# Patient Record
Sex: FEMALE | Race: WHITE | NOT HISPANIC OR LATINO | Employment: FULL TIME | URBAN - METROPOLITAN AREA
[De-identification: names, ages, dates, MRNs, and addresses within clinical notes are randomized per-mention and may not be internally consistent; named-entity substitution may affect disease eponyms.]

---

## 2017-01-30 ENCOUNTER — TELEPHONE (OUTPATIENT)
Dept: INTERNAL MEDICINE | Facility: CLINIC | Age: 62
End: 2017-01-30

## 2017-02-08 ENCOUNTER — TELEPHONE (OUTPATIENT)
Dept: INTERNAL MEDICINE | Facility: CLINIC | Age: 62
End: 2017-02-08

## 2017-02-08 NOTE — TELEPHONE ENCOUNTER
Please ask her to keep a log of her blood sugars for us to review at her next appointment.  For now, I recommend she continue the same dose of metformin.

## 2017-02-08 NOTE — TELEPHONE ENCOUNTER
Bydureon pen injector sent to [facility] mail order service due to medication sent to wrong pharmacy. Notified patient

## 2017-02-08 NOTE — TELEPHONE ENCOUNTER
----- Message from Marita Rivera sent at 2/8/2017 10:01 AM EST -----  Contact: pt  Pt calling prescription for Exenatide ER (BYDUREON) 2 MG pen-injector was sent to the wrong pharmacy. Pt is out , she says that if it can get sent in today [facility] will overnight the prescription for her. Could you resend?    Exenatide ER (BYDUREON) 2 MG pen-injector    To [facility]

## 2017-02-08 NOTE — TELEPHONE ENCOUNTER
----- Message from Chiquita Elliott sent at 2/8/2017  3:36 PM EST -----  Contact: pt - Dr Langston' pt - RE: Rx  Spoke w/ pt, pt informs will not get start on any insulin Rx. Pt informs that since discussed at last appt to just try and see and insurance will not cover Trulicity, pt informs will not try. Pt informs will see what numbers are between now and next appt. Pt informs if Metformin needs to be increased, could you please call in new Rx? Please advise. Thanks      Pt # [phone]

## 2017-03-15 ENCOUNTER — OFFICE VISIT (OUTPATIENT)
Dept: INTERNAL MEDICINE | Facility: CLINIC | Age: 62
End: 2017-03-15

## 2017-03-15 VITALS
BODY MASS INDEX: 33.66 KG/M2 | DIASTOLIC BLOOD PRESSURE: 74 MMHG | HEIGHT: 65 IN | WEIGHT: 202 LBS | RESPIRATION RATE: 14 BRPM | SYSTOLIC BLOOD PRESSURE: 122 MMHG

## 2017-03-15 DIAGNOSIS — I10 ESSENTIAL HYPERTENSION: ICD-10-CM

## 2017-03-15 DIAGNOSIS — E11.9 CONTROLLED TYPE 2 DIABETES MELLITUS WITHOUT COMPLICATION, WITHOUT LONG-TERM CURRENT USE OF INSULIN (HCC): Primary | ICD-10-CM

## 2017-03-15 DIAGNOSIS — E78.00 HYPERCHOLESTEROLEMIA: ICD-10-CM

## 2017-03-15 LAB
ALBUMIN SERPL-MCNC: 4.29 G/DL (ref 3.4–4.6)
ALBUMIN UR-MCNC: 23 MG/L (ref 1.3–20)
ALBUMIN/GLOB SERPL: 1.3 G/DL
ALP SERPL-CCNC: 88 U/L (ref 46–116)
ALT SERPL W P-5'-P-CCNC: 37 U/L (ref 14–59)
ANION GAP SERPL CALCULATED.3IONS-SCNC: 12 MMOL/L
AST SERPL-CCNC: 24 U/L (ref 7–37)
BACTERIA UR QL AUTO: ABNORMAL /HPF
BILIRUB SERPL-MCNC: 0.3 MG/DL (ref 0.2–1)
BILIRUB UR QL STRIP: NEGATIVE
BUN BLD-MCNC: 14 MG/DL (ref 6–22)
BUN/CREAT SERPL: 16.3 (ref 7–25)
CALCIUM SPEC-SCNC: 9.7 MG/DL (ref 8.6–10.5)
CHLORIDE SERPL-SCNC: 103 MMOL/L (ref 95–107)
CHOLEST SERPL-MCNC: 140 MG/DL (ref 0–200)
CLARITY UR: CLEAR
CO2 SERPL-SCNC: 26 MMOL/L (ref 23–32)
COD CRY URNS QL: ABNORMAL /HPF
COLOR UR: YELLOW
CREAT BLD-MCNC: 0.86 MG/DL (ref 0.55–1.02)
CREAT UR-MCNC: 190.4 MG/DL (ref 20–320)
GFR SERPL CREATININE-BSD FRML MDRD: 67 ML/MIN/1.73
GLOBULIN UR ELPH-MCNC: 3.3 GM/DL
GLUCOSE BLD-MCNC: 149 MG/DL (ref 70–100)
GLUCOSE UR STRIP-MCNC: NEGATIVE MG/DL
HBA1C MFR BLD: 6.9 % (ref 4–6)
HDLC SERPL-MCNC: 56 MG/DL (ref 40–81)
HGB UR QL STRIP.AUTO: ABNORMAL
HYALINE CASTS UR QL AUTO: ABNORMAL /LPF
KETONES UR QL STRIP: ABNORMAL
LDLC SERPL CALC-MCNC: 60 MG/DL (ref 0–100)
LDLC/HDLC SERPL: 1.07 {RATIO}
LEUKOCYTE ESTERASE UR QL STRIP.AUTO: ABNORMAL
MICROALBUMIN/CREAT UR: 12.1 MG/L (ref 1.3–30)
MUCOUS THREADS URNS QL MICRO: ABNORMAL /HPF
NITRITE UR QL STRIP: NEGATIVE
PH UR STRIP.AUTO: <=5 [PH] (ref 5–8)
POTASSIUM BLD-SCNC: 5.1 MMOL/L (ref 3.3–5.3)
PROT SERPL-MCNC: 7.6 G/DL (ref 6.3–8.4)
PROT UR QL STRIP: NEGATIVE
RBC # UR: ABNORMAL /HPF
REF LAB TEST METHOD: ABNORMAL
SODIUM BLD-SCNC: 141 MMOL/L (ref 136–145)
SP GR UR STRIP: >=1.03 (ref 1–1.03)
SQUAMOUS #/AREA URNS HPF: ABNORMAL /HPF
TRIGL SERPL-MCNC: 121 MG/DL (ref 0–150)
UROBILINOGEN UR QL STRIP: ABNORMAL
VLDLC SERPL-MCNC: 24.2 MG/DL
WBC UR QL AUTO: ABNORMAL /HPF

## 2017-03-15 PROCEDURE — 83036 HEMOGLOBIN GLYCOSYLATED A1C: CPT | Performed by: INTERNAL MEDICINE

## 2017-03-15 PROCEDURE — 36415 COLL VENOUS BLD VENIPUNCTURE: CPT | Performed by: INTERNAL MEDICINE

## 2017-03-15 PROCEDURE — 80053 COMPREHEN METABOLIC PANEL: CPT | Performed by: INTERNAL MEDICINE

## 2017-03-15 PROCEDURE — 99214 OFFICE O/P EST MOD 30 MIN: CPT | Performed by: INTERNAL MEDICINE

## 2017-03-15 PROCEDURE — 82570 ASSAY OF URINE CREATININE: CPT | Performed by: INTERNAL MEDICINE

## 2017-03-15 PROCEDURE — 80061 LIPID PANEL: CPT | Performed by: INTERNAL MEDICINE

## 2017-03-15 PROCEDURE — 81001 URINALYSIS AUTO W/SCOPE: CPT | Performed by: INTERNAL MEDICINE

## 2017-03-15 PROCEDURE — 82043 UR ALBUMIN QUANTITATIVE: CPT | Performed by: INTERNAL MEDICINE

## 2017-03-15 RX ORDER — FLUOXETINE HYDROCHLORIDE 40 MG/1
40 CAPSULE ORAL DAILY
Qty: 90 CAPSULE | Refills: 1 | Status: SHIPPED | OUTPATIENT
Start: 2017-03-15 | End: 2017-10-07 | Stop reason: SDUPTHER

## 2017-03-15 NOTE — PROGRESS NOTES
Chief Complaint   Patient presents with   • Hypertension     4 month check up    • Hyperlipidemia   • Diabetes        Subjective   Summer Jefferson is a 61 y.o. female     HPI: She comes in for follow-up of hypertension, hyperlipidemia and diabetes.  These are chronic conditions.    Diabetes:  She has diabetes type 2.  She does not have symptoms of polyuria, polydipsia or vision changes.  She does not have associated symptoms of extremity pain, numbness, tingling.  No unexplained weight loss.  She denies symptoms suggestive of hypoglycemia.  Her current treatment includes metformin,Bydureon, dietary modification and exercise. Bydureon was started soon after her last appointment when her hemoglobin A1c returned at 8.4%, higher than previously.  She is tolerating the medications well.  She is trying to follow a prudent diet and exercise regularly.  She recently had her lap band tightened.  So she thinks she is doing better with her diet.    Hyperlipidemia:  no management changes were made at her last appointment.  Her recent lipid panel done 11/14/2016 showed total cholesterol 154, triglycerides 170, HDL 49 and LDL 71.  She is currently taking a statin medication for this.  Exacerbating factors include use of thiazide diuretic and history of hypothyroidism.  Treatment complications negative for myalgias and elevated liver transaminases    Hypertension: Current treatment includes an ACE inhibitor and calcium channel blocker.  By report, there is good compliance with treatment, good tolerance of treatment.  She is also following a prudent diet and trying to exercise regularly.  She denies lightheadedness, dizziness.  No headaches, PND, dyspnea on exertion, orthopnea.    The following portions of the patient's history were reviewed and updated as appropriate: allergies, current medications, past social history, problem list, past surgical history    Review of Systems   Constitutional: Negative for activity change and  "appetite change.   HENT: Negative for nosebleeds.    Eyes: Negative for visual disturbance.   Respiratory: Negative for cough and shortness of breath.    Cardiovascular: Negative for chest pain, palpitations and leg swelling.   Neurological: Negative for headaches.   Psychiatric/Behavioral: Negative for sleep disturbance.       Visit Vitals   • /74 (BP Location: Left arm, Patient Position: Sitting, Cuff Size: Adult)   • Resp 14   • Ht 64.5\" (163.8 cm)   • Wt 202 lb (91.6 kg)   • BMI 34.14 kg/m2        Objective   Physical Exam   Constitutional: She is oriented to person, place, and time. She appears well-developed and well-nourished. No distress.   Neck: Normal carotid pulses present. Carotid bruit is not present.   Cardiovascular: Normal rate, regular rhythm, S1 normal, S2 normal and intact distal pulses.  Exam reveals no gallop and no friction rub.    No murmur heard.  Pulses:       Carotid pulses are 2+ on the right side, and 2+ on the left side.  Pulmonary/Chest: Effort normal and breath sounds normal. No respiratory distress. She has no wheezes. She has no rhonchi. She has no rales. Chest wall is not dull to percussion.   Musculoskeletal: She exhibits no edema.   Neurological: She is alert and oriented to person, place, and time.   Skin: Skin is warm and dry.   Nursing note and vitals reviewed.      Assessment/Plan   Problem List Items Addressed This Visit        Cardiovascular and Mediastinum    Hypertension    Hypercholesterolemia       Endocrine    Diabetes mellitus type 2, controlled - Primary    Relevant Orders    Comprehensive Metabolic Panel (Completed)    Lipid Panel (Completed)    Urinalysis With / Microscopic If Indicated (Completed)    Hemoglobin A1c (Completed)    Microalbumin / Creatinine Urine Ratio (Completed)    Urinalysis, Microscopic Only (Completed)            "

## 2017-04-06 ENCOUNTER — TELEPHONE (OUTPATIENT)
Dept: INTERNAL MEDICINE | Facility: CLINIC | Age: 62
End: 2017-04-06

## 2017-04-06 NOTE — TELEPHONE ENCOUNTER
----- Message from Renetta Trotter sent at 4/6/2017 11:42 AM EDT -----  Contact: Patient  Patient called regarding medication Exenatide ER (BYDUREON) 2 MG pen-injector.  Patient states every time she gives herself an injection she develops a lima bean-sized knot in her leg that can be seen with naked eye.  Only painful when first giving injection.  No discoloration.  She may take another injection on Monday so she will have insulin in her until new med is delivered, if Dr. Langston wants to prescribe new med.      Patient made aware Dr. Langston out this afternoon; states okay to make her aware tomorrow.  Please advise.     Patient:  [phone]    Pharmacy:  [facility] - [address] [phone] [facility] [phone]

## 2017-04-13 NOTE — TELEPHONE ENCOUNTER
Patient states she has multiple lumps on her stomach about the size of a lima bean, patient states she has had lumps that have lasted more than 2 months. She would like to know how long lumps will last before healing and some of the lumps are itchy. Patient would like to know if there is a better drug then Bydureon. Please advise

## 2017-04-13 NOTE — TELEPHONE ENCOUNTER
Please let her know that a small bump at the injection site is a fairly common side effect.  I think it is okay for her to continue using this medication if that is all she is noticed.

## 2017-04-14 NOTE — TELEPHONE ENCOUNTER
Please let her know that Tanzeum and Trulicity may not have that same reaction.  She may check with her pharmacist and also find out if either one is on her formulary

## 2017-06-13 RX ORDER — HYDROCHLOROTHIAZIDE 25 MG/1
TABLET ORAL
Qty: 90 TABLET | Refills: 1 | Status: SHIPPED | OUTPATIENT
Start: 2017-06-13 | End: 2017-11-29 | Stop reason: SDUPTHER

## 2017-09-08 ENCOUNTER — OFFICE VISIT (OUTPATIENT)
Dept: INTERNAL MEDICINE | Facility: CLINIC | Age: 62
End: 2017-09-08

## 2017-09-08 VITALS
BODY MASS INDEX: 32.99 KG/M2 | DIASTOLIC BLOOD PRESSURE: 66 MMHG | SYSTOLIC BLOOD PRESSURE: 112 MMHG | HEIGHT: 65 IN | WEIGHT: 198 LBS

## 2017-09-08 DIAGNOSIS — I10 ESSENTIAL HYPERTENSION: ICD-10-CM

## 2017-09-08 DIAGNOSIS — E03.9 HYPOTHYROIDISM, ADULT: ICD-10-CM

## 2017-09-08 DIAGNOSIS — E78.00 HYPERCHOLESTEROLEMIA: ICD-10-CM

## 2017-09-08 DIAGNOSIS — E11.9 CONTROLLED TYPE 2 DIABETES MELLITUS WITHOUT COMPLICATION, WITHOUT LONG-TERM CURRENT USE OF INSULIN (HCC): Primary | ICD-10-CM

## 2017-09-08 LAB
ALBUMIN SERPL-MCNC: 4.8 G/DL (ref 3.5–5.2)
ALBUMIN/GLOB SERPL: 1.8 G/DL
ALP SERPL-CCNC: 71 U/L (ref 39–117)
ALT SERPL W P-5'-P-CCNC: 28 U/L (ref 1–33)
ANION GAP SERPL CALCULATED.3IONS-SCNC: 14.5 MMOL/L
AST SERPL-CCNC: 29 U/L (ref 1–32)
BILIRUB SERPL-MCNC: 0.5 MG/DL (ref 0.1–1.2)
BUN BLD-MCNC: 15 MG/DL (ref 8–23)
BUN/CREAT SERPL: 16.9 (ref 7–25)
CALCIUM SPEC-SCNC: 10.2 MG/DL (ref 8.6–10.5)
CHLORIDE SERPL-SCNC: 99 MMOL/L (ref 98–107)
CHOLEST SERPL-MCNC: 132 MG/DL (ref 0–200)
CO2 SERPL-SCNC: 27.5 MMOL/L (ref 22–29)
CREAT BLD-MCNC: 0.89 MG/DL (ref 0.57–1)
GFR SERPL CREATININE-BSD FRML MDRD: 64 ML/MIN/1.73
GLOBULIN UR ELPH-MCNC: 2.7 GM/DL
GLUCOSE BLD-MCNC: 141 MG/DL (ref 65–99)
HBA1C MFR BLD: 6.23 % (ref 4.8–5.6)
HDLC SERPL-MCNC: 53 MG/DL (ref 40–60)
LDLC SERPL CALC-MCNC: 52 MG/DL (ref 0–100)
LDLC/HDLC SERPL: 0.98 {RATIO}
POTASSIUM BLD-SCNC: 4 MMOL/L (ref 3.5–5.2)
PROT SERPL-MCNC: 7.5 G/DL (ref 6–8.5)
SODIUM BLD-SCNC: 141 MMOL/L (ref 136–145)
TRIGL SERPL-MCNC: 136 MG/DL (ref 0–150)
TSH SERPL DL<=0.05 MIU/L-ACNC: 1.39 MIU/ML (ref 0.27–4.2)
VLDLC SERPL-MCNC: 27.2 MG/DL (ref 5–40)

## 2017-09-08 PROCEDURE — 36415 COLL VENOUS BLD VENIPUNCTURE: CPT | Performed by: INTERNAL MEDICINE

## 2017-09-08 PROCEDURE — 83036 HEMOGLOBIN GLYCOSYLATED A1C: CPT | Performed by: INTERNAL MEDICINE

## 2017-09-08 PROCEDURE — 80061 LIPID PANEL: CPT | Performed by: INTERNAL MEDICINE

## 2017-09-08 PROCEDURE — 80053 COMPREHEN METABOLIC PANEL: CPT | Performed by: INTERNAL MEDICINE

## 2017-09-08 PROCEDURE — 99214 OFFICE O/P EST MOD 30 MIN: CPT | Performed by: INTERNAL MEDICINE

## 2017-09-08 PROCEDURE — 84443 ASSAY THYROID STIM HORMONE: CPT | Performed by: INTERNAL MEDICINE

## 2017-09-08 NOTE — PROGRESS NOTES
Chief Complaint   Patient presents with   • Hyperlipidemia     4 month follow up   • Hypertension   • Diabetes        Subjective   Summer Jefferson is a 62 y.o. female     HPI:   Diabetes Type 2: This is a chronic problem.   She  has not had symptoms of polyuria, polydipsia or vision changes. Associated symptoms do not include extremity pain, numbness, paresthesias or unexplained weight loss.   She does not report symptoms suggestive of hypoglycemia.   Current treatment includes: bydureon, metformin. dietary modification, exercise program and diabetic foot care. By report there is good compliance with treatment and good tolerance of treatment.    Disease complications do not include peripheral neuropathy, diabetic retinopathy or diabetic nephropathy.    She does not check her blood sugars.   Diabetes self-management includes exercise and diet modification. Lifestyle recommendations include: diabetic diet, regular exercise, close monitoring of feet.  Medical history is significant for HTN, hyperlipidemia. She does not have history of CAD or CVA.  Lab Results   Component Value Date    HGBA1C 6.9 (H) 03/15/2017   She is up to date on eye exams.  Since starting bydureon, she has noticed urgent bowel movements after eating.     Hypertension: This is a chronic problem.   No management changes were made at her last appointment.       She has not had problems with headaches, visual disturbance, dizziness.  Recent blood pressures have been controlled.    She has not had associated symptoms of chest pain, syncope, edema, orthopnea, MARSHALL, PND.     Current treatment: diuretic, ACE-I, BB Prudent diet and regular exercise have also been recommended. By report, there is good compliance and good tolerance of medication. She takes celebrex which could potentially aggravate blood pressure.     Hyperlipidemia:  This is a chronic problem.   No management changes were made at her last appointment.   Her most recent lipid panel was done   "3/2017.  Total cholesterol was 140, Triglycerides 121, HDL 56, LDL 60.   Current treatment: statin  Prudent diet and regular exercise have also been recommended. By report, there is good compliance with treatment and good tolerance.    She has not experienced statin associated myalgias, dyspepsia.  She has not had liver enzyme elevation.        Hypothyroidism: Current treatment levothyroxine. She is compliant with the medication, tolerates it well and reports good control of symptoms of hypothyroidism.  She has not noted any change in tolerance of heat or cold.  No change in hair or skin.  No constipation.  No symptoms of hyperthyroidism.                The following portions of the patient's history were reviewed and updated as appropriate: allergies, current medications, past social history, problem list, past surgical history    Review of Systems   Constitutional: Negative for activity change and appetite change.   HENT: Negative for nosebleeds.    Eyes: Negative for visual disturbance.   Respiratory: Negative for cough and shortness of breath.    Cardiovascular: Negative for chest pain, palpitations and leg swelling.   Neurological: Negative for headaches.   Psychiatric/Behavioral: Negative for sleep disturbance.           Objective     /66  Ht 64.5\" (163.8 cm)  Wt 198 lb (89.8 kg)  BMI 33.46 kg/m2     Physical Exam   Constitutional: She is oriented to person, place, and time. She appears well-developed and well-nourished. No distress.   Neck: Normal carotid pulses present. Carotid bruit is not present.   Cardiovascular: Normal rate, regular rhythm, S1 normal, S2 normal and intact distal pulses.  Exam reveals no gallop and no friction rub.    No murmur heard.  Pulses:       Carotid pulses are 2+ on the right side, and 2+ on the left side.  Pulmonary/Chest: Effort normal and breath sounds normal. No respiratory distress. She has no wheezes. She has no rhonchi. She has no rales. Chest wall is not dull to " percussion.   Musculoskeletal: She exhibits no edema.   Neurological: She is alert and oriented to person, place, and time.   Skin: Skin is warm and dry.   Nursing note and vitals reviewed.      Assessment/Plan   Problem List Items Addressed This Visit        Cardiovascular and Mediastinum    Hypertension - Primary    Hypercholesterolemia       Endocrine    Diabetes mellitus type 2, controlled    Relevant Orders    Comprehensive Metabolic Panel    Lipid Panel    Hemoglobin A1c    Hypothyroidism, adult    Relevant Orders    TSH

## 2017-10-09 RX ORDER — FLUOXETINE HYDROCHLORIDE 40 MG/1
CAPSULE ORAL
Qty: 90 CAPSULE | Refills: 3 | Status: SHIPPED | OUTPATIENT
Start: 2017-10-09 | End: 2017-11-29 | Stop reason: SDUPTHER

## 2017-11-29 ENCOUNTER — TELEPHONE (OUTPATIENT)
Dept: INTERNAL MEDICINE | Facility: CLINIC | Age: 62
End: 2017-11-29

## 2017-11-29 DIAGNOSIS — E11.9 TYPE 2 DIABETES MELLITUS WITHOUT COMPLICATION, WITH LONG-TERM CURRENT USE OF INSULIN (HCC): ICD-10-CM

## 2017-11-29 DIAGNOSIS — Z79.4 TYPE 2 DIABETES MELLITUS WITHOUT COMPLICATION, WITH LONG-TERM CURRENT USE OF INSULIN (HCC): ICD-10-CM

## 2017-11-29 DIAGNOSIS — E78.5 HYPERLIPIDEMIA, UNSPECIFIED HYPERLIPIDEMIA TYPE: ICD-10-CM

## 2017-11-29 DIAGNOSIS — E03.9 HYPOTHYROIDISM, UNSPECIFIED TYPE: Primary | ICD-10-CM

## 2017-11-29 RX ORDER — VERAPAMIL HYDROCHLORIDE 240 MG/1
240 CAPSULE, EXTENDED RELEASE ORAL NIGHTLY
Qty: 90 CAPSULE | Refills: 3 | Status: SHIPPED | OUTPATIENT
Start: 2017-11-29 | End: 2018-01-04 | Stop reason: SDUPTHER

## 2017-11-29 RX ORDER — ROSUVASTATIN CALCIUM 10 MG/1
10 TABLET, COATED ORAL DAILY
Qty: 90 TABLET | Refills: 3 | Status: SHIPPED | OUTPATIENT
Start: 2017-11-29 | End: 2018-11-06 | Stop reason: SDUPTHER

## 2017-11-29 RX ORDER — METFORMIN HYDROCHLORIDE 500 MG/1
TABLET, EXTENDED RELEASE ORAL
Qty: 270 TABLET | Refills: 3 | Status: SHIPPED | OUTPATIENT
Start: 2017-11-29 | End: 2018-11-06 | Stop reason: SDUPTHER

## 2017-11-29 RX ORDER — CELECOXIB 200 MG/1
200 CAPSULE ORAL 2 TIMES DAILY
Qty: 180 CAPSULE | Refills: 3 | Status: SHIPPED | OUTPATIENT
Start: 2017-11-29 | End: 2018-11-06 | Stop reason: SDUPTHER

## 2017-11-29 RX ORDER — LEVOTHYROXINE SODIUM 0.05 MG/1
50 TABLET ORAL DAILY
Qty: 90 TABLET | Refills: 3 | Status: SHIPPED | OUTPATIENT
Start: 2017-11-29 | End: 2018-11-06 | Stop reason: SDUPTHER

## 2017-11-29 RX ORDER — FLUOXETINE HYDROCHLORIDE 40 MG/1
40 CAPSULE ORAL DAILY
Qty: 90 CAPSULE | Refills: 3 | Status: SHIPPED | OUTPATIENT
Start: 2017-11-29 | End: 2018-05-16

## 2017-11-29 RX ORDER — FAMCICLOVIR 500 MG/1
500 TABLET ORAL 2 TIMES DAILY
Qty: 180 TABLET | Refills: 3 | Status: SHIPPED | OUTPATIENT
Start: 2017-11-29 | End: 2018-08-15

## 2017-11-29 RX ORDER — LISINOPRIL 10 MG/1
10 TABLET ORAL DAILY
Qty: 90 TABLET | Refills: 3 | Status: SHIPPED | OUTPATIENT
Start: 2017-11-29 | End: 2018-11-06 | Stop reason: SDUPTHER

## 2017-11-29 RX ORDER — HYDROCHLOROTHIAZIDE 25 MG/1
25 TABLET ORAL DAILY
Qty: 90 TABLET | Refills: 3 | Status: SHIPPED | OUTPATIENT
Start: 2017-11-29 | End: 2018-11-06 | Stop reason: SDUPTHER

## 2017-11-29 NOTE — TELEPHONE ENCOUNTER
----- Message from Geri Hudson MA sent at 11/29/2017 10:20 AM EST -----  Pt calling for new scripts w/refills to be sent to mail order:    [id number]  Crestor 10mg  Metformin XR 500mg  Levothyroxine 50mcg  Lisinopril 10mg  Fluoxetine 40mg  HCTZ 25mg  Bydureon Pen 2mg  Celebrex 200mg  Famvir 500mg    [facility]

## 2017-12-04 PROBLEM — B00.9: Status: ACTIVE | Noted: 2017-12-04

## 2017-12-20 RX ORDER — LIDOCAINE 50 MG/G
PATCH TOPICAL
Qty: 90 PATCH | Refills: 1 | Status: SHIPPED | OUTPATIENT
Start: 2017-12-20 | End: 2019-07-29

## 2017-12-21 ENCOUNTER — TELEPHONE (OUTPATIENT)
Dept: INTERNAL MEDICINE | Facility: CLINIC | Age: 62
End: 2017-12-21

## 2017-12-21 RX ORDER — AZITHROMYCIN 250 MG/1
TABLET, FILM COATED ORAL
Qty: 6 TABLET | Refills: 0 | Status: SHIPPED | OUTPATIENT
Start: 2017-12-21 | End: 2018-05-16

## 2017-12-21 NOTE — TELEPHONE ENCOUNTER
----- Message from Serena Khan sent at 12/21/2017 11:03 AM EST -----  Contact: Pt  Pt is having coughing and tried to come into the acute clinic today and is closed.  She went to urgent care and they tried to charge $100 for a copay.  She wanted to see if anything could be done or called in. Please advise.  She states her  was just in yesterday with the same thing, got a z pac    Coughing  Head cold  Sinuses    Pt# [phone]

## 2017-12-26 ENCOUNTER — APPOINTMENT (OUTPATIENT)
Dept: GENERAL RADIOLOGY | Facility: HOSPITAL | Age: 62
End: 2017-12-26

## 2017-12-26 PROCEDURE — 71020 HC CHEST PA AND LATERAL: CPT | Performed by: EMERGENCY MEDICINE

## 2017-12-26 PROCEDURE — 71020 XR CHEST 2 VW: CPT | Performed by: EMERGENCY MEDICINE

## 2018-01-04 RX ORDER — VERAPAMIL HYDROCHLORIDE 240 MG/1
240 CAPSULE, EXTENDED RELEASE ORAL NIGHTLY
Qty: 90 CAPSULE | Refills: 3 | Status: SHIPPED | OUTPATIENT
Start: 2018-01-04 | End: 2018-11-06 | Stop reason: SDUPTHER

## 2018-04-23 ENCOUNTER — TELEPHONE (OUTPATIENT)
Dept: INTERNAL MEDICINE | Facility: CLINIC | Age: 63
End: 2018-04-23

## 2018-04-23 NOTE — TELEPHONE ENCOUNTER
----- Message from Serena Khan sent at 4/23/2018 11:00 AM EDT -----  Contact: pt  Pt is calling for a refill     FOR  glucose blood (ACCU-CHEK COMPACT TEST DRUM) test strip      Patient requests RX SENT TO   [facility] - [address] [phone] [facility] [phone] [facility]      Caller# Phone:  W:[phone]; H:[phone]    Please and thank you.

## 2018-05-02 ENCOUNTER — TELEPHONE (OUTPATIENT)
Dept: INTERNAL MEDICINE | Facility: CLINIC | Age: 63
End: 2018-05-02

## 2018-05-02 NOTE — TELEPHONE ENCOUNTER
----- Message from Naomie Alarcon sent at 4/30/2018 11:59 AM EDT -----  Contact: Pharmacy  Pharmacy Calling to clarify prescription    glucose blood (ACCU-CHEK COMPACT TEST DRUM) test strip    [facility]:[phone]  REF:[id number]

## 2018-05-02 NOTE — TELEPHONE ENCOUNTER
----- Message from aNomie Alarcon sent at 5/2/2018 11:44 AM EDT -----  Contact: Patient  Patient calling has an appointment on 5/11 at 1 can we write orders and she come in before and have blood drawn.Please advise    Patient:[phone]

## 2018-05-02 NOTE — TELEPHONE ENCOUNTER
Patient's pharmacy stated insurance wasn't covering previous strips ordered, so a new meter strips and lancets have been called in and I have left message to inform patient

## 2018-05-05 DIAGNOSIS — Z79.4 TYPE 2 DIABETES MELLITUS WITHOUT COMPLICATION, WITH LONG-TERM CURRENT USE OF INSULIN (HCC): Primary | ICD-10-CM

## 2018-05-05 DIAGNOSIS — E11.9 TYPE 2 DIABETES MELLITUS WITHOUT COMPLICATION, WITH LONG-TERM CURRENT USE OF INSULIN (HCC): Primary | ICD-10-CM

## 2018-05-05 DIAGNOSIS — E03.9 HYPOTHYROIDISM, UNSPECIFIED TYPE: ICD-10-CM

## 2018-05-08 ENCOUNTER — TELEPHONE (OUTPATIENT)
Dept: INTERNAL MEDICINE | Facility: CLINIC | Age: 63
End: 2018-05-08

## 2018-05-08 NOTE — TELEPHONE ENCOUNTER
----- Message from Naomie Alarcon sent at 5/3/2018 11:03 AM EDT -----  Contact: Patient  Patient calling, Pharmacy said Pharmacy is waiting on additional information for lidocaine (LIDODERM) 5 %. Please advise    Patient:[phone]    Pharmacy:[facility] - [address] [phone] [facility] [phone]

## 2018-05-16 ENCOUNTER — OFFICE VISIT (OUTPATIENT)
Dept: INTERNAL MEDICINE | Facility: CLINIC | Age: 63
End: 2018-05-16

## 2018-05-16 VITALS
SYSTOLIC BLOOD PRESSURE: 108 MMHG | BODY MASS INDEX: 33.46 KG/M2 | HEIGHT: 64 IN | WEIGHT: 196 LBS | DIASTOLIC BLOOD PRESSURE: 64 MMHG

## 2018-05-16 DIAGNOSIS — I10 ESSENTIAL HYPERTENSION: ICD-10-CM

## 2018-05-16 DIAGNOSIS — E11.9 CONTROLLED TYPE 2 DIABETES MELLITUS WITHOUT COMPLICATION, WITHOUT LONG-TERM CURRENT USE OF INSULIN (HCC): Primary | ICD-10-CM

## 2018-05-16 DIAGNOSIS — E03.9 HYPOTHYROIDISM, UNSPECIFIED TYPE: ICD-10-CM

## 2018-05-16 DIAGNOSIS — E11.9 TYPE 2 DIABETES MELLITUS WITHOUT COMPLICATION, WITH LONG-TERM CURRENT USE OF INSULIN (HCC): ICD-10-CM

## 2018-05-16 DIAGNOSIS — Z79.4 TYPE 2 DIABETES MELLITUS WITHOUT COMPLICATION, WITH LONG-TERM CURRENT USE OF INSULIN (HCC): ICD-10-CM

## 2018-05-16 DIAGNOSIS — E78.00 HYPERCHOLESTEROLEMIA: ICD-10-CM

## 2018-05-16 LAB
ALBUMIN SERPL-MCNC: 4.9 G/DL (ref 3.5–5.2)
ALBUMIN/GLOB SERPL: 2 G/DL
ALP SERPL-CCNC: 71 U/L (ref 39–117)
ALT SERPL-CCNC: 25 U/L (ref 1–33)
AST SERPL-CCNC: 28 U/L (ref 1–32)
BILIRUB SERPL-MCNC: 0.4 MG/DL (ref 0.1–1.2)
BUN SERPL-MCNC: 15 MG/DL (ref 8–23)
BUN/CREAT SERPL: 15.6 (ref 7–25)
CALCIUM SERPL-MCNC: 10.1 MG/DL (ref 8.6–10.5)
CHLORIDE SERPL-SCNC: 98 MMOL/L (ref 98–107)
CHOLEST SERPL-MCNC: 126 MG/DL (ref 0–200)
CO2 SERPL-SCNC: 26.2 MMOL/L (ref 22–29)
CREAT SERPL-MCNC: 0.96 MG/DL (ref 0.57–1)
ERYTHROCYTE [DISTWIDTH] IN BLOOD BY AUTOMATED COUNT: 14.3 % (ref 11.7–13)
GLOBULIN SER CALC-MCNC: 2.5 GM/DL
GLUCOSE SERPL-MCNC: 109 MG/DL (ref 65–99)
HBA1C MFR BLD: 6.2 % (ref 4.8–5.6)
HCT VFR BLD AUTO: 47.4 % (ref 35.6–45.5)
HDLC SERPL-MCNC: 50 MG/DL (ref 40–60)
HGB BLD-MCNC: 14.7 G/DL (ref 11.9–15.5)
LDLC SERPL CALC-MCNC: 52 MG/DL (ref 0–100)
MCH RBC QN AUTO: 28.3 PG (ref 26.9–32)
MCHC RBC AUTO-ENTMCNC: 31 G/DL (ref 32.4–36.3)
MCV RBC AUTO: 91.3 FL (ref 80.5–98.2)
PLATELET # BLD AUTO: 287 10*3/MM3 (ref 140–500)
POTASSIUM SERPL-SCNC: 4.5 MMOL/L (ref 3.5–5.2)
PROT SERPL-MCNC: 7.4 G/DL (ref 6–8.5)
RBC # BLD AUTO: 5.19 10*6/MM3 (ref 3.9–5.2)
SODIUM SERPL-SCNC: 142 MMOL/L (ref 136–145)
TRIGL SERPL-MCNC: 118 MG/DL (ref 0–150)
TSH SERPL-ACNC: 1.71 MIU/ML (ref 0.27–4.2)
VLDLC SERPL-MCNC: 23.6 MG/DL (ref 5–40)
WBC # BLD AUTO: 8.64 10*3/MM3 (ref 4.5–10.7)

## 2018-05-16 PROCEDURE — 99214 OFFICE O/P EST MOD 30 MIN: CPT | Performed by: INTERNAL MEDICINE

## 2018-05-16 RX ORDER — FLUOXETINE HYDROCHLORIDE 20 MG/1
CAPSULE ORAL
Qty: 30 CAPSULE | Refills: 0 | Status: SHIPPED | OUTPATIENT
Start: 2018-05-16 | End: 2018-05-16 | Stop reason: SDUPTHER

## 2018-05-16 RX ORDER — FLUOXETINE HYDROCHLORIDE 20 MG/1
CAPSULE ORAL
Qty: 57 CAPSULE | Refills: 0 | Status: SHIPPED | OUTPATIENT
Start: 2018-05-16 | End: 2018-09-04 | Stop reason: SDUPTHER

## 2018-05-16 NOTE — PROGRESS NOTES
Chief Complaint   Patient presents with   • Hyperlipidemia     follow up   • Hypertension   • Diabetes       Subjective   Summer Jefferson is a 62 y.o. female.     Hyperlipidemia   This is a chronic problem. Recent lipid tests were reviewed and are normal. Exacerbating diseases include diabetes, hypothyroidism and obesity. Factors aggravating her hyperlipidemia include thiazides. Pertinent negatives include no chest pain or shortness of breath. Current antihyperlipidemic treatment includes statins, exercise and diet change. The current treatment provides significant improvement of lipids. There are no compliance problems.  Risk factors for coronary artery disease include diabetes mellitus, dyslipidemia and post-menopausal.   Hypertension   This is a chronic problem. The problem is controlled. Pertinent negatives include no anxiety, blurred vision, chest pain, headaches, orthopnea, palpitations, peripheral edema, PND or shortness of breath. Agents associated with hypertension include NSAIDs. Risk factors for coronary artery disease include diabetes mellitus, dyslipidemia, obesity, sedentary lifestyle and post-menopausal state. Current antihypertension treatment includes ACE inhibitors, diuretics, calcium channel blockers and lifestyle changes. The current treatment provides significant improvement. There are no compliance problems.  There is no history of CAD/MI, CVA or left ventricular hypertrophy.   Diabetes   She presents for her follow-up diabetic visit. She has type 2 diabetes mellitus. Pertinent negatives for hypoglycemia include no headaches. (No episodes of hypoglycemia) Pertinent negatives for diabetes include no blurred vision, no chest pain, no foot paresthesias, no polydipsia, no polyuria and no visual change. Pertinent negatives for diabetic complications include no CVA. Risk factors for coronary artery disease include dyslipidemia, diabetes mellitus, obesity and post-menopausal. Current diabetic treatment  "includes oral agent (monotherapy) and diet (and bydureon). She is compliant with treatment most of the time. Her weight is stable. She is following a generally healthy diet. Meal planning includes avoidance of concentrated sweets. She participates in exercise intermittently. There is no change in her home blood glucose trend. An ACE inhibitor/angiotensin II receptor blocker is being taken. Eye exam is current.   Depression: has taken antidepressant for years.  Would like to try and stop. Makes her feel \"numb\".  She has reduced fluoxetine to every other day.      Hypothyroidism: Current treatment levothyroxine. She is compliant with the medication, tolerates it well and reports good control of symptoms of hypothyroidism.  She has not noted any change in tolerance of heat or cold.  No change in hair or skin.  No constipation.  No symptoms of hyperthyroidism.       The following portions of the patient's history were reviewed and updated as appropriate: allergies, current medications, past family history, past medical history, past social history, past surgical history and problem list.    Review of Systems   Constitutional: Negative for appetite change.   HENT: Negative for nosebleeds.    Eyes: Negative for blurred vision and double vision.   Respiratory: Negative for cough and shortness of breath.    Cardiovascular: Negative for chest pain, palpitations, orthopnea, leg swelling and PND.   Endocrine: Negative for polydipsia and polyuria.   Neurological: Negative for headache.         Current Outpatient Prescriptions:   •  albuterol (PROVENTIL HFA;VENTOLIN HFA) 108 (90 Base) MCG/ACT inhaler, Inhale 2 puffs Every 4 (Four) Hours As Needed for Wheezing., Disp: 6.7 g, Rfl: 0  •  celecoxib (CeleBREX) 200 MG capsule, Take 1 capsule by mouth 2 (Two) Times a Day., Disp: 180 capsule, Rfl: 3  •  Exenatide ER (BYDUREON) 2 MG pen-injector, Inject 2 mg under the skin Every 7 (Seven) Days., Disp: 12 each, Rfl: 3  •  famciclovir " "(FAMVIR) 500 MG tablet, Take 1 tablet by mouth 2 (Two) Times a Day., Disp: 180 tablet, Rfl: 3  •  FLUoxetine (PROZAC) 20 MG capsule, One daily for two weeks, then one every other day until finished, Disp: 30 capsule, Rfl: 0  •  glucose blood (ACCU-CHEK COMPACT TEST DRUM) test strip, 1 each by Other route Daily., Disp: 90 each, Rfl: 3  •  hydrochlorothiazide (HYDRODIURIL) 25 MG tablet, Take 1 tablet by mouth Daily., Disp: 90 tablet, Rfl: 3  •  Lancets Misc. (ACCU-CHEK FASTCLIX LANCET) kit, 1 each Daily. Checks blood sugar 3 times a day, Disp: 1 kit, Rfl: 3  •  levothyroxine (SYNTHROID, LEVOTHROID) 50 MCG tablet, Take 1 tablet by mouth Daily., Disp: 90 tablet, Rfl: 3  •  lidocaine (LIDODERM) 5 %, APPLY 1 PATCH DAILY FOR 12  HOURS. REMOVE AFTER 12  HOURS., Disp: 90 patch, Rfl: 1  •  lisinopril (PRINIVIL,ZESTRIL) 10 MG tablet, Take 1 tablet by mouth Daily., Disp: 90 tablet, Rfl: 3  •  metFORMIN ER (GLUCOPHAGE-XR) 500 MG 24 hr tablet, Take one with breakfast, two with dinner, Disp: 270 tablet, Rfl: 3  •  rosuvastatin (CRESTOR) 10 MG tablet, Take 1 tablet by mouth Daily., Disp: 90 tablet, Rfl: 3  •  verapamil (VERELAN) 240 MG 24 hr capsule, Take 1 capsule by mouth Every Night., Disp: 90 capsule, Rfl: 3        Objective     /64   Ht 162.6 cm (64\")   Wt 88.9 kg (196 lb)   BMI 33.64 kg/m²     Physical Exam   Constitutional: She is oriented to person, place, and time. She appears well-developed and well-nourished. No distress.   Neck: Normal carotid pulses present. Carotid bruit is not present.   Cardiovascular: Regular rhythm, S1 normal and S2 normal.  Exam reveals no gallop and no friction rub.    No murmur heard.  Pulses:       Carotid pulses are 2+ on the right side, and 2+ on the left side.  Pulmonary/Chest: Effort normal and breath sounds normal. She has no wheezes. She has no rhonchi. She has no rales. Chest wall is not dull to percussion.   Musculoskeletal: She exhibits no edema.   Neurological: She is alert " "and oriented to person, place, and time.   Skin: Skin is warm and dry.   Nursing note and vitals reviewed.        Assessment/Plan   Summer was seen today for hyperlipidemia, hypertension and diabetes.    Diagnoses and all orders for this visit:    Controlled type 2 diabetes mellitus without complication, without long-term current use of insulin    Essential hypertension    Hypercholesterolemia    Type 2 diabetes mellitus without complication, with long-term current use of insulin  -     Comprehensive Metabolic Panel  -     Lipid Panel  -     Hemoglobin A1c  -     Microalbumin / Creatinine Urine Ratio - Urine, Clean Catch  -     Urinalysis With / Microscopic If Indicated - Urine, Clean Catch  -     CBC (No Diff)    Hypothyroidism, unspecified type  -     TSH Rfx On Abnormal To Free T4    Other orders  -     FLUoxetine (PROZAC) 20 MG capsule; One daily for two weeks, then one every other day until finished      Will try tapering and discontinuing fluoxetine to see if this makes her feel less \"numb\".  She currently takes 40 mg daily.  She has been taking this every other day.  Prescription given for fluoxetine 20 mg, one daily for 2 weeks and then 1 every other day until finished.  If she notices symptoms of worsening depression, she will resume fluoxetine 40 mg..         "

## 2018-05-17 LAB
CREAT 24H UR-MCNC: 180 MG/DL
MICROALBUMIN UR-MCNC: 19.2 UG/ML
MICROALBUMIN/CREAT UR: 10.7 MG/G CREAT (ref 0–30)

## 2018-05-18 DIAGNOSIS — N39.0 URINARY TRACT INFECTION WITHOUT HEMATURIA, SITE UNSPECIFIED: Primary | ICD-10-CM

## 2018-05-18 LAB
APPEARANCE UR: CLEAR
BACTERIA UR CULT: ABNORMAL
BILIRUB UR QL STRIP: NEGATIVE
CASTS URNS MICRO: ABNORMAL
CASTS URNS QL MICRO: PRESENT /LPF
COLOR UR: YELLOW
CONV BACTERIA IN URINE MICRO: ABNORMAL /HPF
CRYSTALS URNS MICRO: ABNORMAL
EPI CELLS #/AREA URNS HPF: >10 /HPF
GLUCOSE UR QL: NEGATIVE
HGB UR QL STRIP: NEGATIVE
KETONES UR QL STRIP: NEGATIVE
LEUKOCYTE ESTERASE UR QL STRIP: ABNORMAL
Lab: ABNORMAL
MICRO URNS: ABNORMAL
MUCOUS THREADS URNS QL MICRO: PRESENT /HPF
NITRITE UR QL STRIP: NEGATIVE
PH UR STRIP.AUTO: 5.5 [PH] (ref 5–7.5)
PROTEIN: NEGATIVE
RBC #/AREA URNS HPF: ABNORMAL /HPF
SP GR UR: 1.02 (ref 1–1.03)
UNIDENT CRYS URNS QL MICRO: PRESENT /LPF
URINALYSIS REFLEX: ABNORMAL
UROBILINOGEN UR STRIP-MCNC: 1 MG/DL (ref 0.2–1)
WBC #/AREA URNS HPF: ABNORMAL /HPF

## 2018-05-18 RX ORDER — AMOXICILLIN 875 MG/1
875 TABLET, COATED ORAL 2 TIMES DAILY
Qty: 14 TABLET | Refills: 0 | Status: SHIPPED | OUTPATIENT
Start: 2018-05-18 | End: 2018-05-25

## 2018-08-08 ENCOUNTER — TELEPHONE (OUTPATIENT)
Dept: ORTHOPEDIC SURGERY | Facility: CLINIC | Age: 63
End: 2018-08-08

## 2018-08-15 ENCOUNTER — OFFICE VISIT (OUTPATIENT)
Dept: ORTHOPEDIC SURGERY | Facility: CLINIC | Age: 63
End: 2018-08-15

## 2018-08-15 VITALS — BODY MASS INDEX: 34.15 KG/M2 | WEIGHT: 200 LBS | HEIGHT: 64 IN

## 2018-08-15 DIAGNOSIS — M54.16 LUMBAR RADICULOPATHY: Primary | ICD-10-CM

## 2018-08-15 PROCEDURE — 72100 X-RAY EXAM L-S SPINE 2/3 VWS: CPT | Performed by: ORTHOPAEDIC SURGERY

## 2018-08-15 PROCEDURE — 73562 X-RAY EXAM OF KNEE 3: CPT | Performed by: ORTHOPAEDIC SURGERY

## 2018-08-15 PROCEDURE — 72170 X-RAY EXAM OF PELVIS: CPT | Performed by: ORTHOPAEDIC SURGERY

## 2018-08-15 PROCEDURE — 99213 OFFICE O/P EST LOW 20 MIN: CPT | Performed by: ORTHOPAEDIC SURGERY

## 2018-08-15 RX ORDER — LIDOCAINE 50 MG/G
1 PATCH TOPICAL DAILY
Qty: 30 PATCH | Refills: 2 | Status: SHIPPED | OUTPATIENT
Start: 2018-08-15 | End: 2019-12-11

## 2018-08-19 NOTE — PROGRESS NOTES
"Patient:Summer Jefferson    YOB: 1955    Medical Record Number:[id number]    Chief Complaints:  Right hip and leg pain    History of Present Illness:     63 y.o. female patient who presents for evaluation of her right hip and leg.  She reports a 6 month history of pain in the back, buttock and down the back of her right leg without precipitating event or factor.  She says that she feels like her gait is \"off\".  She feels like her leg wants to \"rotate out\".  She describes her pain as mild, constant and aching.  The symptoms are worse with standing, sitting, driving and walking.  Ice and anti-inflammatories help.    Allergies:  Allergies   Allergen Reactions   • Oxycodone-Acetaminophen      Pruritis       Home Medications:    Current Outpatient Prescriptions:   •  celecoxib (CeleBREX) 200 MG capsule, Take 1 capsule by mouth 2 (Two) Times a Day., Disp: 180 capsule, Rfl: 3  •  Exenatide ER (BYDUREON) 2 MG pen-injector, Inject 2 mg under the skin Every 7 (Seven) Days., Disp: 12 each, Rfl: 3  •  FLUoxetine (PROzac) 20 MG capsule, TAKE ONE CAPSULE BY MOUTH DAILY FOR 2 WEEKS THEN 1 EVERY OTHER DAY UNTIL FINISHED, Disp: 57 capsule, Rfl: 0  •  glucose blood (ACCU-CHEK COMPACT TEST DRUM) test strip, 1 each by Other route Daily., Disp: 90 each, Rfl: 3  •  hydrochlorothiazide (HYDRODIURIL) 25 MG tablet, Take 1 tablet by mouth Daily., Disp: 90 tablet, Rfl: 3  •  Lancets Misc. (ACCU-CHEK FASTCLIX LANCET) kit, 1 each Daily. Checks blood sugar 3 times a day, Disp: 1 kit, Rfl: 3  •  levothyroxine (SYNTHROID, LEVOTHROID) 50 MCG tablet, Take 1 tablet by mouth Daily., Disp: 90 tablet, Rfl: 3  •  lisinopril (PRINIVIL,ZESTRIL) 10 MG tablet, Take 1 tablet by mouth Daily., Disp: 90 tablet, Rfl: 3  •  metFORMIN ER (GLUCOPHAGE-XR) 500 MG 24 hr tablet, Take one with breakfast, two with dinner, Disp: 270 tablet, Rfl: 3  •  rosuvastatin (CRESTOR) 10 MG tablet, Take 1 tablet by mouth Daily., Disp: 90 tablet, Rfl: 3  •  verapamil " (VERELAN) 240 MG 24 hr capsule, Take 1 capsule by mouth Every Night., Disp: 90 capsule, Rfl: 3  •  albuterol (PROVENTIL HFA;VENTOLIN HFA) 108 (90 Base) MCG/ACT inhaler, Inhale 2 puffs Every 4 (Four) Hours As Needed for Wheezing., Disp: 6.7 g, Rfl: 0  •  lidocaine (LIDODERM) 5 %, APPLY 1 PATCH DAILY FOR 12  HOURS. REMOVE AFTER 12  HOURS., Disp: 90 patch, Rfl: 1  •  lidocaine (LIDODERM) 5 %, Place 1 patch on the skin as directed by provider Daily. Remove & Discard patch within 12 hours or as directed by MD, Disp: 30 patch, Rfl: 2    Past Medical History:   Diagnosis Date   • Backache    • Diabetes mellitus type 2, controlled (CMS/[facility])    • H/O bone density study     8/2014   • H/O mammogram     8/2014   • Hypercholesterolemia    • Hypertension    • Hypothyroidism    • Osteoarthritis of multiple joints        Past Surgical History:   Procedure Laterality Date   • ANKLE ARTHROSCOPY Right     1/2013 & 10/2013   • COLONOSCOPY      7/27/2007   • LAPAROSCOPIC GASTRIC BANDING  08/01/2007   • PAP SMEAR      12/2014   • ROTATOR CUFF REPAIR Right     5/2014   • TOTAL KNEE ARTHROPLASTY Right     12/2014       Social History     Occupational History   • Not on file.     Social History Main Topics   • Smoking status: Former Smoker   • Smokeless tobacco: Not on file   • Alcohol use Yes   • Drug use: Unknown   • Sexual activity: Not on file      Social History     Social History Narrative   • No narrative on file       History reviewed. No pertinent family history.    Review of Systems:      Constitutional: Denies fever, shaking or chills   Eyes: Denies change in visual acuity   HEENT: Denies nasal congestion or sore throat   Respiratory: Denies cough or shortness of breath   Cardiovascular: Denies chest pain or edema  Endocrine: Denies tremors, palpitations, intolerance of heat or cold, polyuria, polydipsia.  GI: Denies abdominal pain, nausea, vomiting, bloody stools or diarrhea  : Denies frequency, urgency, incontinence,  "retention, or nocturia.  Musculoskeletal: Denies numbness, tingling or loss of motor function except as above  Integument: Denies rash, lesion or ulceration   Neurologic: Denies headache or focal weakness, deficits  Heme: Denies spontaneous or excessive bleeding, epistaxis, hematuria, melena, fatigue, enlarged or tender lymph nodes.      All other pertinent positives and negatives as noted above in HPI.    Physical Exam:63 y.o. female  Vitals:    08/15/18 1437   Weight: 90.7 kg (200 lb)   Height: 162.6 cm (64\")   RR:  12    General:  Patient is awake and alert.  Appears in no acute distress or discomfort.    Psych:  Affect and demeanor are appropriate.    Back: No tenderness or step-offs.  Skin is benign.  A straight leg raise maneuver on the right does reproduce back and buttock pain    Extremities:  The right knee is examined.  Incision is healed.  No tenderness or effusion.  No adenopathy.  Good motion.  No instability.  Good strength with hip flexion and knee extension.  Good strength with plantar flexion and dorsal flexion of the ankle and toes.  Sensation is intact.  Brisk capillary refill.    Imaging:  AP, merchants and lateral views the right knee are ordered, reviewed, and compared to previous x-rays.  The implants appear well fixed and well positioned.  No complicating process noted.  AP and lateral views of the lumbar spine ordered and reviewed.  She has grade 1 spondylolisthesis at L4/5 and mild diffuse degenerative changes.  An AP pelvis is ordered and reviewed.  No concerning findings noted.    Assessment/Plan:  Lumbar radiculopathy    I think her symptoms are coming from the back.  I suggested physical therapy.  She was given a referral for this.  She tells me that she has responded well to Lidoderm patches in the past.  I have agreed to give her a limited prescription for those.  Risks were discussed.  If her symptoms persist, I will be happy to see her back.  Otherwise, she can follow-up as " needed.    Madi Blanton MD    08/15/2018

## 2018-08-21 ENCOUNTER — TREATMENT (OUTPATIENT)
Dept: PHYSICAL THERAPY | Facility: CLINIC | Age: 63
End: 2018-08-21

## 2018-08-21 DIAGNOSIS — M54.16 LUMBAR RADICULOPATHY: ICD-10-CM

## 2018-08-21 DIAGNOSIS — G89.29 CHRONIC RIGHT-SIDED LOW BACK PAIN WITH RIGHT-SIDED SCIATICA: Primary | ICD-10-CM

## 2018-08-21 DIAGNOSIS — M54.41 CHRONIC RIGHT-SIDED LOW BACK PAIN WITH RIGHT-SIDED SCIATICA: Primary | ICD-10-CM

## 2018-08-21 PROCEDURE — 97530 THERAPEUTIC ACTIVITIES: CPT | Performed by: PHYSICAL THERAPIST

## 2018-08-21 PROCEDURE — G0283 ELEC STIM OTHER THAN WOUND: HCPCS | Performed by: PHYSICAL THERAPIST

## 2018-08-21 PROCEDURE — 97112 NEUROMUSCULAR REEDUCATION: CPT | Performed by: PHYSICAL THERAPIST

## 2018-08-21 PROCEDURE — 97161 PT EVAL LOW COMPLEX 20 MIN: CPT | Performed by: PHYSICAL THERAPIST

## 2018-08-21 NOTE — PROGRESS NOTES
Physical Therapy Initial Evaluation and Plan of Care    TIME  TIME OUT 1003  Patient: Summer Jefferson   : 1955  Diagnosis/ICD-10 Code:  Chronic right-sided low back pain with right-sided sciatica [M54.41, G89.29]  Referring practitioner: Madi Blanton MD     OSWESTRY- BACK    Please answer every section and may in each section only the ONE answer which applies to you or most closely describes your problem.  Thank you.    Section 1- Pain Intensity  []  The pain comes and goes and is very mild.  []  The pain is mild and does not vary much.  [x]  The pain comes and goes and is moderate.  []  The pain is moderate and does not vary much.  []  The pain comes and goes and is severe.  []  The pain is severe and does not vary much.    Section 2- Personal Care  [x]  I would not have to change my way of washing or dressing in order to avoid pain.  []  I do not normally change my way of washing or dressing even though it causes      some pain.  []  Washing and dressing increases the pain but I manage not to change my way of doing it.  []  Washing and dressing increase the pain and I find it necessary to change my way of doing it.  []  Because of the pain I am unable to do some washing and dressing without help.  []  Because of the pain I am unable to do any washing or dressing without help  .  Section 3- Lifting  []  I can lift heavy weight without extra pain.  []  I can lift heavy weight but it causes extra pain.  []  Pain prevents me lifting heavy weight off the floor.  []  Pain prevents me lifting heavy weight off the floor but I can manage if they are conveniently positioned, e.g., on a table.  []  Pain prevents me from lifting heavy weight but I can manage light to medium weight if it is conveniently positioned.  [x]  I can only lift very light weight at the most.    Section 4- Walking  [] I have no pain on walking.  [] I have some pain on walking but it does not increase with distance.  [] I cannot walk  more than One Mile without increasing pain.  [x] I cannot walk more than 1/2 Mile without increasing pain.  [] I cannot walk more than 1/4 Mile without increasing pain.  [] I cannot walk at all without increasing pain.    Section 5- Sitting  [x] I can sit in any chair as long as I like.  [] I can sit only in my favorite chair as long as I like.  [] Pain prevents me from sitting more than one hour.  [] Pain prevents me from sitting more than 1/2 hour.  [] Pain prevents me from sitting more than 10 minutes.  [] I avoid sitting because it increases pain straight away.      Section 6- Standing  [] I can stand as long as I want without pain.  [] I have some pain on standing but it does not increase with time.  [x] I cannot stand for longer than one hour without increasing pain.  [] I cannot stand for longer than 1/2 hour without increasing pain.  [] I cannot stand for longer than 10 minutes without increasing pain.  [] I avoid standing because it increases the pain straight away.    Section 7- Sleeping  [] I get no pain in bed.  [] I get pain in bed but it does not prevent me from sleeping well.  [x] Because of pain my normal nights sleep is reduced by less than 1/4.  [] Because of pain my normal nights sleep is reduced by less than 1/2.  [] Because of pain my normal nights sleep is reduced by less than 3/4.  [] Pain prevents me from sleeping at all.    Section 8-Social Life  [] My social life is normal and gives me no pain.  [] My social life is normal but increases the degree of my pain.  [x] Pain has no significant effect on my social life apart from limiting my more energetic interests e.g., dancing etc.  [] Pain has restricted my social life and I do not go out very often.  [] Pain has restricted my social life to my home.  [] I have hardly any social life because of pain.    Section 9- Traveling  [] I get no pain while traveling.  [x] I get some pain while traveling but none of my usual forms of travel make it any  "worse.  [] I get extra pain while traveling but it does not compel me to seek alternative forms of travel.  [] I get extra pain while traveling which compels me to seek alternative forms of travel.  [] Pain restricts all forms of travel.  [] Pain prevents all forms of travel except that done lying down.    Section 10 - Changing Degree of Pain  [] My pain is rapidly getting better.  [] My pain fluctuates but overall is definitely getting better.  [] My pain seems to be getting better but improvement is slow at present.  [x] My pain is neither getting better or worse.  [] My pain is gradually worsening.  [] My pain is rapidly worsening.                                                           Initial 6 Visits D/C Change   % Score 40%      VAS #                       Subjective Evaluation    History of Present Illness  Onset date: 3-4 months.  Mechanism of injury: Have been walking with a limp/hitch for 3-4 months because of (R) foot turning out. States family notices this as well.  Had (R) knee checked out due to previous (R) TKA (approx 2 yrs ago).  Reports weakness (R) LE with ascending steps (nonreciprocally).      Xray of (R) knee shows normal prosthetic alignment.  Xray lumbar spine shows L4-5 grade 1 spondylolisthesis and mild diffuse degenerative changes. Negative Hip xrays.    Cannot stand for more than 1 hour without pain in \"small of low back.\"  Had used Lidoderm patches in past.  Pain wraps around (R) hip to anterior thigh and anteromedial lower leg. Denies N/T (R) LE.    Joined [employer] to ride bike and build overall strength.        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 Blanton is trying to get Lidoderm patches.     Patient Goals  Patient goals for therapy: decreased pain and increased strength  Patient goal: decrease pain, strengthening overall, be able to walk up steps normally           Objective       Tenderness     Additional Tenderness Details  Neg TTP lumbar spine    Neurological Testing     Sensation     Lumbar   Left   Intact: light touch    Right   Intact: light touch    Additional Neurological Details  Intact and equal (B) LE dermatomes except some numbness from previous (R) TKA    Active Range of Motion     Lumbar   Flexion: 26 degrees with pain  Extension: 21 degrees with pain  Left lateral flexion: 24 degrees with pain  Right lateral flexion: 21 degrees with pain  Left rotation: Active left lumbar rotation: 75%, discomfort.   Right rotation: Active right lumbar rotation: 75%     Strength/Myotome Testing     Left Hip   Planes of Motion   Flexion: 4+    Right Hip   Planes of Motion   Flexion: 4-    Left Knee   Flexion: 4+  Extension: 4+    Right Knee   Flexion: 3  Extension: 4- (with pain in lumbar spine)    Left Ankle/Foot   Dorsiflexion: 4+    Right Ankle/Foot   Dorsiflexion: 3+    Tests     Additional Tests Details  Neg SLR (B) but reports increased strain/effort required (R) LE actively versus (L)  Limited ONUR (R) versus (L)       Ambulation     Observational Gait   Gait: antalgic and asymmetric   Decreased walking speed and right stance time.     Additional Observational Gait Details  (R) LE significantly externally rotated during ambulation         Assessment & Plan     Assessment  Impairments: abnormal coordination, abnormal gait, abnormal muscle firing, abnormal or restricted ROM, activity intolerance, impaired balance, impaired physical strength, lacks appropriate home exercise program and pain with function  Assessment  details: Patient is a 63 y.o female who reports 3-4+ month history of LBP, (R) LE pain, and gait abnormalities.  She is concerned about the weakness in her (R) LE and how much her (R) foot turns outward when she walks.  Xray revealed grade 1 spondylolisthesis at L4-5.  She exhibits limited and painful lumbar AROM, decreased (R) LE strength, positional intolerance (standing), limited functional mobility (walking), and significant gait abnormalities (R) LE.  She scores 40% on Oswestry this date.  She will benefit from skilled PT services to improve pain, restore normal ROM and functional strength, and assist patient in returning to optimal physical functional level.    Prognosis: good  Functional Limitations: carrying objects, lifting, sleeping, walking, uncomfortable because of pain and standing  Goals  Plan Goals: STGs: to be met in 4 weeks  1. Patient will be independent with initial HEP  2. Patient will report improved lumbar/(R) LE pain 0-4/10 for improved comfort with ADLs  3. Patient will demonstrate good stability of lumbar spine (minimal to no movement) during TrA retraining activities in supported position for improved lumbar stability during ADLs  4. Patient will demonstrate functional, painfree lumbar AROM all planes  5. Patient will report ability to stand and/or walk up to 1.5 hours for improved activity tolerance    LTGs: to be met in 8 weeks  1. Patient will be independent with progressed strength and stabilization HEP  2. Patient will report improved LBP 0-2/10 and no incidence of (R) LE pain beyond gluteal region x 1 week  3. Patient will demonstrate improved (R) hip flexion, knee flexion, and ankle dorsiflexion strength >/= 4+/5 for improved function and stability   4. Patient will consistently negotiate stairs reciprocally  5. Patient will have improved ELIECER </= 20% for subjective evidence of functional improvement    Plan  Therapy options: will be seen for skilled physical therapy services  Planned  modality interventions: cryotherapy, electrical stimulation/Russian stimulation and thermotherapy (hydrocollator packs)  Planned therapy interventions: abdominal trunk stabilization, body mechanics training, fine motor coordination training, flexibility, functional ROM exercises, gait training, home exercise program, joint mobilization, manual therapy, motor coordination training, neuromuscular re-education, postural training, soft tissue mobilization, spinal/joint mobilization, strengthening, stretching and therapeutic activities  Frequency: 2x week  Duration in weeks: 8  Treatment plan discussed with: patient        Manual Therapy:         mins  28472;  Therapeutic Exercise:         mins  03631;     Neuromuscular Bernabe:    17    mins  82338;    Therapeutic Activity:     12     mins  86122;     Gait Training:           mins  31559;     Ultrasound:          mins  78635;    Electrical Stimulation:    15     mins  05471 ( );  Dry Needling          mins self-pay    Timed Treatment:   29   mins   Total Treatment:     61   mins    PT SIGNATURE: Breana Mederos PT, DPT   DATE TREATMENT INITIATED: 8/21/2018    Initial Certification  Certification Period: 11/19/2018  I certify that the therapy services are furnished while this patient is under my care.  The services outlined above are required by this patient, and will be reviewed every 90 days.     PHYSICIAN: Madi Blanton MD      DATE:     Please sign and return via fax to [phone].. Thank you, [facility].

## 2018-08-30 ENCOUNTER — TREATMENT (OUTPATIENT)
Dept: PHYSICAL THERAPY | Facility: CLINIC | Age: 63
End: 2018-08-30

## 2018-08-30 DIAGNOSIS — G89.29 CHRONIC RIGHT-SIDED LOW BACK PAIN WITH RIGHT-SIDED SCIATICA: Primary | ICD-10-CM

## 2018-08-30 DIAGNOSIS — M54.16 LUMBAR RADICULOPATHY: ICD-10-CM

## 2018-08-30 DIAGNOSIS — M54.41 CHRONIC RIGHT-SIDED LOW BACK PAIN WITH RIGHT-SIDED SCIATICA: Primary | ICD-10-CM

## 2018-08-30 PROCEDURE — 97112 NEUROMUSCULAR REEDUCATION: CPT | Performed by: PHYSICAL THERAPIST

## 2018-08-30 PROCEDURE — G0283 ELEC STIM OTHER THAN WOUND: HCPCS | Performed by: PHYSICAL THERAPIST

## 2018-08-30 PROCEDURE — 97110 THERAPEUTIC EXERCISES: CPT | Performed by: PHYSICAL THERAPIST

## 2018-08-30 NOTE — PROGRESS NOTES
"Physical Therapy Daily Progress Note    Summer Jefferson reports: \"I just got back from [place] on Tuesday.  On the way home we did the drive in one day so my back is pretty sore from that.  I didn't do the exercises while I was gone, I'll admit.  I did get my Lidoderm patches approved so I should be getting those today.  I use them sparingly but they help a lot.\"    Subjective     Objective   See Exercise, Manual, and Modality Logs for complete treatment.   *Initiated flexion-based lumbar stretching    Assessment & Plan     Assessment  Assessment details: Patient requires cueing for therapeutic exercise recall this date, admitting she did not perform HEP while gone on vacation.  She verbalizes mild s/s relief with PPT position.  Instructed patient to utilize this maneuver as pain-relieving activity once she reaches the point of lumbar muscular fatigue during standing and walking activities.  Transversus abdominis engagement is fair but quick to fatigue, exhibiting poor endurance of deep abdominal stabilizers.        Progress strengthening /stabilization /functional activity. Initiate bridging with PPT and sidelying clamshells.         Manual Therapy:         mins  53752;  Therapeutic Exercise:    14     mins  00609;     Neuromuscular Bernabe:    17    mins  22691;    Therapeutic Activity:          mins  74392;     Gait Training:           mins  71342;     Ultrasound:          mins  75777;    Electrical Stimulation:    15     mins  84877 ( );  Dry Needling          mins self-pay    Timed Treatment:   31   mins   Total Treatment:     49   mins    Breana Mederos PT, DPT  Physical Therapist  [place] License # [id number]    "

## 2018-09-04 ENCOUNTER — TREATMENT (OUTPATIENT)
Dept: PHYSICAL THERAPY | Facility: CLINIC | Age: 63
End: 2018-09-04

## 2018-09-04 ENCOUNTER — TELEPHONE (OUTPATIENT)
Dept: INTERNAL MEDICINE | Facility: CLINIC | Age: 63
End: 2018-09-04

## 2018-09-04 DIAGNOSIS — G89.29 CHRONIC RIGHT-SIDED LOW BACK PAIN WITH RIGHT-SIDED SCIATICA: Primary | ICD-10-CM

## 2018-09-04 DIAGNOSIS — M54.41 CHRONIC RIGHT-SIDED LOW BACK PAIN WITH RIGHT-SIDED SCIATICA: Primary | ICD-10-CM

## 2018-09-04 DIAGNOSIS — M54.16 LUMBAR RADICULOPATHY: ICD-10-CM

## 2018-09-04 PROCEDURE — 97112 NEUROMUSCULAR REEDUCATION: CPT | Performed by: PHYSICAL THERAPIST

## 2018-09-04 PROCEDURE — 97110 THERAPEUTIC EXERCISES: CPT | Performed by: PHYSICAL THERAPIST

## 2018-09-04 PROCEDURE — G0283 ELEC STIM OTHER THAN WOUND: HCPCS | Performed by: PHYSICAL THERAPIST

## 2018-09-04 RX ORDER — FLUOXETINE HYDROCHLORIDE 20 MG/1
20 CAPSULE ORAL DAILY
Qty: 90 CAPSULE | Refills: 3 | Status: SHIPPED | OUTPATIENT
Start: 2018-09-04 | End: 2018-11-06 | Stop reason: SDUPTHER

## 2018-09-04 NOTE — PROGRESS NOTES
"Physical Therapy Daily Progress Note    Summer Jefferson reports: \"I am sore today from having a Labor Day party yesterday, so it was a lot of standing and cooking.  I have had a busy past week with traveling and this party so I may be partly sore from that but I am not feeling much relief yet. I had to stand for about 4 hours on Sunday working at the [employer] and I was having a lot of pain down my right leg.  I have been using heat for the last 3 nights. I still can't go up steps alternating feet yet.   I am not feeling the strength yet I would like to have.\"    Subjective Evaluation    Pain  Pain scale: 5-6/10.  Location: Lumbar           Objective   See Exercise, Manual, and Modality Logs for complete treatment.   *Initiated sciatic nerve glides  *Progressed PPT to include bridge  *Initiated sidelying TrA with clamshell  *Initiated TrA activ with alt LE heel slides     Assessment & Plan     Assessment  Assessment details: Patient verbalizes no significant lasting pain relief yet with PT interventions.  She experiences temporary s/s alleviation with modalities as well as PPT activities including bridging.  Discussed the gradual, progressive nature of strength building and reiterated the necessity of improving dynamic spinal stability via surrounding musculature to prevent progression of s/s.  She responds positively to introduction of bridging with PPT as well as sciatic nerve glide this date. HEP is updated in [facility].        Progress per Plan of Care - reassess lumbar AROM.  Initiate seated flexion stretching with Swiss ball on low table.         Manual Therapy:         mins  07439;  Therapeutic Exercise:    27     mins  59144;     Neuromuscular Bernabe:    16    mins  23745;    Therapeutic Activity:          mins  14384;     Gait Training:           mins  59940;     Ultrasound:          mins  71290;    Electrical Stimulation:    15     mins  14446 ( );  Dry Needling          mins self-pay    Timed " Treatment:   43   mins   Total Treatment:     64   mins    Breana Mederos PT, DPT  Physical Therapist  [place] License # [id number]

## 2018-09-04 NOTE — TELEPHONE ENCOUNTER
If she is off Fluoxetine completely, lets try 20 mg once a day for 4-5 weeks, if not better- then will change to 40.

## 2018-09-04 NOTE — PATIENT INSTRUCTIONS
Importance of building dynamic spinal stability through surrounding musculature  Pain-relief through PPT activities to reverse significant lordotic curve

## 2018-09-04 NOTE — TELEPHONE ENCOUNTER
----- Message from Chiquita Elliott sent at 9/4/2018 10:17 AM EDT -----  Contact: pt - Dr Langston' pt - RE: new Rx refill  Pt calling and would like a return call regarding her re-starting anti-depressant medication. She would like to know if ok to start taking 40 mg Prozac. Could you please call pt to discuss Rx, dosage if ok to take, etc? Please advise. Thanks    FLUoxetine (PROzac) 20 MG capsule 57 capsule    Sig: TAKE ONE CAPSULE BY MOUTH DAILY FOR 2 WEEKS THEN 1 EVERY OTHER DAY UNTIL FINISHED     Pt # [phone]

## 2018-09-06 ENCOUNTER — TREATMENT (OUTPATIENT)
Dept: PHYSICAL THERAPY | Facility: CLINIC | Age: 63
End: 2018-09-06

## 2018-09-06 DIAGNOSIS — M54.41 CHRONIC RIGHT-SIDED LOW BACK PAIN WITH RIGHT-SIDED SCIATICA: Primary | ICD-10-CM

## 2018-09-06 DIAGNOSIS — M54.16 LUMBAR RADICULOPATHY: ICD-10-CM

## 2018-09-06 DIAGNOSIS — G89.29 CHRONIC RIGHT-SIDED LOW BACK PAIN WITH RIGHT-SIDED SCIATICA: Primary | ICD-10-CM

## 2018-09-06 PROCEDURE — G0283 ELEC STIM OTHER THAN WOUND: HCPCS | Performed by: PHYSICAL THERAPIST

## 2018-09-06 PROCEDURE — 97112 NEUROMUSCULAR REEDUCATION: CPT | Performed by: PHYSICAL THERAPIST

## 2018-09-06 PROCEDURE — 97110 THERAPEUTIC EXERCISES: CPT | Performed by: PHYSICAL THERAPIST

## 2018-09-06 NOTE — PROGRESS NOTES
"Physical Therapy Daily Progress Note    Summer Jefferson reports: \"My back is starting to feel better.  It seems to finally be starting to release some tension.  I think all of the traveling kept it irritated. I haven't had any pain down my leg for a couple of days. I am not really noticing my foot turning outward as much so I don't think it's doing it as much.  That was the thing that actually brought me in. I went to work out at the gym yesterday and did my normal 10 machine circuit.  I felt weak from not being there but it didn't bother my back.  I always finish with the water massage.\"    Subjective     Objective       Active Range of Motion     Lumbar   Flexion: 38 degrees   Extension: 24 degrees   Left lateral flexion: 31 degrees   Right lateral flexion: 26 degrees   Left rotation: Active left lumbar rotation: 80%   Right rotation: Active right lumbar rotation: 75%      See Exercise, Manual, and Modality Logs for complete treatment.   *Initiated seated lumbar flexion stretching with Swiss ball     Assessment & Plan     Assessment  Assessment details: Patient verbalizes improving lumbar and (R) LE s/s this date.  She exhibits improving lumbar AROM which is painfree all planes and very mildly limited.  She is able to perform all activities painfree this date, including introduction of seated lumbar flexion stretching with Swiss ball as patient is unable to kneel.  She experiences further s/s relief with modality application which appears to be lasting longer after each session.        Progress strengthening /stabilization /functional activity         Manual Therapy:         mins  58172;  Therapeutic Exercise:    19     mins  20428;     Neuromuscular Bernabe:    12    mins  98824;    Therapeutic Activity:          mins  07080;     Gait Training:           mins  66957;     Ultrasound:          mins  37064;    Electrical Stimulation:    15     mins  11642 ( );  Dry Needling          mins self-pay    Timed " Treatment:   31   mins   Total Treatment:     49   mins    Breana Mederos PT, DPT  Physical Therapist  [place] License # [id number]

## 2018-09-18 ENCOUNTER — TREATMENT (OUTPATIENT)
Dept: PHYSICAL THERAPY | Facility: CLINIC | Age: 63
End: 2018-09-18

## 2018-09-18 DIAGNOSIS — M54.41 CHRONIC RIGHT-SIDED LOW BACK PAIN WITH RIGHT-SIDED SCIATICA: Primary | ICD-10-CM

## 2018-09-18 DIAGNOSIS — G89.29 CHRONIC RIGHT-SIDED LOW BACK PAIN WITH RIGHT-SIDED SCIATICA: Primary | ICD-10-CM

## 2018-09-18 DIAGNOSIS — M54.16 LUMBAR RADICULOPATHY: ICD-10-CM

## 2018-09-18 PROCEDURE — 97110 THERAPEUTIC EXERCISES: CPT | Performed by: PHYSICAL THERAPIST

## 2018-09-18 PROCEDURE — 97530 THERAPEUTIC ACTIVITIES: CPT | Performed by: PHYSICAL THERAPIST

## 2018-09-18 NOTE — PROGRESS NOTES
Physical Therapy Daily Progress Note    OSWESTRY- BACK    Please answer every section and may in each section only the ONE answer which applies to you or most closely describes your problem.  Thank you.    Section 1- Pain Intensity  [x]  The pain comes and goes and is very mild.  []  The pain is mild and does not vary much.  []  The pain comes and goes and is moderate.  []  The pain is moderate and does not vary much.  []  The pain comes and goes and is severe.  []  The pain is severe and does not vary much.    Section 2- Personal Care  [x]  I would not have to change my way of washing or dressing in order to avoid pain.  []  I do not normally change my way of washing or dressing even though it causes      some pain.  []  Washing and dressing increases the pain but I manage not to change my way of doing it.  []  Washing and dressing increase the pain and I find it necessary to change my way of doing it.  []  Because of the pain I am unable to do some washing and dressing without help.  []  Because of the pain I am unable to do any washing or dressing without help  .  Section 3- Lifting  [x]  I can lift heavy weight without extra pain.  []  I can lift heavy weight but it causes extra pain.  []  Pain prevents me lifting heavy weight off the floor.  []  Pain prevents me lifting heavy weight off the floor but I can manage if they are conveniently positioned, e.g., on a table.  []  Pain prevents me from lifting heavy weight but I can manage light to medium weight if it is conveniently positioned.  []  I can only lift very light weight at the most.    Section 4- Walking  [] I have no pain on walking.  [x] I have some pain on walking but it does not increase with distance.  [] I cannot walk more than One Mile without increasing pain.  [] I cannot walk more than 1/2 Mile without increasing pain.  [] I cannot walk more than 1/4 Mile without increasing pain.  [] I cannot walk at all without increasing pain.    Section 5-  Sitting  [x] I can sit in any chair as long as I like.  [] I can sit only in my favorite chair as long as I like.  [] Pain prevents me from sitting more than one hour.  [] Pain prevents me from sitting more than 1/2 hour.  [] Pain prevents me from sitting more than 10 minutes.  [] I avoid sitting because it increases pain straight away.      Section 6- Standing  [] I can stand as long as I want without pain.  [] I have some pain on standing but it does not increase with time.  [x] I cannot stand for longer than one hour without increasing pain.  [] I cannot stand for longer than 1/2 hour without increasing pain.  [] I cannot stand for longer than 10 minutes without increasing pain.  [] I avoid standing because it increases the pain straight away.    Section 7- Sleeping  [x] I get no pain in bed.  [] I get pain in bed but it does not prevent me from sleeping well.  [] Because of pain my normal nights sleep is reduced by less than 1/4.  [] Because of pain my normal nights sleep is reduced by less than 1/2.  [] Because of pain my normal nights sleep is reduced by less than 3/4.  [] Pain prevents me from sleeping at all.    Section 8-Social Life  [] My social life is normal and gives me no pain.  [x] My social life is normal but increases the degree of my pain.  [] Pain has no significant effect on my social life apart from limiting my more energetic interests e.g., dancing etc.  [] Pain has restricted my social life and I do not go out very often.  [] Pain has restricted my social life to my home.  [] I have hardly any social life because of pain.    Section 9- Traveling  [] I get no pain while traveling.  [] I get some pain while traveling but none of my usual forms of travel make it any worse.  [x] I get extra pain while traveling but it does not compel me to seek alternative forms of travel.  [] I get extra pain while traveling which compels me to seek alternative forms of travel.  [] Pain restricts all forms of  "travel.  [] Pain prevents all forms of travel except that done lying down.    Section 10 - Changing Degree of Pain  [] My pain is rapidly getting better.  [] My pain fluctuates but overall is definitely getting better.  x My pain seems to be getting better but improvement is slow at present.  [] My pain is neither getting better or worse.  [] My pain is gradually worsening.  [] My pain is rapidly worsening.                                                           Initial 6 Visits D/C Change   % Score 16%      VAS # 0/10                        Summer Jefferson reports: \"I am going to have to make today my last day.  With so much going on at work and my daughter in law got a new job and I have to help with the [employer] afterschool every day now for 3 weeks until she gets things settled. The exercises definitely help a lot but I haven't been as good about doing them this week. I haven't had any pain going down my leg and my foot turning out I think has been corrected now too.  I would like to get a printed copy of them.\"    Subjective Evaluation    Pain  Pain scale: \"No real pain, just a little tender\"           Objective   See Exercise, Manual, and Modality Logs for complete treatment.   *progressed supine marching to dead bugs    Assessment & Plan     Assessment  Assessment details: Patient has met all PT goals except STG #5 and LTG #3.  She is progressing toward these steadily.  Her Oswestry score has improved from 40% to 16%.  She is unfortunately unable to attend further PT at this time due to needing to assist with her grandchildren during her daughter in law's job transition, but she has been issued an updated strength and lumbar stabilization program for [facility] for which she is able to perform independently.  Patient questions how to attempt sleeping on her back; discussed with patient the necessity to reverse excessive lumbar lordosis due to presence of spondylolisthesis by keeping knees bent or LEs elevated.  " Discussed potential pillow or wedge use for this purpose.  All questions are answered to patient's satisfaction and she is encouraged to contact PT for further exercise progression or during period of exacerbation if needed.  Patient is agreeable to this.        Other - D/C skilled PT services to independence with self management at this time.         Manual Therapy:         mins  26589;  Therapeutic Exercise:    19     mins  42599;     Neuromuscular Bernabe:        mins  27390;    Therapeutic Activity:     12     mins  85937;     Gait Training:           mins  91040;     Ultrasound:          mins  46770;    Electrical Stimulation:         mins  61916 ( );  Dry Needling          mins self-pay    Timed Treatment:   31   mins   Total Treatment:     33   mins    Breana Mederos, PT, DPT  Physical Therapist  [place] License # [id number]

## 2018-10-04 ENCOUNTER — OFFICE VISIT (OUTPATIENT)
Dept: INTERNAL MEDICINE | Facility: CLINIC | Age: 63
End: 2018-10-04

## 2018-10-04 VITALS
DIASTOLIC BLOOD PRESSURE: 80 MMHG | BODY MASS INDEX: 34.66 KG/M2 | SYSTOLIC BLOOD PRESSURE: 130 MMHG | WEIGHT: 203 LBS | HEIGHT: 64 IN

## 2018-10-04 DIAGNOSIS — E78.00 HYPERCHOLESTEROLEMIA: ICD-10-CM

## 2018-10-04 DIAGNOSIS — I10 ESSENTIAL HYPERTENSION: ICD-10-CM

## 2018-10-04 DIAGNOSIS — E11.9 CONTROLLED TYPE 2 DIABETES MELLITUS WITHOUT COMPLICATION, WITHOUT LONG-TERM CURRENT USE OF INSULIN (HCC): Primary | ICD-10-CM

## 2018-10-04 LAB
ALBUMIN SERPL-MCNC: 4.6 G/DL (ref 3.5–5.2)
ALBUMIN/GLOB SERPL: 1.6 G/DL
ALP SERPL-CCNC: 74 U/L (ref 39–117)
ALT SERPL W P-5'-P-CCNC: 34 U/L (ref 1–33)
ANION GAP SERPL CALCULATED.3IONS-SCNC: 14.5 MMOL/L
AST SERPL-CCNC: 28 U/L (ref 1–32)
BACTERIA UR QL AUTO: ABNORMAL /HPF
BILIRUB SERPL-MCNC: 0.4 MG/DL (ref 0.1–1.2)
BILIRUB UR QL STRIP: NEGATIVE
BUN BLD-MCNC: 14 MG/DL (ref 8–23)
BUN/CREAT SERPL: 16.7 (ref 7–25)
CALCIUM SPEC-SCNC: 10.2 MG/DL (ref 8.6–10.5)
CHLORIDE SERPL-SCNC: 100 MMOL/L (ref 98–107)
CHOLEST SERPL-MCNC: 132 MG/DL (ref 0–200)
CLARITY UR: CLEAR
CO2 SERPL-SCNC: 26.5 MMOL/L (ref 22–29)
COLOR UR: YELLOW
CREAT BLD-MCNC: 0.84 MG/DL (ref 0.57–1)
GFR SERPL CREATININE-BSD FRML MDRD: 68 ML/MIN/1.73
GLOBULIN UR ELPH-MCNC: 2.8 GM/DL
GLUCOSE BLD-MCNC: 117 MG/DL (ref 65–99)
GLUCOSE UR STRIP-MCNC: NEGATIVE MG/DL
HBA1C MFR BLD: 6.6 % (ref 4.8–5.6)
HDLC SERPL-MCNC: 48 MG/DL (ref 40–60)
HGB UR QL STRIP.AUTO: ABNORMAL
HYALINE CASTS UR QL AUTO: ABNORMAL /LPF
KETONES UR QL STRIP: NEGATIVE
LDLC SERPL CALC-MCNC: 51 MG/DL (ref 0–100)
LDLC/HDLC SERPL: 1.07 {RATIO}
LEUKOCYTE ESTERASE UR QL STRIP.AUTO: ABNORMAL
MUCOUS THREADS URNS QL MICRO: ABNORMAL /HPF
NITRITE UR QL STRIP: NEGATIVE
PH UR STRIP.AUTO: 6 [PH] (ref 5–8)
POTASSIUM BLD-SCNC: 4.5 MMOL/L (ref 3.5–5.2)
PROT SERPL-MCNC: 7.4 G/DL (ref 6–8.5)
PROT UR QL STRIP: NEGATIVE
RBC # UR: ABNORMAL /HPF
REF LAB TEST METHOD: ABNORMAL
SODIUM BLD-SCNC: 141 MMOL/L (ref 136–145)
SP GR UR STRIP: 1.02 (ref 1–1.03)
SQUAMOUS #/AREA URNS HPF: ABNORMAL /HPF
TRIGL SERPL-MCNC: 163 MG/DL (ref 0–150)
UROBILINOGEN UR QL STRIP: ABNORMAL
VLDLC SERPL-MCNC: 32.6 MG/DL (ref 5–40)
WBC UR QL AUTO: ABNORMAL /HPF

## 2018-10-04 PROCEDURE — 90471 IMMUNIZATION ADMIN: CPT | Performed by: INTERNAL MEDICINE

## 2018-10-04 PROCEDURE — 36415 COLL VENOUS BLD VENIPUNCTURE: CPT | Performed by: INTERNAL MEDICINE

## 2018-10-04 PROCEDURE — 80061 LIPID PANEL: CPT | Performed by: INTERNAL MEDICINE

## 2018-10-04 PROCEDURE — 99214 OFFICE O/P EST MOD 30 MIN: CPT | Performed by: INTERNAL MEDICINE

## 2018-10-04 PROCEDURE — 80053 COMPREHEN METABOLIC PANEL: CPT | Performed by: INTERNAL MEDICINE

## 2018-10-04 PROCEDURE — 81001 URINALYSIS AUTO W/SCOPE: CPT | Performed by: INTERNAL MEDICINE

## 2018-10-04 PROCEDURE — 90674 CCIIV4 VAC NO PRSV 0.5 ML IM: CPT | Performed by: INTERNAL MEDICINE

## 2018-10-04 NOTE — PROGRESS NOTES
Chief Complaint   Patient presents with   • Hyperlipidemia     4 month follow up   • Hypertension   • Diabetes       Subjective   Summer Jefferson is a 63 y.o. female.     Hyperlipidemia   This is a chronic problem. The problem is controlled. Recent lipid tests were reviewed and are normal. Exacerbating diseases include diabetes and obesity. Factors aggravating her hyperlipidemia include thiazides. Pertinent negatives include no chest pain, focal sensory loss, focal weakness, leg pain, myalgias or shortness of breath. Current antihyperlipidemic treatment includes statins, exercise and diet change. The current treatment provides significant improvement of lipids. There are no compliance problems (She could do better with diet and exercise.).  Risk factors for coronary artery disease include diabetes mellitus, dyslipidemia, hypertension, post-menopausal, a sedentary lifestyle and obesity.   Hypertension   This is a chronic problem. The problem is unchanged. The problem is controlled. Pertinent negatives include no blurred vision, chest pain, headaches, orthopnea, palpitations, peripheral edema, PND or shortness of breath. Agents associated with hypertension include thyroid hormones. Risk factors for coronary artery disease include diabetes mellitus, dyslipidemia, obesity, post-menopausal state and sedentary lifestyle. Current antihypertension treatment includes calcium channel blockers, ACE inhibitors, diuretics and lifestyle changes. The current treatment provides moderate improvement. Compliance problems include diet and exercise.  There is no history of CAD/MI or CVA.   Diabetes   She presents for her follow-up diabetic visit. She has type 2 diabetes mellitus. Pertinent negatives for hypoglycemia include no headaches. (No episodes of hypoglycemia) Pertinent negatives for diabetes include no blurred vision, no chest pain, no foot paresthesias, no polydipsia, no polyphagia, no polyuria and no visual change. Pertinent  negatives for diabetic complications include no CVA. Risk factors for coronary artery disease include diabetes mellitus, dyslipidemia, hypertension, obesity, post-menopausal and sedentary lifestyle. Current diabetic treatment includes diet (Bydureon, metformin). She is compliant with treatment most of the time. Her weight is stable. She is following a generally healthy diet. Meal planning includes avoidance of concentrated sweets. She has not had a previous visit with a dietitian. She participates in exercise intermittently. There is no change in her home blood glucose trend. (130) An ACE inhibitor/angiotensin II receptor blocker is being taken. She does not see a podiatrist.Eye exam is current.        The following portions of the patient's history were reviewed and updated as appropriate: allergies, current medications, past family history, past medical history, past social history, past surgical history and problem list.    Review of Systems   Constitutional: Negative for appetite change.   HENT: Negative for nosebleeds.    Eyes: Negative for blurred vision and double vision.   Respiratory: Negative for cough and shortness of breath.    Cardiovascular: Negative for chest pain, palpitations, orthopnea, leg swelling and PND.   Endocrine: Negative for polydipsia, polyphagia and polyuria.   Musculoskeletal: Negative for myalgias.   Neurological: Negative for focal weakness.         Current Outpatient Prescriptions:   •  celecoxib (CeleBREX) 200 MG capsule, Take 1 capsule by mouth 2 (Two) Times a Day., Disp: 180 capsule, Rfl: 3  •  Exenatide ER (BYDUREON) 2 MG pen-injector, Inject 2 mg under the skin Every 7 (Seven) Days., Disp: 12 each, Rfl: 3  •  FLUoxetine (PROzac) 20 MG capsule, Take 1 capsule by mouth Daily., Disp: 90 capsule, Rfl: 3  •  glucose blood (ACCU-CHEK COMPACT TEST DRUM) test strip, 1 each by Other route Daily., Disp: 90 each, Rfl: 3  •  hydrochlorothiazide (HYDRODIURIL) 25 MG tablet, Take 1 tablet by  "mouth Daily., Disp: 90 tablet, Rfl: 3  •  Lancets Misc. (ACCU-CHEK FASTCLIX LANCET) kit, 1 each Daily. Checks blood sugar 3 times a day, Disp: 1 kit, Rfl: 3  •  levothyroxine (SYNTHROID, LEVOTHROID) 50 MCG tablet, Take 1 tablet by mouth Daily., Disp: 90 tablet, Rfl: 3  •  lidocaine (LIDODERM) 5 %, APPLY 1 PATCH DAILY FOR 12  HOURS. REMOVE AFTER 12  HOURS., Disp: 90 patch, Rfl: 1  •  lidocaine (LIDODERM) 5 %, Place 1 patch on the skin as directed by provider Daily. Remove & Discard patch within 12 hours or as directed by MD, Disp: 30 patch, Rfl: 2  •  lisinopril (PRINIVIL,ZESTRIL) 10 MG tablet, Take 1 tablet by mouth Daily., Disp: 90 tablet, Rfl: 3  •  metFORMIN ER (GLUCOPHAGE-XR) 500 MG 24 hr tablet, Take one with breakfast, two with dinner, Disp: 270 tablet, Rfl: 3  •  rosuvastatin (CRESTOR) 10 MG tablet, Take 1 tablet by mouth Daily., Disp: 90 tablet, Rfl: 3  •  verapamil (VERELAN) 240 MG 24 hr capsule, Take 1 capsule by mouth Every Night., Disp: 90 capsule, Rfl: 3        Objective     /80   Ht 162.6 cm (64\")   Wt 92.1 kg (203 lb)   BMI 34.84 kg/m²     Physical Exam   Constitutional: She is oriented to person, place, and time. She appears well-developed and well-nourished. No distress.   Neck: Normal carotid pulses present. Carotid bruit is not present.   Cardiovascular: Regular rhythm, S1 normal and S2 normal.  Exam reveals no gallop and no friction rub.    No murmur heard.  Pulses:       Carotid pulses are 2+ on the right side, and 2+ on the left side.  Pulmonary/Chest: Effort normal and breath sounds normal. She has no wheezes. She has no rhonchi. She has no rales. Chest wall is not dull to percussion.   Musculoskeletal: She exhibits no edema.    Summer had a diabetic foot exam performed today.   During the foot exam she had a monofilament test performed.    Neurological Sensory Findings -  No right ankle/foot altered proprioception and no left ankle/foot altered proprioception  Vascular Status -  Her " right foot exhibits normal foot vasculature  and no edema. Her left foot exhibits normal foot vasculature  and no edema.  Skin Integrity  -  Her right foot skin is intact.Her left foot skin is intact..   Foot Structure and Biomechanics -  Her right foot has no hallux valgus present. Her left foot has no hallux valgus.  Neurological: She is alert and oriented to person, place, and time.   Skin: Skin is warm and dry.   Nursing note and vitals reviewed.        Assessment/Plan   Summer was seen today for hyperlipidemia, hypertension and diabetes.    Diagnoses and all orders for this visit:    Controlled type 2 diabetes mellitus without complication, without long-term current use of insulin (CMS/[facility])  -     Comprehensive Metabolic Panel; Future  -     Lipid Panel; Future  -     Hemoglobin A1c; Future  -     Urinalysis With Microscopic If Indicated (No Culture) - Urine, Clean Catch; Future  -     Comprehensive Metabolic Panel  -     Lipid Panel  -     Hemoglobin A1c  -     Urinalysis With Microscopic If Indicated (No Culture) - Urine, Clean Catch  -     Urinalysis, Microscopic Only - Urine, Clean Catch; Future  -     Urinalysis, Microscopic Only - Urine, Clean Catch    Essential hypertension    Hypercholesterolemia    Other orders  -     Flucelvax Quad=>4Years (PFS)

## 2018-11-06 DIAGNOSIS — E78.5 HYPERLIPIDEMIA, UNSPECIFIED HYPERLIPIDEMIA TYPE: ICD-10-CM

## 2018-11-06 DIAGNOSIS — E11.9 TYPE 2 DIABETES MELLITUS WITHOUT COMPLICATION, WITH LONG-TERM CURRENT USE OF INSULIN (HCC): ICD-10-CM

## 2018-11-06 DIAGNOSIS — Z79.4 TYPE 2 DIABETES MELLITUS WITHOUT COMPLICATION, WITH LONG-TERM CURRENT USE OF INSULIN (HCC): ICD-10-CM

## 2018-11-06 DIAGNOSIS — E03.9 HYPOTHYROIDISM, UNSPECIFIED TYPE: ICD-10-CM

## 2018-11-06 RX ORDER — LISINOPRIL 10 MG/1
10 TABLET ORAL DAILY
Qty: 90 TABLET | Refills: 3 | Status: SHIPPED | OUTPATIENT
Start: 2018-11-06 | End: 2019-08-13 | Stop reason: SDUPTHER

## 2018-11-06 RX ORDER — HYDROCHLOROTHIAZIDE 25 MG/1
25 TABLET ORAL DAILY
Qty: 90 TABLET | Refills: 3 | Status: SHIPPED | OUTPATIENT
Start: 2018-11-06 | End: 2019-08-13 | Stop reason: SDUPTHER

## 2018-11-06 RX ORDER — EXENATIDE 2 MG/.65ML
2 INJECTION, SUSPENSION, EXTENDED RELEASE SUBCUTANEOUS
Qty: 12 EACH | Refills: 1 | Status: SHIPPED | OUTPATIENT
Start: 2018-11-06 | End: 2019-07-29

## 2018-11-06 RX ORDER — METFORMIN HYDROCHLORIDE 500 MG/1
TABLET, EXTENDED RELEASE ORAL
Qty: 270 TABLET | Refills: 3 | Status: SHIPPED | OUTPATIENT
Start: 2018-11-06 | End: 2019-07-29 | Stop reason: SDUPTHER

## 2018-11-06 RX ORDER — FLUOXETINE HYDROCHLORIDE 20 MG/1
20 CAPSULE ORAL DAILY
Qty: 90 CAPSULE | Refills: 3 | Status: SHIPPED | OUTPATIENT
Start: 2018-11-06 | End: 2019-08-13 | Stop reason: SDUPTHER

## 2018-11-06 RX ORDER — VERAPAMIL HYDROCHLORIDE 240 MG/1
240 CAPSULE, EXTENDED RELEASE ORAL NIGHTLY
Qty: 90 CAPSULE | Refills: 3 | Status: SHIPPED | OUTPATIENT
Start: 2018-11-06 | End: 2019-08-13 | Stop reason: SDUPTHER

## 2018-11-06 RX ORDER — ROSUVASTATIN CALCIUM 10 MG/1
10 TABLET, COATED ORAL DAILY
Qty: 90 TABLET | Refills: 3 | Status: SHIPPED | OUTPATIENT
Start: 2018-11-06 | End: 2019-08-13 | Stop reason: SDUPTHER

## 2018-11-06 RX ORDER — LEVOTHYROXINE SODIUM 0.05 MG/1
50 TABLET ORAL DAILY
Qty: 90 TABLET | Refills: 3 | Status: SHIPPED | OUTPATIENT
Start: 2018-11-06 | End: 2019-08-13 | Stop reason: SDUPTHER

## 2018-11-06 RX ORDER — CELECOXIB 200 MG/1
200 CAPSULE ORAL 2 TIMES DAILY
Qty: 180 CAPSULE | Refills: 3 | Status: SHIPPED | OUTPATIENT
Start: 2018-11-06 | End: 2019-08-13 | Stop reason: SDUPTHER

## 2018-11-06 NOTE — TELEPHONE ENCOUNTER
----- Message from Chiquita MARILUZ Elliott sent at 11/6/2018  1:39 PM EST -----  Contact: pt - Dr Langston' pt - RE: Rx refills  Pt calling and would like a refill on Rx's      celecoxib (CeleBREX) 200 MG capsule 180 capsule     Sig - Route: Take 1 capsule by mouth 2 (Two) Times a Day. - Oral     FLUoxetine (PROzac) 20 MG capsule 90 capsule     Sig - Route: Take 1 capsule by mouth Daily. - Oral     hydrochlorothiazide (HYDRODIURIL) 25 MG tablet 90 tablet    Sig - Route: Take 1 tablet by mouth Daily. - Oral     levothyroxine (SYNTHROID, LEVOTHROID) 50 MCG tablet 90 tablet   Sig - Route: Take 1 tablet by mouth Daily. - Oral     lisinopril (PRINIVIL,ZESTRIL) 10 MG tablet 90 tablet     Sig - Route: Take 1 tablet by mouth Daily. - Oral     metFORMIN ER (GLUCOPHAGE-XR) 500 MG 24 hr tablet 270 tablet    Sig: Take one with breakfast, two with dinner     rosuvastatin (CRESTOR) 10 MG tablet 90 tablet     Sig - Route: Take 1 tablet by mouth Daily. - Oral     verapamil (VERELAN) 240 MG 24 hr capsule 90 capsule     Sig - Route: Take 1 capsule by mouth Every Night. - Oral     [facility] - [facility] [address] [phone] [facility] [phone] FX    Pt # [phone]

## 2018-11-14 ENCOUNTER — OFFICE VISIT (OUTPATIENT)
Dept: INTERNAL MEDICINE | Facility: CLINIC | Age: 63
End: 2018-11-14

## 2018-11-14 VITALS
HEIGHT: 64 IN | WEIGHT: 205 LBS | HEART RATE: 80 BPM | OXYGEN SATURATION: 97 % | SYSTOLIC BLOOD PRESSURE: 120 MMHG | TEMPERATURE: 98.2 F | DIASTOLIC BLOOD PRESSURE: 78 MMHG | BODY MASS INDEX: 35 KG/M2

## 2018-11-14 DIAGNOSIS — J20.9 ACUTE BRONCHITIS, UNSPECIFIED ORGANISM: Primary | ICD-10-CM

## 2018-11-14 PROBLEM — B00.9: Status: RESOLVED | Noted: 2017-12-04 | Resolved: 2018-11-14

## 2018-11-14 PROCEDURE — 99213 OFFICE O/P EST LOW 20 MIN: CPT | Performed by: NURSE PRACTITIONER

## 2018-11-14 RX ORDER — AZITHROMYCIN 250 MG/1
TABLET, FILM COATED ORAL
Qty: 6 TABLET | Refills: 0 | Status: SHIPPED | OUTPATIENT
Start: 2018-11-14 | End: 2018-11-20

## 2018-11-14 RX ORDER — BENZONATATE 200 MG/1
200 CAPSULE ORAL 3 TIMES DAILY PRN
Qty: 30 CAPSULE | Refills: 0 | Status: SHIPPED | OUTPATIENT
Start: 2018-11-14 | End: 2019-03-15

## 2018-11-14 NOTE — PROGRESS NOTES
Mitzi Jefferson is a 63 y.o. female who presents due to respiratory symptoms.    URI    This is a new problem. The current episode started in the past 7 days. The problem has been gradually worsening. There has been no fever. Associated symptoms include congestion, coughing, ear pain, headaches, rhinorrhea and a sore throat. Pertinent negatives include no abdominal pain, chest pain, diarrhea, nausea, neck pain, sneezing, vomiting or wheezing. She has tried antihistamine for the symptoms.        The following portions of the patient's history were reviewed and updated as appropriate: allergies, current medications, past social history and problem list.    Past Medical History:   Diagnosis Date   • Backache    • Diabetes mellitus type 2, controlled (CMS/[facility])    • H/O bone density study     8/2014   • H/O mammogram     8/2014   • Hypercholesterolemia    • Hypertension    • Hypothyroidism    • Osteoarthritis of multiple joints          Current Outpatient Medications:   •  BYDUREON 2 MG pen-injector injection, INJECT 2 MG UNDER THE SKIN  EVERY 7 (SEVEN) DAYS., Disp: 12 each, Rfl: 1  •  celecoxib (CeleBREX) 200 MG capsule, Take 1 capsule by mouth 2 (Two) Times a Day., Disp: 180 capsule, Rfl: 3  •  FLUoxetine (PROzac) 20 MG capsule, Take 1 capsule by mouth Daily., Disp: 90 capsule, Rfl: 3  •  glucose blood (ACCU-CHEK COMPACT TEST DRUM) test strip, 1 each by Other route Daily., Disp: 90 each, Rfl: 3  •  hydrochlorothiazide (HYDRODIURIL) 25 MG tablet, Take 1 tablet by mouth Daily., Disp: 90 tablet, Rfl: 3  •  Lancets Misc. (ACCU-CHEK FASTCLIX LANCET) kit, 1 each Daily. Checks blood sugar 3 times a day, Disp: 1 kit, Rfl: 3  •  levothyroxine (SYNTHROID, LEVOTHROID) 50 MCG tablet, Take 1 tablet by mouth Daily., Disp: 90 tablet, Rfl: 3  •  lidocaine (LIDODERM) 5 %, APPLY 1 PATCH DAILY FOR 12  HOURS. REMOVE AFTER 12  HOURS., Disp: 90 patch, Rfl: 1  •  lidocaine (LIDODERM) 5 %, Place 1 patch on the skin as directed by  "provider Daily. Remove & Discard patch within 12 hours or as directed by MD, Disp: 30 patch, Rfl: 2  •  lisinopril (PRINIVIL,ZESTRIL) 10 MG tablet, Take 1 tablet by mouth Daily., Disp: 90 tablet, Rfl: 3  •  metFORMIN ER (GLUCOPHAGE-XR) 500 MG 24 hr tablet, Take one with breakfast, two with dinner, Disp: 270 tablet, Rfl: 3  •  rosuvastatin (CRESTOR) 10 MG tablet, Take 1 tablet by mouth Daily., Disp: 90 tablet, Rfl: 3  •  verapamil (VERELAN) 240 MG 24 hr capsule, Take 1 capsule by mouth Every Night., Disp: 90 capsule, Rfl: 3  •  azithromycin (ZITHROMAX Z-MASSIMO) 250 MG tablet, Take 2 tablets the first day, then 1 tablet daily for 4 days., Disp: 6 tablet, Rfl: 0  •  benzonatate (TESSALON) 200 MG capsule, Take 1 capsule by mouth 3 (Three) Times a Day As Needed for Cough., Disp: 30 capsule, Rfl: 0    No Known Allergies    Review of Systems   Constitutional: Negative for appetite change, chills, fatigue and fever.   HENT: Positive for congestion, ear pain, postnasal drip, rhinorrhea, sinus pressure and sore throat. Negative for ear discharge, facial swelling, sneezing and tinnitus.    Respiratory: Positive for cough. Negative for chest tightness, shortness of breath and wheezing.    Cardiovascular: Negative for chest pain, palpitations and leg swelling.   Gastrointestinal: Negative for abdominal pain, diarrhea, nausea and vomiting.   Musculoskeletal: Negative for neck pain and neck stiffness.   Neurological: Positive for headaches.   Hematological: Negative for adenopathy.       Objective   Vitals:    11/14/18 1410   BP: 120/78   BP Location: Left arm   Patient Position: Sitting   Cuff Size: Adult   Pulse: 80   Temp: 98.2 °F (36.8 °C)   TempSrc: Oral   SpO2: 97%   Weight: 93 kg (205 lb)   Height: 162.6 cm (64\")     Physical Exam   Constitutional: She appears well-developed and well-nourished. She is cooperative. She does not have a sickly appearance. She does not appear ill.   HENT:   Head: Normocephalic.   Right Ear: " Hearing and external ear normal. No drainage, swelling or tenderness. Tympanic membrane is bulging. Tympanic membrane is not erythematous. No middle ear effusion. No decreased hearing is noted.   Left Ear: Hearing and external ear normal. No drainage, swelling or tenderness. Tympanic membrane is bulging. Tympanic membrane is not erythematous.  No middle ear effusion. No decreased hearing is noted.   Nose: Nose normal. No mucosal edema, rhinorrhea, sinus tenderness or nasal deformity. Right sinus exhibits no maxillary sinus tenderness and no frontal sinus tenderness. Left sinus exhibits no maxillary sinus tenderness and no frontal sinus tenderness.   Mouth/Throat: Mucous membranes are normal. Posterior oropharyngeal erythema present.   Eyes: Conjunctivae and lids are normal.   Neck: Trachea normal.   Cardiovascular: Regular rhythm, normal heart sounds and normal pulses.   No murmur heard.  Pulmonary/Chest: Breath sounds normal. No respiratory distress. She has no decreased breath sounds. She has no wheezes. She has no rhonchi. She has no rales.   Lymphadenopathy:     She has no cervical adenopathy.   Neurological: She is alert.   Skin: Skin is warm, dry and intact.   Nursing note and vitals reviewed.      Assessment/Plan   Summer was seen today for uri.    Diagnoses and all orders for this visit:    Acute bronchitis, unspecified organism  -     azithromycin (ZITHROMAX Z-MASSIMO) 250 MG tablet; Take 2 tablets the first day, then 1 tablet daily for 4 days.  -     benzonatate (TESSALON) 200 MG capsule; Take 1 capsule by mouth 3 (Three) Times a Day As Needed for Cough.    She will start Tessalon Perles and Zyrtec for increased postnasal drainage and dry cough, only start Zpak if symptoms persist and/or worsen (discussed).

## 2019-03-13 ENCOUNTER — TELEPHONE (OUTPATIENT)
Dept: INTERNAL MEDICINE | Facility: CLINIC | Age: 64
End: 2019-03-13

## 2019-03-13 NOTE — TELEPHONE ENCOUNTER
----- Message from Serena Khan sent at 3/13/2019  8:48 AM EDT -----  Contact: Pt   Pt was scheduled for 30 min, she needs blood work and she wants to see MD.  She has lost a lot of weight and had to adjust her medication herself.  She would like to have blood work done because of the weight.    Pt# [phone]

## 2019-03-15 ENCOUNTER — OFFICE VISIT (OUTPATIENT)
Dept: INTERNAL MEDICINE | Facility: CLINIC | Age: 64
End: 2019-03-15

## 2019-03-15 VITALS
BODY MASS INDEX: 32.27 KG/M2 | WEIGHT: 189 LBS | SYSTOLIC BLOOD PRESSURE: 128 MMHG | HEIGHT: 64 IN | DIASTOLIC BLOOD PRESSURE: 74 MMHG

## 2019-03-15 DIAGNOSIS — E11.9 CONTROLLED TYPE 2 DIABETES MELLITUS WITHOUT COMPLICATION, WITHOUT LONG-TERM CURRENT USE OF INSULIN (HCC): Primary | ICD-10-CM

## 2019-03-15 DIAGNOSIS — E78.00 HYPERCHOLESTEROLEMIA: ICD-10-CM

## 2019-03-15 DIAGNOSIS — I10 ESSENTIAL HYPERTENSION: ICD-10-CM

## 2019-03-15 LAB
ALBUMIN SERPL-MCNC: 4.9 G/DL (ref 3.5–5.2)
ALBUMIN/GLOB SERPL: 2.1 G/DL
ALP SERPL-CCNC: 59 U/L (ref 39–117)
ALT SERPL-CCNC: 24 U/L (ref 1–33)
AST SERPL-CCNC: 23 U/L (ref 1–32)
BILIRUB SERPL-MCNC: 0.3 MG/DL (ref 0.1–1.2)
BUN SERPL-MCNC: 15 MG/DL (ref 8–23)
BUN/CREAT SERPL: 19.2 (ref 7–25)
CALCIUM SERPL-MCNC: 9.7 MG/DL (ref 8.6–10.5)
CHLORIDE SERPL-SCNC: 106 MMOL/L (ref 98–107)
CHOLEST SERPL-MCNC: 138 MG/DL (ref 0–200)
CO2 SERPL-SCNC: 25.7 MMOL/L (ref 22–29)
CREAT SERPL-MCNC: 0.78 MG/DL (ref 0.57–1)
GLOBULIN SER CALC-MCNC: 2.3 GM/DL
GLUCOSE SERPL-MCNC: 110 MG/DL (ref 65–99)
HBA1C MFR BLD: 5.6 % (ref 4.8–5.6)
HDLC SERPL-MCNC: 50 MG/DL (ref 40–60)
LDLC SERPL CALC-MCNC: 74 MG/DL (ref 0–100)
POTASSIUM SERPL-SCNC: 4.6 MMOL/L (ref 3.5–5.2)
PROT SERPL-MCNC: 7.2 G/DL (ref 6–8.5)
SODIUM SERPL-SCNC: 144 MMOL/L (ref 136–145)
TRIGL SERPL-MCNC: 72 MG/DL (ref 0–150)
VLDLC SERPL-MCNC: 14.4 MG/DL (ref 5–40)

## 2019-03-15 PROCEDURE — 99214 OFFICE O/P EST MOD 30 MIN: CPT | Performed by: INTERNAL MEDICINE

## 2019-03-15 NOTE — PROGRESS NOTES
Chief Complaint   Patient presents with   • Diabetes   • Hypertension   • Hyperlipidemia       Subjective   Summer Jefferson is a 63 y.o. female.     She is following a low carb diet. Started January 1.  She is loosing about 2 lbs a week.  She has lost a total of 21 lbs.  AM sugars have been less than 100.  She has reduced metformin to one pill in the am.  She continues to do weekly Bydureon injection.       Diabetes   She presents for her follow-up diabetic visit. She has type 2 diabetes mellitus. Her disease course has been improving. Pertinent negatives for hypoglycemia include no headaches. (No episodes of hypoglycemia) Pertinent negatives for diabetes include no blurred vision, no chest pain, no foot paresthesias, no polydipsia, no polyuria and no visual change. Symptoms are stable. There are no diabetic complications. Risk factors for coronary artery disease include diabetes mellitus, dyslipidemia, hypertension and obesity. Current diabetic treatment includes diet and oral agent (monotherapy) (and bydureon). She is compliant with treatment all of the time. Her weight is decreasing steadily. She is following a generally healthy diet. Meal planning includes avoidance of concentrated sweets and carbohydrate counting. She has not had a previous visit with a dietitian. She participates in exercise three times a week. Her home blood glucose trend is decreasing steadily. An ACE inhibitor/angiotensin II receptor blocker is being taken. Eye exam is current.   Hypertension   This is a chronic problem. The problem is unchanged. The problem is controlled. Pertinent negatives include no blurred vision, chest pain, headaches, orthopnea, palpitations, peripheral edema, PND or shortness of breath. Agents associated with hypertension include NSAIDs. Current antihypertension treatment includes ACE inhibitors, lifestyle changes and calcium channel blockers. The current treatment provides significant improvement. There are no  compliance problems.    Hyperlipidemia   This is a chronic problem. The problem is controlled. Recent lipid tests were reviewed and are normal. Exacerbating diseases include diabetes and obesity. Pertinent negatives include no chest pain, leg pain, myalgias or shortness of breath. Current antihyperlipidemic treatment includes statins. The current treatment provides significant improvement of lipids. There are no compliance problems.         The following portions of the patient's history were reviewed and updated as appropriate: allergies, current medications, past family history, past medical history, past social history, past surgical history and problem list.    Review of Systems   Constitutional: Negative for appetite change.   HENT: Negative for nosebleeds.    Eyes: Negative for blurred vision and double vision.   Respiratory: Negative for cough and shortness of breath.    Cardiovascular: Negative for chest pain, palpitations, orthopnea, leg swelling and PND.   Endocrine: Negative for polydipsia and polyuria.   Musculoskeletal: Negative for myalgias.   Neurological: Negative for headache.         Current Outpatient Medications:   •  BYDUREON 2 MG pen-injector injection, INJECT 2 MG UNDER THE SKIN  EVERY 7 (SEVEN) DAYS., Disp: 12 each, Rfl: 1  •  celecoxib (CeleBREX) 200 MG capsule, Take 1 capsule by mouth 2 (Two) Times a Day., Disp: 180 capsule, Rfl: 3  •  FLUoxetine (PROzac) 20 MG capsule, Take 1 capsule by mouth Daily., Disp: 90 capsule, Rfl: 3  •  glucose blood (ACCU-CHEK COMPACT TEST DRUM) test strip, 1 each by Other route Daily., Disp: 90 each, Rfl: 3  •  hydrochlorothiazide (HYDRODIURIL) 25 MG tablet, Take 1 tablet by mouth Daily., Disp: 90 tablet, Rfl: 3  •  Lancets Misc. (ACCU-CHEK FASTCLIX LANCET) kit, 1 each Daily. Checks blood sugar 3 times a day, Disp: 1 kit, Rfl: 3  •  levothyroxine (SYNTHROID, LEVOTHROID) 50 MCG tablet, Take 1 tablet by mouth Daily., Disp: 90 tablet, Rfl: 3  •  lidocaine  "(LIDODERM) 5 %, APPLY 1 PATCH DAILY FOR 12  HOURS. REMOVE AFTER 12  HOURS., Disp: 90 patch, Rfl: 1  •  lidocaine (LIDODERM) 5 %, Place 1 patch on the skin as directed by provider Daily. Remove & Discard patch within 12 hours or as directed by MD, Disp: 30 patch, Rfl: 2  •  lisinopril (PRINIVIL,ZESTRIL) 10 MG tablet, Take 1 tablet by mouth Daily., Disp: 90 tablet, Rfl: 3  •  metFORMIN ER (GLUCOPHAGE-XR) 500 MG 24 hr tablet, Take one with breakfast, two with dinner, Disp: 270 tablet, Rfl: 3  •  rosuvastatin (CRESTOR) 10 MG tablet, Take 1 tablet by mouth Daily., Disp: 90 tablet, Rfl: 3  •  verapamil (VERELAN) 240 MG 24 hr capsule, Take 1 capsule by mouth Every Night., Disp: 90 capsule, Rfl: 3        Objective     /74   Ht 162.6 cm (64\")   Wt 85.7 kg (189 lb)   BMI 32.44 kg/m²     Physical Exam   Constitutional: She is oriented to person, place, and time. She appears well-developed and well-nourished. No distress.   Neck: Normal carotid pulses present. Carotid bruit is not present.   Cardiovascular: Regular rhythm, S1 normal and S2 normal. Exam reveals no gallop and no friction rub.   No murmur heard.  Pulses:       Carotid pulses are 2+ on the right side, and 2+ on the left side.  Pulmonary/Chest: Effort normal and breath sounds normal. She has no wheezes. She has no rhonchi. She has no rales. Chest wall is not dull to percussion.   Musculoskeletal: She exhibits no edema.   Neurological: She is alert and oriented to person, place, and time.   Skin: Skin is warm and dry.   Nursing note and vitals reviewed.        Assessment/Plan   Summer was seen today for diabetes, hypertension and hyperlipidemia.    Diagnoses and all orders for this visit:    Controlled type 2 diabetes mellitus without complication, without long-term current use of insulin (CMS/[facility])  -     Comprehensive Metabolic Panel; Future  -     Lipid Panel; Future  -     Hemoglobin A1c; Future  -     Comprehensive Metabolic Panel  -     Lipid Panel  -  "    Hemoglobin A1c    Hypercholesterolemia    Essential hypertension

## 2019-06-07 ENCOUNTER — TELEPHONE (OUTPATIENT)
Dept: INTERNAL MEDICINE | Facility: CLINIC | Age: 64
End: 2019-06-07

## 2019-06-07 RX ORDER — FAMCICLOVIR 500 MG/1
500 TABLET ORAL 2 TIMES DAILY
Qty: 180 TABLET | Refills: 3 | Status: SHIPPED | OUTPATIENT
Start: 2019-06-07 | End: 2019-08-13 | Stop reason: SDUPTHER

## 2019-06-07 NOTE — TELEPHONE ENCOUNTER
----- Message from Anat Aguirre sent at 2019  9:42 AM EDT -----  Contact: pt  Patient called in to get a medication that has . She stated that she does not use it very often. It is Samciclovir 500mg.     She would like it sent to [facility] - [place], [place] - [address] [place] [address] - [phone]  - [phone] FX Phone:  [phone] Fax:  [phone]   Address:  [address] [place] [address] [address], [facility] [address]       Please advise    Pt # [phone]

## 2019-06-07 NOTE — TELEPHONE ENCOUNTER
I sent in a prescription for famciclovir 500 mg, 1 twice a day.  That is what we have in her medicine list.

## 2019-07-01 ENCOUNTER — DOCUMENTATION (OUTPATIENT)
Dept: PHYSICAL THERAPY | Facility: CLINIC | Age: 64
End: 2019-07-01

## 2019-07-01 NOTE — PROGRESS NOTES
Patient D/C from skilled PT services. See treatment note dated 09/18/19 for relevant objective findings, assessment, and status of goals.

## 2019-07-17 DIAGNOSIS — E11.9 CONTROLLED TYPE 2 DIABETES MELLITUS WITHOUT COMPLICATION, WITHOUT LONG-TERM CURRENT USE OF INSULIN (HCC): Primary | ICD-10-CM

## 2019-07-22 ENCOUNTER — LAB (OUTPATIENT)
Dept: INTERNAL MEDICINE | Facility: CLINIC | Age: 64
End: 2019-07-22

## 2019-07-22 DIAGNOSIS — E11.9 CONTROLLED TYPE 2 DIABETES MELLITUS WITHOUT COMPLICATION, WITHOUT LONG-TERM CURRENT USE OF INSULIN (HCC): ICD-10-CM

## 2019-07-22 LAB
ALBUMIN SERPL-MCNC: 4.6 G/DL (ref 3.5–5.2)
ALBUMIN/GLOB SERPL: 1.8 G/DL
ALP SERPL-CCNC: 71 U/L (ref 39–117)
ALT SERPL-CCNC: 23 U/L (ref 1–33)
AST SERPL-CCNC: 19 U/L (ref 1–32)
BILIRUB SERPL-MCNC: 0.4 MG/DL (ref 0.2–1.2)
BUN SERPL-MCNC: 19 MG/DL (ref 8–23)
BUN/CREAT SERPL: 22.6 (ref 7–25)
CALCIUM SERPL-MCNC: 9.7 MG/DL (ref 8.6–10.5)
CHLORIDE SERPL-SCNC: 98 MMOL/L (ref 98–107)
CHOLEST SERPL-MCNC: 176 MG/DL (ref 0–200)
CO2 SERPL-SCNC: 28 MMOL/L (ref 22–29)
CREAT SERPL-MCNC: 0.84 MG/DL (ref 0.57–1)
DEPRECATED RDW RBC AUTO: 44.8 FL (ref 37–54)
ERYTHROCYTE [DISTWIDTH] IN BLOOD BY AUTOMATED COUNT: 14.1 % (ref 12.3–15.4)
GLOBULIN SER CALC-MCNC: 2.6 GM/DL
GLUCOSE SERPL-MCNC: 138 MG/DL (ref 65–99)
HBA1C MFR BLD: 5.8 % (ref 4.8–5.6)
HCT VFR BLD AUTO: 45.6 % (ref 34–46.6)
HDLC SERPL-MCNC: 62 MG/DL (ref 40–60)
HGB BLD-MCNC: 15 G/DL (ref 12–15.9)
LDLC SERPL CALC-MCNC: 93 MG/DL (ref 0–100)
MCH RBC QN AUTO: 29 PG (ref 26.6–33)
MCHC RBC AUTO-ENTMCNC: 32.9 G/DL (ref 31.5–35.7)
MCV RBC AUTO: 88.2 FL (ref 79–97)
PLATELET # BLD AUTO: 234 10*3/MM3 (ref 140–450)
PMV BLD AUTO: 10.6 FL (ref 6–12)
POTASSIUM SERPL-SCNC: 4.2 MMOL/L (ref 3.5–5.2)
PROT SERPL-MCNC: 7.2 G/DL (ref 6–8.5)
RBC # BLD AUTO: 5.17 10*6/MM3 (ref 3.77–5.28)
SODIUM SERPL-SCNC: 141 MMOL/L (ref 136–145)
TRIGL SERPL-MCNC: 103 MG/DL (ref 0–150)
TSH SERPL-ACNC: 2.19 MIU/ML (ref 0.27–4.2)
VLDLC SERPL-MCNC: 20.6 MG/DL
WBC NRBC COR # BLD: 4.8 10*3/MM3 (ref 3.4–10.8)

## 2019-07-22 PROCEDURE — 85027 COMPLETE CBC AUTOMATED: CPT | Performed by: INTERNAL MEDICINE

## 2019-07-22 PROCEDURE — 36415 COLL VENOUS BLD VENIPUNCTURE: CPT | Performed by: INTERNAL MEDICINE

## 2019-07-23 LAB
CREAT 24H UR-MCNC: 81.8 MG/DL
MICROALBUMIN UR-MCNC: 7.1 UG/ML
MICROALBUMIN/CREAT UR: 8.7 MG/G CREAT (ref 0–30)

## 2019-07-29 ENCOUNTER — OFFICE VISIT (OUTPATIENT)
Dept: INTERNAL MEDICINE | Facility: CLINIC | Age: 64
End: 2019-07-29

## 2019-07-29 VITALS
BODY MASS INDEX: 30.22 KG/M2 | WEIGHT: 177 LBS | SYSTOLIC BLOOD PRESSURE: 118 MMHG | DIASTOLIC BLOOD PRESSURE: 64 MMHG | HEIGHT: 64 IN

## 2019-07-29 DIAGNOSIS — I10 ESSENTIAL HYPERTENSION: ICD-10-CM

## 2019-07-29 DIAGNOSIS — E11.9 TYPE 2 DIABETES MELLITUS WITHOUT COMPLICATION, WITH LONG-TERM CURRENT USE OF INSULIN (HCC): Primary | ICD-10-CM

## 2019-07-29 DIAGNOSIS — E78.00 HYPERCHOLESTEROLEMIA: ICD-10-CM

## 2019-07-29 DIAGNOSIS — Z79.4 TYPE 2 DIABETES MELLITUS WITHOUT COMPLICATION, WITH LONG-TERM CURRENT USE OF INSULIN (HCC): Primary | ICD-10-CM

## 2019-07-29 PROCEDURE — 99214 OFFICE O/P EST MOD 30 MIN: CPT | Performed by: INTERNAL MEDICINE

## 2019-07-29 RX ORDER — METFORMIN HYDROCHLORIDE 500 MG/1
TABLET, EXTENDED RELEASE ORAL
Qty: 90 TABLET | Refills: 3
Start: 2019-07-29 | End: 2019-12-11 | Stop reason: SDUPTHER

## 2019-07-29 NOTE — PROGRESS NOTES
Chief Complaint   Patient presents with   • Hypertension     4 month follow up   • Hyperlipidemia   • Diabetes   • Hypothyroidism       Subjective   Summer Jefferson is a 63 y.o. female.     Hypertension   This is a chronic problem. The problem is unchanged. The problem is controlled. Pertinent negatives include no blurred vision, chest pain, orthopnea, palpitations, peripheral edema, PND or shortness of breath. Agents associated with hypertension include NSAIDs. Risk factors for coronary artery disease include diabetes mellitus and dyslipidemia. Current antihypertension treatment includes ACE inhibitors and lifestyle changes. The current treatment provides significant improvement. There are no compliance problems.  There is no history of CAD/MI or CVA.   Hyperlipidemia   This is a chronic problem. The problem is controlled. Recent lipid tests were reviewed and are normal. Exacerbating diseases include diabetes, hypothyroidism and obesity. There are no known factors aggravating her hyperlipidemia. Pertinent negatives include no chest pain or shortness of breath. Current antihyperlipidemic treatment includes statins, diet change and exercise. The current treatment provides significant improvement of lipids.   Diabetes   She presents for her follow-up diabetic visit. She has type 2 diabetes mellitus. Her disease course has been improving. There are no hypoglycemic associated symptoms. Pertinent negatives for diabetes include no blurred vision, no chest pain, no foot paresthesias, no polydipsia and no polyuria. There are no hypoglycemic complications. There are no diabetic complications. Pertinent negatives for diabetic complications include no CVA. Risk factors for coronary artery disease include diabetes mellitus, dyslipidemia, hypertension, obesity and post-menopausal. Current diabetic treatment includes oral agent (monotherapy) and diet. She is compliant with treatment all of the time. Her weight is decreasing  steadily. She is following a generally healthy diet. Meal planning includes avoidance of concentrated sweets. She participates in exercise daily. Her home blood glucose trend is decreasing steadily. An ACE inhibitor/angiotensin II receptor blocker is being taken. Eye exam is current.   Hypothyroidism   This is a chronic problem. Progression since onset: Controlled. Pertinent negatives include no chest pain, coughing or numbness. Associated symptoms comments: No changes in heat/cold tolerance, no change hair/skin.  No fatigue.  . Nothing aggravates the symptoms. Treatments tried: Levothyroxine. The treatment provided significant relief.        The following portions of the patient's history were reviewed and updated as appropriate: allergies, current medications, past family history, past medical history, past social history, past surgical history and problem list.    Review of Systems   Constitutional: Negative for appetite change.   HENT: Negative for nosebleeds.    Eyes: Negative for blurred vision and double vision.   Respiratory: Negative for cough and shortness of breath.    Cardiovascular: Negative for chest pain, palpitations, orthopnea, leg swelling and PND.   Endocrine: Negative for polydipsia and polyuria.   Neurological: Negative for numbness.         Current Outpatient Medications:   •  celecoxib (CeleBREX) 200 MG capsule, Take 1 capsule by mouth 2 (Two) Times a Day., Disp: 180 capsule, Rfl: 3  •  famciclovir (FAMVIR) 500 MG tablet, Take 1 tablet by mouth 2 (Two) Times a Day., Disp: 180 tablet, Rfl: 3  •  FLUoxetine (PROzac) 20 MG capsule, Take 1 capsule by mouth Daily., Disp: 90 capsule, Rfl: 3  •  glucose blood (ACCU-CHEK COMPACT TEST DRUM) test strip, 1 each by Other route Daily., Disp: 90 each, Rfl: 3  •  hydrochlorothiazide (HYDRODIURIL) 25 MG tablet, Take 1 tablet by mouth Daily., Disp: 90 tablet, Rfl: 3  •  Lancets Misc. (ACCU-CHEK FASTCLIX LANCET) kit, 1 each Daily. Checks blood sugar 3 times a  "day, Disp: 1 kit, Rfl: 3  •  levothyroxine (SYNTHROID, LEVOTHROID) 50 MCG tablet, Take 1 tablet by mouth Daily., Disp: 90 tablet, Rfl: 3  •  lidocaine (LIDODERM) 5 %, Place 1 patch on the skin as directed by provider Daily. Remove & Discard patch within 12 hours or as directed by MD, Disp: 30 patch, Rfl: 2  •  lisinopril (PRINIVIL,ZESTRIL) 10 MG tablet, Take 1 tablet by mouth Daily., Disp: 90 tablet, Rfl: 3  •  metFORMIN ER (GLUCOPHAGE-XR) 500 MG 24 hr tablet, One daily, Disp: 90 tablet, Rfl: 3  •  rosuvastatin (CRESTOR) 10 MG tablet, Take 1 tablet by mouth Daily., Disp: 90 tablet, Rfl: 3  •  verapamil (VERELAN) 240 MG 24 hr capsule, Take 1 capsule by mouth Every Night., Disp: 90 capsule, Rfl: 3        Objective     /64   Ht 162.6 cm (64\")   Wt 80.3 kg (177 lb)   BMI 30.38 kg/m²     Physical Exam   Constitutional: She is oriented to person, place, and time. She appears well-developed and well-nourished. No distress.   Neck: Normal carotid pulses present. Carotid bruit is not present.   Cardiovascular: Regular rhythm, S1 normal and S2 normal. Exam reveals no gallop and no friction rub.   No murmur heard.  Pulses:       Carotid pulses are 2+ on the right side, and 2+ on the left side.  Pulmonary/Chest: Effort normal and breath sounds normal. She has no wheezes. She has no rhonchi. She has no rales. Chest wall is not dull to percussion.   Musculoskeletal: She exhibits no edema.   Neurological: She is alert and oriented to person, place, and time.   Skin: Skin is warm and dry.   Nursing note and vitals reviewed.    Lab Results   Component Value Date    HGBA1C 5.80 (H) 07/22/2019     Lab Results   Component Value Date    CHOL 132 10/04/2018    CHLPL 176 07/22/2019    TRIG 103 07/22/2019    HDL 62 (H) 07/22/2019    LDL 93 07/22/2019     Lab Results   Component Value Date    TSH 2.190 07/22/2019     Lab Results   Component Value Date    MICROALBUR 7.1 07/22/2019           Assessment/Plan   Debbora was seen today " for hypertension, hyperlipidemia, diabetes and hypothyroidism.    Diagnoses and all orders for this visit:    Type 2 diabetes mellitus without complication, with long-term current use of insulin (CMS/[facility])  -     metFORMIN ER (GLUCOPHAGE-XR) 500 MG 24 hr tablet; One daily    Hypercholesterolemia    Essential hypertension      Reviewed recent labs with her.  Hemoglobin A1c is very good.  She will continue prudent diet, continue medications as listed.

## 2019-08-13 ENCOUNTER — TELEPHONE (OUTPATIENT)
Dept: INTERNAL MEDICINE | Facility: CLINIC | Age: 64
End: 2019-08-13

## 2019-08-13 DIAGNOSIS — E03.9 HYPOTHYROIDISM, UNSPECIFIED TYPE: ICD-10-CM

## 2019-08-13 DIAGNOSIS — I10 ESSENTIAL HYPERTENSION: Primary | ICD-10-CM

## 2019-08-13 DIAGNOSIS — E78.5 HYPERLIPIDEMIA, UNSPECIFIED HYPERLIPIDEMIA TYPE: ICD-10-CM

## 2019-08-13 DIAGNOSIS — E11.9 TYPE 2 DIABETES MELLITUS WITHOUT COMPLICATION, WITHOUT LONG-TERM CURRENT USE OF INSULIN (HCC): ICD-10-CM

## 2019-08-13 RX ORDER — ROSUVASTATIN CALCIUM 10 MG/1
10 TABLET, COATED ORAL DAILY
Qty: 90 TABLET | Refills: 3 | Status: SHIPPED | OUTPATIENT
Start: 2019-08-13 | End: 2019-08-16 | Stop reason: SDUPTHER

## 2019-08-13 RX ORDER — FAMCICLOVIR 500 MG/1
500 TABLET ORAL 2 TIMES DAILY
Qty: 180 TABLET | Refills: 3 | Status: SHIPPED | OUTPATIENT
Start: 2019-08-13 | End: 2019-08-16 | Stop reason: SDUPTHER

## 2019-08-13 RX ORDER — LEVOTHYROXINE SODIUM 0.05 MG/1
50 TABLET ORAL DAILY
Qty: 90 TABLET | Refills: 3 | Status: SHIPPED | OUTPATIENT
Start: 2019-08-13 | End: 2019-08-16 | Stop reason: SDUPTHER

## 2019-08-13 RX ORDER — CELECOXIB 200 MG/1
200 CAPSULE ORAL 2 TIMES DAILY
Qty: 180 CAPSULE | Refills: 3 | Status: SHIPPED | OUTPATIENT
Start: 2019-08-13 | End: 2019-08-16 | Stop reason: SDUPTHER

## 2019-08-13 RX ORDER — VERAPAMIL HYDROCHLORIDE 240 MG/1
240 CAPSULE, EXTENDED RELEASE ORAL NIGHTLY
Qty: 90 CAPSULE | Refills: 3 | Status: SHIPPED | OUTPATIENT
Start: 2019-08-13 | End: 2019-08-16 | Stop reason: SDUPTHER

## 2019-08-13 RX ORDER — FLUOXETINE HYDROCHLORIDE 20 MG/1
20 CAPSULE ORAL DAILY
Qty: 90 CAPSULE | Refills: 3 | Status: SHIPPED | OUTPATIENT
Start: 2019-08-13 | End: 2019-08-16 | Stop reason: SDUPTHER

## 2019-08-13 RX ORDER — LISINOPRIL 10 MG/1
10 TABLET ORAL DAILY
Qty: 90 TABLET | Refills: 3 | Status: SHIPPED | OUTPATIENT
Start: 2019-08-13 | End: 2019-08-16 | Stop reason: SDUPTHER

## 2019-08-13 RX ORDER — HYDROCHLOROTHIAZIDE 25 MG/1
25 TABLET ORAL DAILY
Qty: 90 TABLET | Refills: 3 | Status: SHIPPED | OUTPATIENT
Start: 2019-08-13 | End: 2019-08-16 | Stop reason: SDUPTHER

## 2019-08-13 NOTE — TELEPHONE ENCOUNTER
----- Message from Renetta Trotter sent at 8/13/2019  9:59 AM EDT -----  Contact: Patient   Patient called stating she needs new scripts for all the following:      celecoxib (CeleBREX) 200 MG capsule    famciclovir (FAMVIR) 500 MG tablet    FLUoxetine (PROzac) 20 MG capsule    glucose blood (ACCU-CHEK COMPACT TEST DRUM) test strip    hydrochlorothiazide (HYDRODIURIL) 25 MG tablet               levothyroxine (SYNTHROID, LEVOTHROID) 50 MCG tablet              lisinopril (PRINIVIL,ZESTRIL) 10 MG tablet   rosuvastatin (CRESTOR) 10 MG tablet    verapamil (VERELAN) 240 MG 24 hr capsule     Please advise.     Patient:  [phone]    [facility] - [address] [phone] [facility] [phone] FX

## 2019-08-16 RX ORDER — CELECOXIB 200 MG/1
200 CAPSULE ORAL 2 TIMES DAILY
Qty: 180 CAPSULE | Refills: 3 | Status: SHIPPED | OUTPATIENT
Start: 2019-08-16 | End: 2019-12-11 | Stop reason: SDUPTHER

## 2019-08-16 RX ORDER — ROSUVASTATIN CALCIUM 10 MG/1
10 TABLET, COATED ORAL DAILY
Qty: 90 TABLET | Refills: 3 | Status: SHIPPED | OUTPATIENT
Start: 2019-08-16 | End: 2019-12-11 | Stop reason: SDUPTHER

## 2019-08-16 RX ORDER — VERAPAMIL HYDROCHLORIDE 240 MG/1
240 CAPSULE, EXTENDED RELEASE ORAL NIGHTLY
Qty: 90 CAPSULE | Refills: 3 | Status: SHIPPED | OUTPATIENT
Start: 2019-08-16 | End: 2019-12-11 | Stop reason: SDUPTHER

## 2019-08-16 RX ORDER — FAMCICLOVIR 500 MG/1
500 TABLET ORAL 2 TIMES DAILY
Qty: 180 TABLET | Refills: 3 | Status: SHIPPED | OUTPATIENT
Start: 2019-08-16 | End: 2019-12-11 | Stop reason: SDUPTHER

## 2019-08-16 RX ORDER — FLUOXETINE HYDROCHLORIDE 20 MG/1
20 CAPSULE ORAL DAILY
Qty: 90 CAPSULE | Refills: 3 | Status: SHIPPED | OUTPATIENT
Start: 2019-08-16 | End: 2019-12-11 | Stop reason: SDUPTHER

## 2019-08-16 RX ORDER — LISINOPRIL 10 MG/1
10 TABLET ORAL DAILY
Qty: 90 TABLET | Refills: 3 | Status: SHIPPED | OUTPATIENT
Start: 2019-08-16 | End: 2019-12-11 | Stop reason: SDUPTHER

## 2019-08-16 RX ORDER — HYDROCHLOROTHIAZIDE 25 MG/1
25 TABLET ORAL DAILY
Qty: 90 TABLET | Refills: 3 | Status: SHIPPED | OUTPATIENT
Start: 2019-08-16 | End: 2019-12-11 | Stop reason: SDUPTHER

## 2019-08-16 RX ORDER — LEVOTHYROXINE SODIUM 0.05 MG/1
50 TABLET ORAL DAILY
Qty: 90 TABLET | Refills: 3 | Status: SHIPPED | OUTPATIENT
Start: 2019-08-16 | End: 2019-12-11 | Stop reason: SDUPTHER

## 2019-12-04 ENCOUNTER — LAB (OUTPATIENT)
Dept: INTERNAL MEDICINE | Facility: CLINIC | Age: 64
End: 2019-12-04

## 2019-12-04 DIAGNOSIS — Z79.4 TYPE 2 DIABETES MELLITUS WITHOUT COMPLICATION, WITH LONG-TERM CURRENT USE OF INSULIN (HCC): ICD-10-CM

## 2019-12-04 DIAGNOSIS — E11.9 TYPE 2 DIABETES MELLITUS WITHOUT COMPLICATION, WITH LONG-TERM CURRENT USE OF INSULIN (HCC): ICD-10-CM

## 2019-12-04 LAB
ALBUMIN SERPL-MCNC: 4.8 G/DL (ref 3.5–5.2)
ALBUMIN/GLOB SERPL: 1.9 G/DL
ALP SERPL-CCNC: 68 U/L (ref 39–117)
ALT SERPL-CCNC: 26 U/L (ref 1–33)
AST SERPL-CCNC: 25 U/L (ref 1–32)
BILIRUB SERPL-MCNC: 0.4 MG/DL (ref 0.2–1.2)
BUN SERPL-MCNC: 23 MG/DL (ref 8–23)
BUN/CREAT SERPL: 29.5 (ref 7–25)
CALCIUM SERPL-MCNC: 9.3 MG/DL (ref 8.6–10.5)
CHLORIDE SERPL-SCNC: 103 MMOL/L (ref 98–107)
CHOLEST SERPL-MCNC: 190 MG/DL (ref 0–200)
CO2 SERPL-SCNC: 25.4 MMOL/L (ref 22–29)
CREAT SERPL-MCNC: 0.78 MG/DL (ref 0.57–1)
GLOBULIN SER CALC-MCNC: 2.5 GM/DL
GLUCOSE SERPL-MCNC: 132 MG/DL (ref 65–99)
HBA1C MFR BLD: 6.1 % (ref 4.8–5.6)
HDLC SERPL-MCNC: 66 MG/DL (ref 40–60)
LDLC SERPL CALC-MCNC: 99 MG/DL (ref 0–100)
POTASSIUM SERPL-SCNC: 4.5 MMOL/L (ref 3.5–5.2)
PROT SERPL-MCNC: 7.3 G/DL (ref 6–8.5)
SODIUM SERPL-SCNC: 142 MMOL/L (ref 136–145)
TRIGL SERPL-MCNC: 123 MG/DL (ref 0–150)
VLDLC SERPL-MCNC: 24.6 MG/DL

## 2019-12-10 ENCOUNTER — TELEPHONE (OUTPATIENT)
Dept: INTERNAL MEDICINE | Facility: CLINIC | Age: 64
End: 2019-12-10

## 2019-12-11 ENCOUNTER — OFFICE VISIT (OUTPATIENT)
Dept: INTERNAL MEDICINE | Facility: CLINIC | Age: 64
End: 2019-12-11

## 2019-12-11 VITALS
HEART RATE: 72 BPM | BODY MASS INDEX: 29.88 KG/M2 | DIASTOLIC BLOOD PRESSURE: 60 MMHG | WEIGHT: 175 LBS | OXYGEN SATURATION: 98 % | HEIGHT: 64 IN | SYSTOLIC BLOOD PRESSURE: 112 MMHG

## 2019-12-11 DIAGNOSIS — E11.9 TYPE 2 DIABETES MELLITUS WITHOUT COMPLICATION, WITH LONG-TERM CURRENT USE OF INSULIN (HCC): ICD-10-CM

## 2019-12-11 DIAGNOSIS — Z79.4 TYPE 2 DIABETES MELLITUS WITHOUT COMPLICATION, WITH LONG-TERM CURRENT USE OF INSULIN (HCC): ICD-10-CM

## 2019-12-11 DIAGNOSIS — I10 ESSENTIAL HYPERTENSION: ICD-10-CM

## 2019-12-11 DIAGNOSIS — E03.9 HYPOTHYROIDISM, UNSPECIFIED TYPE: ICD-10-CM

## 2019-12-11 DIAGNOSIS — E78.5 HYPERLIPIDEMIA, UNSPECIFIED HYPERLIPIDEMIA TYPE: ICD-10-CM

## 2019-12-11 PROCEDURE — 99214 OFFICE O/P EST MOD 30 MIN: CPT | Performed by: INTERNAL MEDICINE

## 2019-12-11 RX ORDER — METFORMIN HYDROCHLORIDE 500 MG/1
TABLET, EXTENDED RELEASE ORAL
Qty: 180 TABLET | Refills: 3 | Status: SHIPPED | OUTPATIENT
Start: 2019-12-11 | End: 2020-02-06 | Stop reason: SDUPTHER

## 2019-12-11 RX ORDER — LEVOTHYROXINE SODIUM 0.05 MG/1
50 TABLET ORAL DAILY
Qty: 90 TABLET | Refills: 3 | Status: SHIPPED | OUTPATIENT
Start: 2019-12-11 | End: 2020-02-06 | Stop reason: SDUPTHER

## 2019-12-11 RX ORDER — FLUOXETINE HYDROCHLORIDE 20 MG/1
20 CAPSULE ORAL DAILY
Qty: 90 CAPSULE | Refills: 3 | Status: SHIPPED | OUTPATIENT
Start: 2019-12-11 | End: 2020-02-06 | Stop reason: SDUPTHER

## 2019-12-11 RX ORDER — HYDROCHLOROTHIAZIDE 25 MG/1
25 TABLET ORAL DAILY
Qty: 90 TABLET | Refills: 3 | Status: SHIPPED | OUTPATIENT
Start: 2019-12-11 | End: 2020-02-06 | Stop reason: SDUPTHER

## 2019-12-11 RX ORDER — LISINOPRIL 10 MG/1
10 TABLET ORAL DAILY
Qty: 90 TABLET | Refills: 3 | Status: SHIPPED | OUTPATIENT
Start: 2019-12-11 | End: 2020-02-06 | Stop reason: SDUPTHER

## 2019-12-11 RX ORDER — ROSUVASTATIN CALCIUM 10 MG/1
10 TABLET, COATED ORAL DAILY
Qty: 90 TABLET | Refills: 3 | Status: SHIPPED | OUTPATIENT
Start: 2019-12-11 | End: 2020-02-06 | Stop reason: SDUPTHER

## 2019-12-11 RX ORDER — CELECOXIB 200 MG/1
200 CAPSULE ORAL 2 TIMES DAILY
Qty: 180 CAPSULE | Refills: 3 | Status: SHIPPED | OUTPATIENT
Start: 2019-12-11 | End: 2020-02-06 | Stop reason: SDUPTHER

## 2019-12-11 RX ORDER — VERAPAMIL HYDROCHLORIDE 240 MG/1
240 CAPSULE, EXTENDED RELEASE ORAL NIGHTLY
Qty: 90 CAPSULE | Refills: 3 | Status: SHIPPED | OUTPATIENT
Start: 2019-12-11 | End: 2020-02-06 | Stop reason: SDUPTHER

## 2019-12-11 RX ORDER — FAMCICLOVIR 500 MG/1
500 TABLET ORAL 2 TIMES DAILY
Qty: 180 TABLET | Refills: 3 | Status: SHIPPED | OUTPATIENT
Start: 2019-12-11 | End: 2020-09-25 | Stop reason: SDUPTHER

## 2019-12-11 NOTE — PROGRESS NOTES
Chief Complaint   Patient presents with   • Diabetes   • Hypertension   • Hyperlipidemia       Subjective   Summer Jefferson is a 64 y.o. female.     Diabetes   She presents for her follow-up diabetic visit. She has type 2 diabetes mellitus. Her disease course has been stable. There are no hypoglycemic associated symptoms. Pertinent negatives for hypoglycemia include no headaches. There are no diabetic associated symptoms. Pertinent negatives for diabetes include no blurred vision and no chest pain. Risk factors for coronary artery disease include diabetes mellitus, dyslipidemia and hypertension. Current diabetic treatment includes diet and oral agent (monotherapy). She is compliant with treatment most of the time. She is following a diabetic and generally healthy diet. Meal planning includes avoidance of concentrated sweets. She participates in exercise intermittently. There is no change in her home blood glucose trend. An ACE inhibitor/angiotensin II receptor blocker is being taken.   Hypertension   This is a chronic problem. The problem is controlled. Pertinent negatives include no blurred vision, chest pain, headaches, orthopnea, palpitations, peripheral edema, PND or shortness of breath. Agents associated with hypertension include NSAIDs. Risk factors for coronary artery disease include diabetes mellitus, dyslipidemia, obesity and post-menopausal state. Current antihypertension treatment includes ACE inhibitors and diuretics. The current treatment provides significant improvement.   Hyperlipidemia   This is a chronic problem. The problem is controlled. Recent lipid tests were reviewed and are normal. Exacerbating diseases include diabetes and obesity. Factors aggravating her hyperlipidemia include thiazides. Pertinent negatives include no chest pain or shortness of breath. Current antihyperlipidemic treatment includes statins, diet change and exercise. The current treatment provides significant improvement of  "lipids.        The following portions of the patient's history were reviewed and updated as appropriate: allergies, current medications, past family history, past medical history, past social history, past surgical history and problem list.    Review of Systems   Constitutional: Negative for appetite change.   HENT: Negative for nosebleeds.    Eyes: Negative for blurred vision and double vision.   Respiratory: Negative for cough and shortness of breath.    Cardiovascular: Negative for chest pain, palpitations, orthopnea, leg swelling and PND.         Current Outpatient Medications:   •  celecoxib (CeleBREX) 200 MG capsule, Take 1 capsule by mouth 2 (Two) Times a Day., Disp: 180 capsule, Rfl: 3  •  famciclovir (FAMVIR) 500 MG tablet, Take 1 tablet by mouth 2 (Two) Times a Day., Disp: 180 tablet, Rfl: 3  •  FLUoxetine (PROzac) 20 MG capsule, Take 1 capsule by mouth Daily., Disp: 90 capsule, Rfl: 3  •  glucose blood (ACCU-CHEK COMPACT TEST DRUM) test strip, 1 each by Other route Daily., Disp: 90 each, Rfl: 3  •  hydroCHLOROthiazide (HYDRODIURIL) 25 MG tablet, Take 1 tablet by mouth Daily., Disp: 90 tablet, Rfl: 3  •  Lancets Misc. (ACCU-CHEK FASTCLIX LANCET) kit, 1 each Daily. Checks blood sugar 3 times a day, Disp: 1 kit, Rfl: 3  •  levothyroxine (SYNTHROID, LEVOTHROID) 50 MCG tablet, Take 1 tablet by mouth Daily., Disp: 90 tablet, Rfl: 3  •  lisinopril (PRINIVIL,ZESTRIL) 10 MG tablet, Take 1 tablet by mouth Daily., Disp: 90 tablet, Rfl: 3  •  metFORMIN ER (GLUCOPHAGE-XR) 500 MG 24 hr tablet, One twice daily, Disp: 180 tablet, Rfl: 3  •  rosuvastatin (CRESTOR) 10 MG tablet, Take 1 tablet by mouth Daily., Disp: 90 tablet, Rfl: 3  •  verapamil ER (VERELAN) 240 MG 24 hr capsule, Take 1 capsule by mouth Every Night., Disp: 90 capsule, Rfl: 3        Objective     /60   Pulse 72   Ht 162.6 cm (64\")   Wt 79.4 kg (175 lb)   SpO2 98%   BMI 30.04 kg/m²     Physical Exam   Constitutional: She is oriented to person, " place, and time. She appears well-developed and well-nourished. No distress.   Neck: Normal carotid pulses present. Carotid bruit is not present.   Cardiovascular: Regular rhythm, S1 normal and S2 normal. Exam reveals no gallop and no friction rub.   No murmur heard.  Pulses:       Carotid pulses are 2+ on the right side, and 2+ on the left side.  Pulmonary/Chest: Effort normal and breath sounds normal. She has no wheezes. She has no rhonchi. She has no rales. Chest wall is not dull to percussion.   Musculoskeletal: She exhibits no edema.   Neurological: She is alert and oriented to person, place, and time.   Skin: Skin is warm and dry.   Nursing note and vitals reviewed.    Lab Results   Component Value Date    CHOL 132 10/04/2018    CHLPL 190 12/04/2019    TRIG 123 12/04/2019    HDL 66 (H) 12/04/2019    LDL 99 12/04/2019     Lab Results   Component Value Date    HGBA1C 6.10 (H) 12/04/2019          Assessment/Plan   Summer was seen today for diabetes, hypertension and hyperlipidemia.    Diagnoses and all orders for this visit:    Hypothyroidism, unspecified type  -     levothyroxine (SYNTHROID, LEVOTHROID) 50 MCG tablet; Take 1 tablet by mouth Daily.    Essential hypertension  -     lisinopril (PRINIVIL,ZESTRIL) 10 MG tablet; Take 1 tablet by mouth Daily.    Type 2 diabetes mellitus without complication, with long-term current use of insulin (CMS/[facility])  -     metFORMIN ER (GLUCOPHAGE-XR) 500 MG 24 hr tablet; One twice daily  -     glucose blood (ACCU-CHEK COMPACT TEST DRUM) test strip; 1 each by Other route Daily.    Hyperlipidemia, unspecified hyperlipidemia type  -     rosuvastatin (CRESTOR) 10 MG tablet; Take 1 tablet by mouth Daily.    Other orders  -     celecoxib (CeleBREX) 200 MG capsule; Take 1 capsule by mouth 2 (Two) Times a Day.  -     famciclovir (FAMVIR) 500 MG tablet; Take 1 tablet by mouth 2 (Two) Times a Day.  -     FLUoxetine (PROzac) 20 MG capsule; Take 1 capsule by mouth Daily.  -      hydroCHLOROthiazide (HYDRODIURIL) 25 MG tablet; Take 1 tablet by mouth Daily.  -     verapamil ER (VERELAN) 240 MG 24 hr capsule; Take 1 capsule by mouth Every Night.

## 2020-02-06 ENCOUNTER — TELEPHONE (OUTPATIENT)
Dept: INTERNAL MEDICINE | Facility: CLINIC | Age: 65
End: 2020-02-06

## 2020-02-06 DIAGNOSIS — I10 ESSENTIAL HYPERTENSION: ICD-10-CM

## 2020-02-06 DIAGNOSIS — E78.5 HYPERLIPIDEMIA, UNSPECIFIED HYPERLIPIDEMIA TYPE: ICD-10-CM

## 2020-02-06 DIAGNOSIS — E11.9 TYPE 2 DIABETES MELLITUS WITHOUT COMPLICATION, WITH LONG-TERM CURRENT USE OF INSULIN (HCC): ICD-10-CM

## 2020-02-06 DIAGNOSIS — E03.9 HYPOTHYROIDISM, UNSPECIFIED TYPE: ICD-10-CM

## 2020-02-06 DIAGNOSIS — Z79.4 TYPE 2 DIABETES MELLITUS WITHOUT COMPLICATION, WITH LONG-TERM CURRENT USE OF INSULIN (HCC): ICD-10-CM

## 2020-02-06 RX ORDER — VERAPAMIL HYDROCHLORIDE 240 MG/1
240 CAPSULE, EXTENDED RELEASE ORAL NIGHTLY
Qty: 90 CAPSULE | Refills: 3 | Status: CANCELLED | OUTPATIENT
Start: 2020-02-06

## 2020-02-06 RX ORDER — ROSUVASTATIN CALCIUM 10 MG/1
10 TABLET, COATED ORAL DAILY
Qty: 90 TABLET | Refills: 3 | Status: SHIPPED | OUTPATIENT
Start: 2020-02-06 | End: 2020-09-25 | Stop reason: SDUPTHER

## 2020-02-06 RX ORDER — ROSUVASTATIN CALCIUM 10 MG/1
10 TABLET, COATED ORAL DAILY
Qty: 90 TABLET | Refills: 3 | Status: SHIPPED | OUTPATIENT
Start: 2020-02-06 | End: 2020-02-06 | Stop reason: SDUPTHER

## 2020-02-06 RX ORDER — CELECOXIB 200 MG/1
200 CAPSULE ORAL 2 TIMES DAILY
Qty: 180 CAPSULE | Refills: 3 | Status: CANCELLED | OUTPATIENT
Start: 2020-02-06

## 2020-02-06 RX ORDER — LEVOTHYROXINE SODIUM 0.05 MG/1
50 TABLET ORAL DAILY
Qty: 90 TABLET | Refills: 3 | Status: CANCELLED | OUTPATIENT
Start: 2020-02-06

## 2020-02-06 RX ORDER — HYDROCHLOROTHIAZIDE 25 MG/1
25 TABLET ORAL DAILY
Qty: 90 TABLET | Refills: 3 | Status: SHIPPED | OUTPATIENT
Start: 2020-02-06 | End: 2020-09-25 | Stop reason: SDUPTHER

## 2020-02-06 RX ORDER — CELECOXIB 200 MG/1
200 CAPSULE ORAL 2 TIMES DAILY
Qty: 180 CAPSULE | Refills: 3 | Status: SHIPPED | OUTPATIENT
Start: 2020-02-06 | End: 2020-02-06 | Stop reason: SDUPTHER

## 2020-02-06 RX ORDER — LEVOTHYROXINE SODIUM 0.05 MG/1
50 TABLET ORAL DAILY
Qty: 90 TABLET | Refills: 3 | Status: SHIPPED | OUTPATIENT
Start: 2020-02-06 | End: 2020-09-25 | Stop reason: SDUPTHER

## 2020-02-06 RX ORDER — FLUOXETINE HYDROCHLORIDE 20 MG/1
20 CAPSULE ORAL DAILY
Qty: 90 CAPSULE | Refills: 3 | Status: SHIPPED | OUTPATIENT
Start: 2020-02-06 | End: 2020-09-25 | Stop reason: SDUPTHER

## 2020-02-06 RX ORDER — LISINOPRIL 10 MG/1
10 TABLET ORAL DAILY
Qty: 90 TABLET | Refills: 3 | Status: SHIPPED | OUTPATIENT
Start: 2020-02-06 | End: 2020-09-25 | Stop reason: SDUPTHER

## 2020-02-06 RX ORDER — VERAPAMIL HYDROCHLORIDE 240 MG/1
240 CAPSULE, EXTENDED RELEASE ORAL NIGHTLY
Qty: 90 CAPSULE | Refills: 3 | Status: SHIPPED | OUTPATIENT
Start: 2020-02-06 | End: 2020-02-06 | Stop reason: SDUPTHER

## 2020-02-06 RX ORDER — HYDROCHLOROTHIAZIDE 25 MG/1
25 TABLET ORAL DAILY
Qty: 90 TABLET | Refills: 3 | Status: SHIPPED | OUTPATIENT
Start: 2020-02-06 | End: 2020-02-06 | Stop reason: SDUPTHER

## 2020-02-06 RX ORDER — ROSUVASTATIN CALCIUM 10 MG/1
10 TABLET, COATED ORAL DAILY
Qty: 90 TABLET | Refills: 3 | Status: CANCELLED | OUTPATIENT
Start: 2020-02-06

## 2020-02-06 RX ORDER — METFORMIN HYDROCHLORIDE 500 MG/1
TABLET, EXTENDED RELEASE ORAL
Qty: 180 TABLET | Refills: 3 | Status: SHIPPED | OUTPATIENT
Start: 2020-02-06 | End: 2020-09-25 | Stop reason: SDUPTHER

## 2020-02-06 RX ORDER — FLUOXETINE HYDROCHLORIDE 20 MG/1
20 CAPSULE ORAL DAILY
Qty: 90 CAPSULE | Refills: 3 | Status: SHIPPED | OUTPATIENT
Start: 2020-02-06 | End: 2020-02-06 | Stop reason: SDUPTHER

## 2020-02-06 RX ORDER — LISINOPRIL 10 MG/1
10 TABLET ORAL DAILY
Qty: 90 TABLET | Refills: 3 | Status: CANCELLED | OUTPATIENT
Start: 2020-02-06

## 2020-02-06 RX ORDER — FLUOXETINE HYDROCHLORIDE 20 MG/1
20 CAPSULE ORAL DAILY
Qty: 90 CAPSULE | Refills: 3 | Status: CANCELLED | OUTPATIENT
Start: 2020-02-06

## 2020-02-06 RX ORDER — CELECOXIB 200 MG/1
200 CAPSULE ORAL 2 TIMES DAILY
Qty: 180 CAPSULE | Refills: 3 | Status: SHIPPED | OUTPATIENT
Start: 2020-02-06 | End: 2020-03-11 | Stop reason: SDUPTHER

## 2020-02-06 RX ORDER — METFORMIN HYDROCHLORIDE 500 MG/1
TABLET, EXTENDED RELEASE ORAL
Qty: 180 TABLET | Refills: 3 | Status: CANCELLED | OUTPATIENT
Start: 2020-02-06

## 2020-02-06 RX ORDER — HYDROCHLOROTHIAZIDE 25 MG/1
25 TABLET ORAL DAILY
Qty: 90 TABLET | Refills: 3 | Status: CANCELLED | OUTPATIENT
Start: 2020-02-06

## 2020-02-06 RX ORDER — VERAPAMIL HYDROCHLORIDE 240 MG/1
240 CAPSULE, EXTENDED RELEASE ORAL NIGHTLY
Qty: 90 CAPSULE | Refills: 3 | Status: SHIPPED | OUTPATIENT
Start: 2020-02-06 | End: 2020-09-25 | Stop reason: SDUPTHER

## 2020-02-06 RX ORDER — LEVOTHYROXINE SODIUM 0.05 MG/1
50 TABLET ORAL DAILY
Qty: 90 TABLET | Refills: 3 | Status: SHIPPED | OUTPATIENT
Start: 2020-02-06 | End: 2020-02-06 | Stop reason: SDUPTHER

## 2020-02-06 RX ORDER — LISINOPRIL 10 MG/1
10 TABLET ORAL DAILY
Qty: 90 TABLET | Refills: 3 | Status: SHIPPED | OUTPATIENT
Start: 2020-02-06 | End: 2020-02-06 | Stop reason: SDUPTHER

## 2020-02-06 RX ORDER — METFORMIN HYDROCHLORIDE 500 MG/1
TABLET, EXTENDED RELEASE ORAL
Qty: 180 TABLET | Refills: 3 | Status: SHIPPED | OUTPATIENT
Start: 2020-02-06 | End: 2020-02-06 | Stop reason: SDUPTHER

## 2020-02-06 NOTE — TELEPHONE ENCOUNTER
Patient called in requesting a re-fill for:    celecoxib (CeleBREX) 200 MG capsule  FLUoxetine (PROzac) 20 MG capsule  glucose blood (ACCU-CHEK COMPACT TEST DRUM) test strip  hydroCHLOROthiazide (HYDRODIURIL) 25 MG tablet  levothyroxine (SYNTHROID, LEVOTHROID) 50 MCG tablet  lisinopril (PRINIVIL,ZESTRIL) 10 MG tablet  metFORMIN ER (GLUCOPHAGE-XR) 500 MG 24 hr tablet  rosuvastatin (CRESTOR) 10 MG tablet  verapamil ER (VERELAN) 240 MG 24 hr capsule    Patient stated she is almost out of all.    [facility], confirmed    Patient call back [phone]

## 2020-03-11 DIAGNOSIS — E11.9 TYPE 2 DIABETES MELLITUS WITHOUT COMPLICATION, WITH LONG-TERM CURRENT USE OF INSULIN (HCC): ICD-10-CM

## 2020-03-11 DIAGNOSIS — Z79.4 TYPE 2 DIABETES MELLITUS WITHOUT COMPLICATION, WITH LONG-TERM CURRENT USE OF INSULIN (HCC): ICD-10-CM

## 2020-03-11 RX ORDER — CELECOXIB 200 MG/1
200 CAPSULE ORAL 2 TIMES DAILY
Qty: 180 CAPSULE | Refills: 3 | Status: SHIPPED | OUTPATIENT
Start: 2020-03-11 | End: 2020-09-25 | Stop reason: SDUPTHER

## 2020-03-11 RX ORDER — LANCING DEVICE/LANCETS
1 KIT MISCELLANEOUS DAILY
Qty: 1 KIT | Refills: 3 | Status: SHIPPED | OUTPATIENT
Start: 2020-03-11 | End: 2020-09-25

## 2020-04-13 DIAGNOSIS — E11.9 TYPE 2 DIABETES MELLITUS WITHOUT COMPLICATION, WITH LONG-TERM CURRENT USE OF INSULIN (HCC): Primary | ICD-10-CM

## 2020-04-13 DIAGNOSIS — E03.9 HYPOTHYROIDISM, UNSPECIFIED TYPE: ICD-10-CM

## 2020-04-13 DIAGNOSIS — Z79.4 TYPE 2 DIABETES MELLITUS WITHOUT COMPLICATION, WITH LONG-TERM CURRENT USE OF INSULIN (HCC): Primary | ICD-10-CM

## 2020-04-27 ENCOUNTER — RESULTS ENCOUNTER (OUTPATIENT)
Dept: INTERNAL MEDICINE | Facility: CLINIC | Age: 65
End: 2020-04-27

## 2020-04-27 DIAGNOSIS — E03.9 HYPOTHYROIDISM, UNSPECIFIED TYPE: ICD-10-CM

## 2020-04-27 DIAGNOSIS — E11.9 TYPE 2 DIABETES MELLITUS WITHOUT COMPLICATION, WITH LONG-TERM CURRENT USE OF INSULIN (HCC): ICD-10-CM

## 2020-04-27 DIAGNOSIS — Z79.4 TYPE 2 DIABETES MELLITUS WITHOUT COMPLICATION, WITH LONG-TERM CURRENT USE OF INSULIN (HCC): ICD-10-CM

## 2020-06-01 ENCOUNTER — APPOINTMENT (OUTPATIENT)
Dept: GENERAL RADIOLOGY | Facility: HOSPITAL | Age: 65
End: 2020-06-01

## 2020-06-01 PROCEDURE — 73080 X-RAY EXAM OF ELBOW: CPT | Performed by: FAMILY MEDICINE

## 2020-06-03 ENCOUNTER — OFFICE VISIT (OUTPATIENT)
Dept: ORTHOPEDIC SURGERY | Facility: CLINIC | Age: 65
End: 2020-06-03

## 2020-06-03 DIAGNOSIS — M25.522 LEFT ELBOW PAIN: Primary | ICD-10-CM

## 2020-06-03 PROCEDURE — 99214 OFFICE O/P EST MOD 30 MIN: CPT | Performed by: ORTHOPAEDIC SURGERY

## 2020-06-03 PROCEDURE — 24650 CLTX RDL HEAD/NCK FX WO MNPJ: CPT | Performed by: ORTHOPAEDIC SURGERY

## 2020-06-03 NOTE — PROGRESS NOTES
History & Physical       Patient: Summer Jefferson    YOB: 1955    Medical Record Number: [id number]    Chief Complaints: Left elbow injury    History of Present Illness: 64 y.o. female presents for evaluation of the left elbow.  The injury was sustained in a fall on June 1.  She tripped in her driveway and fell on the elbow and left knee.  She also struck her chin.  The knee and chin are fine at this point.  She was seen at an urgent care and had x-rays.  She was diagnosed with a radial head fracture.  She has been wearing a sling.  Current pain is described as moderate, constant, and aching.  Pain is worse with movement.  Rest, the sling, and naproxen have all helped.  The patient is right hand dominant.  She reports good use and function of the left arm prior to this injury.    Allergies: No Known Allergies    Home Medications:      Current Outpatient Medications:   •  celecoxib (CeleBREX) 200 MG capsule, Take 1 capsule by mouth 2 (Two) Times a Day., Disp: 180 capsule, Rfl: 3  •  famciclovir (FAMVIR) 500 MG tablet, Take 1 tablet by mouth 2 (Two) Times a Day., Disp: 180 tablet, Rfl: 3  •  FLUoxetine (PROzac) 20 MG capsule, Take 1 capsule by mouth Daily., Disp: 90 capsule, Rfl: 3  •  glucose blood (ACCU-CHEK COMPACT TEST DRUM) test strip, 1 each by Other route Daily., Disp: 90 each, Rfl: 3  •  hydroCHLOROthiazide (HYDRODIURIL) 25 MG tablet, Take 1 tablet by mouth Daily., Disp: 90 tablet, Rfl: 3  •  Lancets Misc. (ACCU-CHEK FASTCLIX LANCET) kit, 1 each Daily. Checks blood sugar 3 times a day, Disp: 1 kit, Rfl: 3  •  levothyroxine (SYNTHROID, LEVOTHROID) 50 MCG tablet, Take 1 tablet by mouth Daily., Disp: 90 tablet, Rfl: 3  •  lisinopril (PRINIVIL,ZESTRIL) 10 MG tablet, Take 1 tablet by mouth Daily., Disp: 90 tablet, Rfl: 3  •  metFORMIN ER (GLUCOPHAGE-XR) 500 MG 24 hr tablet, One twice daily, Disp: 180 tablet, Rfl: 3  •  naproxen (NAPROSYN) 500 MG tablet, Take 1 tablet by mouth 2 (Two) Times a Day  With Meals for 10 days., Disp: 20 tablet, Rfl: 0  •  rosuvastatin (CRESTOR) 10 MG tablet, Take 1 tablet by mouth Daily., Disp: 90 tablet, Rfl: 3  •  verapamil ER (VERELAN) 240 MG 24 hr capsule, Take 1 capsule by mouth Every Night., Disp: 90 capsule, Rfl: 3    Past Medical History:   Diagnosis Date   • Backache    • Diabetes mellitus type 2, controlled (CMS/[facility])    • H/O bone density study     8/2014   • H/O mammogram     8/2014   • Hypercholesterolemia    • Hypertension    • Hypothyroidism    • Osteoarthritis of multiple joints      Past Surgical History:   Procedure Laterality Date   • ANKLE ARTHROSCOPY Right     1/2013 & 10/2013   • COLONOSCOPY      7/27/2007   • LAPAROSCOPIC GASTRIC BANDING  08/01/2007   • PAP SMEAR      12/2014   • ROTATOR CUFF REPAIR Right     5/2014   • TOTAL KNEE ARTHROPLASTY Right     12/2014     Social History     Occupational History   • Not on file   Tobacco Use   • Smoking status: Former Smoker   Substance and Sexual Activity   • Alcohol use: Yes   • Drug use: Not on file   • Sexual activity: Not on file      Social History     Social History Narrative   • Not on file     Denies pertinent family history    Review of Systems:      Constitutional: Denies fever, shaking or chills   Eyes: Denies change in visual acuity   HEENT: Denies nasal congestion or sore throat   Respiratory: Denies cough or shortness of breath   Cardiovascular: Denies chest pain or edema  Endocrine: Denies tremors, palpitations, intolerance of heat or cold, polyuria, polydipsia.  GI: Denies abdominal pain, nausea, vomiting, bloody stools or diarrhea  : Denies frequency, urgency, incontinence, retention, or nocturia.  Musculoskeletal: Denies numbness, tingling or loss of motor function except as above  Integument: Denies rash, lesion or ulceration   Neurologic: Denies headache or focal weakness, deficits  Heme: Denies spontaneous or excessive bleeding, epistaxis, hematuria, melena, fatigue, enlarged or tender lymph  nodes.      All other pertinent positives and negatives as noted above in HPI.    Physical Exam: 64 y.o. female    Height 5 foot 4 inches.  Weight 194.2 pounds.  Temperature 97.7    General:  Patient is awake and alert.  Appears in no acute distress or discomfort.    Psych:  Affect and demeanor are appropriate.    Eyes:  Conjunctiva and sclera appear grossly normal.  Eyes track well and EOM seem to be intact.    Dentition:  No gross abnormalities noted.    Ears:  No gross abnormalities.  Hearing adequate for the exam.    Cardiovascular:  Regular rate and rhythm.    Lungs:  Good chest expansion.  Breathing unlabored.    Lymph:  No palpable masses or adenopathy in the affected extremity    Left upper extremity:  Sling was in place and removed.  There is edema and ecchymosis along the lateral aspect of the elbow.  Skin appears benign.  No lacerations or abrasions.  Focal tenderness noted over the lateral aspect of the elbow.  No tenderness about the remainder of the elbow, upper arm, forearm, wrist or hand.  No palpable masses or adenopathy.  Compartments soft.  Painful, limited ROM of the elbow.  No obvious mechanical blocks.  Could not assess stability due to discomfort with motion.  Good strength in the wrist with flexion and extension as well as , pinch, finger and thumb abduction.  Intact sensation.  Brisk cap refill.  Palpable radial pulse.     Diagnostic Tests:  Lab Results   Component Value Date    GLUCOSE 117 (H) 10/04/2018    CALCIUM 9.3 12/04/2019     12/04/2019    K 4.5 12/04/2019    CO2 25.4 12/04/2019     12/04/2019    BUN 23 12/04/2019    CREATININE 0.78 12/04/2019    EGFRIFAFRI 90 12/04/2019    EGFRIFNONA 74 12/04/2019    BCR 29.5 (H) 12/04/2019    ANIONGAP 14.5 10/04/2018     Lab Results   Component Value Date    WBC 4.80 07/22/2019    HGB 15.0 07/22/2019    HCT 45.6 07/22/2019    MCV 88.2 07/22/2019     07/22/2019     No results found for: INR, PROTIME    Imaging:  Outside AP  and lateral views of the left elbow are reviewed along with the associated report.  There is a minimally displaced radial head fracture.  There appears to be a displaced fragment on the lateral view.  This is a very small avulsion type fragment.  The AP view shows a small depressed fragment of the radial head.    Assessment:  Left radial head fracture    Plan:  I recommend closed treatment of this injury.  Risks were discussed including nonunion, malunion, displacement necessitating operative intervention, arthrofibrosis, and persistent pain or motion loss necessitating further intervention.  I recommend that the patient continue use of the sling for comfort.  We will reconvene in approximately 1 week for repeat x-rays and begin early active motion.  We discussed appropriate activity modifications and restrictions including no lifting greater than 2 pounds, pushing, or pulling with the affected arm.  She says the naproxen is adequate for her pain right now.  She will call me if she needs a refill.    Date: 6/3/2020    Madi Blanton MD

## 2020-06-10 ENCOUNTER — OFFICE VISIT (OUTPATIENT)
Dept: ORTHOPEDIC SURGERY | Facility: CLINIC | Age: 65
End: 2020-06-10

## 2020-06-10 VITALS — WEIGHT: 175 LBS | BODY MASS INDEX: 29.88 KG/M2 | HEIGHT: 64 IN | TEMPERATURE: 97.8 F

## 2020-06-10 DIAGNOSIS — Z09 FRACTURE FOLLOW-UP: Primary | ICD-10-CM

## 2020-06-10 DIAGNOSIS — S52.122D CLOSED DISPLACED FRACTURE OF HEAD OF LEFT RADIUS WITH ROUTINE HEALING, SUBSEQUENT ENCOUNTER: ICD-10-CM

## 2020-06-10 PROCEDURE — 99024 POSTOP FOLLOW-UP VISIT: CPT | Performed by: NURSE PRACTITIONER

## 2020-06-10 PROCEDURE — 73070 X-RAY EXAM OF ELBOW: CPT | Performed by: NURSE PRACTITIONER

## 2020-06-10 NOTE — PROGRESS NOTES
Chief Complaint:  Follow up left elbow     HPI: Patient returns to clinic today for follow-up left elbow.  States pain is improving.  No new complaints today.      Exam: Skin is benign.  Painful and limited range of motion with flexion and extension.  She can supinate and pronate with minimal discomfort.  Good  strength.  Sensation intact in forearm, hand, and fingers.  Palpable radial pulse.      Imaging:  Dr. Blanton and I reviewed the x-rays together.  2v xrays including AP and lateral views of the elbow are ordered and reviewed by me to evaluate alignment and for comparison purposes.  No new or concerning findings noted. Alignment is unchanged.    Assessment: Left radial head fracture, subsequent visit    Plan: The alignment appears unchanged.  I encouraged her to begin range of motion exercises.  She may discontinue use of the sling.  She is interested in starting physical therapy, so I have entered the referral.  She may take OTC Tylenol as needed.  The risks and dosing instructions for this medication were discussed.  She will follow-up with Dr. Blanton in 4 weeks.    GILBERT Prieto  06/10/2020

## 2020-07-13 ENCOUNTER — OFFICE VISIT (OUTPATIENT)
Dept: ORTHOPEDIC SURGERY | Facility: CLINIC | Age: 65
End: 2020-07-13

## 2020-07-13 VITALS — HEIGHT: 64 IN | WEIGHT: 180 LBS | TEMPERATURE: 97.8 F | BODY MASS INDEX: 30.73 KG/M2

## 2020-07-13 DIAGNOSIS — Z09 FRACTURE FOLLOW-UP: Primary | ICD-10-CM

## 2020-07-13 PROCEDURE — 99024 POSTOP FOLLOW-UP VISIT: CPT | Performed by: ORTHOPAEDIC SURGERY

## 2020-07-13 PROCEDURE — 73070 X-RAY EXAM OF ELBOW: CPT | Performed by: ORTHOPAEDIC SURGERY

## 2020-07-13 RX ORDER — LIDOCAINE 50 MG/G
1 PATCH TOPICAL DAILY
Qty: 30 PATCH | Refills: 0 | Status: SHIPPED | OUTPATIENT
Start: 2020-07-13 | End: 2020-09-25

## 2020-07-13 NOTE — PROGRESS NOTES
Ms. Jefferson is now 6 weeks out from her radial head fracture.  She reports very little discomfort.  She reports having recovered near full motion.    She lacks about 10 degrees of extension.  Flexion is to 135 degrees.  Pronation supination are both about 80 degrees.  She can push against resistance with good strength.  Only mild discomfort when pushing.    AP and lateral views of the left elbow are ordered, reviewed, and compared to previous x-rays.  No change in alignment.  The radial head fracture is again visualized.  There has been interval callus formation.    Assessment: 1 month status post closed treatment of left radial head fracture    Plan: She seems to be doing well.  She can progress her activity as tolerated.  I will see her back in 6 weeks for a final visit.  Of note, before leaving she did request a prescription for Lidoderm patches.  They have helped her in the past.  I did agree to give her that prescription today at her request.  Risks were discussed.    Madi Blanton MD

## 2020-07-22 ENCOUNTER — TELEPHONE (OUTPATIENT)
Dept: ORTHOPEDIC SURGERY | Facility: CLINIC | Age: 65
End: 2020-07-22

## 2020-07-22 NOTE — TELEPHONE ENCOUNTER
Patient called stating apparently the Lidoderm patches need prior authorization? [facility].

## 2020-08-24 ENCOUNTER — OFFICE VISIT (OUTPATIENT)
Dept: ORTHOPEDIC SURGERY | Facility: CLINIC | Age: 65
End: 2020-08-24

## 2020-08-24 VITALS — BODY MASS INDEX: 30.73 KG/M2 | HEIGHT: 64 IN | TEMPERATURE: 97.1 F | WEIGHT: 180 LBS

## 2020-08-24 DIAGNOSIS — Z09 FRACTURE FOLLOW-UP: Primary | ICD-10-CM

## 2020-08-24 PROCEDURE — 99024 POSTOP FOLLOW-UP VISIT: CPT | Performed by: ORTHOPAEDIC SURGERY

## 2020-08-24 PROCEDURE — 73070 X-RAY EXAM OF ELBOW: CPT | Performed by: ORTHOPAEDIC SURGERY

## 2020-08-24 NOTE — PROGRESS NOTES
Ms. Jefferson is now almost 3 months out from closed treatment of her radial head fracture.  She reports the elbow is doing great.  No pain.  No complaints.    She has just about full extension.  She lacks just a few degrees, no more than about 5.  Flexion is symmetric to the contralateral side.  Pronation and supination are also symmetric.  She can push, pull and supinate against resistance with good strength and no pain.    AP, lateral views of the left elbow are ordered, reviewed, and compared to previous x-rays to evaluate healing.  There has been significant interval callus formation and healing of the fracture.    Assessment: 3-month status post closed treatment left radial head fracture    Plan: She is doing great.  Okay to follow-up as needed and resume activity without restriction.

## 2020-09-25 ENCOUNTER — OFFICE VISIT (OUTPATIENT)
Dept: INTERNAL MEDICINE | Facility: CLINIC | Age: 65
End: 2020-09-25

## 2020-09-25 VITALS
HEART RATE: 79 BPM | SYSTOLIC BLOOD PRESSURE: 107 MMHG | RESPIRATION RATE: 16 BRPM | OXYGEN SATURATION: 96 % | TEMPERATURE: 98.4 F | WEIGHT: 189 LBS | HEIGHT: 64 IN | BODY MASS INDEX: 32.27 KG/M2 | DIASTOLIC BLOOD PRESSURE: 72 MMHG

## 2020-09-25 DIAGNOSIS — Z23 NEED FOR INFLUENZA VACCINATION: ICD-10-CM

## 2020-09-25 DIAGNOSIS — E03.9 HYPOTHYROIDISM, UNSPECIFIED TYPE: ICD-10-CM

## 2020-09-25 DIAGNOSIS — E11.9 TYPE 2 DIABETES MELLITUS WITHOUT COMPLICATION, WITH LONG-TERM CURRENT USE OF INSULIN (HCC): ICD-10-CM

## 2020-09-25 DIAGNOSIS — Z79.4 TYPE 2 DIABETES MELLITUS WITHOUT COMPLICATION, WITH LONG-TERM CURRENT USE OF INSULIN (HCC): ICD-10-CM

## 2020-09-25 DIAGNOSIS — I10 ESSENTIAL HYPERTENSION: ICD-10-CM

## 2020-09-25 DIAGNOSIS — E11.9 CONTROLLED TYPE 2 DIABETES MELLITUS WITHOUT COMPLICATION, WITHOUT LONG-TERM CURRENT USE OF INSULIN (HCC): Primary | ICD-10-CM

## 2020-09-25 DIAGNOSIS — E78.00 HYPERCHOLESTEROLEMIA: ICD-10-CM

## 2020-09-25 DIAGNOSIS — E78.5 HYPERLIPIDEMIA, UNSPECIFIED HYPERLIPIDEMIA TYPE: ICD-10-CM

## 2020-09-25 PROCEDURE — 99214 OFFICE O/P EST MOD 30 MIN: CPT | Performed by: INTERNAL MEDICINE

## 2020-09-25 PROCEDURE — 90653 IIV ADJUVANT VACCINE IM: CPT | Performed by: INTERNAL MEDICINE

## 2020-09-25 PROCEDURE — G0008 ADMIN INFLUENZA VIRUS VAC: HCPCS | Performed by: INTERNAL MEDICINE

## 2020-09-25 RX ORDER — ROSUVASTATIN CALCIUM 10 MG/1
10 TABLET, COATED ORAL DAILY
Qty: 90 TABLET | Refills: 3 | Status: SHIPPED | OUTPATIENT
Start: 2020-09-25 | End: 2021-04-01 | Stop reason: SDUPTHER

## 2020-09-25 RX ORDER — BLOOD-GLUCOSE METER
1 KIT MISCELLANEOUS AS NEEDED
Qty: 1 EACH | Refills: 0 | Status: SHIPPED | OUTPATIENT
Start: 2020-09-25 | End: 2021-08-09

## 2020-09-25 RX ORDER — HYDROCHLOROTHIAZIDE 25 MG/1
25 TABLET ORAL DAILY
Qty: 90 TABLET | Refills: 3 | Status: SHIPPED | OUTPATIENT
Start: 2020-09-25 | End: 2021-04-01 | Stop reason: SDUPTHER

## 2020-09-25 RX ORDER — METFORMIN HYDROCHLORIDE 500 MG/1
TABLET, EXTENDED RELEASE ORAL
Qty: 180 TABLET | Refills: 3 | Status: SHIPPED | OUTPATIENT
Start: 2020-09-25 | End: 2021-04-01 | Stop reason: SDUPTHER

## 2020-09-25 RX ORDER — VERAPAMIL HYDROCHLORIDE 240 MG/1
240 CAPSULE, EXTENDED RELEASE ORAL NIGHTLY
Qty: 90 CAPSULE | Refills: 3 | Status: SHIPPED | OUTPATIENT
Start: 2020-09-25 | End: 2021-04-01 | Stop reason: SDUPTHER

## 2020-09-25 RX ORDER — FAMCICLOVIR 500 MG/1
500 TABLET ORAL 2 TIMES DAILY
Qty: 180 TABLET | Refills: 3 | Status: SHIPPED | OUTPATIENT
Start: 2020-09-25 | End: 2020-10-07 | Stop reason: SDUPTHER

## 2020-09-25 RX ORDER — LEVOTHYROXINE SODIUM 0.05 MG/1
50 TABLET ORAL DAILY
Qty: 90 TABLET | Refills: 3 | Status: SHIPPED | OUTPATIENT
Start: 2020-09-25 | End: 2021-04-01 | Stop reason: SDUPTHER

## 2020-09-25 RX ORDER — LANCETS 33 GAUGE
EACH MISCELLANEOUS
Qty: 100 EACH | Refills: 3 | Status: SHIPPED | OUTPATIENT
Start: 2020-09-25 | End: 2022-03-15 | Stop reason: SDUPTHER

## 2020-09-25 RX ORDER — CELECOXIB 200 MG/1
200 CAPSULE ORAL 2 TIMES DAILY
Qty: 180 CAPSULE | Refills: 3 | Status: SHIPPED | OUTPATIENT
Start: 2020-09-25 | End: 2021-04-01 | Stop reason: SDUPTHER

## 2020-09-25 RX ORDER — FLUOXETINE HYDROCHLORIDE 20 MG/1
20 CAPSULE ORAL DAILY
Qty: 90 CAPSULE | Refills: 3 | Status: SHIPPED | OUTPATIENT
Start: 2020-09-25 | End: 2021-04-01 | Stop reason: SDUPTHER

## 2020-09-25 RX ORDER — LISINOPRIL 10 MG/1
10 TABLET ORAL DAILY
Qty: 90 TABLET | Refills: 3 | Status: SHIPPED | OUTPATIENT
Start: 2020-09-25 | End: 2021-03-17

## 2020-09-25 RX ORDER — FAMCICLOVIR 500 MG/1
500 TABLET ORAL 2 TIMES DAILY
Qty: 180 TABLET | Refills: 3 | Status: SHIPPED | OUTPATIENT
Start: 2020-09-25 | End: 2020-09-25 | Stop reason: SDUPTHER

## 2020-09-25 NOTE — PROGRESS NOTES
Chief Complaint   Patient presents with   • Diabetes   • Hypertension   • Hyperlipidemia       Subjective   Summer Jefferson is a 65 y.o. female.     Diabetes  She presents for her follow-up diabetic visit. She has type 2 diabetes mellitus. (No symptoms suggestive of hypoglycemia) Pertinent negatives for diabetes include no blurred vision, no chest pain, no foot paresthesias, no polydipsia and no polyuria. There are no diabetic complications. Risk factors for coronary artery disease include diabetes mellitus, hypertension, dyslipidemia, obesity and post-menopausal. Current diabetic treatment includes diet and oral agent (monotherapy). She is compliant with treatment most of the time (she  has not been as good with diet, has gained weight). Her weight is increasing steadily. She is following a generally unhealthy diet. When asked about meal planning, she reported none. She participates in exercise intermittently. Home blood sugar record trend: she has not been checking blood glucose recently. An ACE inhibitor/angiotensin II receptor blocker is being taken. She does not see a podiatrist.Eye exam is current.   Hypertension  This is a chronic problem. The problem is controlled. Pertinent negatives include no blurred vision, chest pain, palpitations or shortness of breath. Current antihypertension treatment includes ACE inhibitors, diuretics, calcium channel blockers and lifestyle changes. The current treatment provides significant improvement. Compliance problems include diet.    Hyperlipidemia  This is a chronic problem. The problem is controlled. Recent lipid tests were reviewed and are normal. Exacerbating diseases include diabetes and obesity. Pertinent negatives include no chest pain or shortness of breath. Current antihyperlipidemic treatment includes diet change, exercise and statins. The current treatment provides significant improvement of lipids. Compliance problems include adherence to diet and adherence to  exercise.         The following portions of the patient's history were reviewed and updated as appropriate: allergies, current medications, past family history, past medical history, past social history, past surgical history and problem list.    Review of Systems   Constitutional: Negative for appetite change.   HENT: Negative for nosebleeds.    Eyes: Negative for blurred vision and double vision.   Respiratory: Negative for cough and shortness of breath.    Cardiovascular: Negative for chest pain, palpitations and leg swelling.   Endocrine: Negative for polydipsia and polyuria.         Current Outpatient Medications:   •  celecoxib (CeleBREX) 200 MG capsule, Take 1 capsule by mouth 2 (Two) Times a Day., Disp: 180 capsule, Rfl: 3  •  famciclovir (FAMVIR) 500 MG tablet, Take 1 tablet by mouth 2 (Two) Times a Day., Disp: 180 tablet, Rfl: 3  •  FLUoxetine (PROzac) 20 MG capsule, Take 1 capsule by mouth Daily., Disp: 90 capsule, Rfl: 3  •  hydroCHLOROthiazide (HYDRODIURIL) 25 MG tablet, Take 1 tablet by mouth Daily., Disp: 90 tablet, Rfl: 3  •  levothyroxine (SYNTHROID, LEVOTHROID) 50 MCG tablet, Take 1 tablet by mouth Daily., Disp: 90 tablet, Rfl: 3  •  lisinopril (PRINIVIL,ZESTRIL) 10 MG tablet, Take 1 tablet by mouth Daily., Disp: 90 tablet, Rfl: 3  •  metFORMIN ER (GLUCOPHAGE-XR) 500 MG 24 hr tablet, One twice daily, Disp: 180 tablet, Rfl: 3  •  rosuvastatin (CRESTOR) 10 MG tablet, Take 1 tablet by mouth Daily., Disp: 90 tablet, Rfl: 3  •  verapamil ER (VERELAN) 240 MG 24 hr capsule, Take 1 capsule by mouth Every Night., Disp: 90 capsule, Rfl: 3  •  glucose blood test strip, Check blood glucose once a day, Disp: 100 each, Rfl: 3  •  glucose monitor monitoring kit, 1 each As Needed (glucose testing)., Disp: 1 each, Rfl: 0  •  OneTouch Delica Lancets 33G misc, Check blood glucose once daily, Disp: 100 each, Rfl: 3        Objective     /72 (BP Location: Left arm, Patient Position: Sitting, Cuff Size: Adult)    "Pulse 79   Temp 98.4 °F (36.9 °C) (Oral)   Resp 16   Ht 162.6 cm (64\")   Wt 85.7 kg (189 lb)   SpO2 96%   BMI 32.44 kg/m²     Physical Exam  Vitals signs and nursing note reviewed.   Constitutional:       General: She is not in acute distress.     Appearance: She is well-developed.   Neck:      Vascular: Normal carotid pulses. No carotid bruit.   Cardiovascular:      Rate and Rhythm: Regular rhythm.      Pulses:           Carotid pulses are 2+ on the right side and 2+ on the left side.     Heart sounds: S1 normal and S2 normal. No murmur. No friction rub. No gallop.    Pulmonary:      Effort: Pulmonary effort is normal.      Breath sounds: Normal breath sounds. No wheezing, rhonchi or rales.   Chest:      Chest wall: There is no dullness to percussion.   Skin:     General: Skin is warm and dry.   Neurological:      Mental Status: She is alert and oriented to person, place, and time.       Lab Results   Component Value Date    CHOL 132 10/04/2018    CHLPL 190 12/04/2019    TRIG 123 12/04/2019    HDL 66 (H) 12/04/2019    LDL 99 12/04/2019        Assessment/Plan   Summer was seen today for diabetes, hypertension and hyperlipidemia.    Diagnoses and all orders for this visit:    Controlled type 2 diabetes mellitus without complication, without long-term current use of insulin (CMS/[facility])  -     Comprehensive Metabolic Panel  -     Lipid Panel With / Chol / HDL Ratio  -     CBC (No Diff)  -     TSH Rfx On Abnormal To Free T4  -     Hemoglobin A1c  -     Microalbumin / Creatinine Urine Ratio - Urine, Clean Catch  -     Urinalysis With Culture If Indicated - Urine, Clean Catch    Essential hypertension  -     lisinopril (PRINIVIL,ZESTRIL) 10 MG tablet; Take 1 tablet by mouth Daily.    Hypercholesterolemia    Need for influenza vaccination  -     Fluad Quad >65 years    Hypothyroidism, unspecified type  -     levothyroxine (SYNTHROID, LEVOTHROID) 50 MCG tablet; Take 1 tablet by mouth Daily.    Type 2 diabetes mellitus " without complication, with long-term current use of insulin (CMS/[facility])  -     metFORMIN ER (GLUCOPHAGE-XR) 500 MG 24 hr tablet; One twice daily  -     glucose monitor monitoring kit; 1 each As Needed (glucose testing).  -     OneTouch Delica Lancets 33G misc; Check blood glucose once daily  -     glucose blood test strip; Check blood glucose once a day    Hyperlipidemia, unspecified hyperlipidemia type  -     rosuvastatin (CRESTOR) 10 MG tablet; Take 1 tablet by mouth Daily.    Other orders  -     celecoxib (CeleBREX) 200 MG capsule; Take 1 capsule by mouth 2 (Two) Times a Day.  -     Discontinue: famciclovir (FAMVIR) 500 MG tablet; Take 1 tablet by mouth 2 (Two) Times a Day.  -     FLUoxetine (PROzac) 20 MG capsule; Take 1 capsule by mouth Daily.  -     hydroCHLOROthiazide (HYDRODIURIL) 25 MG tablet; Take 1 tablet by mouth Daily.  -     verapamil ER (VERELAN) 240 MG 24 hr capsule; Take 1 capsule by mouth Every Night.  -     famciclovir (FAMVIR) 500 MG tablet; Take 1 tablet by mouth 2 (Two) Times a Day.

## 2020-09-27 LAB
ALBUMIN SERPL-MCNC: 5.3 G/DL (ref 3.5–5.2)
ALBUMIN/CREAT UR: 9 MG/G CREAT (ref 0–29)
ALBUMIN/GLOB SERPL: 2.8 G/DL
ALP SERPL-CCNC: 69 U/L (ref 39–117)
ALT SERPL-CCNC: 24 U/L (ref 1–33)
APPEARANCE UR: CLEAR
AST SERPL-CCNC: 21 U/L (ref 1–32)
BACTERIA #/AREA URNS HPF: ABNORMAL /HPF
BACTERIA UR CULT: ABNORMAL
BACTERIA UR CULT: ABNORMAL
BILIRUB SERPL-MCNC: 0.4 MG/DL (ref 0–1.2)
BILIRUB UR QL STRIP: NEGATIVE
BUN SERPL-MCNC: 18 MG/DL (ref 8–23)
BUN/CREAT SERPL: 19.4 (ref 7–25)
CALCIUM SERPL-MCNC: 9.7 MG/DL (ref 8.6–10.5)
CHLORIDE SERPL-SCNC: 101 MMOL/L (ref 98–107)
CHOLEST SERPL-MCNC: 152 MG/DL (ref 0–200)
CHOLEST/HDLC SERPL: 2.76 {RATIO}
CO2 SERPL-SCNC: 28 MMOL/L (ref 22–29)
COLOR UR: YELLOW
CREAT SERPL-MCNC: 0.93 MG/DL (ref 0.57–1)
CREAT UR-MCNC: 170.3 MG/DL
EPI CELLS #/AREA URNS HPF: ABNORMAL /HPF (ref 0–10)
ERYTHROCYTE [DISTWIDTH] IN BLOOD BY AUTOMATED COUNT: 13.1 % (ref 12.3–15.4)
GLOBULIN SER CALC-MCNC: 1.9 GM/DL
GLUCOSE SERPL-MCNC: 144 MG/DL (ref 65–99)
GLUCOSE UR QL: NEGATIVE
HBA1C MFR BLD: 6.7 % (ref 4.8–5.6)
HCT VFR BLD AUTO: 44.7 % (ref 34–46.6)
HDLC SERPL-MCNC: 55 MG/DL (ref 40–60)
HGB BLD-MCNC: 15 G/DL (ref 12–15.9)
HGB UR QL STRIP: NEGATIVE
KETONES UR QL STRIP: NEGATIVE
LDLC SERPL CALC-MCNC: 72 MG/DL (ref 0–100)
LEUKOCYTE ESTERASE UR QL STRIP: ABNORMAL
MCH RBC QN AUTO: 28.8 PG (ref 26.6–33)
MCHC RBC AUTO-ENTMCNC: 33.6 G/DL (ref 31.5–35.7)
MCV RBC AUTO: 86 FL (ref 79–97)
MICRO URNS: ABNORMAL
MICROALBUMIN UR-MCNC: 15.2 UG/ML
MUCOUS THREADS URNS QL MICRO: PRESENT /HPF
NITRITE UR QL STRIP: NEGATIVE
PH UR STRIP: 5.5 [PH] (ref 5–7.5)
PLATELET # BLD AUTO: 261 10*3/MM3 (ref 140–450)
POTASSIUM SERPL-SCNC: 4.4 MMOL/L (ref 3.5–5.2)
PROT SERPL-MCNC: 7.2 G/DL (ref 6–8.5)
PROT UR QL STRIP: NEGATIVE
RBC # BLD AUTO: 5.2 10*6/MM3 (ref 3.77–5.28)
RBC #/AREA URNS HPF: ABNORMAL /HPF (ref 0–2)
SODIUM SERPL-SCNC: 139 MMOL/L (ref 136–145)
SP GR UR: 1.02 (ref 1–1.03)
TRIGL SERPL-MCNC: 126 MG/DL (ref 0–150)
TSH SERPL DL<=0.005 MIU/L-ACNC: 1.59 UIU/ML (ref 0.27–4.2)
URINALYSIS REFLEX: ABNORMAL
UROBILINOGEN UR STRIP-MCNC: 1 MG/DL (ref 0.2–1)
VLDLC SERPL CALC-MCNC: 25.2 MG/DL
WBC # BLD AUTO: 6.04 10*3/MM3 (ref 3.4–10.8)
WBC #/AREA URNS HPF: ABNORMAL /HPF (ref 0–5)

## 2020-09-30 ENCOUNTER — TELEPHONE (OUTPATIENT)
Dept: INTERNAL MEDICINE | Facility: CLINIC | Age: 65
End: 2020-09-30

## 2020-09-30 RX ORDER — AMPICILLIN 500 MG/1
500 CAPSULE ORAL 2 TIMES DAILY
Qty: 10 CAPSULE | Refills: 0 | Status: SHIPPED | OUTPATIENT
Start: 2020-09-30 | End: 2020-12-04 | Stop reason: SDUPTHER

## 2020-09-30 NOTE — TELEPHONE ENCOUNTER
I spoke with the pharmacy and they stated the Ampicillin is only available in 500 mg.   Will you please send in new prescription with that dosage and new directions if needed?

## 2020-09-30 NOTE — TELEPHONE ENCOUNTER
Patient called stated that Alber told her that the prescription for ampicillin 250 mg needs to be changed to a different dose.  Patient did not know why but stated pharmacy faxed office a request.     Patient has been with out medicine since diagnosis on Monday.    Please advise  [phone]

## 2020-10-05 RX ORDER — FAMCICLOVIR 500 MG/1
500 TABLET ORAL 2 TIMES DAILY
Qty: 180 TABLET | Refills: 3 | Status: CANCELLED | OUTPATIENT
Start: 2020-10-05

## 2020-10-07 RX ORDER — FAMCICLOVIR 500 MG/1
500 TABLET ORAL 2 TIMES DAILY
Qty: 180 TABLET | Refills: 3 | Status: SHIPPED | OUTPATIENT
Start: 2020-10-07 | End: 2020-10-08 | Stop reason: SDUPTHER

## 2020-10-09 RX ORDER — FAMCICLOVIR 500 MG/1
500 TABLET ORAL 2 TIMES DAILY
Qty: 180 TABLET | Refills: 3 | Status: SHIPPED | OUTPATIENT
Start: 2020-10-09 | End: 2021-04-01 | Stop reason: SDUPTHER

## 2020-10-15 ENCOUNTER — TELEPHONE (OUTPATIENT)
Dept: INTERNAL MEDICINE | Facility: CLINIC | Age: 65
End: 2020-10-15

## 2020-10-15 NOTE — TELEPHONE ENCOUNTER
Patient and her  is going to quarantine to their home. I advised them to watch their symptoms. Go to urgent care or ER if they get symptoms

## 2020-10-15 NOTE — TELEPHONE ENCOUNTER
PT AND HER  WENT TO DINNER WITH A FAMILY OF 3 ON Sunday, AND WERE TOLD THEY GOT THEIR LAB RESULTS BACK, AND ALL OF THEM TESTED POSITIVE FOR COVID.  PT AND  REMAIN ASYMPTOMATIC, THEY ARE CONCERNED ABOUT THEIR UNDERLYING MEDICAL ISSUES.    PLEASE ADVISE    [phone]

## 2020-12-04 ENCOUNTER — E-VISIT (OUTPATIENT)
Dept: FAMILY MEDICINE CLINIC | Facility: TELEHEALTH | Age: 65
End: 2020-12-04

## 2020-12-04 DIAGNOSIS — R39.89 SUSPECTED UTI: Primary | ICD-10-CM

## 2020-12-04 PROCEDURE — 99422 OL DIG E/M SVC 11-20 MIN: CPT | Performed by: NURSE PRACTITIONER

## 2020-12-04 RX ORDER — AMPICILLIN 500 MG/1
500 CAPSULE ORAL 2 TIMES DAILY
Qty: 10 CAPSULE | Refills: 0 | Status: SHIPPED | OUTPATIENT
Start: 2020-12-04 | End: 2021-01-25

## 2020-12-04 NOTE — PROGRESS NOTES
I have reviewed the e-Visit questionnaire and patient's answers, and my assessment and plan are as follows:    HPI  Summer Jefferson is a 65 y.o. female  presents with complaint of dull pain with urination, frequent urination, and urinary urgency for 1-4 days. Denies fever. Does report back pain. Has taken ampicillin in last 3 months for UTI. She states she has had UTI's before and usually does not know, but it is found on an office visit. She requests prescription for same medication (ampicillin).     Review of Systems - Negative except those listed in the HPI.      Diagnoses and all orders for this visit:    1. Suspected UTI (Primary)  -     ampicillin (PRINCIPEN) 500 MG capsule; Take 1 capsule by mouth 2 (two) times a day.  Dispense: 10 capsule; Refill: 0          FOLLOW-UP  If symptoms worsen or fail to improve, follow-up with PCP/Urgent Care/Emergency Department.      Time Documentation  Counseled patient  Counseling topics: diagnosis, treatment options and follow up plan  Total encounter time: counseling time more than 50% of visit: 15 minutes        Trinity Johns, GILBERT  12/04/20  13:07 EST

## 2020-12-04 NOTE — PATIENT INSTRUCTIONS
Urinary Tract Infection, Adult    A urinary tract infection (UTI) is an infection of any part of the urinary tract. The urinary tract includes the kidneys, ureters, bladder, and urethra. These organs make, store, and get rid of urine in the body.  Your health care provider may use other names to describe the infection. An upper UTI affects the ureters and kidneys (pyelonephritis). A lower UTI affects the bladder (cystitis) and urethra (urethritis).  What are the causes?  Most urinary tract infections are caused by bacteria in your genital area, around the entrance to your urinary tract (urethra). These bacteria grow and cause inflammation of your urinary tract.  What increases the risk?  You are more likely to develop this condition if:  · You have a urinary catheter that stays in place (indwelling).  · You are not able to control when you urinate or have a bowel movement (you have incontinence).  · You are female and you:  ? Use a spermicide or diaphragm for birth control.  ? Have low estrogen levels.  ? Are pregnant.  · You have certain genes that increase your risk (genetics).  · You are sexually active.  · You take antibiotic medicines.  · You have a condition that causes your flow of urine to slow down, such as:  ? An enlarged prostate, if you are male.  ? Blockage in your urethra (stricture).  ? A kidney stone.  ? A nerve condition that affects your bladder control (neurogenic bladder).  ? Not getting enough to drink, or not urinating often.  · You have certain medical conditions, such as:  ? Diabetes.  ? A weak disease-fighting system (immunesystem).  ? Sickle cell disease.  ? Gout.  ? Spinal cord injury.  What are the signs or symptoms?  Symptoms of this condition include:  · Needing to urinate right away (urgently).  · Frequent urination or passing small amounts of urine frequently.  · Pain or burning with urination.  · Blood in the urine.  · Urine that smells bad or unusual.  · Trouble urinating.  · Cloudy  urine.  · Vaginal discharge, if you are female.  · Pain in the abdomen or the lower back.  You may also have:  · Vomiting or a decreased appetite.  · Confusion.  · Irritability or tiredness.  · A fever.  · Diarrhea.  The first symptom in older adults may be confusion. In some cases, they may not have any symptoms until the infection has worsened.  How is this diagnosed?  This condition is diagnosed based on your medical history and a physical exam. You may also have other tests, including:  · Urine tests.  · Blood tests.  · Tests for sexually transmitted infections (STIs).  If you have had more than one UTI, a cystoscopy or imaging studies may be done to determine the cause of the infections.  How is this treated?  Treatment for this condition includes:  · Antibiotic medicine.  · Over-the-counter medicines to treat discomfort.  · Drinking enough water to stay hydrated.  If you have frequent infections or have other conditions such as a kidney stone, you may need to see a health care provider who specializes in the urinary tract (urologist).  In rare cases, urinary tract infections can cause sepsis. Sepsis is a life-threatening condition that occurs when the body responds to an infection. Sepsis is treated in the hospital with IV antibiotics, fluids, and other medicines.  Follow these instructions at home:    Medicines  · Take over-the-counter and prescription medicines only as told by your health care provider.  · If you were prescribed an antibiotic medicine, take it as told by your health care provider. Do not stop using the antibiotic even if you start to feel better.  General instructions  · Make sure you:  ? Empty your bladder often and completely. Do not hold urine for long periods of time.  ? Empty your bladder after sex.  ? Wipe from front to back after a bowel movement if you are female. Use each tissue one time when you wipe.  · Drink enough fluid to keep your urine pale yellow.  · Keep all follow-up  visits as told by your health care provider. This is important.  Contact a health care provider if:  · Your symptoms do not get better after 1-2 days.  · Your symptoms go away and then return.  Get help right away if you have:  · Severe pain in your back or your lower abdomen.  · A fever.  · Nausea or vomiting.  Summary  · A urinary tract infection (UTI) is an infection of any part of the urinary tract, which includes the kidneys, ureters, bladder, and urethra.  · Most urinary tract infections are caused by bacteria in your genital area, around the entrance to your urinary tract (urethra).  · Treatment for this condition often includes antibiotic medicines.  · If you were prescribed an antibiotic medicine, take it as told by your health care provider. Do not stop using the antibiotic even if you start to feel better.  · Keep all follow-up visits as told by your health care provider. This is important.  This information is not intended to replace advice given to you by your health care provider. Make sure you discuss any questions you have with your health care provider.  Document Revised: 12/05/2019 Document Reviewed: 06/27/2019  [employer] Patient Education © 2020 [employer]

## 2021-01-25 ENCOUNTER — OFFICE VISIT (OUTPATIENT)
Dept: INTERNAL MEDICINE | Facility: CLINIC | Age: 66
End: 2021-01-25

## 2021-01-25 ENCOUNTER — APPOINTMENT (OUTPATIENT)
Dept: WOMENS IMAGING | Facility: HOSPITAL | Age: 66
End: 2021-01-25

## 2021-01-25 VITALS
HEART RATE: 78 BPM | TEMPERATURE: 98.4 F | DIASTOLIC BLOOD PRESSURE: 73 MMHG | HEIGHT: 64 IN | SYSTOLIC BLOOD PRESSURE: 111 MMHG | BODY MASS INDEX: 32.44 KG/M2 | RESPIRATION RATE: 15 BRPM | WEIGHT: 190 LBS | OXYGEN SATURATION: 96 %

## 2021-01-25 DIAGNOSIS — M54.50 BILATERAL LOW BACK PAIN WITHOUT SCIATICA, UNSPECIFIED CHRONICITY: ICD-10-CM

## 2021-01-25 DIAGNOSIS — R39.9 URINARY SYMPTOM OR SIGN: Primary | ICD-10-CM

## 2021-01-25 LAB
BACTERIA UR QL AUTO: ABNORMAL /HPF
BILIRUB UR QL STRIP: NEGATIVE
CLARITY UR: CLEAR
COLOR UR: YELLOW
GLUCOSE UR STRIP-MCNC: NEGATIVE MG/DL
HGB UR QL STRIP.AUTO: ABNORMAL
HYALINE CASTS UR QL AUTO: ABNORMAL /LPF
KETONES UR QL STRIP: NEGATIVE
LEUKOCYTE ESTERASE UR QL STRIP.AUTO: NEGATIVE
MUCOUS THREADS URNS QL MICRO: ABNORMAL /HPF
NITRITE UR QL STRIP: NEGATIVE
PH UR STRIP.AUTO: 6 [PH] (ref 5–8)
PROT UR QL STRIP: NEGATIVE
RBC # UR: ABNORMAL /HPF
REF LAB TEST METHOD: ABNORMAL
SP GR UR STRIP: 1.02 (ref 1–1.03)
SQUAMOUS #/AREA URNS HPF: ABNORMAL /HPF
UROBILINOGEN UR QL STRIP: ABNORMAL
WBC UR QL AUTO: ABNORMAL /HPF

## 2021-01-25 PROCEDURE — 99214 OFFICE O/P EST MOD 30 MIN: CPT | Performed by: INTERNAL MEDICINE

## 2021-01-25 PROCEDURE — 81001 URINALYSIS AUTO W/SCOPE: CPT | Performed by: INTERNAL MEDICINE

## 2021-01-25 PROCEDURE — 72110 X-RAY EXAM L-2 SPINE 4/>VWS: CPT | Performed by: RADIOLOGY

## 2021-01-25 PROCEDURE — 72110 X-RAY EXAM L-2 SPINE 4/>VWS: CPT | Performed by: INTERNAL MEDICINE

## 2021-01-25 NOTE — PROGRESS NOTES
Chief Complaint   Patient presents with   • Back Pain   • Abdominal Pain       Subjective   Summer Jefferson is a 65 y.o. female.     Back Pain  This is a new problem. The current episode started 1 to 4 weeks ago. The problem occurs daily. The pain is present in the lumbar spine. Radiates to: bilat groin area. The pain is moderate. The symptoms are aggravated by bending. Associated symptoms include abdominal pain. Pertinent negatives include no fever, leg pain, paresis, paresthesias or weakness. Treatments tried: lidoderm patch. The treatment provided mild relief.   Abdominal Pain  This is a new problem. The current episode started 1 to 4 weeks ago. The problem occurs daily. The problem has been unchanged. Pain location: pelvic area. The quality of the pain is dull. Associated symptoms include frequency. Pertinent negatives include no fever.        The following portions of the patient's history were reviewed and updated as appropriate: allergies, current medications, past family history, past medical history, past social history, past surgical history and problem list.    Review of Systems   Constitutional: Negative for fever.   Gastrointestinal: Positive for abdominal pain.   Genitourinary: Positive for frequency. Negative for urgency.   Musculoskeletal: Positive for back pain.   Neurological: Negative for weakness and paresthesias.           Current Outpatient Medications:   •  celecoxib (CeleBREX) 200 MG capsule, Take 1 capsule by mouth 2 (Two) Times a Day., Disp: 180 capsule, Rfl: 3  •  famciclovir (FAMVIR) 500 MG tablet, Take 1 tablet by mouth 2 (Two) Times a Day., Disp: 180 tablet, Rfl: 3  •  FLUoxetine (PROzac) 20 MG capsule, Take 1 capsule by mouth Daily., Disp: 90 capsule, Rfl: 3  •  glucose blood test strip, Check blood glucose once a day, Disp: 100 each, Rfl: 3  •  glucose monitor monitoring kit, 1 each As Needed (glucose testing)., Disp: 1 each, Rfl: 0  •  hydroCHLOROthiazide (HYDRODIURIL) 25 MG tablet,  "Take 1 tablet by mouth Daily., Disp: 90 tablet, Rfl: 3  •  levothyroxine (SYNTHROID, LEVOTHROID) 50 MCG tablet, Take 1 tablet by mouth Daily., Disp: 90 tablet, Rfl: 3  •  lisinopril (PRINIVIL,ZESTRIL) 10 MG tablet, Take 1 tablet by mouth Daily., Disp: 90 tablet, Rfl: 3  •  metFORMIN ER (GLUCOPHAGE-XR) 500 MG 24 hr tablet, One twice daily, Disp: 180 tablet, Rfl: 3  •  OneTouch Delica Lancets 33G misc, Check blood glucose once daily, Disp: 100 each, Rfl: 3  •  rosuvastatin (CRESTOR) 10 MG tablet, Take 1 tablet by mouth Daily., Disp: 90 tablet, Rfl: 3  •  verapamil ER (VERELAN) 240 MG 24 hr capsule, Take 1 capsule by mouth Every Night., Disp: 90 capsule, Rfl: 3        Objective     /73 (BP Location: Left arm, Patient Position: Sitting, Cuff Size: Adult)   Pulse 78   Temp 98.4 °F (36.9 °C)   Resp 15   Ht 162.6 cm (64\")   Wt 86.2 kg (190 lb)   SpO2 96%   BMI 32.61 kg/m²     Physical Exam  Vitals signs and nursing note reviewed.   Constitutional:       Appearance: Normal appearance.   Cardiovascular:      Rate and Rhythm: Normal rate and regular rhythm.      Heart sounds: No murmur. No friction rub. No gallop.    Pulmonary:      Effort: Pulmonary effort is normal. No respiratory distress.      Breath sounds: Normal breath sounds. No wheezing, rhonchi or rales.   Abdominal:      General: There is no distension.      Tenderness: There is no abdominal tenderness.   Musculoskeletal:      Lumbar back: She exhibits tenderness (tender with flexion, extension).   Neurological:      Mental Status: She is alert.           Assessment/Plan   Diagnoses and all orders for this visit:    1. Urinary symptom or sign (Primary)  -     Urinalysis With Microscopic If Indicated (No Culture) - Urine, Clean Catch  -     Urinalysis, Microscopic Only - Urine, Clean Catch    2. Bilateral low back pain without sciatica, unspecified chronicity  -     XR Spine Lumbar 4+ View (In Office)    Urinalysis is normal.  Discussed. Will do lumbar " spine x-ray.  OK to continue lidoderm patch.

## 2021-01-27 LAB
BACTERIA UR CULT: NORMAL
BACTERIA UR CULT: NORMAL

## 2021-03-17 DIAGNOSIS — I10 ESSENTIAL HYPERTENSION: ICD-10-CM

## 2021-03-17 RX ORDER — LISINOPRIL 10 MG/1
TABLET ORAL
Qty: 90 TABLET | Refills: 3 | Status: SHIPPED | OUTPATIENT
Start: 2021-03-17 | End: 2021-04-01 | Stop reason: SDUPTHER

## 2021-03-19 NOTE — PATIENT INSTRUCTIONS
Use of PPT for pain-relieving maneuver during prolonged standing or walking  Postures which facilitate and minimize lumbar lordosis   184.9

## 2021-04-01 ENCOUNTER — OFFICE VISIT (OUTPATIENT)
Dept: INTERNAL MEDICINE | Facility: CLINIC | Age: 66
End: 2021-04-01

## 2021-04-01 VITALS
HEIGHT: 64 IN | BODY MASS INDEX: 32.27 KG/M2 | HEART RATE: 92 BPM | OXYGEN SATURATION: 99 % | TEMPERATURE: 97.1 F | DIASTOLIC BLOOD PRESSURE: 64 MMHG | WEIGHT: 189 LBS | SYSTOLIC BLOOD PRESSURE: 122 MMHG

## 2021-04-01 DIAGNOSIS — E03.9 HYPOTHYROIDISM, UNSPECIFIED TYPE: Primary | ICD-10-CM

## 2021-04-01 DIAGNOSIS — E11.9 TYPE 2 DIABETES MELLITUS WITHOUT COMPLICATION, WITH LONG-TERM CURRENT USE OF INSULIN (HCC): ICD-10-CM

## 2021-04-01 DIAGNOSIS — E78.5 HYPERLIPIDEMIA, UNSPECIFIED HYPERLIPIDEMIA TYPE: ICD-10-CM

## 2021-04-01 DIAGNOSIS — I10 ESSENTIAL HYPERTENSION: ICD-10-CM

## 2021-04-01 DIAGNOSIS — Z79.4 TYPE 2 DIABETES MELLITUS WITHOUT COMPLICATION, WITH LONG-TERM CURRENT USE OF INSULIN (HCC): ICD-10-CM

## 2021-04-01 LAB
ALBUMIN SERPL-MCNC: 4.6 G/DL (ref 3.5–5.2)
ALBUMIN/GLOB SERPL: 2 G/DL
ALP SERPL-CCNC: 65 U/L (ref 39–117)
ALT SERPL-CCNC: 17 U/L (ref 1–33)
AST SERPL-CCNC: 19 U/L (ref 1–32)
BACTERIA UR QL AUTO: ABNORMAL /HPF
BILIRUB SERPL-MCNC: 0.3 MG/DL (ref 0–1.2)
BILIRUB UR QL STRIP: NEGATIVE
BUN SERPL-MCNC: 17 MG/DL (ref 8–23)
BUN/CREAT SERPL: 24.3 (ref 7–25)
CALCIUM SERPL-MCNC: 9.3 MG/DL (ref 8.6–10.5)
CHLORIDE SERPL-SCNC: 104 MMOL/L (ref 98–107)
CHOLEST SERPL-MCNC: 149 MG/DL (ref 0–200)
CHOLEST/HDLC SERPL: 2.71 {RATIO}
CLARITY UR: ABNORMAL
CO2 SERPL-SCNC: 24.3 MMOL/L (ref 22–29)
COLOR UR: YELLOW
CREAT SERPL-MCNC: 0.7 MG/DL (ref 0.57–1)
GLOBULIN SER CALC-MCNC: 2.3 GM/DL
GLUCOSE SERPL-MCNC: 151 MG/DL (ref 65–99)
GLUCOSE UR STRIP-MCNC: NEGATIVE MG/DL
HBA1C MFR BLD: 6.5 % (ref 4.8–5.6)
HDLC SERPL-MCNC: 55 MG/DL (ref 40–60)
HGB UR QL STRIP.AUTO: ABNORMAL
HYALINE CASTS UR QL AUTO: ABNORMAL /LPF
KETONES UR QL STRIP: NEGATIVE
LDLC SERPL CALC-MCNC: 72 MG/DL (ref 0–100)
LEUKOCYTE ESTERASE UR QL STRIP.AUTO: ABNORMAL
MUCOUS THREADS URNS QL MICRO: ABNORMAL /HPF
NITRITE UR QL STRIP: NEGATIVE
PH UR STRIP.AUTO: 6 [PH] (ref 5–8)
POTASSIUM SERPL-SCNC: 4.4 MMOL/L (ref 3.5–5.2)
PROT SERPL-MCNC: 6.9 G/DL (ref 6–8.5)
PROT UR QL STRIP: NEGATIVE
RBC # UR: ABNORMAL /HPF
REF LAB TEST METHOD: ABNORMAL
SODIUM SERPL-SCNC: 141 MMOL/L (ref 136–145)
SP GR UR STRIP: 1.02 (ref 1–1.03)
SQUAMOUS #/AREA URNS HPF: ABNORMAL /HPF
TRIGL SERPL-MCNC: 125 MG/DL (ref 0–150)
TSH SERPL DL<=0.005 MIU/L-ACNC: 1.03 UIU/ML (ref 0.27–4.2)
UROBILINOGEN UR QL STRIP: ABNORMAL
VLDLC SERPL CALC-MCNC: 22 MG/DL (ref 5–40)
WBC UR QL AUTO: ABNORMAL /HPF

## 2021-04-01 PROCEDURE — 99214 OFFICE O/P EST MOD 30 MIN: CPT | Performed by: INTERNAL MEDICINE

## 2021-04-01 PROCEDURE — 81001 URINALYSIS AUTO W/SCOPE: CPT | Performed by: INTERNAL MEDICINE

## 2021-04-01 RX ORDER — VERAPAMIL HYDROCHLORIDE 240 MG/1
240 CAPSULE, EXTENDED RELEASE ORAL NIGHTLY
Qty: 90 CAPSULE | Refills: 3 | Status: SHIPPED | OUTPATIENT
Start: 2021-04-01 | End: 2021-04-17

## 2021-04-01 RX ORDER — LISINOPRIL 10 MG/1
10 TABLET ORAL DAILY
Qty: 90 TABLET | Refills: 3 | Status: SHIPPED | OUTPATIENT
Start: 2021-04-01 | End: 2021-12-15 | Stop reason: SDUPTHER

## 2021-04-01 RX ORDER — HYDROCHLOROTHIAZIDE 25 MG/1
25 TABLET ORAL DAILY
Qty: 90 TABLET | Refills: 3 | Status: SHIPPED | OUTPATIENT
Start: 2021-04-01 | End: 2021-12-15 | Stop reason: SDUPTHER

## 2021-04-01 RX ORDER — ROSUVASTATIN CALCIUM 10 MG/1
10 TABLET, COATED ORAL DAILY
Qty: 90 TABLET | Refills: 3 | Status: SHIPPED | OUTPATIENT
Start: 2021-04-01 | End: 2021-12-15 | Stop reason: SDUPTHER

## 2021-04-01 RX ORDER — LEVOTHYROXINE SODIUM 0.05 MG/1
50 TABLET ORAL DAILY
Qty: 90 TABLET | Refills: 3 | Status: SHIPPED | OUTPATIENT
Start: 2021-04-01 | End: 2021-12-15 | Stop reason: SDUPTHER

## 2021-04-01 RX ORDER — CELECOXIB 200 MG/1
200 CAPSULE ORAL 2 TIMES DAILY
Qty: 180 CAPSULE | Refills: 3 | Status: SHIPPED | OUTPATIENT
Start: 2021-04-01 | End: 2021-08-09 | Stop reason: SDUPTHER

## 2021-04-01 RX ORDER — FAMCICLOVIR 500 MG/1
500 TABLET ORAL 2 TIMES DAILY
Qty: 180 TABLET | Refills: 3 | Status: SHIPPED | OUTPATIENT
Start: 2021-04-01 | End: 2021-12-15 | Stop reason: SDUPTHER

## 2021-04-01 RX ORDER — FLUOXETINE HYDROCHLORIDE 20 MG/1
20 CAPSULE ORAL DAILY
Qty: 90 CAPSULE | Refills: 3 | Status: SHIPPED | OUTPATIENT
Start: 2021-04-01 | End: 2021-12-15 | Stop reason: SDUPTHER

## 2021-04-01 RX ORDER — METFORMIN HYDROCHLORIDE 500 MG/1
TABLET, EXTENDED RELEASE ORAL
Qty: 180 TABLET | Refills: 3 | Status: SHIPPED | OUTPATIENT
Start: 2021-04-01 | End: 2021-12-15 | Stop reason: SDUPTHER

## 2021-04-01 NOTE — PROGRESS NOTES
Chief Complaint   Patient presents with   • Diabetes   • Hyperlipidemia   • Hypertension   • Hypothyroidism       Subjective   Summer Jefferson is a 65 y.o. female.     History of Present Illness     She comes in for follow up of Type II diabetes, hyperlipidemia, hypertension and hypothyroidism. She continues on medications as listed. She has no complaints today.    The following portions of the patient's history were reviewed and updated as appropriate: allergies, current medications, past family history, past medical history, past social history, past surgical history and problem list.    Review of Systems   Constitutional: Negative for appetite change and fever.   HENT: Negative for nosebleeds.    Eyes: Negative for blurred vision and double vision.   Respiratory: Negative for cough and shortness of breath.    Cardiovascular: Negative for chest pain, palpitations and leg swelling.   Endocrine: Negative for polydipsia and polyuria.           Current Outpatient Medications:   •  celecoxib (CeleBREX) 200 MG capsule, Take 1 capsule by mouth 2 (Two) Times a Day., Disp: 180 capsule, Rfl: 3  •  famciclovir (FAMVIR) 500 MG tablet, Take 1 tablet by mouth 2 (Two) Times a Day., Disp: 180 tablet, Rfl: 3  •  FLUoxetine (PROzac) 20 MG capsule, Take 1 capsule by mouth Daily., Disp: 90 capsule, Rfl: 3  •  glucose blood test strip, Check blood glucose once a day, Disp: 100 each, Rfl: 3  •  glucose monitor monitoring kit, 1 each As Needed (glucose testing)., Disp: 1 each, Rfl: 0  •  hydroCHLOROthiazide (HYDRODIURIL) 25 MG tablet, Take 1 tablet by mouth Daily., Disp: 90 tablet, Rfl: 3  •  levothyroxine (SYNTHROID, LEVOTHROID) 50 MCG tablet, Take 1 tablet by mouth Daily., Disp: 90 tablet, Rfl: 3  •  lisinopril (PRINIVIL,ZESTRIL) 10 MG tablet, Take 1 tablet by mouth Daily., Disp: 90 tablet, Rfl: 3  •  metFORMIN ER (GLUCOPHAGE-XR) 500 MG 24 hr tablet, One twice daily, Disp: 180 tablet, Rfl: 3  •  OneTouch Delica Lancets 33G misc, Check  "blood glucose once daily, Disp: 100 each, Rfl: 3  •  rosuvastatin (CRESTOR) 10 MG tablet, Take 1 tablet by mouth Daily., Disp: 90 tablet, Rfl: 3  •  verapamil ER (VERELAN) 240 MG 24 hr capsule, Take 1 capsule by mouth Every Night., Disp: 90 capsule, Rfl: 3        Objective     /64   Pulse 92   Temp 97.1 °F (36.2 °C)   Ht 162.6 cm (64\")   Wt 85.7 kg (189 lb)   SpO2 99%   BMI 32.44 kg/m²     Physical Exam  Vitals and nursing note reviewed.   Constitutional:       General: She is not in acute distress.     Appearance: She is well-developed.   Neck:      Vascular: Normal carotid pulses. No carotid bruit.   Cardiovascular:      Rate and Rhythm: Regular rhythm.      Pulses:           Carotid pulses are 2+ on the right side and 2+ on the left side.     Heart sounds: S1 normal and S2 normal. No murmur heard.   No friction rub. No gallop.    Pulmonary:      Effort: Pulmonary effort is normal.      Breath sounds: Normal breath sounds. No wheezing, rhonchi or rales.   Musculoskeletal:      Right lower leg: No edema.      Left lower leg: No edema.   Skin:     General: Skin is warm and dry.   Neurological:      Mental Status: She is alert and oriented to person, place, and time.       Lab Results   Component Value Date    HGBA1C 6.70 (H) 09/25/2020      Lab Results   Component Value Date    CHOL 132 10/04/2018    CHLPL 152 09/25/2020    TRIG 126 09/25/2020    HDL 55 09/25/2020    LDL 72 09/25/2020      Lab Results   Component Value Date    TSH 1.590 09/25/2020        Assessment/Plan   Diagnoses and all orders for this visit:    1. Hypothyroidism, unspecified type (Primary)  -     levothyroxine (SYNTHROID, LEVOTHROID) 50 MCG tablet; Take 1 tablet by mouth Daily.  Dispense: 90 tablet; Refill: 3  -     TSH Rfx On Abnormal To Free T4    2. Type 2 diabetes mellitus without complication, with long-term current use of insulin (CMS/[facility])  -     metFORMIN ER (GLUCOPHAGE-XR) 500 MG 24 hr tablet; One twice daily  Dispense: 180 " tablet; Refill: 3  -     Hemoglobin A1c  -     Urinalysis With Microscopic If Indicated (No Culture) - Urine, Clean Catch    3. Essential hypertension  -     lisinopril (PRINIVIL,ZESTRIL) 10 MG tablet; Take 1 tablet by mouth Daily.  Dispense: 90 tablet; Refill: 3  -     Comprehensive Metabolic Panel    4. Hyperlipidemia, unspecified hyperlipidemia type  -     rosuvastatin (CRESTOR) 10 MG tablet; Take 1 tablet by mouth Daily.  Dispense: 90 tablet; Refill: 3  -     Lipid Panel With / Chol / HDL Ratio    Other orders  -     verapamil ER (VERELAN) 240 MG 24 hr capsule; Take 1 capsule by mouth Every Night.  Dispense: 90 capsule; Refill: 3  -     celecoxib (CeleBREX) 200 MG capsule; Take 1 capsule by mouth 2 (Two) Times a Day.  Dispense: 180 capsule; Refill: 3  -     famciclovir (FAMVIR) 500 MG tablet; Take 1 tablet by mouth 2 (Two) Times a Day.  Dispense: 180 tablet; Refill: 3  -     FLUoxetine (PROzac) 20 MG capsule; Take 1 capsule by mouth Daily.  Dispense: 90 capsule; Refill: 3  -     hydroCHLOROthiazide (HYDRODIURIL) 25 MG tablet; Take 1 tablet by mouth Daily.  Dispense: 90 tablet; Refill: 3    She will continue same medications.  Encouraged prudent diet and regular exercise.

## 2021-04-15 ENCOUNTER — TELEPHONE (OUTPATIENT)
Dept: INTERNAL MEDICINE | Facility: CLINIC | Age: 66
End: 2021-04-15

## 2021-04-15 NOTE — TELEPHONE ENCOUNTER
PATIENT STATES HER INSURANCE NO LONGER COVERS HER VERAPAMIL ER AND PHARMACY SUGGESTED SHE TAKES THE FOLLOWING MEDICATION INSTEAD. PLEASE ADVISE.    Caller: Summer Jefferson    Relationship: Self    Best call back number: [phone]     What medication are you requesting: AMLODIPINE      If a prescription is needed, what is your preferred pharmacy and phone number: [facility] - [place], [place] - [address] [facility] - [phone] [facility] [phone] FX

## 2021-04-15 NOTE — TELEPHONE ENCOUNTER
Patient is aware you are out of the office until Monday and stated it is fine to hold message until your return.  She still has a week or two of the Verapamil.

## 2021-04-17 RX ORDER — AMLODIPINE BESYLATE 5 MG/1
5 TABLET ORAL DAILY
Qty: 90 TABLET | Refills: 1 | Status: SHIPPED | OUTPATIENT
Start: 2021-04-17 | End: 2021-10-01

## 2021-05-27 ENCOUNTER — TELEPHONE (OUTPATIENT)
Dept: INTERNAL MEDICINE | Facility: CLINIC | Age: 66
End: 2021-05-27

## 2021-05-27 NOTE — TELEPHONE ENCOUNTER
Ruth w/ [employer] [employer] prior authorizations called to get more information on Lidocaine 5% patches to complete a prior authorization.    I informed Ruth that Dr. Langston did not prescribe this medication for her recently and this was last prescribed by Dr. Madi Blanton on 7/13/2020 and d/c by Dr. Langston on 9/25/2020.    I also told her it looks like the PA was denied on 7/14/2020 in which Dr. Blanton's office did this PA.     Ruth stated she would make note of this and reach back out to MsAtiya Li.

## 2021-08-09 ENCOUNTER — OFFICE VISIT (OUTPATIENT)
Dept: INTERNAL MEDICINE | Facility: CLINIC | Age: 66
End: 2021-08-09

## 2021-08-09 VITALS
OXYGEN SATURATION: 95 % | WEIGHT: 194 LBS | DIASTOLIC BLOOD PRESSURE: 68 MMHG | BODY MASS INDEX: 33.3 KG/M2 | TEMPERATURE: 97.5 F | HEART RATE: 92 BPM | SYSTOLIC BLOOD PRESSURE: 126 MMHG

## 2021-08-09 DIAGNOSIS — E03.9 HYPOTHYROIDISM, ADULT: Chronic | ICD-10-CM

## 2021-08-09 DIAGNOSIS — I10 ESSENTIAL HYPERTENSION: Chronic | ICD-10-CM

## 2021-08-09 DIAGNOSIS — E11.9 CONTROLLED TYPE 2 DIABETES MELLITUS WITHOUT COMPLICATION, WITHOUT LONG-TERM CURRENT USE OF INSULIN (HCC): Primary | Chronic | ICD-10-CM

## 2021-08-09 DIAGNOSIS — E78.00 HYPERCHOLESTEROLEMIA: Chronic | ICD-10-CM

## 2021-08-09 PROCEDURE — 99214 OFFICE O/P EST MOD 30 MIN: CPT | Performed by: INTERNAL MEDICINE

## 2021-08-09 RX ORDER — CELECOXIB 200 MG/1
200 CAPSULE ORAL 2 TIMES DAILY
Qty: 180 CAPSULE | Refills: 1 | Status: SHIPPED | OUTPATIENT
Start: 2021-08-09 | End: 2021-12-15 | Stop reason: SDUPTHER

## 2021-08-09 RX ORDER — LIDOCAINE 50 MG/G
1 PATCH TOPICAL DAILY
Qty: 30 PATCH | Refills: 0 | Status: SHIPPED | OUTPATIENT
Start: 2021-08-09 | End: 2021-12-15 | Stop reason: SDUPTHER

## 2021-08-09 NOTE — PROGRESS NOTES
Chief Complaint   Patient presents with   • Hyperlipidemia   • Hypertension   • Hypothyroidism   • Diabetes       Subjective   Summer Jefferson is a 66 y.o. female.     Hyperlipidemia  This is a chronic problem. The problem is controlled. Recent lipid tests were reviewed and are normal. Exacerbating diseases include diabetes and hypothyroidism. Pertinent negatives include no chest pain or shortness of breath. Current antihyperlipidemic treatment includes statins. The current treatment provides significant improvement of lipids.   Hypertension  This is a chronic problem. The problem is unchanged. The problem is controlled. Pertinent negatives include no blurred vision, chest pain, palpitations, peripheral edema or shortness of breath. Agents associated with hypertension include NSAIDs. Current antihypertension treatment includes calcium channel blockers, diuretics and angiotensin blockers. The current treatment provides significant improvement.   Hypothyroidism  This is a chronic problem. Pertinent negatives include no chest pain, coughing or visual change. Associated symptoms comments: No changes in heat/cold tolerance, no change hair/skin.  No fatigue. . Treatments tried: levothyroxine. The treatment provided significant relief.   Diabetes  She presents for her follow-up diabetic visit. She has type 2 diabetes mellitus. (No symptome suggestive of hypoglycemia) Pertinent negatives for diabetes include no blurred vision, no chest pain, no polydipsia, no polyuria and no visual change. Current diabetic treatment includes diet and oral agent (monotherapy). She is following a generally healthy diet. Meal planning includes avoidance of concentrated sweets. An ACE inhibitor/angiotensin II receptor blocker is being taken.        The following portions of the patient's history were reviewed and updated as appropriate: allergies, current medications, past family history, past medical history, past social history, past surgical  history and problem list.      Current Outpatient Medications:   •  amLODIPine (NORVASC) 5 MG tablet, Take 1 tablet by mouth Daily., Disp: 90 tablet, Rfl: 1  •  celecoxib (CeleBREX) 200 MG capsule, Take 1 capsule by mouth 2 (Two) Times a Day., Disp: 180 capsule, Rfl: 1  •  FLUoxetine (PROzac) 20 MG capsule, Take 1 capsule by mouth Daily., Disp: 90 capsule, Rfl: 3  •  glucose blood test strip, Check blood glucose once a day, Disp: 100 each, Rfl: 3  •  glucose monitor monitoring kit, 1 each As Needed (glucose testing)., Disp: 1 each, Rfl: 0  •  hydroCHLOROthiazide (HYDRODIURIL) 25 MG tablet, Take 1 tablet by mouth Daily., Disp: 90 tablet, Rfl: 3  •  levothyroxine (SYNTHROID, LEVOTHROID) 50 MCG tablet, Take 1 tablet by mouth Daily., Disp: 90 tablet, Rfl: 3  •  lisinopril (PRINIVIL,ZESTRIL) 10 MG tablet, Take 1 tablet by mouth Daily., Disp: 90 tablet, Rfl: 3  •  metFORMIN ER (GLUCOPHAGE-XR) 500 MG 24 hr tablet, One twice daily, Disp: 180 tablet, Rfl: 3  •  OneTouch Delica Lancets 33G misc, Check blood glucose once daily, Disp: 100 each, Rfl: 3  •  rosuvastatin (CRESTOR) 10 MG tablet, Take 1 tablet by mouth Daily., Disp: 90 tablet, Rfl: 3  •  famciclovir (FAMVIR) 500 MG tablet, Take 1 tablet by mouth 2 (Two) Times a Day., Disp: 180 tablet, Rfl: 3  •  lidocaine (LIDODERM) 5 %, Place 1 patch on the skin as directed by provider Daily. Remove & Discard patch within 12 hours or as directed by MD, Disp: 30 patch, Rfl: 0           Review of Systems   Constitutional: Negative for appetite change.   HENT: Negative for nosebleeds.    Eyes: Negative for blurred vision and double vision.   Respiratory: Negative for cough and shortness of breath.    Cardiovascular: Negative for chest pain, palpitations and leg swelling.   Endocrine: Negative for polydipsia and polyuria.           Objective     /68 (BP Location: Left arm, Patient Position: Sitting, Cuff Size: Adult)   Pulse 92   Temp 97.5 °F (36.4 °C) (Tympanic)   Wt 88 kg  (194 lb)   SpO2 95%   BMI 33.30 kg/m²       Physical Exam  Vitals and nursing note reviewed.   Constitutional:       General: She is not in acute distress.     Appearance: She is well-developed.   HENT:      Right Ear: Tympanic membrane and ear canal normal.      Left Ear: Tympanic membrane and ear canal normal.   Neck:      Vascular: Normal carotid pulses. No carotid bruit.   Cardiovascular:      Rate and Rhythm: Regular rhythm.      Pulses:           Carotid pulses are 2+ on the right side and 2+ on the left side.     Heart sounds: S1 normal and S2 normal. No murmur heard.   No friction rub. No gallop.    Pulmonary:      Effort: Pulmonary effort is normal.      Breath sounds: Normal breath sounds. No wheezing, rhonchi or rales.   Musculoskeletal:      Right lower leg: No edema.      Left lower leg: No edema.   Skin:     General: Skin is warm and dry.   Neurological:      Mental Status: She is alert and oriented to person, place, and time.       Lab Results   Component Value Date    CHOL 132 10/04/2018    CHLPL 149 04/01/2021    TRIG 125 04/01/2021    HDL 55 04/01/2021    LDL 72 04/01/2021      Lab Results   Component Value Date    HGBA1C 6.50 (H) 04/01/2021      Lab Results   Component Value Date    TSH 1.030 04/01/2021          Assessment/Plan   Diagnoses and all orders for this visit:    1. Controlled type 2 diabetes mellitus without complication, without long-term current use of insulin (CMS/[facility]) (Primary)  -     Hemoglobin A1c  -     Microalbumin / Creatinine Urine Ratio - Urine, Clean Catch    2. Hypercholesterolemia  -     Lipid Panel With / Chol / HDL Ratio    3. Essential hypertension  -     Comprehensive Metabolic Panel    4. Hypothyroidism, adult  -     TSH Rfx On Abnormal To Free T4    Other orders  -     celecoxib (CeleBREX) 200 MG capsule; Take 1 capsule by mouth 2 (Two) Times a Day.  Dispense: 180 capsule; Refill: 1  -     lidocaine (LIDODERM) 5 %; Place 1 patch on the skin as directed by provider  Daily. Remove & Discard patch within 12 hours or as directed by MD  Dispense: 30 patch; Refill: 0                   Return in about 4 months (around 12/9/2021) for 30 (THIRTY) min follow up.

## 2021-08-10 LAB
ALBUMIN SERPL-MCNC: 4.7 G/DL (ref 3.5–5.2)
ALBUMIN/CREAT UR: 15 MG/G CREAT (ref 0–29)
ALBUMIN/GLOB SERPL: 1.9 G/DL
ALP SERPL-CCNC: 70 U/L (ref 39–117)
ALT SERPL-CCNC: 24 U/L (ref 1–33)
AST SERPL-CCNC: 24 U/L (ref 1–32)
BILIRUB SERPL-MCNC: 0.4 MG/DL (ref 0–1.2)
BUN SERPL-MCNC: 19 MG/DL (ref 8–23)
BUN/CREAT SERPL: 23.5 (ref 7–25)
CALCIUM SERPL-MCNC: 9.8 MG/DL (ref 8.6–10.5)
CHLORIDE SERPL-SCNC: 100 MMOL/L (ref 98–107)
CHOLEST SERPL-MCNC: 166 MG/DL (ref 0–200)
CHOLEST/HDLC SERPL: 3.61 {RATIO}
CO2 SERPL-SCNC: 25.2 MMOL/L (ref 22–29)
CREAT SERPL-MCNC: 0.81 MG/DL (ref 0.57–1)
CREAT UR-MCNC: 116.5 MG/DL
GLOBULIN SER CALC-MCNC: 2.5 GM/DL
GLUCOSE SERPL-MCNC: 174 MG/DL (ref 65–99)
HBA1C MFR BLD: 7.7 % (ref 4.8–5.6)
HDLC SERPL-MCNC: 46 MG/DL (ref 40–60)
LDLC SERPL CALC-MCNC: 102 MG/DL (ref 0–100)
MICROALBUMIN UR-MCNC: 17.5 UG/ML
POTASSIUM SERPL-SCNC: 4.2 MMOL/L (ref 3.5–5.2)
PROT SERPL-MCNC: 7.2 G/DL (ref 6–8.5)
SODIUM SERPL-SCNC: 138 MMOL/L (ref 136–145)
TRIGL SERPL-MCNC: 100 MG/DL (ref 0–150)
TSH SERPL DL<=0.005 MIU/L-ACNC: 1.57 UIU/ML (ref 0.27–4.2)
VLDLC SERPL CALC-MCNC: 18 MG/DL (ref 5–40)

## 2021-09-03 NOTE — TELEPHONE ENCOUNTER
Patient informed and ok with follow these directions will let us know how she feels   Not applicable

## 2021-10-01 RX ORDER — AMLODIPINE BESYLATE 5 MG/1
TABLET ORAL
Qty: 90 TABLET | Refills: 1 | Status: SHIPPED | OUTPATIENT
Start: 2021-10-01 | End: 2021-12-15 | Stop reason: SDUPTHER

## 2021-10-29 ENCOUNTER — APPOINTMENT (OUTPATIENT)
Dept: VACCINE CLINIC | Facility: HOSPITAL | Age: 66
End: 2021-10-29

## 2021-11-12 ENCOUNTER — APPOINTMENT (OUTPATIENT)
Dept: VACCINE CLINIC | Facility: HOSPITAL | Age: 66
End: 2021-11-12

## 2021-11-20 ENCOUNTER — IMMUNIZATION (OUTPATIENT)
Dept: VACCINE CLINIC | Facility: HOSPITAL | Age: 66
End: 2021-11-20

## 2021-11-20 PROCEDURE — 91300 HC SARSCOV02 VAC 30MCG/0.3ML IM: CPT | Performed by: INTERNAL MEDICINE

## 2021-11-20 PROCEDURE — 0004A ADM SARSCOV2 30MCG/0.3ML BOOSTER: CPT | Performed by: INTERNAL MEDICINE

## 2021-12-07 DIAGNOSIS — E11.9 CONTROLLED TYPE 2 DIABETES MELLITUS WITHOUT COMPLICATION, WITHOUT LONG-TERM CURRENT USE OF INSULIN (HCC): Primary | ICD-10-CM

## 2021-12-15 ENCOUNTER — OFFICE VISIT (OUTPATIENT)
Dept: INTERNAL MEDICINE | Facility: CLINIC | Age: 66
End: 2021-12-15

## 2021-12-15 VITALS
WEIGHT: 197 LBS | DIASTOLIC BLOOD PRESSURE: 64 MMHG | HEIGHT: 64 IN | BODY MASS INDEX: 33.63 KG/M2 | SYSTOLIC BLOOD PRESSURE: 126 MMHG | OXYGEN SATURATION: 95 % | HEART RATE: 103 BPM | TEMPERATURE: 99.1 F

## 2021-12-15 DIAGNOSIS — E78.5 HYPERLIPIDEMIA, UNSPECIFIED HYPERLIPIDEMIA TYPE: ICD-10-CM

## 2021-12-15 DIAGNOSIS — I10 PRIMARY HYPERTENSION: Chronic | ICD-10-CM

## 2021-12-15 DIAGNOSIS — E11.9 CONTROLLED TYPE 2 DIABETES MELLITUS WITHOUT COMPLICATION, WITHOUT LONG-TERM CURRENT USE OF INSULIN (HCC): Primary | Chronic | ICD-10-CM

## 2021-12-15 DIAGNOSIS — E03.9 HYPOTHYROIDISM, UNSPECIFIED TYPE: ICD-10-CM

## 2021-12-15 DIAGNOSIS — I10 ESSENTIAL HYPERTENSION: ICD-10-CM

## 2021-12-15 DIAGNOSIS — Z79.4 TYPE 2 DIABETES MELLITUS WITHOUT COMPLICATION, WITH LONG-TERM CURRENT USE OF INSULIN (HCC): ICD-10-CM

## 2021-12-15 DIAGNOSIS — E11.9 TYPE 2 DIABETES MELLITUS WITHOUT COMPLICATION, WITH LONG-TERM CURRENT USE OF INSULIN (HCC): ICD-10-CM

## 2021-12-15 DIAGNOSIS — E78.00 HYPERCHOLESTEROLEMIA: Chronic | ICD-10-CM

## 2021-12-15 PROCEDURE — 99214 OFFICE O/P EST MOD 30 MIN: CPT | Performed by: INTERNAL MEDICINE

## 2021-12-15 RX ORDER — CELECOXIB 200 MG/1
200 CAPSULE ORAL 2 TIMES DAILY
Qty: 180 CAPSULE | Refills: 0 | Status: SHIPPED | OUTPATIENT
Start: 2021-12-15 | End: 2022-03-15 | Stop reason: SDUPTHER

## 2021-12-15 RX ORDER — AMLODIPINE BESYLATE 5 MG/1
5 TABLET ORAL DAILY
Qty: 90 TABLET | Refills: 0 | Status: SHIPPED | OUTPATIENT
Start: 2021-12-15 | End: 2022-03-15 | Stop reason: SDUPTHER

## 2021-12-15 RX ORDER — FAMCICLOVIR 500 MG/1
500 TABLET ORAL 2 TIMES DAILY
Qty: 180 TABLET | Refills: 0 | Status: SHIPPED | OUTPATIENT
Start: 2021-12-15 | End: 2022-03-15 | Stop reason: SDUPTHER

## 2021-12-15 RX ORDER — METFORMIN HYDROCHLORIDE 500 MG/1
TABLET, EXTENDED RELEASE ORAL
Qty: 180 TABLET | Refills: 0 | Status: SHIPPED | OUTPATIENT
Start: 2021-12-15 | End: 2022-03-15 | Stop reason: SDUPTHER

## 2021-12-15 RX ORDER — LEVOTHYROXINE SODIUM 0.05 MG/1
50 TABLET ORAL DAILY
Qty: 90 TABLET | Refills: 0 | Status: SHIPPED | OUTPATIENT
Start: 2021-12-15 | End: 2022-03-15 | Stop reason: SDUPTHER

## 2021-12-15 RX ORDER — HYDROCHLOROTHIAZIDE 25 MG/1
25 TABLET ORAL DAILY
Qty: 90 TABLET | Refills: 0 | Status: SHIPPED | OUTPATIENT
Start: 2021-12-15 | End: 2022-03-15 | Stop reason: SDUPTHER

## 2021-12-15 RX ORDER — ROSUVASTATIN CALCIUM 10 MG/1
10 TABLET, COATED ORAL DAILY
Qty: 90 TABLET | Refills: 0 | Status: SHIPPED | OUTPATIENT
Start: 2021-12-15 | End: 2022-03-15 | Stop reason: SDUPTHER

## 2021-12-15 RX ORDER — FLUOXETINE HYDROCHLORIDE 20 MG/1
20 CAPSULE ORAL DAILY
Qty: 90 CAPSULE | Refills: 0 | Status: SHIPPED | OUTPATIENT
Start: 2021-12-15 | End: 2022-03-15 | Stop reason: SDUPTHER

## 2021-12-15 RX ORDER — LISINOPRIL 10 MG/1
10 TABLET ORAL DAILY
Qty: 90 TABLET | Refills: 0 | Status: SHIPPED | OUTPATIENT
Start: 2021-12-15 | End: 2022-03-15 | Stop reason: SDUPTHER

## 2021-12-15 RX ORDER — LIDOCAINE 50 MG/G
1 PATCH TOPICAL DAILY
Qty: 60 PATCH | Refills: 0 | Status: SHIPPED | OUTPATIENT
Start: 2021-12-15 | End: 2022-07-15 | Stop reason: SDUPTHER

## 2021-12-15 NOTE — PROGRESS NOTES
Chief Complaint   Patient presents with   • Hyperlipidemia   • Diabetes   • Hypertension       Subjective   Summer Jefferson is a 66 y.o. female.     Hyperlipidemia  This is a chronic problem. The problem is controlled. Recent lipid tests were reviewed and are normal. Exacerbating diseases include diabetes and obesity. Factors aggravating her hyperlipidemia include thiazides and fatty foods. Pertinent negatives include no chest pain, leg pain, myalgias or shortness of breath. Current antihyperlipidemic treatment includes statins, diet change and exercise. The current treatment provides significant improvement of lipids. Compliance problems include adherence to diet and adherence to exercise.    Diabetes  She presents for her follow-up diabetic visit. She has type 2 diabetes mellitus. Pertinent negatives for hypoglycemia include no headaches. (No symptoms suggestive of hypoglycemia ) Pertinent negatives for diabetes include no blurred vision, no chest pain, no foot paresthesias, no polydipsia, no polyuria and no visual change. There are no diabetic complications. Risk factors for coronary artery disease include diabetes mellitus, dyslipidemia, hypertension and obesity. Current diabetic treatment includes diet and oral agent (monotherapy). She is compliant with treatment most of the time (has not been compliant with diet recently). She is following a generally unhealthy diet. When asked about meal planning, she reported none (eating more sweets). She participates in exercise intermittently. An ACE inhibitor/angiotensin II receptor blocker is being taken. Eye exam is current.   Hypertension  This is a chronic problem. The problem is controlled. Pertinent negatives include no blurred vision, chest pain, headaches, palpitations or shortness of breath. Current antihypertension treatment includes calcium channel blockers, diuretics, ACE inhibitors and lifestyle changes. The current treatment provides significant improvement.  Compliance problems include diet and exercise.         The following portions of the patient's history were reviewed and updated as appropriate: allergies, current medications, past family history, past medical history, past social history, past surgical history and problem list.      Current Outpatient Medications:   •  amLODIPine (NORVASC) 5 MG tablet, Take 1 tablet by mouth Daily., Disp: 90 tablet, Rfl: 0  •  celecoxib (CeleBREX) 200 MG capsule, Take 1 capsule by mouth 2 (Two) Times a Day., Disp: 180 capsule, Rfl: 0  •  famciclovir (FAMVIR) 500 MG tablet, Take 1 tablet by mouth 2 (Two) Times a Day., Disp: 180 tablet, Rfl: 0  •  FLUoxetine (PROzac) 20 MG capsule, Take 1 capsule by mouth Daily., Disp: 90 capsule, Rfl: 0  •  glucose blood test strip, Check blood glucose once a day, Disp: 100 each, Rfl: 3  •  hydroCHLOROthiazide (HYDRODIURIL) 25 MG tablet, Take 1 tablet by mouth Daily., Disp: 90 tablet, Rfl: 0  •  levothyroxine (SYNTHROID, LEVOTHROID) 50 MCG tablet, Take 1 tablet by mouth Daily., Disp: 90 tablet, Rfl: 0  •  lidocaine (LIDODERM) 5 %, Place 1 patch on the skin as directed by provider Daily. Remove & Discard patch within 12 hours or as directed by MD, Disp: 60 patch, Rfl: 0  •  lisinopril (PRINIVIL,ZESTRIL) 10 MG tablet, Take 1 tablet by mouth Daily., Disp: 90 tablet, Rfl: 0  •  metFORMIN ER (GLUCOPHAGE-XR) 500 MG 24 hr tablet, One twice daily, Disp: 180 tablet, Rfl: 0  •  OneTouch Delica Lancets 33G misc, Check blood glucose once daily, Disp: 100 each, Rfl: 3  •  rosuvastatin (CRESTOR) 10 MG tablet, Take 1 tablet by mouth Daily., Disp: 90 tablet, Rfl: 0           Review of Systems   Constitutional: Negative for appetite change.   HENT: Negative for nosebleeds.    Eyes: Negative for blurred vision and double vision.   Respiratory: Negative for cough and shortness of breath.    Cardiovascular: Negative for chest pain, palpitations and leg swelling.   Endocrine: Negative for polydipsia and polyuria.  "  Musculoskeletal: Negative for myalgias.           Objective     /64   Pulse 103   Temp 99.1 °F (37.3 °C)   Ht 162.6 cm (64\")   Wt 89.4 kg (197 lb)   SpO2 95%   BMI 33.81 kg/m²       Physical Exam  Vitals and nursing note reviewed.   Constitutional:       General: She is not in acute distress.     Appearance: She is well-developed.   Neck:      Vascular: Normal carotid pulses. No carotid bruit.   Cardiovascular:      Rate and Rhythm: Regular rhythm.      Pulses:           Carotid pulses are 2+ on the right side and 2+ on the left side.     Heart sounds: S1 normal and S2 normal. No murmur heard.  No friction rub. No gallop.    Pulmonary:      Effort: Pulmonary effort is normal.      Breath sounds: Normal breath sounds. No wheezing, rhonchi or rales.   Musculoskeletal:      Right lower leg: No edema.      Left lower leg: No edema.   Skin:     General: Skin is warm and dry.   Neurological:      Mental Status: She is alert and oriented to person, place, and time.       Lab Results   Component Value Date    HGBA1C 8.4 (H) 12/08/2021      Lab Results   Component Value Date    CHOL 132 10/04/2018    CHLPL 149 12/08/2021    TRIG 103 12/08/2021    HDL 47 12/08/2021    LDL 83 12/08/2021           Assessment/Plan   Diagnoses and all orders for this visit:    1. Controlled type 2 diabetes mellitus without complication, without long-term current use of insulin (HCC) (Primary)    2. Primary hypertension    3. Hypercholesterolemia    4. Hypothyroidism, unspecified type  -     levothyroxine (SYNTHROID, LEVOTHROID) 50 MCG tablet; Take 1 tablet by mouth Daily.  Dispense: 90 tablet; Refill: 0    5. Essential hypertension  -     lisinopril (PRINIVIL,ZESTRIL) 10 MG tablet; Take 1 tablet by mouth Daily.  Dispense: 90 tablet; Refill: 0    6. Type 2 diabetes mellitus without complication, with long-term current use of insulin (HCC)  -     metFORMIN ER (GLUCOPHAGE-XR) 500 MG 24 hr tablet; One twice daily  Dispense: 180 tablet; " Refill: 0    7. Hyperlipidemia, unspecified hyperlipidemia type  -     rosuvastatin (CRESTOR) 10 MG tablet; Take 1 tablet by mouth Daily.  Dispense: 90 tablet; Refill: 0    Other orders  -     amLODIPine (NORVASC) 5 MG tablet; Take 1 tablet by mouth Daily.  Dispense: 90 tablet; Refill: 0  -     celecoxib (CeleBREX) 200 MG capsule; Take 1 capsule by mouth 2 (Two) Times a Day.  Dispense: 180 capsule; Refill: 0  -     famciclovir (FAMVIR) 500 MG tablet; Take 1 tablet by mouth 2 (Two) Times a Day.  Dispense: 180 tablet; Refill: 0  -     FLUoxetine (PROzac) 20 MG capsule; Take 1 capsule by mouth Daily.  Dispense: 90 capsule; Refill: 0  -     hydroCHLOROthiazide (HYDRODIURIL) 25 MG tablet; Take 1 tablet by mouth Daily.  Dispense: 90 tablet; Refill: 0  -     lidocaine (LIDODERM) 5 %; Place 1 patch on the skin as directed by provider Daily. Remove & Discard patch within 12 hours or as directed by MD  Dispense: 60 patch; Refill: 0      Discussed Hemoglobin A1c which is too high. She admits diet has not been good. She would like to work on that. She is scheduled to see a new PCP in March and will have Hemoglobin A1c checked again. She will continue same medications.  Encouraged prudent diet and regular exercise.   Blood pressure is controlled. Lipid levels good.               No follow-ups on file.

## 2021-12-20 ENCOUNTER — TELEPHONE (OUTPATIENT)
Dept: INTERNAL MEDICINE | Facility: CLINIC | Age: 66
End: 2021-12-20

## 2021-12-20 DIAGNOSIS — M54.50 BILATERAL LOW BACK PAIN WITHOUT SCIATICA, UNSPECIFIED CHRONICITY: Primary | ICD-10-CM

## 2021-12-20 NOTE — TELEPHONE ENCOUNTER
Caller: Summer Jefferson    Relationship: Self    Best call back number: [phone]    What is the medical concern/diagnosis: BACK PAIN     What specialty or service is being requested: PHYSICAL THERAPY    What is the provider, practice or medical service name: [facility] [place]    Any additional details: THE SAME PLACE SHE HAS THERAPY BEFORE THAT [facility] WILL KNOW.

## 2022-03-15 ENCOUNTER — OFFICE VISIT (OUTPATIENT)
Dept: FAMILY MEDICINE CLINIC | Facility: CLINIC | Age: 67
End: 2022-03-15

## 2022-03-15 VITALS
OXYGEN SATURATION: 97 % | DIASTOLIC BLOOD PRESSURE: 70 MMHG | SYSTOLIC BLOOD PRESSURE: 120 MMHG | HEART RATE: 86 BPM | WEIGHT: 192 LBS | BODY MASS INDEX: 32.78 KG/M2 | TEMPERATURE: 97.3 F | HEIGHT: 64 IN

## 2022-03-15 DIAGNOSIS — G89.29 CHRONIC BILATERAL LOW BACK PAIN WITH LEFT-SIDED SCIATICA: ICD-10-CM

## 2022-03-15 DIAGNOSIS — I10 PRIMARY HYPERTENSION: Primary | Chronic | ICD-10-CM

## 2022-03-15 DIAGNOSIS — Z78.0 ENCOUNTER FOR OSTEOPOROSIS SCREENING IN ASYMPTOMATIC POSTMENOPAUSAL PATIENT: ICD-10-CM

## 2022-03-15 DIAGNOSIS — E78.00 HYPERCHOLESTEROLEMIA: Chronic | ICD-10-CM

## 2022-03-15 DIAGNOSIS — F41.9 ANXIETY AND DEPRESSION: ICD-10-CM

## 2022-03-15 DIAGNOSIS — M54.42 CHRONIC BILATERAL LOW BACK PAIN WITH LEFT-SIDED SCIATICA: ICD-10-CM

## 2022-03-15 DIAGNOSIS — F32.A ANXIETY AND DEPRESSION: ICD-10-CM

## 2022-03-15 DIAGNOSIS — E11.9 CONTROLLED TYPE 2 DIABETES MELLITUS WITHOUT COMPLICATION, WITHOUT LONG-TERM CURRENT USE OF INSULIN: Chronic | ICD-10-CM

## 2022-03-15 DIAGNOSIS — Z13.820 ENCOUNTER FOR OSTEOPOROSIS SCREENING IN ASYMPTOMATIC POSTMENOPAUSAL PATIENT: ICD-10-CM

## 2022-03-15 DIAGNOSIS — E03.9 HYPOTHYROIDISM, ADULT: Chronic | ICD-10-CM

## 2022-03-15 DIAGNOSIS — B00.9 HSV (HERPES SIMPLEX VIRUS) INFECTION: ICD-10-CM

## 2022-03-15 PROCEDURE — 99213 OFFICE O/P EST LOW 20 MIN: CPT | Performed by: NURSE PRACTITIONER

## 2022-03-15 RX ORDER — LISINOPRIL 10 MG/1
10 TABLET ORAL DAILY
Qty: 90 TABLET | Refills: 0 | Status: SHIPPED | OUTPATIENT
Start: 2022-03-15 | End: 2022-06-14

## 2022-03-15 RX ORDER — LANCETS 33 GAUGE
EACH MISCELLANEOUS
Qty: 100 EACH | Refills: 3 | Status: SHIPPED | OUTPATIENT
Start: 2022-03-15 | End: 2022-03-15 | Stop reason: SDUPTHER

## 2022-03-15 RX ORDER — FAMCICLOVIR 500 MG/1
500 TABLET ORAL 2 TIMES DAILY
Qty: 180 TABLET | Refills: 0 | Status: SHIPPED | OUTPATIENT
Start: 2022-03-15 | End: 2022-03-15 | Stop reason: SDUPTHER

## 2022-03-15 RX ORDER — ROSUVASTATIN CALCIUM 10 MG/1
10 TABLET, COATED ORAL DAILY
Qty: 90 TABLET | Refills: 0 | Status: SHIPPED | OUTPATIENT
Start: 2022-03-15 | End: 2022-06-14

## 2022-03-15 RX ORDER — CELECOXIB 200 MG/1
200 CAPSULE ORAL 2 TIMES DAILY
Qty: 180 CAPSULE | Refills: 0 | Status: SHIPPED | OUTPATIENT
Start: 2022-03-15 | End: 2022-03-15 | Stop reason: SDUPTHER

## 2022-03-15 RX ORDER — CELECOXIB 200 MG/1
200 CAPSULE ORAL 2 TIMES DAILY
Qty: 180 CAPSULE | Refills: 0 | Status: SHIPPED | OUTPATIENT
Start: 2022-03-15 | End: 2022-06-28

## 2022-03-15 RX ORDER — ROSUVASTATIN CALCIUM 10 MG/1
10 TABLET, COATED ORAL DAILY
Qty: 90 TABLET | Refills: 0 | Status: SHIPPED | OUTPATIENT
Start: 2022-03-15 | End: 2022-03-15 | Stop reason: SDUPTHER

## 2022-03-15 RX ORDER — FLUOXETINE HYDROCHLORIDE 20 MG/1
20 CAPSULE ORAL DAILY
Qty: 90 CAPSULE | Refills: 0 | Status: SHIPPED | OUTPATIENT
Start: 2022-03-15 | End: 2022-03-15 | Stop reason: SDUPTHER

## 2022-03-15 RX ORDER — HYDROCHLOROTHIAZIDE 25 MG/1
25 TABLET ORAL DAILY
Qty: 90 TABLET | Refills: 0 | Status: SHIPPED | OUTPATIENT
Start: 2022-03-15 | End: 2022-03-15 | Stop reason: SDUPTHER

## 2022-03-15 RX ORDER — FAMCICLOVIR 500 MG/1
500 TABLET ORAL 2 TIMES DAILY
Qty: 180 TABLET | Refills: 0 | Status: SHIPPED | OUTPATIENT
Start: 2022-03-15

## 2022-03-15 RX ORDER — LEVOTHYROXINE SODIUM 0.05 MG/1
50 TABLET ORAL DAILY
Qty: 90 TABLET | Refills: 0 | Status: SHIPPED | OUTPATIENT
Start: 2022-03-15 | End: 2022-03-15 | Stop reason: SDUPTHER

## 2022-03-15 RX ORDER — METFORMIN HYDROCHLORIDE 500 MG/1
TABLET, EXTENDED RELEASE ORAL
Qty: 180 TABLET | Refills: 0 | Status: SHIPPED | OUTPATIENT
Start: 2022-03-15 | End: 2022-03-15 | Stop reason: SDUPTHER

## 2022-03-15 RX ORDER — HYDROCHLOROTHIAZIDE 25 MG/1
25 TABLET ORAL DAILY
Qty: 90 TABLET | Refills: 0 | Status: SHIPPED | OUTPATIENT
Start: 2022-03-15 | End: 2022-06-28

## 2022-03-15 RX ORDER — AMLODIPINE BESYLATE 5 MG/1
5 TABLET ORAL DAILY
Qty: 90 TABLET | Refills: 0 | Status: SHIPPED | OUTPATIENT
Start: 2022-03-15 | End: 2022-03-15 | Stop reason: SDUPTHER

## 2022-03-15 RX ORDER — AMLODIPINE BESYLATE 5 MG/1
5 TABLET ORAL DAILY
Qty: 90 TABLET | Refills: 0 | Status: SHIPPED | OUTPATIENT
Start: 2022-03-15 | End: 2022-06-28

## 2022-03-15 RX ORDER — LISINOPRIL 10 MG/1
10 TABLET ORAL DAILY
Qty: 90 TABLET | Refills: 0 | Status: SHIPPED | OUTPATIENT
Start: 2022-03-15 | End: 2022-03-15 | Stop reason: SDUPTHER

## 2022-03-15 RX ORDER — LEVOTHYROXINE SODIUM 0.05 MG/1
50 TABLET ORAL DAILY
Qty: 90 TABLET | Refills: 0 | Status: SHIPPED | OUTPATIENT
Start: 2022-03-15 | End: 2022-06-28

## 2022-03-15 RX ORDER — LANCETS 33 GAUGE
EACH MISCELLANEOUS
Qty: 100 EACH | Refills: 3 | Status: SHIPPED | OUTPATIENT
Start: 2022-03-15 | End: 2023-01-16 | Stop reason: SDUPTHER

## 2022-03-15 RX ORDER — METFORMIN HYDROCHLORIDE 500 MG/1
TABLET, EXTENDED RELEASE ORAL
Qty: 180 TABLET | Refills: 0 | Status: SHIPPED | OUTPATIENT
Start: 2022-03-15 | End: 2022-07-15 | Stop reason: SDUPTHER

## 2022-03-15 RX ORDER — FLUOXETINE HYDROCHLORIDE 20 MG/1
20 CAPSULE ORAL DAILY
Qty: 90 CAPSULE | Refills: 0 | Status: SHIPPED | OUTPATIENT
Start: 2022-03-15 | End: 2022-06-28

## 2022-03-15 NOTE — PROGRESS NOTES
Subjective     Summer Jefferson is a 66 y.o.. female.     Pt here today to become established.     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 Pt with past medical problem list that incudes HTN, HLD, DM, and Hypothyroidism. Pt admits she has not been eating well due to stress. Pt stating she needs medication refills on all medications. Pt stating she uses lidoderm patches prn for back pain. Pt denies medication refill of this at this time.     Pt stating it has been over 5 years since last Dexa scan. Pt stating she does not do mammograms, colonoscopies and/or pap smears. Pt stating she does not do them because she does not want to know if she has cancer because she will not want any treatment for cancer it she does.            The following portions of the patient's history were reviewed and updated as appropriate: allergies, current medications, past family history, past medical history, past social history, past surgical history and problem list.    Past Medical History:   Diagnosis Date   • Anxiety and depression 3/15/2022   • Backache    • Diabetes mellitus type 2, controlled (HCC)    • H/O bone density study     8/2014   • H/O mammogram     8/2014   • HSV (herpes simplex virus) infection 3/15/2022   • Hypercholesterolemia    • Hypertension    • Hypothyroidism    • Osteoarthritis of multiple joints        Past Surgical History:   Procedure Laterality Date   • ANKLE ARTHROSCOPY Right     1/2013 & 10/2013   • COLONOSCOPY      7/27/2007   • LAPAROSCOPIC GASTRIC BANDING  08/01/2007   • PAP SMEAR      12/2014   • ROTATOR CUFF REPAIR Right     5/2014   • TOTAL KNEE ARTHROPLASTY Right     12/2014       Review of Systems   Constitutional: Negative.    Respiratory: Negative.    Cardiovascular: Negative.        No Known Allergies    Objective     Vitals:    03/15/22 0910   BP: 120/70   Pulse: 86   Temp: 97.3 °F (36.3 °C)   SpO2: 97%   Weight: 87.1 kg (192 lb)   Height: 162.6 cm  "(64.02\")     Body mass index is 32.94 kg/m².    Physical Exam  Vitals reviewed.   HENT:      Head: Normocephalic.   Eyes:      Pupils: Pupils are equal, round, and reactive to light.   Neck:      Vascular: No carotid bruit.   Cardiovascular:      Rate and Rhythm: Normal rate and regular rhythm.      Pulses: Normal pulses.   Pulmonary:      Effort: Pulmonary effort is normal.      Breath sounds: Normal breath sounds.   Musculoskeletal:         General: Normal range of motion.      Right lower leg: No edema.      Left lower leg: No edema.   Neurological:      Mental Status: She is alert and oriented to person, place, and time.   Psychiatric:         Behavior: Behavior normal.           Current Outpatient Medications:   •  amLODIPine (NORVASC) 5 MG tablet, Take 1 tablet by mouth Daily., Disp: 90 tablet, Rfl: 0  •  celecoxib (CeleBREX) 200 MG capsule, Take 1 capsule by mouth 2 (Two) Times a Day., Disp: 180 capsule, Rfl: 0  •  famciclovir (FAMVIR) 500 MG tablet, Take 1 tablet by mouth 2 (Two) Times a Day., Disp: 180 tablet, Rfl: 0  •  FLUoxetine (PROzac) 20 MG capsule, Take 1 capsule by mouth Daily., Disp: 90 capsule, Rfl: 0  •  glucose blood test strip, Check blood glucose once a day, Disp: 100 each, Rfl: 3  •  hydroCHLOROthiazide (HYDRODIURIL) 25 MG tablet, Take 1 tablet by mouth Daily., Disp: 90 tablet, Rfl: 0  •  levothyroxine (SYNTHROID, LEVOTHROID) 50 MCG tablet, Take 1 tablet by mouth Daily., Disp: 90 tablet, Rfl: 0  •  lidocaine (LIDODERM) 5 %, Place 1 patch on the skin as directed by provider Daily. Remove & Discard patch within 12 hours or as directed by MD, Disp: 60 patch, Rfl: 0  •  lisinopril (PRINIVIL,ZESTRIL) 10 MG tablet, Take 1 tablet by mouth Daily., Disp: 90 tablet, Rfl: 0  •  metFORMIN ER (GLUCOPHAGE-XR) 500 MG 24 hr tablet, One twice daily, Disp: 180 tablet, Rfl: 0  •  OneTouch Delica Lancets 33G misc, Check blood glucose once daily, Disp: 100 each, Rfl: 3  •  rosuvastatin (CRESTOR) 10 MG tablet, " Take 1 tablet by mouth Daily., Disp: 90 tablet, Rfl: 0        Assessment/Plan   Diagnoses and all orders for this visit:    1. Primary hypertension (Primary)  -     CBC & Differential  -     Comprehensive Metabolic Panel  -     Urinalysis With Culture If Indicated -  -     Discontinue: amLODIPine (NORVASC) 5 MG tablet; Take 1 tablet by mouth Daily.  Dispense: 90 tablet; Refill: 0  -     Discontinue: hydroCHLOROthiazide (HYDRODIURIL) 25 MG tablet; Take 1 tablet by mouth Daily.  Dispense: 90 tablet; Refill: 0  -     Discontinue: lisinopril (PRINIVIL,ZESTRIL) 10 MG tablet; Take 1 tablet by mouth Daily.  Dispense: 90 tablet; Refill: 0  -     amLODIPine (NORVASC) 5 MG tablet; Take 1 tablet by mouth Daily.  Dispense: 90 tablet; Refill: 0  -     hydroCHLOROthiazide (HYDRODIURIL) 25 MG tablet; Take 1 tablet by mouth Daily.  Dispense: 90 tablet; Refill: 0  -     lisinopril (PRINIVIL,ZESTRIL) 10 MG tablet; Take 1 tablet by mouth Daily.  Dispense: 90 tablet; Refill: 0    2. Hypercholesterolemia  -     Lipid Panel With LDL / HDL Ratio  -     Discontinue: rosuvastatin (CRESTOR) 10 MG tablet; Take 1 tablet by mouth Daily.  Dispense: 90 tablet; Refill: 0  -     rosuvastatin (CRESTOR) 10 MG tablet; Take 1 tablet by mouth Daily.  Dispense: 90 tablet; Refill: 0    3. Controlled type 2 diabetes mellitus without complication, without long-term current use of insulin (HCC)  -     Hemoglobin A1c  -     MicroAlbumin, Urine, Random - Urine, Clean Catch  -     Discontinue: glucose blood test strip; Check blood glucose once a day  Dispense: 100 each; Refill: 3  -     Discontinue: metFORMIN ER (GLUCOPHAGE-XR) 500 MG 24 hr tablet; One twice daily  Dispense: 180 tablet; Refill: 0  -     Discontinue: OneTouch Delica Lancets 33G misc; Check blood glucose once daily  Dispense: 100 each; Refill: 3  -     glucose blood test strip; Check blood glucose once a day  Dispense: 100 each; Refill: 3  -     metFORMIN ER (GLUCOPHAGE-XR) 500 MG 24 hr tablet;  One twice daily  Dispense: 180 tablet; Refill: 0  -     OneTouch Delica Lancets 33G misc; Check blood glucose once daily  Dispense: 100 each; Refill: 3    4. Hypothyroidism, adult  -     TSH  -     T4, Free  -     T3, Free  -     Discontinue: levothyroxine (SYNTHROID, LEVOTHROID) 50 MCG tablet; Take 1 tablet by mouth Daily.  Dispense: 90 tablet; Refill: 0  -     levothyroxine (SYNTHROID, LEVOTHROID) 50 MCG tablet; Take 1 tablet by mouth Daily.  Dispense: 90 tablet; Refill: 0    5. Anxiety and depression  -     Discontinue: FLUoxetine (PROzac) 20 MG capsule; Take 1 capsule by mouth Daily.  Dispense: 90 capsule; Refill: 0  -     FLUoxetine (PROzac) 20 MG capsule; Take 1 capsule by mouth Daily.  Dispense: 90 capsule; Refill: 0    6. HSV (herpes simplex virus) infection  -     Discontinue: famciclovir (FAMVIR) 500 MG tablet; Take 1 tablet by mouth 2 (Two) Times a Day.  Dispense: 180 tablet; Refill: 0  -     famciclovir (FAMVIR) 500 MG tablet; Take 1 tablet by mouth 2 (Two) Times a Day.  Dispense: 180 tablet; Refill: 0    7. Chronic bilateral low back pain with left-sided sciatica  -     Discontinue: celecoxib (CeleBREX) 200 MG capsule; Take 1 capsule by mouth 2 (Two) Times a Day.  Dispense: 180 capsule; Refill: 0  -     celecoxib (CeleBREX) 200 MG capsule; Take 1 capsule by mouth 2 (Two) Times a Day.  Dispense: 180 capsule; Refill: 0    8. Encounter for osteoporosis screening in asymptomatic postmenopausal patient  -     DEXA Bone Density Axial        Patient Instructions   Eat a heart healthy diabetic diet  Drink plenty of water, goal 64 oz a day  Exercise 3-5 times a week, for more than 30 minutes at a time       Return for fasting lab follow up and  AWV.

## 2022-03-15 NOTE — PATIENT INSTRUCTIONS
Eat a heart healthy diabetic diet  Drink plenty of water, goal 64 oz a day  Exercise 3-5 times a week, for more than 30 minutes at a time

## 2022-03-16 LAB
ALBUMIN SERPL-MCNC: 4.9 G/DL (ref 3.8–4.8)
ALBUMIN/GLOB SERPL: 1.8 {RATIO} (ref 1.2–2.2)
ALP SERPL-CCNC: 77 IU/L (ref 44–121)
ALT SERPL-CCNC: 20 IU/L (ref 0–32)
APPEARANCE UR: CLEAR
AST SERPL-CCNC: 18 IU/L (ref 0–40)
BACTERIA #/AREA URNS HPF: NORMAL /[HPF]
BASOPHILS # BLD AUTO: 0 X10E3/UL (ref 0–0.2)
BASOPHILS NFR BLD AUTO: 1 %
BILIRUB SERPL-MCNC: 0.4 MG/DL (ref 0–1.2)
BILIRUB UR QL STRIP: NEGATIVE
BUN SERPL-MCNC: 16 MG/DL (ref 8–27)
BUN/CREAT SERPL: 21 (ref 12–28)
CALCIUM SERPL-MCNC: 10 MG/DL (ref 8.7–10.3)
CASTS URNS QL MICRO: NORMAL /LPF
CHLORIDE SERPL-SCNC: 99 MMOL/L (ref 96–106)
CHOLEST SERPL-MCNC: 164 MG/DL (ref 100–199)
CO2 SERPL-SCNC: 24 MMOL/L (ref 20–29)
COLOR UR: YELLOW
CREAT SERPL-MCNC: 0.77 MG/DL (ref 0.57–1)
EGFRCR SERPLBLD CKD-EPI 2021: 84 ML/MIN/1.73
EOSINOPHIL # BLD AUTO: 0.2 X10E3/UL (ref 0–0.4)
EOSINOPHIL NFR BLD AUTO: 3 %
EPI CELLS #/AREA URNS HPF: NORMAL /HPF (ref 0–10)
ERYTHROCYTE [DISTWIDTH] IN BLOOD BY AUTOMATED COUNT: 12.9 % (ref 11.7–15.4)
GLOBULIN SER CALC-MCNC: 2.7 G/DL (ref 1.5–4.5)
GLUCOSE SERPL-MCNC: 154 MG/DL (ref 65–99)
GLUCOSE UR QL STRIP: NEGATIVE
HBA1C MFR BLD: 7.7 % (ref 4.8–5.6)
HCT VFR BLD AUTO: 43.9 % (ref 34–46.6)
HDLC SERPL-MCNC: 53 MG/DL
HGB BLD-MCNC: 14.8 G/DL (ref 11.1–15.9)
HGB UR QL STRIP: NEGATIVE
IMM GRANULOCYTES # BLD AUTO: 0 X10E3/UL (ref 0–0.1)
IMM GRANULOCYTES NFR BLD AUTO: 0 %
KETONES UR QL STRIP: NEGATIVE
LDLC SERPL CALC-MCNC: 84 MG/DL (ref 0–99)
LDLC/HDLC SERPL: 1.6 RATIO (ref 0–3.2)
LEUKOCYTE ESTERASE UR QL STRIP: NEGATIVE
LYMPHOCYTES # BLD AUTO: 1.5 X10E3/UL (ref 0.7–3.1)
LYMPHOCYTES NFR BLD AUTO: 21 %
MCH RBC QN AUTO: 29.2 PG (ref 26.6–33)
MCHC RBC AUTO-ENTMCNC: 33.7 G/DL (ref 31.5–35.7)
MCV RBC AUTO: 87 FL (ref 79–97)
MICRO URNS: NORMAL
MICRO URNS: NORMAL
MICROALBUMIN UR-MCNC: 14.7 UG/ML
MONOCYTES # BLD AUTO: 0.6 X10E3/UL (ref 0.1–0.9)
MONOCYTES NFR BLD AUTO: 9 %
MUCOUS THREADS URNS QL MICRO: PRESENT
NEUTROPHILS # BLD AUTO: 4.8 X10E3/UL (ref 1.4–7)
NEUTROPHILS NFR BLD AUTO: 66 %
NITRITE UR QL STRIP: NEGATIVE
PH UR STRIP: 6 [PH] (ref 5–7.5)
PLATELET # BLD AUTO: 274 X10E3/UL (ref 150–450)
POTASSIUM SERPL-SCNC: 4.4 MMOL/L (ref 3.5–5.2)
PROT SERPL-MCNC: 7.6 G/DL (ref 6–8.5)
PROT UR QL STRIP: NEGATIVE
RBC # BLD AUTO: 5.06 X10E6/UL (ref 3.77–5.28)
RBC #/AREA URNS HPF: NORMAL /HPF (ref 0–2)
SODIUM SERPL-SCNC: 140 MMOL/L (ref 134–144)
SP GR UR STRIP: 1.02 (ref 1–1.03)
T3FREE SERPL-MCNC: 3.2 PG/ML (ref 2–4.4)
T4 FREE SERPL-MCNC: 1.16 NG/DL (ref 0.82–1.77)
TRIGL SERPL-MCNC: 158 MG/DL (ref 0–149)
TSH SERPL DL<=0.005 MIU/L-ACNC: 0.99 UIU/ML (ref 0.45–4.5)
URINALYSIS REFLEX: NORMAL
UROBILINOGEN UR STRIP-MCNC: 0.2 MG/DL (ref 0.2–1)
VLDLC SERPL CALC-MCNC: 27 MG/DL (ref 5–40)
WBC # BLD AUTO: 7.1 X10E3/UL (ref 3.4–10.8)
WBC #/AREA URNS HPF: NORMAL /HPF (ref 0–5)

## 2022-03-17 ENCOUNTER — OFFICE VISIT (OUTPATIENT)
Dept: FAMILY MEDICINE CLINIC | Facility: CLINIC | Age: 67
End: 2022-03-17

## 2022-03-17 VITALS
HEIGHT: 64 IN | RESPIRATION RATE: 18 BRPM | SYSTOLIC BLOOD PRESSURE: 132 MMHG | WEIGHT: 193 LBS | BODY MASS INDEX: 32.95 KG/M2 | TEMPERATURE: 96.2 F | HEART RATE: 81 BPM | OXYGEN SATURATION: 94 % | DIASTOLIC BLOOD PRESSURE: 80 MMHG

## 2022-03-17 DIAGNOSIS — Z00.00 MEDICARE ANNUAL WELLNESS VISIT, SUBSEQUENT: Primary | ICD-10-CM

## 2022-03-17 DIAGNOSIS — E11.65 CONTROLLED TYPE 2 DIABETES MELLITUS WITH HYPERGLYCEMIA, WITHOUT LONG-TERM CURRENT USE OF INSULIN: ICD-10-CM

## 2022-03-17 PROCEDURE — G0439 PPPS, SUBSEQ VISIT: HCPCS | Performed by: NURSE PRACTITIONER

## 2022-03-17 PROCEDURE — 1170F FXNL STATUS ASSESSED: CPT | Performed by: NURSE PRACTITIONER

## 2022-03-17 PROCEDURE — 1159F MED LIST DOCD IN RCRD: CPT | Performed by: NURSE PRACTITIONER

## 2022-03-17 NOTE — PROGRESS NOTES
The ABCs of the Annual Wellness Visit  Subsequent Medicare Wellness Visit    Chief Complaint   Patient presents with   • Medicare Wellness-subsequent     Wellness       Subjective    History of Present Illness:  Summer Jefferson is a 66 y.o. female who presents for a Subsequent Medicare Wellness Visit.    The following portions of the patient's history were reviewed and   updated as appropriate: allergies, current medications, past family history, past medical history, past social history, past surgical history and problem list.    Compared to one year ago, the patient feels her physical   health is the same.    Compared to one year ago, the patient feels her mental   health is the same.     Recent Hospitalizations:  She was not admitted to the hospital during the last year.       Current Medical Providers:  Patient Care Team:  Ruth Benitez APRN as PCP - General (Family Medicine)    Outpatient Medications Prior to Visit   Medication Sig Dispense Refill   • amLODIPine (NORVASC) 5 MG tablet Take 1 tablet by mouth Daily. 90 tablet 0   • celecoxib (CeleBREX) 200 MG capsule Take 1 capsule by mouth 2 (Two) Times a Day. 180 capsule 0   • famciclovir (FAMVIR) 500 MG tablet Take 1 tablet by mouth 2 (Two) Times a Day. 180 tablet 0   • FLUoxetine (PROzac) 20 MG capsule Take 1 capsule by mouth Daily. 90 capsule 0   • glucose blood test strip Check blood glucose once a day 100 each 3   • hydroCHLOROthiazide (HYDRODIURIL) 25 MG tablet Take 1 tablet by mouth Daily. 90 tablet 0   • levothyroxine (SYNTHROID, LEVOTHROID) 50 MCG tablet Take 1 tablet by mouth Daily. 90 tablet 0   • lidocaine (LIDODERM) 5 % Place 1 patch on the skin as directed by provider Daily. Remove & Discard patch within 12 hours or as directed by MD 60 patch 0   • lisinopril (PRINIVIL,ZESTRIL) 10 MG tablet Take 1 tablet by mouth Daily. 90 tablet 0   • metFORMIN ER (GLUCOPHAGE-XR) 500 MG 24 hr tablet One twice daily 180 tablet 0   • OneTouch Delica Lancets 33G  "misc Check blood glucose once daily 100 each 3   • rosuvastatin (CRESTOR) 10 MG tablet Take 1 tablet by mouth Daily. 90 tablet 0     No facility-administered medications prior to visit.       No opioid medication identified on active medication list. I have reviewed chart for other potential  high risk medication/s and harmful drug interactions in the elderly.          Aspirin is not on active medication list.  Aspirin use is not indicated based on review of current medical condition/s. Risk of harm outweighs potential benefits.  .    Patient Active Problem List   Diagnosis   • Diabetes mellitus type 2, controlled (HCC)   • Hypothyroidism, adult   • Hypertension   • Hypercholesterolemia   • HSV (herpes simplex virus) infection   • Anxiety and depression   • Chronic bilateral low back pain with left-sided sciatica     Advance Care Planning  Advance Directive is not on file.  ACP discussion was held with the patient during this visit. Patient has an advance directive (not in EMR), copy requested.          Objective    Vitals:    03/17/22 0917   BP: 132/80   Pulse: 81   Resp: 18   Temp: 96.2 °F (35.7 °C)   TempSrc: Temporal   SpO2: 94%   Weight: 87.5 kg (193 lb)   Height: 162.6 cm (64.02\")     BMI is above normal parameters. Recommendations include: exercise counseling and nutrition counseling    Does the patient have evidence of cognitive impairment? No    Physical Exam  Vitals reviewed.   HENT:      Head: Normocephalic.   Eyes:      Pupils: Pupils are equal, round, and reactive to light.   Cardiovascular:      Rate and Rhythm: Normal rate and regular rhythm.   Pulmonary:      Effort: Pulmonary effort is normal.      Breath sounds: Normal breath sounds.   Musculoskeletal:         General: Normal range of motion.   Feet:      Comments: Microfilament testing done and wnl   Neurological:      Mental Status: She is alert and oriented to person, place, and time.   Psychiatric:         Behavior: Behavior normal. "       Recent Results (from the past 336 hour(s))   CBC & Differential    Collection Time: 03/15/22 10:25 AM    Specimen: Blood   Result Value Ref Range    WBC 7.1 3.4 - 10.8 x10E3/uL    RBC 5.06 3.77 - 5.28 x10E6/uL    Hemoglobin 14.8 11.1 - 15.9 g/dL    Hematocrit 43.9 34.0 - 46.6 %    MCV 87 79 - 97 fL    MCH 29.2 26.6 - 33.0 pg    MCHC 33.7 31.5 - 35.7 g/dL    RDW 12.9 11.7 - 15.4 %    Platelets 274 150 - 450 x10E3/uL    Neutrophil Rel % 66 Not Estab. %    Lymphocyte Rel % 21 Not Estab. %    Monocyte Rel % 9 Not Estab. %    Eosinophil Rel % 3 Not Estab. %    Basophil Rel % 1 Not Estab. %    Neutrophils Absolute 4.8 1.4 - 7.0 x10E3/uL    Lymphocytes Absolute 1.5 0.7 - 3.1 x10E3/uL    Monocytes Absolute 0.6 0.1 - 0.9 x10E3/uL    Eosinophils Absolute 0.2 0.0 - 0.4 x10E3/uL    Basophils Absolute 0.0 0.0 - 0.2 x10E3/uL    Immature Granulocyte Rel % 0 Not Estab. %    Immature Grans Absolute 0.0 0.0 - 0.1 x10E3/uL   Lipid Panel With LDL / HDL Ratio    Collection Time: 03/15/22 10:25 AM    Specimen: Blood   Result Value Ref Range    Total Cholesterol 164 100 - 199 mg/dL    Triglycerides 158 (H) 0 - 149 mg/dL    HDL Cholesterol 53 >39 mg/dL    VLDL Cholesterol Gee 27 5 - 40 mg/dL    LDL Chol Calc (NIH) 84 0 - 99 mg/dL    LDL/HDL RATIO 1.6 0.0 - 3.2 ratio   Comprehensive Metabolic Panel    Collection Time: 03/15/22 10:25 AM    Specimen: Blood   Result Value Ref Range    Glucose 154 (H) 65 - 99 mg/dL    BUN 16 8 - 27 mg/dL    Creatinine 0.77 0.57 - 1.00 mg/dL    EGFR Result 84 >59 mL/min/1.73    BUN/Creatinine Ratio 21 12 - 28    Sodium 140 134 - 144 mmol/L    Potassium 4.4 3.5 - 5.2 mmol/L    Chloride 99 96 - 106 mmol/L    Total CO2 24 20 - 29 mmol/L    Calcium 10.0 8.7 - 10.3 mg/dL    Total Protein 7.6 6.0 - 8.5 g/dL    Albumin 4.9 (H) 3.8 - 4.8 g/dL    Globulin 2.7 1.5 - 4.5 g/dL    A/G Ratio 1.8 1.2 - 2.2    Total Bilirubin 0.4 0.0 - 1.2 mg/dL    Alkaline Phosphatase 77 44 - 121 IU/L    AST (SGOT) 18 0 - 40 IU/L    ALT  (SGPT) 20 0 - 32 IU/L   Urinalysis With Culture If Indicated -    Collection Time: 03/15/22 10:25 AM    Specimen: Urine   Result Value Ref Range    Specific Gravity, UA 1.017 1.005 - 1.030    pH, UA 6.0 5.0 - 7.5    Color, UA Yellow Yellow    Appearance, UA Clear Clear    Leukocytes, UA Negative Negative    Protein Negative Negative/Trace    Glucose, UA Negative Negative    Ketones Negative Negative    Blood, UA Negative Negative    Bilirubin, UA Negative Negative    Urobilinogen, UA 0.2 0.2 - 1.0 mg/dL    Nitrite, UA Negative Negative    Microscopic Examination Comment     Microscopic Examination See below:     Urinalysis Reflex Comment    Hemoglobin A1c    Collection Time: 03/15/22 10:25 AM    Specimen: Blood   Result Value Ref Range    Hemoglobin A1C 7.7 (H) 4.8 - 5.6 %   MicroAlbumin, Urine, Random - Urine, Clean Catch    Collection Time: 03/15/22 10:25 AM    Specimen: Urine, Clean Catch   Result Value Ref Range    Microalbumin, Urine 14.7 Not Estab. ug/mL   TSH    Collection Time: 03/15/22 10:25 AM    Specimen: Blood   Result Value Ref Range    TSH 0.994 0.450 - 4.500 uIU/mL   T4, Free    Collection Time: 03/15/22 10:25 AM    Specimen: Blood   Result Value Ref Range    Free T4 1.16 0.82 - 1.77 ng/dL   T3, Free    Collection Time: 03/15/22 10:25 AM    Specimen: Blood   Result Value Ref Range    T3, Free 3.2 2.0 - 4.4 pg/mL   Microscopic Examination -    Collection Time: 03/15/22 10:25 AM   Result Value Ref Range    WBC, UA None seen 0 - 5 /hpf    RBC, UA None seen 0 - 2 /hpf    Epithelial Cells (non renal) 0-10 0 - 10 /hpf    Casts None seen None seen /lpf    Mucus, UA Present Not Estab.    Bacteria, UA None seen None seen/Few       HEALTH RISK ASSESSMENT    Smoking Status:  Social History     Tobacco Use   Smoking Status Former Smoker   Smokeless Tobacco Never Used   Tobacco Comment    quit 15 yrs ago     Alcohol Consumption:  Social History     Substance and Sexual Activity   Alcohol Use Yes    Comment: 1-2 a  month     Fall Risk Screen:    [facility], and patient is at LOW risk for falls.Assessment completed on:3/17/2022    Depression Screening:  PHQ-2/PHQ-9 Depression Screening 3/17/2022   Retired Total Score -   Little Interest or Pleasure in Doing Things 0-->not at all   Feeling Down, Depressed or Hopeless 0-->not at all   PHQ-9: Brief Depression Severity Measure Score 0       Health Habits and Functional and Cognitive Screening:  Functional & Cognitive Status 3/17/2022   Do you have difficulty preparing food and eating? No   Do you have difficulty bathing yourself, getting dressed or grooming yourself? No   Do you have difficulty using the toilet? No   Do you have difficulty moving around from place to place? No   Do you have trouble with steps or getting out of a bed or a chair? No   Current Diet Unhealthy Diet   Dental Exam Up to date   Eye Exam Up to date   Exercise (times per week) 2 times per week   Current Exercises Include Walking   Do you need help using the phone?  No   Are you deaf or do you have serious difficulty hearing?  No   Do you need help with transportation? No   Do you need help shopping? No   Do you need help preparing meals?  No   Do you need help with housework?  No   Do you need help with laundry? No   Do you need help taking your medications? No   Do you need help managing money? No   Do you ever drive or ride in a car without wearing a seat belt? No   Have you felt unusual stress, anger or loneliness in the last month? No   Who do you live with? Spouse   If you need help, do you have trouble finding someone available to you? No   Have you been bothered in the last four weeks by sexual problems? No   Do you have difficulty concentrating, remembering or making decisions? No       Age-appropriate Screening Schedule:  Refer to the list below for future screening recommendations based on patient's age, sex and/or medical conditions. Orders for these recommended  tests are listed in the plan section. The patient has been provided with a written plan.    Health Maintenance   Topic Date Due   • DXA SCAN  Never done   • DIABETIC EYE EXAM  08/11/2021   • INFLUENZA VACCINE  10/14/2022 (Originally 8/1/2021)   • MAMMOGRAM  10/30/2023 (Originally 8/15/2016)   • PAP SMEAR  10/30/2023 (Originally 3/7/2016)   • TDAP/TD VACCINES (1 - Tdap) 10/30/2023 (Originally 7/31/1974)   • HEMOGLOBIN A1C  09/15/2022   • LIPID PANEL  03/15/2023   • URINE MICROALBUMIN  03/15/2023   • DIABETIC FOOT EXAM  03/17/2023   • ZOSTER VACCINE  Completed              Assessment/Plan   CMS Preventative Services Quick Reference  Risk Factors Identified During Encounter  Inactivity/Sedentary  Obesity/Overweight   The above risks/problems have been discussed with the patient.  Follow up actions/plans if indicated are seen below in the Assessment/Plan Section.  Pertinent information has been shared with the patient in the After Visit Summary.    Diagnoses and all orders for this visit:    1. Medicare annual wellness visit, subsequent (Primary)    2. Controlled type 2 diabetes mellitus with hyperglycemia, without long-term current use of insulin (HCC)  -     empagliflozin (Jardiance) 10 MG tablet tablet; Take 1 tablet by mouth Daily.  Dispense: 90 tablet; Refill: 1        Follow Up:   Return for follow up in 3 months for fasting labs and then follow up for DM.     An After Visit Summary and PPPS were made available to the patient.               All recent labs discussed this office visit  Start eat a heart healthy diabetic diet  Drink plenty of water with goal 64 oz a day  Start exercise routine 2- 3-times a week, for more than 30 minutes at a time   Keep [facility] appt for DM eye exam

## 2022-03-18 ENCOUNTER — TELEPHONE (OUTPATIENT)
Dept: FAMILY MEDICINE CLINIC | Facility: CLINIC | Age: 67
End: 2022-03-18

## 2022-03-18 NOTE — TELEPHONE ENCOUNTER
PATIENT CALLED STATING THAT SHE WOULD LIKE YOU TO WRITE OUT THESE PRESCRIPTIONS:       famciclovir (FAMVIR) 500 MG tablet  500 mg, 2 Times Daily 0 ordered         Summary: Take 1 tablet by mouth 2 (Two) Times a Day.,      GOOD RX FOR THIS ONE AND THEN         AND ALL THE DIABETIC SUPPLIES :  METER, STRIPS, LANCETS,       SHE WANTS TO PICK THEM UP TODAY AND TAKE TO A RETAIL PHARMACY      PLEASE CALL WHEN DONE/READY   Summer Jefferson (Self) [phone] (M)

## 2022-03-25 ENCOUNTER — TELEPHONE (OUTPATIENT)
Dept: FAMILY MEDICINE CLINIC | Facility: CLINIC | Age: 67
End: 2022-03-25

## 2022-03-25 NOTE — TELEPHONE ENCOUNTER
Spoke with patient. She stated that she does not have a michine in particular that she has been using to fulfill the request below. Pharmacy verified correct. Please review and refill if appropriate.      Thanks,  Sushil

## 2022-03-25 NOTE — TELEPHONE ENCOUNTER
Caller: Summer Jefferson    Relationship: Self    Best call back number: [phone] (M)    What medication are you requesting: GLUCOSE METER, STRIPS, AND LANCETS      If a prescription is needed, what is your preferred pharmacy and phone number:  [facility] [id number] [place] [address] & [place] - [phone]  - [phone] FX    Additional notes: PATIENT STATES SHE WAS TOLD BY THE PHARMACY THAT THEY NEED TO RECEIVE INDIVIDUAL PRESCRIPTIONS FOR EACH OF THESE.

## 2022-03-30 ENCOUNTER — TELEPHONE (OUTPATIENT)
Dept: FAMILY MEDICINE CLINIC | Facility: CLINIC | Age: 67
End: 2022-03-30

## 2022-03-30 DIAGNOSIS — M54.42 CHRONIC BILATERAL LOW BACK PAIN WITH LEFT-SIDED SCIATICA: Primary | ICD-10-CM

## 2022-03-30 DIAGNOSIS — M47.816 FACET ARTHROPATHY, LUMBAR: ICD-10-CM

## 2022-03-30 DIAGNOSIS — G89.29 CHRONIC BILATERAL LOW BACK PAIN WITH LEFT-SIDED SCIATICA: Primary | ICD-10-CM

## 2022-05-17 ENCOUNTER — OFFICE VISIT (OUTPATIENT)
Dept: ORTHOPEDIC SURGERY | Facility: CLINIC | Age: 67
End: 2022-05-17

## 2022-05-17 VITALS — WEIGHT: 185 LBS | BODY MASS INDEX: 29.73 KG/M2 | HEIGHT: 66 IN | TEMPERATURE: 97.5 F

## 2022-05-17 DIAGNOSIS — M48.062 SPINAL STENOSIS OF LUMBAR REGION WITH NEUROGENIC CLAUDICATION: ICD-10-CM

## 2022-05-17 DIAGNOSIS — M43.16 SPONDYLOLISTHESIS OF LUMBAR REGION: ICD-10-CM

## 2022-05-17 DIAGNOSIS — M54.50 LUMBAR PAIN: Primary | ICD-10-CM

## 2022-05-17 PROCEDURE — 99214 OFFICE O/P EST MOD 30 MIN: CPT | Performed by: ORTHOPAEDIC SURGERY

## 2022-05-17 PROCEDURE — 72100 X-RAY EXAM L-S SPINE 2/3 VWS: CPT | Performed by: ORTHOPAEDIC SURGERY

## 2022-05-24 NOTE — PROGRESS NOTES
New patient or new problem visit    CC: Low back pain and left hip pain    HPI: She has low back pain and left buttock pain predominantly the latter the subtrochanteric component as well which interferes with walking.  No numbness tingling weakness.  Celebrex helped some Tylenol arthritis was not as helpful.  Physical therapy for 2months in January and February gave mixed relief.  No balance difficulties bowel or bladder complaints    PFSH: See attached    ROS: As above    PE: BMI 30.  Good strength in the legs bilaterally straight leg raise is negative.  Back is mildly tender.    XRAY: Plain film x-rays of the lumbar spine shows slight scoliosis, L4-5 L5-S1 spondylolisthesis grade 1, retrolisthesis at 3 4 and slight hip DJD.  No comparison views are available.    Other: n/a    Impression: Lumbar spinal stenosis lumbar spondylolisthesis    Plan: MRI scan with an eye toward epidural injections although I think at some point she may be headed toward surgery.  Risk and benefits of the epidurals was explained.  We will call her with results and she may be scheduled for epidurals in my absence from the office in the upcoming weeks.

## 2022-06-07 NOTE — TELEPHONE ENCOUNTER
Called and left a voicemail letting there patient know Dr. Marie will be out of the office this week and he will review when he gets back in.

## 2022-06-07 NOTE — TELEPHONE ENCOUNTER
Caller: PATIENT    Relationship: SELF     Best call back number: [phone]    What medication are you requesting: LIDODERM 5% PATCHES    What are your current symptoms: LUMBAR BACK PAIN    Have you had these symptoms before:    [x] Yes  [] No    Have you been treated for these symptoms before:   [x] Yes  [] No    If a prescription is needed, what is your preferred pharmacy and phone number: [facility] IN [place], [phone]       Additional notes: PATIENT WAS CALLING TO SEE IF DR. VALERIO WOULD PRESCRIBE HER LIDODERM PATCHES FOR HER LUMBAR BACK PAIN DUE DR. STUBBS RETIRING. PATIENT WENT TO THE PHARMACY TO FILL HER PRESCRIPTION AND REALIZED SHE SHE WAS OUT OF REFILLS. PATIENT WOULD LIKE A CALL BACK ASAP. THANK YOU!

## 2022-06-08 ENCOUNTER — HOSPITAL ENCOUNTER (OUTPATIENT)
Dept: MRI IMAGING | Facility: HOSPITAL | Age: 67
Discharge: HOME | End: 2022-06-08
Admitting: ORTHOPAEDIC SURGERY

## 2022-06-08 PROCEDURE — 72148 MRI LUMBAR SPINE W/O DYE: CPT

## 2022-06-13 DIAGNOSIS — I10 PRIMARY HYPERTENSION: Chronic | ICD-10-CM

## 2022-06-13 DIAGNOSIS — E78.00 HYPERCHOLESTEROLEMIA: Chronic | ICD-10-CM

## 2022-06-14 RX ORDER — ROSUVASTATIN CALCIUM 10 MG/1
TABLET, COATED ORAL
Qty: 90 TABLET | Refills: 0 | Status: SHIPPED | OUTPATIENT
Start: 2022-06-14 | End: 2022-07-15 | Stop reason: SDUPTHER

## 2022-06-14 RX ORDER — LIDOCAINE 50 MG/G
1 PATCH TOPICAL DAILY
Qty: 30 PATCH | Refills: 0 | Status: SHIPPED | OUTPATIENT
Start: 2022-06-14 | End: 2022-07-26

## 2022-06-14 RX ORDER — LISINOPRIL 10 MG/1
TABLET ORAL
Qty: 90 TABLET | Refills: 0 | Status: SHIPPED | OUTPATIENT
Start: 2022-06-14 | End: 2022-07-15 | Stop reason: SDUPTHER

## 2022-06-16 ENCOUNTER — TELEPHONE (OUTPATIENT)
Dept: ORTHOPEDIC SURGERY | Facility: CLINIC | Age: 67
End: 2022-06-16

## 2022-06-16 DIAGNOSIS — M43.16 SPONDYLOLISTHESIS OF LUMBAR REGION: Primary | ICD-10-CM

## 2022-06-16 NOTE — TELEPHONE ENCOUNTER
Spoke with patient and informed her the order wasn't placed but I was going to go ahead and put the order in and then gave her the number for [facility]. Instructed her to give them a call tomorrow to set up the appt. She gave verbal understanding.

## 2022-06-16 NOTE — TELEPHONE ENCOUNTER
MRI complete in [facility] 6/8/2022    LOV 5/17/2022    Plan: MRI scan with an eye toward epidural injections although I think at some point she may be headed toward surgery.  Risk and benefits of the epidurals was explained.  We will call her with results and she may be scheduled for epidurals in my absence from the office in the upcoming weeks.    NO ORDER FOR EPIDURAL IN Epic .

## 2022-06-16 NOTE — TELEPHONE ENCOUNTER
Caller: RAE  Relationship to Patient: SELF    Phone Number: [phone]  Reason for Call: PT CALLED STATING THAT SHE WAS SUPPOSED TO GET AN EPIDURAL SCHEDULED ON Monday BUT HAS NOT HEARD FROM ANYBODY AND NEEDED TO CHECK ON STATUS. PLEASE ADVISE IF DONT ANSWER OK TO LEAVE

## 2022-06-20 DIAGNOSIS — E03.9 HYPOTHYROIDISM, ADULT: Chronic | ICD-10-CM

## 2022-06-20 DIAGNOSIS — G89.29 CHRONIC BILATERAL LOW BACK PAIN WITH LEFT-SIDED SCIATICA: ICD-10-CM

## 2022-06-20 DIAGNOSIS — F41.9 ANXIETY AND DEPRESSION: ICD-10-CM

## 2022-06-20 DIAGNOSIS — F32.A ANXIETY AND DEPRESSION: ICD-10-CM

## 2022-06-20 DIAGNOSIS — M54.42 CHRONIC BILATERAL LOW BACK PAIN WITH LEFT-SIDED SCIATICA: ICD-10-CM

## 2022-06-20 DIAGNOSIS — I10 PRIMARY HYPERTENSION: Chronic | ICD-10-CM

## 2022-06-20 NOTE — TELEPHONE ENCOUNTER
Pt. Requesting a refill of  amLODIPine (NORVASC) 5 MG tablet  celecoxib (CeleBREX) 200 MG capsule  FLUoxetine (PROzac) 20 MG capsule  hydroCHLOROthiazide (HYDRODIURIL) 25 MG tablet  levothyroxine (SYNTHROID, LEVOTHROID) 50 MCG tablet  06/20/22 [facility]

## 2022-06-22 ENCOUNTER — TELEPHONE (OUTPATIENT)
Dept: ORTHOPEDIC SURGERY | Facility: CLINIC | Age: 67
End: 2022-06-22

## 2022-06-22 NOTE — TELEPHONE ENCOUNTER
----- Message from Chema Marie MD sent at 6/22/2022  2:40 PM EDT -----  Please let her know that I reviewed the MRI scan and it shows primarily severe stenosis at L4-5 the level of the spondylolisthesis.  There is some scattered associated degenerative changes above including some mild stenosis at 2 3 which I do not think is causing radicular pain.  Her back pain could be coming from more than 1 level but I do not think her nerve pain is.  For now do the epidurals as we discussed and that may be enough but if he gets worse surgery could be an option if she wishes and that would include L4-5 laminectomy and fusion with instrumentation, at present.

## 2022-06-22 NOTE — TELEPHONE ENCOUNTER
Attempted to contact the patient regarding her MRI results.  I was unable to reach her but I did leave a message for her to contact me at the office

## 2022-06-23 ENCOUNTER — TELEPHONE (OUTPATIENT)
Dept: ORTHOPEDIC SURGERY | Facility: CLINIC | Age: 67
End: 2022-06-23

## 2022-06-23 NOTE — TELEPHONE ENCOUNTER
Patient returned my call.  We discussed her MRI results.  She does have severe stenosis at L4-5.  Also has some degenerative changes at the level above primarily to 3.  Dr. Frank Marie seems to think that most of her pain is coming from the L4-5 level.  For now he is recommending lumbar epidurals.  Patient states she has one scheduled for June 30.  We discussed that if the lumbar epidurals do not help her pain then her next step would be surgery.  Have left it open for her to call if she has any other questions or concerns

## 2022-06-28 ENCOUNTER — TELEPHONE (OUTPATIENT)
Dept: FAMILY MEDICINE CLINIC | Facility: CLINIC | Age: 67
End: 2022-06-28

## 2022-06-28 RX ORDER — HYDROCHLOROTHIAZIDE 25 MG/1
TABLET ORAL
Qty: 90 TABLET | Refills: 0 | Status: SHIPPED | OUTPATIENT
Start: 2022-06-28 | End: 2022-07-15 | Stop reason: SDUPTHER

## 2022-06-28 RX ORDER — LEVOTHYROXINE SODIUM 0.05 MG/1
TABLET ORAL
Qty: 90 TABLET | Refills: 0 | Status: SHIPPED | OUTPATIENT
Start: 2022-06-28 | End: 2022-07-15 | Stop reason: SDUPTHER

## 2022-06-28 RX ORDER — CELECOXIB 200 MG/1
CAPSULE ORAL
Qty: 180 CAPSULE | Refills: 0 | Status: SHIPPED | OUTPATIENT
Start: 2022-06-28 | End: 2022-07-15 | Stop reason: SDUPTHER

## 2022-06-28 RX ORDER — FLUOXETINE HYDROCHLORIDE 20 MG/1
CAPSULE ORAL
Qty: 90 CAPSULE | Refills: 0 | Status: SHIPPED | OUTPATIENT
Start: 2022-06-28 | End: 2022-07-15 | Stop reason: SDUPTHER

## 2022-06-28 RX ORDER — AMLODIPINE BESYLATE 5 MG/1
TABLET ORAL
Qty: 90 TABLET | Refills: 0 | Status: SHIPPED | OUTPATIENT
Start: 2022-06-28 | End: 2022-07-15 | Stop reason: SDUPTHER

## 2022-06-28 NOTE — TELEPHONE ENCOUNTER
Dr. Eaton,      Could you please review and refill on behalf of GILBERT Benitez. Let me know if anything additional is needed.      Thanks,  Sushil      Next OV 07/15/2022 w/Ruth  Last OV 03/17/2022 w/Ruth

## 2022-06-28 NOTE — TELEPHONE ENCOUNTER
Caller: Summer Jefferson    Relationship: Self    Best call back number: [phone]    Requested Prescriptions:   Requested Prescriptions     Pending Prescriptions Disp Refills   • celecoxib (CeleBREX) 200 MG capsule [Pharmacy Med Name: CELECOXIB 200 MG Capsule] 180 capsule 0     Sig: TAKE 1 CAPSULE TWICE DAILY   • hydroCHLOROthiazide (HYDRODIURIL) 25 MG tablet [Pharmacy Med Name: HYDROCHLOROTHIAZIDE 25 MG Tablet] 90 tablet 0     Sig: TAKE 1 TABLET EVERY DAY   • FLUoxetine (PROzac) 20 MG capsule [Pharmacy Med Name: FLUOXETINE HCL 20 MG Capsule] 90 capsule 0     Sig: TAKE 1 CAPSULE EVERY DAY   • amLODIPine (NORVASC) 5 MG tablet [Pharmacy Med Name: AMLODIPINE BESYLATE 5 MG Tablet] 90 tablet 0     Sig: TAKE 1 TABLET EVERY DAY   • levothyroxine (SYNTHROID, LEVOTHROID) 50 MCG tablet [Pharmacy Med Name: LEVOTHYROXINE SODIUM 50 MCG Tablet] 90 tablet 0     Sig: TAKE 1 TABLET EVERY DAY        Pharmacy where request should be sent: [facility] ([facility]) - [address] - [phone]  - [phone] FX       Does the patient have less than a 3 day supply:  [x] Yes  [] No    Sara Olivas   06/28/22 11:00 EDT

## 2022-06-28 NOTE — TELEPHONE ENCOUNTER
Caller: Summer Jefferson    Relationship to patient: Self    Best call back number: [phone]    Where are you experiencing symptoms: EPIDURAL STEROID SCHEDULED ON 6/30/2022    PATIENT IS DIABETIC AND WOULD LIKE TO KNOW IF THERE ARE ANY PRESCRIPTION CHANGES OR PRECAUTIONS TO TAKE BEFORE PROCEDURE

## 2022-06-28 NOTE — TELEPHONE ENCOUNTER
Caller: Summer Jefferson    Relationship: Self    Best call back number: [phone]    Which medication are you concerned about: empagliflozin (Jardiance) 10 MG tablet tablet    What are your concerns: MEDICATION COSTS $1000. IS THERE AN ALTERNATIVE OR CHEAPER    [facility] (Now [facility]) - [place], [place] - [address] [facility] [address] - [phone]  - [phone] FX  [phone]

## 2022-06-30 ENCOUNTER — ANESTHESIA (OUTPATIENT)
Dept: PAIN MEDICINE | Facility: HOSPITAL | Age: 67
End: 2022-06-30

## 2022-06-30 ENCOUNTER — ANESTHESIA EVENT (OUTPATIENT)
Dept: PAIN MEDICINE | Facility: HOSPITAL | Age: 67
End: 2022-06-30

## 2022-06-30 ENCOUNTER — HOSPITAL ENCOUNTER (OUTPATIENT)
Dept: GENERAL RADIOLOGY | Facility: HOSPITAL | Age: 67
Discharge: HOME | End: 2022-06-30

## 2022-06-30 ENCOUNTER — HOSPITAL ENCOUNTER (OUTPATIENT)
Dept: PAIN MEDICINE | Facility: HOSPITAL | Age: 67
Discharge: HOME | End: 2022-06-30

## 2022-06-30 VITALS
DIASTOLIC BLOOD PRESSURE: 72 MMHG | HEART RATE: 73 BPM | TEMPERATURE: 97.7 F | OXYGEN SATURATION: 98 % | WEIGHT: 185 LBS | RESPIRATION RATE: 16 BRPM | BODY MASS INDEX: 31.58 KG/M2 | SYSTOLIC BLOOD PRESSURE: 117 MMHG | HEIGHT: 64 IN

## 2022-06-30 DIAGNOSIS — M48.062 SPINAL STENOSIS OF LUMBAR REGION WITH NEUROGENIC CLAUDICATION: Primary | ICD-10-CM

## 2022-06-30 DIAGNOSIS — R52 PAIN: ICD-10-CM

## 2022-06-30 LAB — GLUCOSE BLDC GLUCOMTR-MCNC: 118 MG/DL (ref 70–130)

## 2022-06-30 PROCEDURE — 82962 GLUCOSE BLOOD TEST: CPT

## 2022-06-30 PROCEDURE — 0 IOPAMIDOL 41 % SOLUTION: Performed by: ANESTHESIOLOGY

## 2022-06-30 PROCEDURE — 25010000002 METHYLPREDNISOLONE PER 80 MG: Performed by: ANESTHESIOLOGY

## 2022-06-30 PROCEDURE — 77003 FLUOROGUIDE FOR SPINE INJECT: CPT

## 2022-06-30 RX ORDER — LIDOCAINE HYDROCHLORIDE 10 MG/ML
1 INJECTION, SOLUTION INFILTRATION; PERINEURAL ONCE
Status: DISCONTINUED | OUTPATIENT
Start: 2022-06-30 | End: 2022-07-01 | Stop reason: HOSPADM

## 2022-06-30 RX ORDER — MIDAZOLAM HYDROCHLORIDE 1 MG/ML
1 INJECTION INTRAMUSCULAR; INTRAVENOUS
Status: DISCONTINUED | OUTPATIENT
Start: 2022-06-30 | End: 2022-07-01 | Stop reason: HOSPADM

## 2022-06-30 RX ORDER — METHYLPREDNISOLONE ACETATE 80 MG/ML
80 INJECTION, SUSPENSION INTRA-ARTICULAR; INTRALESIONAL; INTRAMUSCULAR; SOFT TISSUE ONCE
Status: COMPLETED | OUTPATIENT
Start: 2022-06-30 | End: 2022-06-30

## 2022-06-30 RX ORDER — FENTANYL CITRATE 50 UG/ML
50 INJECTION, SOLUTION INTRAMUSCULAR; INTRAVENOUS AS NEEDED
Status: DISCONTINUED | OUTPATIENT
Start: 2022-06-30 | End: 2022-07-01 | Stop reason: HOSPADM

## 2022-06-30 RX ADMIN — IOPAMIDOL 10 ML: 408 INJECTION, SOLUTION INTRATHECAL at 12:11

## 2022-06-30 RX ADMIN — METHYLPREDNISOLONE ACETATE 80 MG: 80 INJECTION, SUSPENSION INTRA-ARTICULAR; INTRALESIONAL; INTRAMUSCULAR; SOFT TISSUE at 12:12

## 2022-06-30 NOTE — ANESTHESIA PROCEDURE NOTES
PAIN Epidural block    Pre-sedation assessment completed: 6/30/2022 12:04 PM    Patient reassessed immediately prior to procedure    Patient location during procedure: pain clinic  Start Time: 6/30/2022 12:04 PM  Stop Time: 6/30/2022 12:13 PM  Indication:at surgeon's request and procedure for pain  Performed By  Anesthesiologist: Cedrick Hammonds MD  Preanesthetic Checklist  Completed: patient identified, IV checked, site marked, risks and benefits discussed, surgical consent, monitors and equipment checked, pre-op evaluation and timeout performed  Additional Notes  Dx:  Post-Op Diagnosis Codes:     * Spinal stenosis of lumbar region with neurogenic claudication (M48.062)  80 mg depomedrol in epid    Plan : return to clinic as needed  Prep:  Pt Position:prone (prone)  Sterile Tech:cap, gloves, mask and sterile barrier  Prep:chlorhexidine gluconate and isopropyl alcohol  Monitoring:blood pressure monitoring, EKG and continuous pulse oximetry  Procedure:Sedation: no     Approach:midline  Guidance: fluoroscopy and c arm pa and lat and loss of resistance  Location:lumbar  Level:4-5 (interlaminar)  Needle Type:Stryking Entertainmentyariel  Needle Gauge:20  Aspiration:negative  Medications:  Preservative Free Saline:3mL  Isovue:2mL  Depomedrol:80 mg  Post Assessment:  Post-procedure: bandaid.  Pt Tolerance:patient tolerated the procedure well with no apparent complications  Complications:no

## 2022-07-01 DIAGNOSIS — E11.65 CONTROLLED TYPE 2 DIABETES MELLITUS WITH HYPERGLYCEMIA, WITHOUT LONG-TERM CURRENT USE OF INSULIN: Primary | Chronic | ICD-10-CM

## 2022-07-01 RX ORDER — GLIPIZIDE 2.5 MG/1
2.5 TABLET, EXTENDED RELEASE ORAL DAILY
Qty: 30 TABLET | Refills: 2 | Status: SHIPPED | OUTPATIENT
Start: 2022-07-01 | End: 2022-07-15

## 2022-07-01 NOTE — TELEPHONE ENCOUNTER
Please call pt and inform her that I sent over script for glipizide 2.5 mg 1 tab daily to be started and stopping jardiance once she starts the glipizide to her mail in pharmacy. Pt should be on metformin and glipizide for her dm now. Please follow up with an appt in 1-2 months for recheck of DM. thanks

## 2022-07-08 DIAGNOSIS — I10 PRIMARY HYPERTENSION: ICD-10-CM

## 2022-07-08 DIAGNOSIS — E11.65 CONTROLLED TYPE 2 DIABETES MELLITUS WITH HYPERGLYCEMIA, WITHOUT LONG-TERM CURRENT USE OF INSULIN: Primary | ICD-10-CM

## 2022-07-08 DIAGNOSIS — E78.00 HYPERCHOLESTEROLEMIA: ICD-10-CM

## 2022-07-08 DIAGNOSIS — E03.9 HYPOTHYROIDISM, ADULT: ICD-10-CM

## 2022-07-12 ENCOUNTER — HOSPITAL ENCOUNTER (OUTPATIENT)
Dept: BONE DENSITY | Facility: HOSPITAL | Age: 67
Discharge: HOME | End: 2022-07-12
Admitting: NURSE PRACTITIONER

## 2022-07-12 PROCEDURE — 77080 DXA BONE DENSITY AXIAL: CPT

## 2022-07-14 LAB
ALBUMIN SERPL-MCNC: 4.4 G/DL (ref 3.8–4.8)
ALBUMIN/CREAT UR: 20 MG/G CREAT (ref 0–29)
ALBUMIN/GLOB SERPL: 1.7 {RATIO} (ref 1.2–2.2)
ALP SERPL-CCNC: 71 IU/L (ref 44–121)
ALT SERPL-CCNC: 22 IU/L (ref 0–32)
AST SERPL-CCNC: 20 IU/L (ref 0–40)
BASOPHILS # BLD AUTO: 0 X10E3/UL (ref 0–0.2)
BASOPHILS NFR BLD AUTO: 1 %
BILIRUB SERPL-MCNC: 0.5 MG/DL (ref 0–1.2)
BUN SERPL-MCNC: 20 MG/DL (ref 8–27)
BUN/CREAT SERPL: 24 (ref 12–28)
CALCIUM SERPL-MCNC: 9.7 MG/DL (ref 8.7–10.3)
CHLORIDE SERPL-SCNC: 101 MMOL/L (ref 96–106)
CHOLEST SERPL-MCNC: 159 MG/DL (ref 100–199)
CO2 SERPL-SCNC: 24 MMOL/L (ref 20–29)
CREAT SERPL-MCNC: 0.84 MG/DL (ref 0.57–1)
CREAT UR-MCNC: 119.4 MG/DL
EGFRCR SERPLBLD CKD-EPI 2021: 76 ML/MIN/1.73
EOSINOPHIL # BLD AUTO: 0.1 X10E3/UL (ref 0–0.4)
EOSINOPHIL NFR BLD AUTO: 2 %
ERYTHROCYTE [DISTWIDTH] IN BLOOD BY AUTOMATED COUNT: 12.9 % (ref 11.7–15.4)
GLOBULIN SER CALC-MCNC: 2.6 G/DL (ref 1.5–4.5)
GLUCOSE SERPL-MCNC: 152 MG/DL (ref 65–99)
HBA1C MFR BLD: 7.6 % (ref 4.8–5.6)
HCT VFR BLD AUTO: 45.5 % (ref 34–46.6)
HDLC SERPL-MCNC: 67 MG/DL
HGB BLD-MCNC: 14.9 G/DL (ref 11.1–15.9)
IMM GRANULOCYTES # BLD AUTO: 0 X10E3/UL (ref 0–0.1)
IMM GRANULOCYTES NFR BLD AUTO: 0 %
LDLC SERPL CALC-MCNC: 78 MG/DL (ref 0–99)
LDLC/HDLC SERPL: 1.2 RATIO (ref 0–3.2)
LYMPHOCYTES # BLD AUTO: 1.5 X10E3/UL (ref 0.7–3.1)
LYMPHOCYTES NFR BLD AUTO: 21 %
MCH RBC QN AUTO: 28.6 PG (ref 26.6–33)
MCHC RBC AUTO-ENTMCNC: 32.7 G/DL (ref 31.5–35.7)
MCV RBC AUTO: 87 FL (ref 79–97)
MICROALBUMIN UR-MCNC: 23.6 UG/ML
MONOCYTES # BLD AUTO: 0.5 X10E3/UL (ref 0.1–0.9)
MONOCYTES NFR BLD AUTO: 8 %
NEUTROPHILS # BLD AUTO: 4.9 X10E3/UL (ref 1.4–7)
NEUTROPHILS NFR BLD AUTO: 68 %
PLATELET # BLD AUTO: 278 X10E3/UL (ref 150–450)
POTASSIUM SERPL-SCNC: 4.4 MMOL/L (ref 3.5–5.2)
PROT SERPL-MCNC: 7 G/DL (ref 6–8.5)
RBC # BLD AUTO: 5.21 X10E6/UL (ref 3.77–5.28)
SODIUM SERPL-SCNC: 139 MMOL/L (ref 134–144)
TRIGL SERPL-MCNC: 73 MG/DL (ref 0–149)
TSH SERPL DL<=0.005 MIU/L-ACNC: 1.66 UIU/ML (ref 0.45–4.5)
VLDLC SERPL CALC-MCNC: 14 MG/DL (ref 5–40)
WBC # BLD AUTO: 7.2 X10E3/UL (ref 3.4–10.8)

## 2022-07-15 ENCOUNTER — OFFICE VISIT (OUTPATIENT)
Dept: FAMILY MEDICINE CLINIC | Facility: CLINIC | Age: 67
End: 2022-07-15

## 2022-07-15 VITALS
TEMPERATURE: 97.5 F | HEART RATE: 78 BPM | SYSTOLIC BLOOD PRESSURE: 112 MMHG | HEIGHT: 64 IN | DIASTOLIC BLOOD PRESSURE: 74 MMHG | OXYGEN SATURATION: 95 % | RESPIRATION RATE: 18 BRPM | WEIGHT: 186.6 LBS | BODY MASS INDEX: 31.86 KG/M2

## 2022-07-15 DIAGNOSIS — E78.00 HYPERCHOLESTEROLEMIA: Chronic | ICD-10-CM

## 2022-07-15 DIAGNOSIS — E11.65 CONTROLLED TYPE 2 DIABETES MELLITUS WITH HYPERGLYCEMIA, WITHOUT LONG-TERM CURRENT USE OF INSULIN: Chronic | ICD-10-CM

## 2022-07-15 DIAGNOSIS — F41.9 ANXIETY AND DEPRESSION: ICD-10-CM

## 2022-07-15 DIAGNOSIS — M54.42 CHRONIC BILATERAL LOW BACK PAIN WITH LEFT-SIDED SCIATICA: ICD-10-CM

## 2022-07-15 DIAGNOSIS — E03.9 HYPOTHYROIDISM, ADULT: Chronic | ICD-10-CM

## 2022-07-15 DIAGNOSIS — G89.29 CHRONIC BILATERAL LOW BACK PAIN WITH LEFT-SIDED SCIATICA: ICD-10-CM

## 2022-07-15 DIAGNOSIS — F32.A ANXIETY AND DEPRESSION: ICD-10-CM

## 2022-07-15 DIAGNOSIS — I10 PRIMARY HYPERTENSION: Primary | Chronic | ICD-10-CM

## 2022-07-15 PROCEDURE — 99213 OFFICE O/P EST LOW 20 MIN: CPT | Performed by: NURSE PRACTITIONER

## 2022-07-15 RX ORDER — ROSUVASTATIN CALCIUM 10 MG/1
10 TABLET, COATED ORAL DAILY
Qty: 90 TABLET | Refills: 3 | Status: SHIPPED | OUTPATIENT
Start: 2022-07-15 | End: 2023-01-16 | Stop reason: SDUPTHER

## 2022-07-15 RX ORDER — HYDROCHLOROTHIAZIDE 25 MG/1
25 TABLET ORAL DAILY
Qty: 90 TABLET | Refills: 3 | Status: SHIPPED | OUTPATIENT
Start: 2022-07-15 | End: 2023-01-16 | Stop reason: SDUPTHER

## 2022-07-15 RX ORDER — FLUOXETINE HYDROCHLORIDE 20 MG/1
20 CAPSULE ORAL DAILY
Qty: 90 CAPSULE | Refills: 3 | Status: SHIPPED | OUTPATIENT
Start: 2022-07-15 | End: 2023-01-16 | Stop reason: SDUPTHER

## 2022-07-15 RX ORDER — LEVOTHYROXINE SODIUM 0.05 MG/1
50 TABLET ORAL DAILY
Qty: 90 TABLET | Refills: 3 | Status: SHIPPED | OUTPATIENT
Start: 2022-07-15 | End: 2023-01-16 | Stop reason: SDUPTHER

## 2022-07-15 RX ORDER — METFORMIN HYDROCHLORIDE 500 MG/1
TABLET, EXTENDED RELEASE ORAL
Qty: 180 TABLET | Refills: 3 | Status: SHIPPED | OUTPATIENT
Start: 2022-07-15 | End: 2023-01-16 | Stop reason: SDUPTHER

## 2022-07-15 RX ORDER — AMLODIPINE BESYLATE 5 MG/1
5 TABLET ORAL DAILY
Qty: 90 TABLET | Refills: 3 | Status: SHIPPED | OUTPATIENT
Start: 2022-07-15 | End: 2023-01-16 | Stop reason: SDUPTHER

## 2022-07-15 RX ORDER — CELECOXIB 200 MG/1
200 CAPSULE ORAL 2 TIMES DAILY
Qty: 180 CAPSULE | Refills: 3 | Status: SHIPPED | OUTPATIENT
Start: 2022-07-15 | End: 2023-01-16 | Stop reason: SDUPTHER

## 2022-07-15 RX ORDER — LISINOPRIL 10 MG/1
10 TABLET ORAL DAILY
Qty: 90 TABLET | Refills: 3 | Status: SHIPPED | OUTPATIENT
Start: 2022-07-15 | End: 2023-01-16 | Stop reason: SDUPTHER

## 2022-07-15 NOTE — PROGRESS NOTES
Mitzi Jefferson is a 66 y.o.. female.     Pt here today for follow up on HTN, HLD, DM, Hypothyroidism, Anxiety, depression, and chronic back pain. Pt stating she is not eating healthy at this time but is drinking plenty of water. Pt stating she is unable to exercise d/t her back pain at this time. Pt going in for her 2nd epidural injection next Tuesday.     Pt having Dexa scan in 3/22 with results of within normal limits though the T score within the left femoral neck is near the lower limits of normal. Pt has yearly eye exams in July, last one was 2021 but due to go in shortly for this year.           The following portions of the patient's history were reviewed and updated as appropriate: allergies, current medications, past family history, past medical history, past social history, past surgical history and problem list.    Past Medical History:   Diagnosis Date   • Anxiety and depression 3/15/2022   • Backache    • Diabetes mellitus type 2, controlled (HCC)    • H/O bone density study     8/2014   • H/O mammogram     8/2014   • HSV (herpes simplex virus) infection 3/15/2022   • Hypercholesterolemia    • Hypertension    • Hypothyroidism    • Osteoarthritis of multiple joints        Past Surgical History:   Procedure Laterality Date   • ANKLE ARTHROSCOPY Right     1/2013 & 10/2013   • COLONOSCOPY      7/27/2007   • LAPAROSCOPIC GASTRIC BANDING  08/01/2007   • PAP SMEAR      12/2014   • ROTATOR CUFF REPAIR Right     5/2014   • TOTAL KNEE ARTHROPLASTY Right     12/2014       Review of Systems   Constitutional: Negative.    Respiratory: Negative.    Cardiovascular: Negative.    Gastrointestinal: Negative.    Musculoskeletal: Positive for back pain.   Psychiatric/Behavioral: Negative.  Negative for dysphoric mood and sleep disturbance. The patient is not nervous/anxious.        No Known Allergies    Objective     Vitals:    07/15/22 0853   BP: 112/74   Pulse: 78   Resp: 18   Temp: 97.5 °F (36.4 °C)  "  TempSrc: Oral   SpO2: 95%   Weight: 84.6 kg (186 lb 9.6 oz)   Height: 162.6 cm (64.02\")     Body mass index is 32.01 kg/m².    Physical Exam  Vitals reviewed.   HENT:      Head: Normocephalic.   Eyes:      Pupils: Pupils are equal, round, and reactive to light.   Neck:      Vascular: No carotid bruit.   Cardiovascular:      Rate and Rhythm: Normal rate and regular rhythm.      Pulses: Normal pulses.   Pulmonary:      Effort: Pulmonary effort is normal.      Breath sounds: Normal breath sounds.   Musculoskeletal:         General: Normal range of motion.      Right lower leg: No edema.      Left lower leg: No edema.   Neurological:      Mental Status: She is alert and oriented to person, place, and time.   Psychiatric:         Behavior: Behavior normal.           Current Outpatient Medications:   •  amLODIPine (NORVASC) 5 MG tablet, Take 1 tablet by mouth Daily., Disp: 90 tablet, Rfl: 3  •  celecoxib (CeleBREX) 200 MG capsule, Take 1 capsule by mouth 2 (Two) Times a Day., Disp: 180 capsule, Rfl: 3  •  famciclovir (FAMVIR) 500 MG tablet, Take 1 tablet by mouth 2 (Two) Times a Day., Disp: 180 tablet, Rfl: 0  •  FLUoxetine (PROzac) 20 MG capsule, Take 1 capsule by mouth Daily., Disp: 90 capsule, Rfl: 3  •  glucose blood test strip, Check blood glucose once a day, Disp: 100 each, Rfl: 3  •  hydroCHLOROthiazide (HYDRODIURIL) 25 MG tablet, Take 1 tablet by mouth Daily., Disp: 90 tablet, Rfl: 3  •  levothyroxine (SYNTHROID, LEVOTHROID) 50 MCG tablet, Take 1 tablet by mouth Daily., Disp: 90 tablet, Rfl: 3  •  lidocaine (LIDODERM) 5 %, Place 1 patch on the skin as directed by provider Daily. Remove & Discard patch within 12 hours or as directed by MD, Disp: 30 patch, Rfl: 0  •  lisinopril (PRINIVIL,ZESTRIL) 10 MG tablet, Take 1 tablet by mouth Daily., Disp: 90 tablet, Rfl: 3  •  metFORMIN ER (GLUCOPHAGE-XR) 500 MG 24 hr tablet, One twice daily, Disp: 180 tablet, Rfl: 3  •  OneTouch Delica Lancets 33G misc, Check blood " glucose once daily, Disp: 100 each, Rfl: 3  •  rosuvastatin (CRESTOR) 10 MG tablet, Take 1 tablet by mouth Daily., Disp: 90 tablet, Rfl: 3  •  empagliflozin (Jardiance) 10 MG tablet tablet, Take 1 tablet by mouth Daily., Disp: 90 tablet, Rfl: 3    Recent Results (from the past 2016 hour(s))   POC Glucose Once    Collection Time: 06/30/22 11:58 AM    Specimen: Blood   Result Value Ref Range    Glucose 118 70 - 130 mg/dL   CBC & Differential    Collection Time: 07/13/22  8:48 AM    Specimen: Blood   Result Value Ref Range    WBC 7.2 3.4 - 10.8 x10E3/uL    RBC 5.21 3.77 - 5.28 x10E6/uL    Hemoglobin 14.9 11.1 - 15.9 g/dL    Hematocrit 45.5 34.0 - 46.6 %    MCV 87 79 - 97 fL    MCH 28.6 26.6 - 33.0 pg    MCHC 32.7 31.5 - 35.7 g/dL    RDW 12.9 11.7 - 15.4 %    Platelets 278 150 - 450 x10E3/uL    Neutrophil Rel % 68 Not Estab. %    Lymphocyte Rel % 21 Not Estab. %    Monocyte Rel % 8 Not Estab. %    Eosinophil Rel % 2 Not Estab. %    Basophil Rel % 1 Not Estab. %    Neutrophils Absolute 4.9 1.4 - 7.0 x10E3/uL    Lymphocytes Absolute 1.5 0.7 - 3.1 x10E3/uL    Monocytes Absolute 0.5 0.1 - 0.9 x10E3/uL    Eosinophils Absolute 0.1 0.0 - 0.4 x10E3/uL    Basophils Absolute 0.0 0.0 - 0.2 x10E3/uL    Immature Granulocyte Rel % 0 Not Estab. %    Immature Grans Absolute 0.0 0.0 - 0.1 x10E3/uL   Comprehensive Metabolic Panel    Collection Time: 07/13/22  8:48 AM    Specimen: Blood   Result Value Ref Range    Glucose 152 (H) 65 - 99 mg/dL    BUN 20 8 - 27 mg/dL    Creatinine 0.84 0.57 - 1.00 mg/dL    EGFR Result 76 >59 mL/min/1.73    BUN/Creatinine Ratio 24 12 - 28    Sodium 139 134 - 144 mmol/L    Potassium 4.4 3.5 - 5.2 mmol/L    Chloride 101 96 - 106 mmol/L    Total CO2 24 20 - 29 mmol/L    Calcium 9.7 8.7 - 10.3 mg/dL    Total Protein 7.0 6.0 - 8.5 g/dL    Albumin 4.4 3.8 - 4.8 g/dL    Globulin 2.6 1.5 - 4.5 g/dL    A/G Ratio 1.7 1.2 - 2.2    Total Bilirubin 0.5 0.0 - 1.2 mg/dL    Alkaline Phosphatase 71 44 - 121 IU/L    AST  (SGOT) 20 0 - 40 IU/L    ALT (SGPT) 22 0 - 32 IU/L   Lipid Panel With LDL / HDL Ratio    Collection Time: 07/13/22  8:48 AM    Specimen: Blood   Result Value Ref Range    Total Cholesterol 159 100 - 199 mg/dL    Triglycerides 73 0 - 149 mg/dL    HDL Cholesterol 67 >39 mg/dL    VLDL Cholesterol Gee 14 5 - 40 mg/dL    LDL Chol Calc (NIH) 78 0 - 99 mg/dL    LDL/HDL RATIO 1.2 0.0 - 3.2 ratio   Hemoglobin A1c    Collection Time: 07/13/22  8:48 AM    Specimen: Blood   Result Value Ref Range    Hemoglobin A1C 7.6 (H) 4.8 - 5.6 %   Microalbumin / Creatinine Urine Ratio - Urine, Clean Catch    Collection Time: 07/13/22  8:48 AM    Specimen: Urine, Clean Catch   Result Value Ref Range    Creatinine, Urine 119.4 Not Estab. mg/dL    Microalbumin, Urine 23.6 Not Estab. ug/mL    Microalbumin/Creatinine Ratio 20 0 - 29 mg/g creat   TSH    Collection Time: 07/13/22  8:48 AM    Specimen: Blood   Result Value Ref Range    TSH 1.660 0.450 - 4.500 uIU/mL       DEXA Bone Density Axial  Narrative: BONE MINERAL DENSITOMETRY     HISTORY: Postmenopausal.     COMPARISON: None     TECHNIQUE: The bone mineral density was determined using a dual-energy  x-ray source. Fracture risk is related to the absolute bone density and  is expressed as compared to a young gender-matched population  representative of the main peak bone density. Fracture risk is not  calculated for patients with osteoporosis or patients receiving  treatment for osteoporosis.      For postmenopausal women, men over age 65, and for men age 50-65 with  other risk factors, the WHO defines osteopenia is greater than -2.5 and  less than -1.0 standard deviations below peak BMD [T score] and  osteoporosis as 2.5 standard deviations or more below peak BMD. The risk  of osteoporotic fracture doubles for each standard deviation below peak  BMD. For premenopausal women, men under age 50, and for children, Z  scores are used to compare bone density to age-matched controls. The  diagnosis  of osteoporosis in these populations requires clinical  assessment of additional risk factors.     Note that the lumbar bone density may be artificially elevated due to  vertebral compression fracture, degenerative disc disease/osteophyte  formation and sclerosis, aortic calcification. Femoral BMD could be  falsely elevated in the setting of degenerative change/arthritis.     FINDINGS:   LUMBAR SPINE:  The BMD measured in the L1-L4 is 0.989 g/cm2 for a  T-score of -0.5 and a Z-score of 1.4     LEFT HIP: The BMD for the femoral neck is 0.754g/cm2 for a T score of   -0.9 and a Z score of 0.8     RIGHT HIP:  The BMD for the femoral neck is 0.908g/cm2 for a T score of  0.5 and a Z score of 2.2     Impression: Bone mineral density is within normal limits though the T  score within the left femoral neck is near the lower limits of normal.        Current recommendations are that a follow-up bone density be obtained at  an interval of not less than 2 years except in specific circumstances,  such as after initiation or change of therapy.     This report was finalized on 7/12/2022 10:09 AM by Dr. Lasha Currie M.D.            Diagnoses and all orders for this visit:    1. Primary hypertension (Primary)  -     amLODIPine (NORVASC) 5 MG tablet; Take 1 tablet by mouth Daily.  Dispense: 90 tablet; Refill: 3  -     hydroCHLOROthiazide (HYDRODIURIL) 25 MG tablet; Take 1 tablet by mouth Daily.  Dispense: 90 tablet; Refill: 3  -     lisinopril (PRINIVIL,ZESTRIL) 10 MG tablet; Take 1 tablet by mouth Daily.  Dispense: 90 tablet; Refill: 3    2. Hypercholesterolemia  -     rosuvastatin (CRESTOR) 10 MG tablet; Take 1 tablet by mouth Daily.  Dispense: 90 tablet; Refill: 3    3. Controlled type 2 diabetes mellitus with hyperglycemia, without long-term current use of insulin (HCC)  -     empagliflozin (Jardiance) 10 MG tablet tablet; Take 1 tablet by mouth Daily.  Dispense: 90 tablet; Refill: 3  -     metFORMIN ER (GLUCOPHAGE-XR) 500 MG  24 hr tablet; One twice daily  Dispense: 180 tablet; Refill: 3    4. Hypothyroidism, adult  -     levothyroxine (SYNTHROID, LEVOTHROID) 50 MCG tablet; Take 1 tablet by mouth Daily.  Dispense: 90 tablet; Refill: 3    5. Anxiety and depression  -     FLUoxetine (PROzac) 20 MG capsule; Take 1 capsule by mouth Daily.  Dispense: 90 capsule; Refill: 3    6. Chronic bilateral low back pain with left-sided sciatica  -     celecoxib (CeleBREX) 200 MG capsule; Take 1 capsule by mouth 2 (Two) Times a Day.  Dispense: 180 capsule; Refill: 3        Patient Instructions   HTN: stable, continue amlodipine 5 mg 1 tab daily, HCTZ 25 mg 1 tab daily, lisinopril 10 mg 1 tab daily.    HLD: stable, continue rosuvastatin 10 mg 1 tab daily; recommend working on eating a heart healthy diabetic diet, continue to drink plenty of water with goal 64 oz a day and start exercise routine (once able) 3-5 times a week, for more than 30 minutes at a time     DM: not controlled, recommend eating a more healthy diabetic diet, going back on Jardiance from glipizide and exercising once able; continue metformin  mg 1 tab twice a day and restarting Jardiance 10 mg daily; will recheck in 6 months if HgA1c still elevated will increase Jardiance from 10 to 25 mg daily.    Hypothyroidism: stable, continue levothyroxine 50 mcg 1 tab daily    Anxiety/depression: stable, continue Prozac 20 mg 1 tab daily    Chronic back pain: continue going to pain management and Dr. Marie; continue celebrex 200 mg 1 tab twice a day       Return for follow up with fasting labs and then follow up appt. in 6 months.

## 2022-07-15 NOTE — PATIENT INSTRUCTIONS
HTN: stable, continue amlodipine 5 mg 1 tab daily, HCTZ 25 mg 1 tab daily, lisinopril 10 mg 1 tab daily.    HLD: stable, continue rosuvastatin 10 mg 1 tab daily; recommend working on eating a heart healthy diabetic diet, continue to drink plenty of water with goal 64 oz a day and start exercise routine (once able) 3-5 times a week, for more than 30 minutes at a time     DM: not controlled, recommend eating a more healthy diabetic diet, going back on Jardiance from glipizide and exercising once able; continue metformin  mg 1 tab twice a day and restarting Jardiance 10 mg daily; will recheck in 6 months if HgA1c still elevated will increase Jardiance from 10 to 25 mg daily.    Hypothyroidism: stable, continue levothyroxine 50 mcg 1 tab daily    Anxiety/depression: stable, continue Prozac 20 mg 1 tab daily    Chronic back pain: continue going to pain management and Dr. Marie; continue celebrex 200 mg 1 tab twice a day

## 2022-07-19 ENCOUNTER — ANESTHESIA (OUTPATIENT)
Dept: PAIN MEDICINE | Facility: HOSPITAL | Age: 67
End: 2022-07-19

## 2022-07-19 ENCOUNTER — HOSPITAL ENCOUNTER (OUTPATIENT)
Dept: PAIN MEDICINE | Facility: HOSPITAL | Age: 67
Discharge: HOME | End: 2022-07-19

## 2022-07-19 ENCOUNTER — ANESTHESIA EVENT (OUTPATIENT)
Dept: PAIN MEDICINE | Facility: HOSPITAL | Age: 67
End: 2022-07-19

## 2022-07-19 ENCOUNTER — HOSPITAL ENCOUNTER (OUTPATIENT)
Dept: GENERAL RADIOLOGY | Facility: HOSPITAL | Age: 67
Discharge: HOME | End: 2022-07-19

## 2022-07-19 VITALS
RESPIRATION RATE: 16 BRPM | WEIGHT: 185.19 LBS | BODY MASS INDEX: 31.62 KG/M2 | HEART RATE: 72 BPM | DIASTOLIC BLOOD PRESSURE: 72 MMHG | TEMPERATURE: 97.7 F | SYSTOLIC BLOOD PRESSURE: 120 MMHG | HEIGHT: 64 IN | OXYGEN SATURATION: 98 %

## 2022-07-19 DIAGNOSIS — M48.062 SPINAL STENOSIS OF LUMBAR REGION WITH NEUROGENIC CLAUDICATION: ICD-10-CM

## 2022-07-19 DIAGNOSIS — G89.29 CHRONIC BILATERAL LOW BACK PAIN WITH LEFT-SIDED SCIATICA: Primary | ICD-10-CM

## 2022-07-19 DIAGNOSIS — M54.42 CHRONIC BILATERAL LOW BACK PAIN WITH LEFT-SIDED SCIATICA: Primary | ICD-10-CM

## 2022-07-19 DIAGNOSIS — R52 PAIN: ICD-10-CM

## 2022-07-19 LAB — GLUCOSE BLDC GLUCOMTR-MCNC: 133 MG/DL (ref 70–130)

## 2022-07-19 PROCEDURE — 77003 FLUOROGUIDE FOR SPINE INJECT: CPT

## 2022-07-19 PROCEDURE — 0 IOPAMIDOL 41 % SOLUTION: Performed by: ANESTHESIOLOGY

## 2022-07-19 PROCEDURE — 82962 GLUCOSE BLOOD TEST: CPT

## 2022-07-19 PROCEDURE — 25010000002 METHYLPREDNISOLONE PER 80 MG: Performed by: ANESTHESIOLOGY

## 2022-07-19 RX ORDER — METHYLPREDNISOLONE ACETATE 80 MG/ML
80 INJECTION, SUSPENSION INTRA-ARTICULAR; INTRALESIONAL; INTRAMUSCULAR; SOFT TISSUE ONCE
Status: COMPLETED | OUTPATIENT
Start: 2022-07-19 | End: 2022-07-19

## 2022-07-19 RX ORDER — FENTANYL CITRATE 50 UG/ML
50 INJECTION, SOLUTION INTRAMUSCULAR; INTRAVENOUS AS NEEDED
Status: DISCONTINUED | OUTPATIENT
Start: 2022-07-19 | End: 2022-07-20 | Stop reason: HOSPADM

## 2022-07-19 RX ORDER — LIDOCAINE HYDROCHLORIDE 10 MG/ML
1 INJECTION, SOLUTION INFILTRATION; PERINEURAL ONCE AS NEEDED
Status: DISCONTINUED | OUTPATIENT
Start: 2022-07-19 | End: 2022-07-20 | Stop reason: HOSPADM

## 2022-07-19 RX ORDER — SODIUM CHLORIDE 0.9 % (FLUSH) 0.9 %
1-10 SYRINGE (ML) INJECTION AS NEEDED
Status: DISCONTINUED | OUTPATIENT
Start: 2022-07-19 | End: 2022-07-20 | Stop reason: HOSPADM

## 2022-07-19 RX ORDER — MIDAZOLAM HYDROCHLORIDE 1 MG/ML
1 INJECTION INTRAMUSCULAR; INTRAVENOUS AS NEEDED
Status: DISCONTINUED | OUTPATIENT
Start: 2022-07-19 | End: 2022-07-20 | Stop reason: HOSPADM

## 2022-07-19 RX ADMIN — IOPAMIDOL 10 ML: 408 INJECTION, SOLUTION INTRATHECAL at 09:19

## 2022-07-19 RX ADMIN — METHYLPREDNISOLONE ACETATE 80 MG: 80 INJECTION, SUSPENSION INTRA-ARTICULAR; INTRALESIONAL; INTRAMUSCULAR; SOFT TISSUE at 09:19

## 2022-07-19 NOTE — ANESTHESIA PROCEDURE NOTES
PAIN Epidural block      Patient reassessed immediately prior to procedure    Patient location during procedure: pain clinic  Start Time: 7/19/2022 9:12 AM  Stop Time: 7/19/2022 9:20 AM  Indication:procedure for pain  Performed By  Anesthesiologist: Eb Carlton MD  Preanesthetic Checklist  Completed: patient identified, IV checked, risks and benefits discussed, surgical consent, monitors and equipment checked, pre-op evaluation and timeout performed  Additional Notes  Diagnosis:  Post-Op Diagnosis Codes:     * Spinal stenosis of lumbar region with neurogenic claudication (M48.062)      Prep:  Pt Position:prone  Sterile Tech:gloves, mask and sterile barrier  Prep:chlorhexidine gluconate and isopropyl alcohol  Monitoring:blood pressure monitoring, continuous pulse oximetry and EKG  Procedure:Sedation: no     Approach:left paramedian  Guidance: fluoroscopy  Location:lumbar  Level:4-5  Needle Type:Tuohy  Needle Gauge:20  Aspiration:negative  Test Dose:negative  Medications:  Preservative Free Saline:3mL  Isovue:2mL  Comments:Isovue dye was instilled and dye spread was confirmed to be consistent with epidural placement  Depomedrol:80 mg  Post Assessment:  Dressing:occlusive dressing applied  Pt Tolerance:patient tolerated the procedure well with no apparent complications  Complications:no

## 2022-07-19 NOTE — INTERVAL H&P NOTE
[facility]  H&P reviewed. No changes since last visit.  Patient states   10-25% improvement since the last procedure/injection.    Diagnosis     * Spinal stenosis of lumbar region with neurogenic claudication [M48.062]      Airway assessed since last visit. Airway class equals: 2.

## 2022-07-19 NOTE — DISCHARGE INSTRUCTIONS
Lumbar Epidural Steroid Injection Instructions  Plan includes:  1.  Lumbar epidural steroid injections, up to 3, spaced 4 weeks apart.  If pain control is acceptable after 1 or 2 injections, it was discussed with the patient that they may return for the subsequent injections if and when their pain returns.  The risks were discussed with the patient including failure of relief, worsening pain, Headache (post dural puncture headache), bleeding (epidural hematoma) and infection (epidural abscess or skin infection).  2.  Physical therapy exercises at home as prescribed by physical therapy or from the pain clinic handout (given to the patient).  Continuation of these exercises every day, or multiple times per week, even when the patient has good pain relief, was stressed to the patient as a preventative measure to decrease the frequency and severity of future pain episodes.  3.  Continue pain medicines as already prescribed.  If patient not currently taking any, it is recommended to begin Acetaminophen 1000 mg po q 8 hours.  If other medicines containing Acetaminophen are currently prescribed, maintain daily dose at 3000 mg.    4.  If they can tolerate NSAIDS, it is recommended to take Ibuprofen 600 mg po q 6 hours for 7 days during pain exacerbations.  Alternatively, they may substitute an NSAID of their choice (e.g. Aleve).  This may be taken at the same time as Acetaminophen.  5.  Heat and ice to the affected area as tolerated for pain control.  It was discussed that heating pads can cause burns.  6.  Daily low impact exercise such as walking or water exercise was recommended to maintain overall health and aid in weight control.   7.  Follow up as needed for subsequent injections.  8.  Patient was counseled to abstain from tobacco products. What can I expect after the procedure?  Follow these instructions at home:  Injection site care  You may remove the bandage (dressing) after 24 hours.  Check your injection site  every day for signs of infection. Check for:  Redness, swelling, or pain.  Fluid or blood.  Warmth.  Pus or a bad smell.  Managing pain, stiffness, and swelling  For 24 hours after the procedure:  Avoid using heat on the injection site.  Do not take baths, swim, or use a hot tub. You may take a shower.   If directed, put ice on the injection site. To do this:        Put ice in a plastic bag.  Place a towel between your skin and the bag.  Leave the ice on for 20 minutes, 2-3 times a day.     Activity  NO DRIVING FOR THE REST OF THE DAY IF receiving a sedative during your procedure.  Return to your normal activities the next day as tolerated.  General instructions  The injection site may feel sore.  Take over-the-counter medications as directed on packaging and prescription medicines only as told by your health care provider who prescribes these.  Keep all follow-up visits as told by your health care provider.   Contact a health care provider if:  You have any of these signs of infection:  Redness, swelling, or pain around your injection site.  Fluid or blood coming from your injection site.  Warmth coming from your injection site.  Pus or a bad smell coming from your injection site.  A fever.  You continue to have pain and soreness around the injection site, even after taking over-the-counter pain medicine.  You have severe, sudden, or lasting nausea or vomiting.  Get help right away if:  You have severe pain at the injection site that is not relieved by medicines.  You develop a severe headache or a stiff neck.  You become sensitive to light.  You have any new numbness or weakness in your legs or arms.  You lose control of your bladder or bowel movements.  You have trouble breathing.  Summary  An epidural steroid block is an injection of steroid medication and numbing medicine that is given into the epidural space.  This injection helps relieve pain caused by an irritated or swollen nerve root.    Doxepin Pregnancy And Lactation Text: This medication is Pregnancy Category C and it isn't known if it is safe during pregnancy. It is also excreted in breast milk and breast feeding isn't recommended.

## 2022-07-26 RX ORDER — LIDOCAINE 50 MG/G
PATCH TOPICAL
Qty: 30 PATCH | Refills: 0 | Status: SHIPPED | OUTPATIENT
Start: 2022-07-26 | End: 2022-10-11

## 2022-08-03 ENCOUNTER — OFFICE VISIT (OUTPATIENT)
Dept: FAMILY MEDICINE CLINIC | Facility: CLINIC | Age: 67
End: 2022-08-03

## 2022-08-03 VITALS
HEIGHT: 64 IN | RESPIRATION RATE: 18 BRPM | DIASTOLIC BLOOD PRESSURE: 76 MMHG | OXYGEN SATURATION: 95 % | BODY MASS INDEX: 31.99 KG/M2 | WEIGHT: 187.4 LBS | SYSTOLIC BLOOD PRESSURE: 120 MMHG | HEART RATE: 90 BPM | TEMPERATURE: 98.4 F

## 2022-08-03 DIAGNOSIS — E66.09 CLASS 1 OBESITY DUE TO EXCESS CALORIES WITH BODY MASS INDEX (BMI) OF 32.0 TO 32.9 IN ADULT, UNSPECIFIED WHETHER SERIOUS COMORBIDITY PRESENT: Primary | ICD-10-CM

## 2022-08-03 DIAGNOSIS — I10 PRIMARY HYPERTENSION: Chronic | ICD-10-CM

## 2022-08-03 PROBLEM — E66.811 CLASS 1 OBESITY DUE TO EXCESS CALORIES WITH BODY MASS INDEX (BMI) OF 32.0 TO 32.9 IN ADULT: Status: ACTIVE | Noted: 2022-08-03

## 2022-08-03 PROCEDURE — 93000 ELECTROCARDIOGRAM COMPLETE: CPT | Performed by: NURSE PRACTITIONER

## 2022-08-03 PROCEDURE — 99213 OFFICE O/P EST LOW 20 MIN: CPT | Performed by: NURSE PRACTITIONER

## 2022-08-03 NOTE — PATIENT INSTRUCTIONS
Discussed contrave and b/p medication interaction--makes b/p medication less active. Pt to monitor b/p daily and keep log. Pt will rto in 1 month; if having hypertensive issues rto sooner. Discussed contrave and famciclovir interaction-slight increase risk of seizures; recommend coming off contrave if needing to use her famciclovir which is prn use only.   EKG done while in office today for base line. Pt stating she would like to start contrave with all information provided. Script written out and given to pt. Pt to continue eating a heart healthy diabetic diet. Pt to rto in 1 month for follow up.

## 2022-09-01 ENCOUNTER — TELEPHONE (OUTPATIENT)
Dept: ORTHOPEDIC SURGERY | Facility: CLINIC | Age: 67
End: 2022-09-01

## 2022-09-01 DIAGNOSIS — M54.50 LUMBAR PAIN: ICD-10-CM

## 2022-09-01 DIAGNOSIS — M48.062 SPINAL STENOSIS OF LUMBAR REGION WITH NEUROGENIC CLAUDICATION: ICD-10-CM

## 2022-09-01 DIAGNOSIS — M43.16 SPONDYLOLISTHESIS OF LUMBAR REGION: Primary | ICD-10-CM

## 2022-09-01 NOTE — TELEPHONE ENCOUNTER
----- Message from Summer Jefferson sent at 9/1/2022  1:03 PM EDT -----  Regarding: Physical Therapy  May I please have some Physical Therapy orders at [facility] in [place] to help me until I can have my next epidural?  I had my first epidural on 07/19/22 and had 5 days of some relief.  I had my second epidural on 08/03/22 and had NO relief.  My next epidural is not scheduled until 10/26/22.  I am having constant pain and extreme pain when standing or walking for a period of time.  Thanking you in advance.  Stephanie Jefferson

## 2022-10-11 RX ORDER — LIDOCAINE 50 MG/G
PATCH TOPICAL
Qty: 30 PATCH | Refills: 0 | Status: SHIPPED | OUTPATIENT
Start: 2022-10-11 | End: 2022-11-30 | Stop reason: SDUPTHER

## 2022-10-24 ENCOUNTER — ANESTHESIA EVENT (OUTPATIENT)
Dept: PAIN MEDICINE | Facility: HOSPITAL | Age: 67
End: 2022-10-24

## 2022-10-24 ENCOUNTER — ANESTHESIA (OUTPATIENT)
Dept: PAIN MEDICINE | Facility: HOSPITAL | Age: 67
End: 2022-10-24

## 2022-10-24 ENCOUNTER — HOSPITAL ENCOUNTER (OUTPATIENT)
Dept: PAIN MEDICINE | Facility: HOSPITAL | Age: 67
Discharge: HOME | End: 2022-10-24

## 2022-10-24 ENCOUNTER — HOSPITAL ENCOUNTER (OUTPATIENT)
Dept: GENERAL RADIOLOGY | Facility: HOSPITAL | Age: 67
Discharge: HOME | End: 2022-10-24

## 2022-10-24 VITALS
HEART RATE: 77 BPM | SYSTOLIC BLOOD PRESSURE: 124 MMHG | RESPIRATION RATE: 14 BRPM | OXYGEN SATURATION: 96 % | DIASTOLIC BLOOD PRESSURE: 73 MMHG

## 2022-10-24 DIAGNOSIS — M48.062 SPINAL STENOSIS OF LUMBAR REGION WITH NEUROGENIC CLAUDICATION: ICD-10-CM

## 2022-10-24 DIAGNOSIS — R52 PAIN: ICD-10-CM

## 2022-10-24 LAB — GLUCOSE BLDC GLUCOMTR-MCNC: 170 MG/DL (ref 70–130)

## 2022-10-24 PROCEDURE — 0 IOPAMIDOL 41 % SOLUTION: Performed by: ANESTHESIOLOGY

## 2022-10-24 PROCEDURE — 82962 GLUCOSE BLOOD TEST: CPT

## 2022-10-24 PROCEDURE — 77003 FLUOROGUIDE FOR SPINE INJECT: CPT

## 2022-10-24 PROCEDURE — 25010000002 METHYLPREDNISOLONE PER 80 MG: Performed by: ANESTHESIOLOGY

## 2022-10-24 RX ORDER — METHYLPREDNISOLONE ACETATE 80 MG/ML
80 INJECTION, SUSPENSION INTRA-ARTICULAR; INTRALESIONAL; INTRAMUSCULAR; SOFT TISSUE ONCE
Status: COMPLETED | OUTPATIENT
Start: 2022-10-24 | End: 2022-10-24

## 2022-10-24 RX ORDER — FENTANYL CITRATE 50 UG/ML
50 INJECTION, SOLUTION INTRAMUSCULAR; INTRAVENOUS AS NEEDED
Status: DISCONTINUED | OUTPATIENT
Start: 2022-10-24 | End: 2022-10-25 | Stop reason: HOSPADM

## 2022-10-24 RX ORDER — MIDAZOLAM HYDROCHLORIDE 1 MG/ML
1 INJECTION INTRAMUSCULAR; INTRAVENOUS AS NEEDED
Status: DISCONTINUED | OUTPATIENT
Start: 2022-10-24 | End: 2022-10-25 | Stop reason: HOSPADM

## 2022-10-24 RX ORDER — LIDOCAINE HYDROCHLORIDE 10 MG/ML
1 INJECTION, SOLUTION INFILTRATION; PERINEURAL ONCE AS NEEDED
Status: DISCONTINUED | OUTPATIENT
Start: 2022-10-24 | End: 2022-10-25 | Stop reason: HOSPADM

## 2022-10-24 RX ORDER — SODIUM CHLORIDE 0.9 % (FLUSH) 0.9 %
1-10 SYRINGE (ML) INJECTION AS NEEDED
Status: DISCONTINUED | OUTPATIENT
Start: 2022-10-24 | End: 2022-10-25 | Stop reason: HOSPADM

## 2022-10-24 RX ADMIN — IOPAMIDOL 10 ML: 408 INJECTION, SOLUTION INTRATHECAL at 09:18

## 2022-10-24 RX ADMIN — METHYLPREDNISOLONE ACETATE 80 MG: 80 INJECTION, SUSPENSION INTRA-ARTICULAR; INTRALESIONAL; INTRAMUSCULAR; SOFT TISSUE at 09:18

## 2022-10-24 NOTE — H&P
[facility]    History and Physical    Patient Name: Summer Jefferson  :  1955  MRN:  [id number]  Date of Admission: 10/24/2022    Subjective     Patient is a 67 y.o. female presents with chief complaint of chronic low back and hips: bilateral pain.  Onset of symptoms was gradual starting several years ago.  Symptoms are associated/aggravated by nothing in particular. Symptoms improve with injection    The following portions of the patients history were reviewed and updated as appropriate: current medications, allergies, past medical history, past surgical history, past family history, past social history and problem list    She has a history of low back pain that intermittently radiates into both of her hips.  Lately has been radiating to her left hip mostly.  She had epidural steroid injections in the past which were helpful.  The most recent was less helpful however the one prior to that gave her at least 50% relief of her discomfort.  He is noted to have scoliosis in her spine as well as some spondylolisthesis at L4-5 causing central canal and lateral recess stenosis as well as L2-3 causing left frontal recess neuroforaminal stenosis.          Objective     Past Medical History:   Past Medical History:   Diagnosis Date   • Anxiety and depression 3/15/2022   • Backache    • Diabetes mellitus type 2, controlled ([facility])    • H/O bone density study     2014   • H/O mammogram     2014   • HSV (herpes simplex virus) infection 3/15/2022   • Hypercholesterolemia    • Hypertension    • Hypothyroidism    • Osteoarthritis of multiple joints      Past Surgical History:   Past Surgical History:   Procedure Laterality Date   • ANKLE ARTHROSCOPY Right     2013 & 10/2013   • COLONOSCOPY      2007   • LAPAROSCOPIC GASTRIC BANDING  2007   • PAP SMEAR      2014   • ROTATOR CUFF REPAIR Right     2014   • TOTAL KNEE ARTHROPLASTY Right     2014     Family History:   Family History   Problem Relation  Age of Onset   • Thyroid disease Mother    • Diabetes Mother    • Hypertension Mother    • Heart disease Mother    • Vision loss Father      Social History:   Social History     Socioeconomic History   • Marital status:    Tobacco Use   • Smoking status: Former   • Smokeless tobacco: Never   • Tobacco comments:     quit 15 yrs ago   Vaping Use   • Vaping Use: Never used   Substance and Sexual Activity   • Alcohol use: Yes     Comment: 1-2 a month   • Drug use: Never   • Sexual activity: Defer       Vital Signs Range for the last 24 hours  Temperature:     Temp Source:     BP: BP: (146)/(86) 146/86   Pulse: Heart Rate:  [86] 86   Respirations: Resp:  [16] 16   SPO2: SpO2:  [96 %] 96 %   O2 Amount (l/min):     O2 Devices Device (Oxygen Therapy): room air   Weight:           --------------------------------------------------------------------------------    Current Outpatient Medications   Medication Sig Dispense Refill   • celecoxib (CeleBREX) 200 MG capsule Take 1 capsule by mouth 2 (Two) Times a Day. 180 capsule 3   • empagliflozin (Jardiance) 10 MG tablet tablet Take 1 tablet by mouth Daily. 90 tablet 3   • FLUoxetine (PROzac) 20 MG capsule Take 1 capsule by mouth Daily. 90 capsule 3   • hydroCHLOROthiazide (HYDRODIURIL) 25 MG tablet Take 1 tablet by mouth Daily. 90 tablet 3   • levothyroxine (SYNTHROID, LEVOTHROID) 50 MCG tablet Take 1 tablet by mouth Daily. 90 tablet 3   • lidocaine (LIDODERM) 5 % PLACE ONE PATCH ON THE SKIN DAILY AND REMOVE AND DISCARD PATCH WITHIN 12 HOURS OR AS DIRECTED BY DOCTOR 30 patch 0   • lisinopril (PRINIVIL,ZESTRIL) 10 MG tablet Take 1 tablet by mouth Daily. 90 tablet 3   • metFORMIN ER (GLUCOPHAGE-XR) 500 MG 24 hr tablet One twice daily 180 tablet 3   • rosuvastatin (CRESTOR) 10 MG tablet Take 1 tablet by mouth Daily. 90 tablet 3   • amLODIPine (NORVASC) 5 MG tablet Take 1 tablet by mouth Daily. 90 tablet 3   • famciclovir (FAMVIR) 500 MG tablet Take 1 tablet by mouth 2 (Two)  Times a Day. 180 tablet 0   • glucose blood test strip Check blood glucose once a day 100 each 3   • OneTouch Delica Lancets 33G misc Check blood glucose once daily 100 each 3     Current Facility-Administered Medications   Medication Dose Route Frequency Provider Last Rate Last Admin   • fentaNYL citrate (PF) (SUBLIMAZE) injection 50 mcg  50 mcg Intravenous PRN Abundio Berkowitz MD       • iopamidol (ISOVUE-M 200) injection 41%  12 mL Epidural Once in imaging Sho, Abundio Mccullough MD       • lidocaine (XYLOCAINE) 1 % injection 1 mL  1 mL Intradermal Once PRN Abundio Berkowitz MD       • methylPREDNISolone acetate (DEPO-medrol) injection 80 mg  80 mg Intra-articular Once RenderAbundio MD       • midazolam (VERSED) injection 1 mg  1 mg Intravenous PRN Abundio Berkowitz MD       • sodium chloride 0.9 % flush 1-10 mL  1-10 mL Intravenous PRN Abundio Berkowitz MD           --------------------------------------------------------------------------------  Assessment & Plan      Anesthesia Evaluation           Pain impairs ability to perform ADLs: Exercise/Activity  Modalities previously tried to control pain with limited effectiveness within the last 4-6 weeks: Physical therapy     Airway   Mallampati: II  TM distance: >3 FB  Neck ROM: full  Dental      Pulmonary    (-) wheezes  Cardiovascular     Rhythm: regular      PE comment: Dorsal pedal pulses are palpable bilateral    Neuro/Psych  (+)   left straight leg raise test,    GI/Hepatic/Renal/Endo      Musculoskeletal   normal range of motion    Abdominal    Substance History      OB/GYN          Other            Phys Exam Other: She is been doing physical therapy and actually feels like it made her feel worse           Diagnosis and Plan    Treatment Plan  ASA 3   Patient has had previous injection/procedure with 50-75% improvement.   Procedures: Lumbar Epidural Steroid Injection(LESI), With fluoroscopy,       Anesthetic plan and risks discussed with  "patient.      Discussed with patient risk and benefits of HONG including but not limited to : Bleeding, infection, PDPH, inadvertant spinal anesthetic, worsening pain, hyperglycemia, hypertension, CHF, nerve damage, steroid \"toxicity\" and AVN of hips.  She understands this will not affect any mechanical causes of her pain and will only address inflammatory issues..  Pt agrees to proceed.    * No Diagnosis Codes entered *                    "

## 2022-10-24 NOTE — ANESTHESIA PROCEDURE NOTES
PAIN Epidural block    Pre-sedation assessment completed: 10/24/2022 9:15 AM    Patient reassessed immediately prior to procedure    Patient location during procedure: pain clinic  Start Time: 10/24/2022 9:15 AM  Stop Time: 10/24/2022 9:24 AM  Indication:at surgeon's request and procedure for pain  Performed By  Anesthesiologist: Abundio Berkowitz MD  Preanesthetic Checklist  Completed: patient identified, site marked, risks and benefits discussed, surgical consent, monitors and equipment checked, pre-op evaluation and timeout performed  Additional Notes  * No Diagnosis Codes entered *    Prep:  Pt Position:prone  Sterile Tech:sterile barrier, mask and gloves  Prep:chlorhexidine gluconate and isopropyl alcohol  Monitoring:blood pressure monitoring, continuous pulse oximetry and EKG  Procedure:Sedation: no     Approach:left paramedian  Guidance: fluoroscopy  Location:lumbar  Level:4-5  Needle Type:Tuohy  Needle Gauge:20 G  Aspiration:negative  Test Dose:negative  Medications:  Isovue:2mL  Depomedrol:80mg  Post Assessment:  Pt Tolerance:patient tolerated the procedure well with no apparent complications  Complications:no

## 2022-11-30 RX ORDER — LIDOCAINE 50 MG/G
PATCH TOPICAL
Qty: 30 PATCH | Refills: 0 | Status: SHIPPED | OUTPATIENT
Start: 2022-11-30 | End: 2023-01-16 | Stop reason: SDUPTHER

## 2022-12-22 DIAGNOSIS — E03.9 HYPOTHYROIDISM, ADULT: ICD-10-CM

## 2022-12-22 DIAGNOSIS — E78.00 HYPERCHOLESTEROLEMIA: ICD-10-CM

## 2022-12-22 DIAGNOSIS — I10 PRIMARY HYPERTENSION: ICD-10-CM

## 2022-12-22 DIAGNOSIS — E11.65 CONTROLLED TYPE 2 DIABETES MELLITUS WITH HYPERGLYCEMIA, WITHOUT LONG-TERM CURRENT USE OF INSULIN: ICD-10-CM

## 2023-01-04 ENCOUNTER — TELEPHONE (OUTPATIENT)
Dept: FAMILY MEDICINE CLINIC | Facility: CLINIC | Age: 68
End: 2023-01-04
Payer: MEDICARE

## 2023-01-04 NOTE — TELEPHONE ENCOUNTER
Hub staff attempted to follow warm transfer process and was unsuccessful     Caller: Summer Jefferson    Relationship to patient: Self    Best call back number: [phone]    Patient is needing: NEEDING TO RESCHEDULED LAB APPOINTMENT

## 2023-01-13 LAB
ALBUMIN SERPL-MCNC: 4.9 G/DL (ref 3.5–5.2)
ALBUMIN/GLOB SERPL: 2 G/DL
ALP SERPL-CCNC: 80 U/L (ref 39–117)
ALT SERPL-CCNC: 18 U/L (ref 1–33)
AST SERPL-CCNC: 17 U/L (ref 1–32)
BASOPHILS # BLD AUTO: 0.03 10*3/MM3 (ref 0–0.2)
BASOPHILS NFR BLD AUTO: 0.4 % (ref 0–1.5)
BILIRUB SERPL-MCNC: 0.6 MG/DL (ref 0–1.2)
BUN SERPL-MCNC: 13 MG/DL (ref 8–23)
BUN/CREAT SERPL: 14.9 (ref 7–25)
CALCIUM SERPL-MCNC: 10.3 MG/DL (ref 8.6–10.5)
CHLORIDE SERPL-SCNC: 100 MMOL/L (ref 98–107)
CHOLEST SERPL-MCNC: 159 MG/DL (ref 0–200)
CO2 SERPL-SCNC: 28 MMOL/L (ref 22–29)
CREAT SERPL-MCNC: 0.87 MG/DL (ref 0.57–1)
EGFRCR SERPLBLD CKD-EPI 2021: 72.7 ML/MIN/1.73
EOSINOPHIL # BLD AUTO: 0.11 10*3/MM3 (ref 0–0.4)
EOSINOPHIL NFR BLD AUTO: 1.6 % (ref 0.3–6.2)
ERYTHROCYTE [DISTWIDTH] IN BLOOD BY AUTOMATED COUNT: 13.7 % (ref 12.3–15.4)
GLOBULIN SER CALC-MCNC: 2.5 GM/DL
GLUCOSE SERPL-MCNC: 199 MG/DL (ref 65–99)
HBA1C MFR BLD: 8 % (ref 4.8–5.6)
HCT VFR BLD AUTO: 44.5 % (ref 34–46.6)
HDLC SERPL-MCNC: 52 MG/DL (ref 40–60)
HGB BLD-MCNC: 14.9 G/DL (ref 12–15.9)
IMM GRANULOCYTES # BLD AUTO: 0.01 10*3/MM3 (ref 0–0.05)
IMM GRANULOCYTES NFR BLD AUTO: 0.1 % (ref 0–0.5)
LDLC SERPL CALC-MCNC: 88 MG/DL (ref 0–100)
LDLC/HDLC SERPL: 1.66 {RATIO}
LYMPHOCYTES # BLD AUTO: 1.51 10*3/MM3 (ref 0.7–3.1)
LYMPHOCYTES NFR BLD AUTO: 21.7 % (ref 19.6–45.3)
MCH RBC QN AUTO: 27.5 PG (ref 26.6–33)
MCHC RBC AUTO-ENTMCNC: 33.5 G/DL (ref 31.5–35.7)
MCV RBC AUTO: 82.1 FL (ref 79–97)
MONOCYTES # BLD AUTO: 0.58 10*3/MM3 (ref 0.1–0.9)
MONOCYTES NFR BLD AUTO: 8.3 % (ref 5–12)
NEUTROPHILS # BLD AUTO: 4.71 10*3/MM3 (ref 1.7–7)
NEUTROPHILS NFR BLD AUTO: 67.9 % (ref 42.7–76)
NRBC BLD AUTO-RTO: 0 /100 WBC (ref 0–0.2)
PLATELET # BLD AUTO: 275 10*3/MM3 (ref 140–450)
POTASSIUM SERPL-SCNC: 4 MMOL/L (ref 3.5–5.2)
PROT SERPL-MCNC: 7.4 G/DL (ref 6–8.5)
RBC # BLD AUTO: 5.42 10*6/MM3 (ref 3.77–5.28)
SODIUM SERPL-SCNC: 140 MMOL/L (ref 136–145)
TRIGL SERPL-MCNC: 104 MG/DL (ref 0–150)
TSH SERPL DL<=0.005 MIU/L-ACNC: 1.45 UIU/ML (ref 0.27–4.2)
VLDLC SERPL CALC-MCNC: 19 MG/DL (ref 5–40)
WBC # BLD AUTO: 6.95 10*3/MM3 (ref 3.4–10.8)

## 2023-01-16 ENCOUNTER — OFFICE VISIT (OUTPATIENT)
Dept: FAMILY MEDICINE CLINIC | Facility: CLINIC | Age: 68
End: 2023-01-16
Payer: MEDICARE

## 2023-01-16 VITALS
HEART RATE: 92 BPM | OXYGEN SATURATION: 98 % | HEIGHT: 64 IN | DIASTOLIC BLOOD PRESSURE: 66 MMHG | TEMPERATURE: 98.2 F | SYSTOLIC BLOOD PRESSURE: 110 MMHG | RESPIRATION RATE: 16 BRPM | WEIGHT: 188 LBS | BODY MASS INDEX: 32.1 KG/M2

## 2023-01-16 DIAGNOSIS — F32.A ANXIETY AND DEPRESSION: ICD-10-CM

## 2023-01-16 DIAGNOSIS — I10 PRIMARY HYPERTENSION: Primary | Chronic | ICD-10-CM

## 2023-01-16 DIAGNOSIS — E78.00 HYPERCHOLESTEROLEMIA: Chronic | ICD-10-CM

## 2023-01-16 DIAGNOSIS — G89.29 CHRONIC BILATERAL LOW BACK PAIN WITH LEFT-SIDED SCIATICA: ICD-10-CM

## 2023-01-16 DIAGNOSIS — E11.65 CONTROLLED TYPE 2 DIABETES MELLITUS WITH HYPERGLYCEMIA, WITHOUT LONG-TERM CURRENT USE OF INSULIN: Chronic | ICD-10-CM

## 2023-01-16 DIAGNOSIS — M54.42 CHRONIC BILATERAL LOW BACK PAIN WITH LEFT-SIDED SCIATICA: ICD-10-CM

## 2023-01-16 DIAGNOSIS — E03.9 HYPOTHYROIDISM, ADULT: Chronic | ICD-10-CM

## 2023-01-16 DIAGNOSIS — F41.9 ANXIETY AND DEPRESSION: ICD-10-CM

## 2023-01-16 PROCEDURE — 99213 OFFICE O/P EST LOW 20 MIN: CPT | Performed by: NURSE PRACTITIONER

## 2023-01-16 RX ORDER — LIDOCAINE 50 MG/G
PATCH TOPICAL
Qty: 30 PATCH | Refills: 1 | Status: SHIPPED | OUTPATIENT
Start: 2023-01-16 | End: 2023-02-08 | Stop reason: SDUPTHER

## 2023-01-16 RX ORDER — LEVOTHYROXINE SODIUM 0.05 MG/1
50 TABLET ORAL DAILY
Qty: 90 TABLET | Refills: 3 | Status: SHIPPED | OUTPATIENT
Start: 2023-01-16

## 2023-01-16 RX ORDER — METFORMIN HYDROCHLORIDE 500 MG/1
TABLET, EXTENDED RELEASE ORAL
Qty: 180 TABLET | Refills: 3 | Status: SHIPPED | OUTPATIENT
Start: 2023-01-16

## 2023-01-16 RX ORDER — ROSUVASTATIN CALCIUM 10 MG/1
10 TABLET, COATED ORAL DAILY
Qty: 90 TABLET | Refills: 3 | Status: SHIPPED | OUTPATIENT
Start: 2023-01-16

## 2023-01-16 RX ORDER — LANCETS 33 GAUGE
EACH MISCELLANEOUS
Qty: 100 EACH | Refills: 3 | Status: SHIPPED | OUTPATIENT
Start: 2023-01-16

## 2023-01-16 RX ORDER — CELECOXIB 200 MG/1
200 CAPSULE ORAL 2 TIMES DAILY
Qty: 180 CAPSULE | Refills: 3 | Status: SHIPPED | OUTPATIENT
Start: 2023-01-16

## 2023-01-16 RX ORDER — LISINOPRIL 10 MG/1
10 TABLET ORAL DAILY
Qty: 90 TABLET | Refills: 3 | Status: SHIPPED | OUTPATIENT
Start: 2023-01-16

## 2023-01-16 RX ORDER — AMLODIPINE BESYLATE 5 MG/1
5 TABLET ORAL DAILY
Qty: 90 TABLET | Refills: 3 | Status: SHIPPED | OUTPATIENT
Start: 2023-01-16

## 2023-01-16 RX ORDER — FLUOXETINE HYDROCHLORIDE 20 MG/1
20 CAPSULE ORAL DAILY
Qty: 90 CAPSULE | Refills: 3 | Status: SHIPPED | OUTPATIENT
Start: 2023-01-16

## 2023-01-16 RX ORDER — HYDROCHLOROTHIAZIDE 25 MG/1
25 TABLET ORAL DAILY
Qty: 90 TABLET | Refills: 3 | Status: SHIPPED | OUTPATIENT
Start: 2023-01-16

## 2023-01-16 NOTE — PROGRESS NOTES
"Subjective     Summer Jefferson is a 67 y.o.. female.     Pt here today for follow up on HTN, HLD, and DM. Pt stating she is eating healthy, drinking plenty of water and lost about 6 lbs since September. Pt stating she started a new part time job in September. Pt stating she is currently not exercising. Pt stating she has been only taking metformin one pill a day because she can not remember to take second dose later in day. Pt stating her blood sugars have been around 110-170 at home in the am.      The following portions of the patient's history were reviewed and updated as appropriate: allergies, current medications, past family history, past medical history, past social history, past surgical history and problem list.    Past Medical History:   Diagnosis Date   • Anxiety and depression 3/15/2022   • Backache    • Diabetes mellitus type 2, controlled ([facility])    • H/O bone density study     8/2014   • H/O mammogram     8/2014   • HSV (herpes simplex virus) infection 3/15/2022   • Hypercholesterolemia    • Hypertension    • Hypothyroidism    • Osteoarthritis of multiple joints        Past Surgical History:   Procedure Laterality Date   • ANKLE ARTHROSCOPY Right     1/2013 & 10/2013   • COLONOSCOPY      7/27/2007   • LAPAROSCOPIC GASTRIC BANDING  08/01/2007   • PAP SMEAR      12/2014   • ROTATOR CUFF REPAIR Right     5/2014   • TOTAL KNEE ARTHROPLASTY Right     12/2014       Review of Systems   Constitutional: Negative.    HENT: Negative.    Respiratory: Negative.    Cardiovascular: Negative.    Gastrointestinal: Negative.    Genitourinary: Negative.    Neurological: Negative.        No Known Allergies    Objective     Vitals:    01/16/23 0851 01/16/23 0910   BP: 100/70 110/66   Pulse: 92    Resp: 16    Temp: 98.2 °F (36.8 °C)    TempSrc: Temporal    SpO2: 98%    Weight: 85.3 kg (188 lb)    Height: 162 cm (63.78\")      Body mass index is 32.49 kg/m².    Physical Exam  Vitals reviewed.   HENT:      Head: " Normocephalic.   Eyes:      Pupils: Pupils are equal, round, and reactive to light.   Neck:      Vascular: No carotid bruit.   Cardiovascular:      Rate and Rhythm: Normal rate and regular rhythm.      Pulses: Normal pulses.   Pulmonary:      Effort: Pulmonary effort is normal.      Breath sounds: Normal breath sounds.   Musculoskeletal:         General: Normal range of motion.      Right lower leg: No edema.      Left lower leg: No edema.   Skin:     General: Skin is warm and dry.   Neurological:      Mental Status: She is alert and oriented to person, place, and time.   Psychiatric:         Behavior: Behavior normal.           Current Outpatient Medications:   •  amLODIPine (NORVASC) 5 MG tablet, Take 1 tablet by mouth Daily., Disp: 90 tablet, Rfl: 3  •  celecoxib (CeleBREX) 200 MG capsule, Take 1 capsule by mouth 2 (Two) Times a Day., Disp: 180 capsule, Rfl: 3  •  famciclovir (FAMVIR) 500 MG tablet, Take 1 tablet by mouth 2 (Two) Times a Day., Disp: 180 tablet, Rfl: 0  •  FLUoxetine (PROzac) 20 MG capsule, Take 1 capsule by mouth Daily., Disp: 90 capsule, Rfl: 3  •  glucose blood test strip, Check blood glucose once a day, Disp: 100 each, Rfl: 3  •  hydroCHLOROthiazide (HYDRODIURIL) 25 MG tablet, Take 1 tablet by mouth Daily., Disp: 90 tablet, Rfl: 3  •  levothyroxine (SYNTHROID, LEVOTHROID) 50 MCG tablet, Take 1 tablet by mouth Daily., Disp: 90 tablet, Rfl: 3  •  lidocaine (LIDODERM) 5 %, Remove & Discard patch within 12 hours; use as needed for back pain, Disp: 30 patch, Rfl: 1  •  lisinopril (PRINIVIL,ZESTRIL) 10 MG tablet, Take 1 tablet by mouth Daily., Disp: 90 tablet, Rfl: 3  •  metFORMIN ER (GLUCOPHAGE-XR) 500 MG 24 hr tablet, Two tabs daily, Disp: 180 tablet, Rfl: 3  •  OneTouch Delica Lancets 33G misc, Check blood glucose once daily, Disp: 100 each, Rfl: 3  •  rosuvastatin (CRESTOR) 10 MG tablet, Take 1 tablet by mouth Daily., Disp: 90 tablet, Rfl: 3  •  empagliflozin (Jardiance) 25 MG tablet tablet,  Take 1 tablet by mouth Daily., Disp: 90 tablet, Rfl: 2    Recent Results (from the past 2016 hour(s))   Hemoglobin A1c    Collection Time: 01/12/23  8:37 AM    Specimen: Blood   Result Value Ref Range    Hemoglobin A1C 8.00 (H) 4.80 - 5.60 %   Comprehensive Metabolic Panel    Collection Time: 01/12/23  8:37 AM    Specimen: Blood   Result Value Ref Range    Glucose 199 (H) 65 - 99 mg/dL    BUN 13 8 - 23 mg/dL    Creatinine 0.87 0.57 - 1.00 mg/dL    EGFR Result 72.7 >60.0 mL/min/1.73    BUN/Creatinine Ratio 14.9 7.0 - 25.0    Sodium 140 136 - 145 mmol/L    Potassium 4.0 3.5 - 5.2 mmol/L    Chloride 100 98 - 107 mmol/L    Total CO2 28.0 22.0 - 29.0 mmol/L    Calcium 10.3 8.6 - 10.5 mg/dL    Total Protein 7.4 6.0 - 8.5 g/dL    Albumin 4.9 3.5 - 5.2 g/dL    Globulin 2.5 gm/dL    A/G Ratio 2.0 g/dL    Total Bilirubin 0.6 0.0 - 1.2 mg/dL    Alkaline Phosphatase 80 39 - 117 U/L    AST (SGOT) 17 1 - 32 U/L    ALT (SGPT) 18 1 - 33 U/L   Lipid Panel With LDL / HDL Ratio    Collection Time: 01/12/23  8:37 AM    Specimen: Blood   Result Value Ref Range    Total Cholesterol 159 0 - 200 mg/dL    Triglycerides 104 0 - 150 mg/dL    HDL Cholesterol 52 40 - 60 mg/dL    VLDL Cholesterol Gee 19 5 - 40 mg/dL    LDL Chol Calc (NIH) 88 0 - 100 mg/dL    LDL/HDL RATIO 1.66    CBC & Differential    Collection Time: 01/12/23  8:37 AM    Specimen: Blood   Result Value Ref Range    WBC 6.95 3.40 - 10.80 10*3/mm3    RBC 5.42 (H) 3.77 - 5.28 10*6/mm3    Hemoglobin 14.9 12.0 - 15.9 g/dL    Hematocrit 44.5 34.0 - 46.6 %    MCV 82.1 79.0 - 97.0 fL    MCH 27.5 26.6 - 33.0 pg    MCHC 33.5 31.5 - 35.7 g/dL    RDW 13.7 12.3 - 15.4 %    Platelets 275 140 - 450 10*3/mm3    Neutrophil Rel % 67.9 42.7 - 76.0 %    Lymphocyte Rel % 21.7 19.6 - 45.3 %    Monocyte Rel % 8.3 5.0 - 12.0 %    Eosinophil Rel % 1.6 0.3 - 6.2 %    Basophil Rel % 0.4 0.0 - 1.5 %    Neutrophils Absolute 4.71 1.70 - 7.00 10*3/mm3    Lymphocytes Absolute 1.51 0.70 - 3.10 10*3/mm3     Monocytes Absolute 0.58 0.10 - 0.90 10*3/mm3    Eosinophils Absolute 0.11 0.00 - 0.40 10*3/mm3    Basophils Absolute 0.03 0.00 - 0.20 10*3/mm3    Immature Granulocyte Rel % 0.1 0.0 - 0.5 %    Immature Grans Absolute 0.01 0.00 - 0.05 10*3/mm3    nRBC 0.0 0.0 - 0.2 /100 WBC   TSH    Collection Time: 01/12/23  8:37 AM    Specimen: Blood   Result Value Ref Range    TSH 1.450 0.270 - 4.200 uIU/mL         Diagnoses and all orders for this visit:    1. Primary hypertension (Primary)  -     amLODIPine (NORVASC) 5 MG tablet; Take 1 tablet by mouth Daily.  Dispense: 90 tablet; Refill: 3  -     hydroCHLOROthiazide (HYDRODIURIL) 25 MG tablet; Take 1 tablet by mouth Daily.  Dispense: 90 tablet; Refill: 3  -     lisinopril (PRINIVIL,ZESTRIL) 10 MG tablet; Take 1 tablet by mouth Daily.  Dispense: 90 tablet; Refill: 3    2. Hypercholesterolemia  -     rosuvastatin (CRESTOR) 10 MG tablet; Take 1 tablet by mouth Daily.  Dispense: 90 tablet; Refill: 3    3. Controlled type 2 diabetes mellitus with hyperglycemia, without long-term current use of insulin ([facility])  -     empagliflozin (Jardiance) 25 MG tablet tablet; Take 1 tablet by mouth Daily.  Dispense: 90 tablet; Refill: 2  -     metFORMIN ER (GLUCOPHAGE-XR) 500 MG 24 hr tablet; Two tabs daily  Dispense: 180 tablet; Refill: 3  -     OneTouch Delica Lancets 33G misc; Check blood glucose once daily  Dispense: 100 each; Refill: 3  -     glucose blood test strip; Check blood glucose once a day  Dispense: 100 each; Refill: 3    4. Hypothyroidism, adult  -     levothyroxine (SYNTHROID, LEVOTHROID) 50 MCG tablet; Take 1 tablet by mouth Daily.  Dispense: 90 tablet; Refill: 3    5. Anxiety and depression  -     FLUoxetine (PROzac) 20 MG capsule; Take 1 capsule by mouth Daily.  Dispense: 90 capsule; Refill: 3    6. Chronic bilateral low back pain with left-sided sciatica  -     celecoxib (CeleBREX) 200 MG capsule; Take 1 capsule by mouth 2 (Two) Times a Day.  Dispense: 180 capsule; Refill: 3  -      lidocaine (LIDODERM) 5 %; Remove & Discard patch within 12 hours; use as needed for back pain  Dispense: 30 patch; Refill: 1        Patient Instructions   HTN: stable, continue amlodipine 5 mg 1 tab daily, Hctz 25 mg 1 tab daily and lisinopril 10 mg 1 tab daily    HLD: stable, continue rosuvastatin 10 mg 1 tab daily    DM: uncontrolled, increasing Jardiance from 10 to 25 mg 1 tab daily and switching metformin to 500 mg 2 tabs daily (in am).     Hypothyroidism: stable, continue levothyroxine 50 mcg 1 tab daily    Anxiety/depression: stable, continue fluoxetine 20 mg 1 tab daily    Chronic back pain: continue following up with Pain management. Refilling lidocaine patches due to orthopedic MD retiring and pt needing refills. Continue Celebrex as current.       Return for 6 months for fasting CMP/HgA1c, Medicare Wellness and follow up on DM.

## 2023-01-16 NOTE — PATIENT INSTRUCTIONS
HTN: stable, continue amlodipine 5 mg 1 tab daily, Hctz 25 mg 1 tab daily and lisinopril 10 mg 1 tab daily    HLD: stable, continue rosuvastatin 10 mg 1 tab daily    DM: uncontrolled, increasing Jardiance from 10 to 25 mg 1 tab daily and switching metformin to 500 mg 2 tabs daily (in am).     Hypothyroidism: stable, continue levothyroxine 50 mcg 1 tab daily    Anxiety/depression: stable, continue fluoxetine 20 mg 1 tab daily    Chronic back pain: continue following up with Pain management. Refilling lidocaine patches due to orthopedic MD retiring and pt needing refills. Continue Celebrex as current.

## 2023-01-24 ENCOUNTER — HOSPITAL ENCOUNTER (OUTPATIENT)
Dept: GENERAL RADIOLOGY | Facility: HOSPITAL | Age: 68
Discharge: HOME | End: 2023-01-24
Payer: MEDICARE

## 2023-01-24 ENCOUNTER — ANESTHESIA EVENT (OUTPATIENT)
Dept: PAIN MEDICINE | Facility: HOSPITAL | Age: 68
End: 2023-01-24
Payer: MEDICARE

## 2023-01-24 ENCOUNTER — HOSPITAL ENCOUNTER (OUTPATIENT)
Dept: PAIN MEDICINE | Facility: HOSPITAL | Age: 68
Discharge: HOME | End: 2023-01-24
Payer: MEDICARE

## 2023-01-24 ENCOUNTER — ANESTHESIA (OUTPATIENT)
Dept: PAIN MEDICINE | Facility: HOSPITAL | Age: 68
End: 2023-01-24
Payer: MEDICARE

## 2023-01-24 ENCOUNTER — TELEPHONE (OUTPATIENT)
Dept: ORTHOPEDIC SURGERY | Facility: CLINIC | Age: 68
End: 2023-01-24

## 2023-01-24 VITALS
RESPIRATION RATE: 14 BRPM | HEART RATE: 78 BPM | TEMPERATURE: 97.8 F | SYSTOLIC BLOOD PRESSURE: 114 MMHG | DIASTOLIC BLOOD PRESSURE: 70 MMHG | OXYGEN SATURATION: 95 %

## 2023-01-24 DIAGNOSIS — R52 PAIN: ICD-10-CM

## 2023-01-24 DIAGNOSIS — M48.062 SPINAL STENOSIS OF LUMBAR REGION WITH NEUROGENIC CLAUDICATION: ICD-10-CM

## 2023-01-24 PROCEDURE — 0 IOPAMIDOL 41 % SOLUTION: Performed by: ANESTHESIOLOGY

## 2023-01-24 PROCEDURE — 25010000002 DEXAMETHASONE SODIUM PHOSPHATE 10 MG/ML SOLUTION: Performed by: ANESTHESIOLOGY

## 2023-01-24 PROCEDURE — 25010000002 DEXAMETHASONE PER 1 MG: Performed by: ANESTHESIOLOGY

## 2023-01-24 PROCEDURE — 77003 FLUOROGUIDE FOR SPINE INJECT: CPT

## 2023-01-24 PROCEDURE — 25010000002 METHYLPREDNISOLONE PER 80 MG: Performed by: ANESTHESIOLOGY

## 2023-01-24 RX ORDER — LIDOCAINE HYDROCHLORIDE 10 MG/ML
1 INJECTION, SOLUTION INFILTRATION; PERINEURAL ONCE AS NEEDED
Status: DISCONTINUED | OUTPATIENT
Start: 2023-01-24 | End: 2023-01-25 | Stop reason: HOSPADM

## 2023-01-24 RX ORDER — METHYLPREDNISOLONE ACETATE 80 MG/ML
80 INJECTION, SUSPENSION INTRA-ARTICULAR; INTRALESIONAL; INTRAMUSCULAR; SOFT TISSUE ONCE
Status: COMPLETED | OUTPATIENT
Start: 2023-01-24 | End: 2023-01-24

## 2023-01-24 RX ORDER — DEXAMETHASONE SODIUM PHOSPHATE 10 MG/ML
10 INJECTION, SOLUTION INTRAMUSCULAR; INTRAVENOUS ONCE
Status: COMPLETED | OUTPATIENT
Start: 2023-01-24 | End: 2023-01-24

## 2023-01-24 RX ORDER — MIDAZOLAM HYDROCHLORIDE 1 MG/ML
1 INJECTION INTRAMUSCULAR; INTRAVENOUS AS NEEDED
Status: DISCONTINUED | OUTPATIENT
Start: 2023-01-24 | End: 2023-01-25 | Stop reason: HOSPADM

## 2023-01-24 RX ORDER — SODIUM CHLORIDE 0.9 % (FLUSH) 0.9 %
1-10 SYRINGE (ML) INJECTION AS NEEDED
Status: DISCONTINUED | OUTPATIENT
Start: 2023-01-24 | End: 2023-01-25 | Stop reason: HOSPADM

## 2023-01-24 RX ORDER — FENTANYL CITRATE 50 UG/ML
50 INJECTION, SOLUTION INTRAMUSCULAR; INTRAVENOUS AS NEEDED
Status: DISCONTINUED | OUTPATIENT
Start: 2023-01-24 | End: 2023-01-25 | Stop reason: HOSPADM

## 2023-01-24 RX ORDER — DEXAMETHASONE SODIUM PHOSPHATE 4 MG/ML
INJECTION, SOLUTION INTRA-ARTICULAR; INTRALESIONAL; INTRAMUSCULAR; INTRAVENOUS; SOFT TISSUE
Status: COMPLETED | OUTPATIENT
Start: 2023-01-24 | End: 2023-01-24

## 2023-01-24 RX ADMIN — DEXAMETHASONE SODIUM PHOSPHATE 10 MG: 10 INJECTION INTRAMUSCULAR; INTRAVENOUS at 09:03

## 2023-01-24 RX ADMIN — DEXAMETHASONE SODIUM PHOSPHATE 10 MG: 4 INJECTION, SOLUTION INTRA-ARTICULAR; INTRALESIONAL; INTRAMUSCULAR; INTRAVENOUS; SOFT TISSUE at 08:45

## 2023-01-24 RX ADMIN — IOPAMIDOL 10 ML: 408 INJECTION, SOLUTION INTRATHECAL at 08:55

## 2023-01-24 RX ADMIN — METHYLPREDNISOLONE ACETATE 80 MG: 80 INJECTION, SUSPENSION INTRA-ARTICULAR; INTRALESIONAL; INTRAMUSCULAR; SOFT TISSUE at 08:55

## 2023-01-24 NOTE — DISCHARGE INSTRUCTIONS

## 2023-01-24 NOTE — TELEPHONE ENCOUNTER
Caller: CLAUDIO QUINTANA    Relationship to patient: SELF    Best call back number: [phone]    Chief complaint: EPIDURAL     Type of visit: EPIDURAL    Requested date: NA    If rescheduling, when is the original appointment: NA    Additional notes: PATIENT WOULD LIKE A CALL BACK TO DISCUSS HER FUTURE OPTIONS.

## 2023-01-24 NOTE — ANESTHESIA PROCEDURE NOTES
PAIN Epidural block      Patient reassessed immediately prior to procedure    Patient location during procedure: pain clinic  Start Time: 1/24/2023 8:46 AM  Stop Time: 1/24/2023 9:07 AM  Indication:procedure for pain  Performed By  Anesthesiologist: Abelardo Art MD  Preanesthetic Checklist  Completed: patient identified, site marked, risks and benefits discussed, surgical consent, monitors and equipment checked, pre-op evaluation and timeout performed  Additional Notes  Post-Op Diagnosis Codes:     * Spinal stenosis of lumbar region with neurogenic claudication (M48.062)     * Lumbar radiculopathy (M54.16)    The patient was observed in recovery with no evidence of neurological deficits or other problems. All questions were answered. The patient was discharged with appropriate instructions.  Prep:  Pt Position:prone  Sterile Tech:cap, gloves, mask and sterile barrier  Prep:chlorhexidine gluconate and isopropyl alcohol  Monitoring:blood pressure monitoring, continuous pulse oximetry and EKG  Procedure:Sedation: no     Approach:left paramedian  Guidance: fluoroscopy  Location:lumbar  Level:4-5 (Interlaminar)  Needle Type:Tuohy  Needle Gauge:20 G  Aspiration:negative  Medications:  Preservative Free Saline:3mL  Isovue:2mL  Comments:Isovue dye spread was consistent with epidural placement.  Post Assessment:  Dressing:occlusive dressing applied  Pt Tolerance:patient tolerated the procedure well with no apparent complications  Complications:no

## 2023-01-24 NOTE — H&P
[facility]    History and Physical    Patient Name: Summer Jefferson  :  1955  MRN:  [id number]  Date of Admission: 2023    Subjective     The patient is a 67-year-old female who has pain which originates in her lower back and radiates to her left hip and left lower extremity.  She reports approximate 80% improvement following her last injection.  Today she rates her pain as a 4/10.  She is here for lumbar epidural steroid injection #4.    Her most recent lumbar MRI results are as follows:    FINDINGS: There is a scoliotic curvature of the lumbar spine that is  convex to the right at L3-4. The vertebral body heights appear normal.  Diminished T2 disc signal hyperintensity is present throughout the  lumbar spine. The tip of the conus medullaris terminates at T12-L1  level.     At T12-L1, there is 2 mm anterolisthesis T12 on L1. There is a mild disc  bulge eccentric to the right though the central canal and neural  foramina are patent.     At L1-2, there is disc space narrowing and there is a broad disc bulge  eccentric to the right mildly effacing the anterior aspect of the thecal  sac. The neural foramina are patent.     At L2-3, there is 6 mm retrolisthesis of L2 on L3. Uncovered disc bulge  is eccentric to the left and there is bilateral facet arthritis with  facet joint fluid. Mild concentric narrowing is present in the central  canal and there is left greater than right lateral recess narrowing.  There is also moderate narrowing of the left neural foramen and bulging  disc material that extends into the left L2-3 neural foramina,  approaches and potentially contacts the undersurface of the exiting left  L2 nerve along the exiting margin of the left neural foramen. Mild  narrowing is present in the right neural foramen.     At L3-4, there is a mild broad disc bulge eccentric to the right with  mild narrowing of the right lateral recess. Facet arthritis is present.  The neural foramina are  patent.     At L4-5, there is 7 mm anterolisthesis of L4 on L5. Uncovered disc bulge  is present and there is moderate bilateral facet arthritis. Diminished  foraminal height is present and there is mild bilateral foraminal  narrowing. There is also mild to moderate concentric narrowing of the  central canal and lateral recesses.     At L5-S1, there is mild bilateral facet arthritis and there is a mild  disc bulge without foraminal narrowing.    The following portions of the patients history were reviewed and updated as appropriate: current medications, allergies, past medical history, past surgical history, past family history, past social history and problem list                Objective     Past Medical History:   Past Medical History:   Diagnosis Date   • Anxiety and depression 3/15/2022   • Backache    • Diabetes mellitus type 2, controlled ([facility])    • H/O bone density study     8/2014   • H/O mammogram     8/2014   • HSV (herpes simplex virus) infection 3/15/2022   • Hypercholesterolemia    • Hypertension    • Hypothyroidism    • Osteoarthritis of multiple joints      Past Surgical History:   Past Surgical History:   Procedure Laterality Date   • ANKLE ARTHROSCOPY Right     1/2013 & 10/2013   • COLONOSCOPY      7/27/2007   • LAPAROSCOPIC GASTRIC BANDING  08/01/2007   • PAP SMEAR      12/2014   • ROTATOR CUFF REPAIR Right     5/2014   • TOTAL KNEE ARTHROPLASTY Right     12/2014     Family History:   Family History   Problem Relation Age of Onset   • Thyroid disease Mother    • Diabetes Mother    • Hypertension Mother    • Heart disease Mother    • Vision loss Father      Social History:   Social History     Socioeconomic History   • Marital status:    Tobacco Use   • Smoking status: Former   • Smokeless tobacco: Never   • Tobacco comments:     quit 15 yrs ago   Vaping Use   • Vaping Use: Never used   Substance and Sexual Activity   • Alcohol use: Yes     Comment: 1-2 a month   • Drug use: Never   • Sexual  activity: Defer       Vital Signs Range for the last 24 hours  Temperature:     Temp Source:     BP:     Pulse:     Respirations:     SPO2:     O2 Amount (l/min):     O2 Devices     Weight:           --------------------------------------------------------------------------------    Current Outpatient Medications   Medication Sig Dispense Refill   • amLODIPine (NORVASC) 5 MG tablet Take 1 tablet by mouth Daily. 90 tablet 3   • celecoxib (CeleBREX) 200 MG capsule Take 1 capsule by mouth 2 (Two) Times a Day. 180 capsule 3   • empagliflozin (Jardiance) 25 MG tablet tablet Take 1 tablet by mouth Daily. 90 tablet 2   • famciclovir (FAMVIR) 500 MG tablet Take 1 tablet by mouth 2 (Two) Times a Day. 180 tablet 0   • FLUoxetine (PROzac) 20 MG capsule Take 1 capsule by mouth Daily. 90 capsule 3   • glucose blood test strip Check blood glucose once a day 100 each 3   • hydroCHLOROthiazide (HYDRODIURIL) 25 MG tablet Take 1 tablet by mouth Daily. 90 tablet 3   • levothyroxine (SYNTHROID, LEVOTHROID) 50 MCG tablet Take 1 tablet by mouth Daily. 90 tablet 3   • lidocaine (LIDODERM) 5 % Remove & Discard patch within 12 hours; use as needed for back pain 30 patch 1   • lisinopril (PRINIVIL,ZESTRIL) 10 MG tablet Take 1 tablet by mouth Daily. 90 tablet 3   • metFORMIN ER (GLUCOPHAGE-XR) 500 MG 24 hr tablet Two tabs daily 180 tablet 3   • OneTouch Delica Lancets 33G misc Check blood glucose once daily 100 each 3   • rosuvastatin (CRESTOR) 10 MG tablet Take 1 tablet by mouth Daily. 90 tablet 3     No current facility-administered medications for this encounter.       --------------------------------------------------------------------------------  Assessment & Plan      Anesthesia Evaluation     Patient summary reviewed and Nursing notes reviewed   NPO Solid Status: > 8 hours  NPO Liquid Status: > 2 hours           Airway   Mallampati: II  TM distance: >3 FB  Neck ROM: full  Dental - normal exam     Pulmonary - normal exam    breath  sounds clear to auscultation  (+) a smoker Former,   Cardiovascular - normal exam    Rhythm: regular  Rate: normal    (+) hypertension, hyperlipidemia,   (-) angina, orthopnea, PND, MARSHALL      Neuro/Psych  (+) numbness, psychiatric history Anxiety and Depression,    GI/Hepatic/Renal/Endo    (+) obesity,   diabetes mellitus type 2 well controlled, thyroid problem hypothyroidism    Musculoskeletal     Abdominal    Substance History - negative use     OB/GYN negative ob/gyn ROS         Other   arthritis,                 Diagnosis and Plan    Treatment Plan  ASA 3   Patient has had previous injection/procedure with % improvement.   Procedures: Lumbar Epidural Steroid Injection(LESI), With fluoroscopy,       Anesthetic plan and risks discussed with patient.          Diagnosis     * Spinal stenosis of lumbar region with neurogenic claudication [M48.062]

## 2023-02-08 ENCOUNTER — OFFICE VISIT (OUTPATIENT)
Dept: ORTHOPEDIC SURGERY | Facility: CLINIC | Age: 68
End: 2023-02-08
Payer: MEDICARE

## 2023-02-08 VITALS — HEIGHT: 64 IN | BODY MASS INDEX: 31.79 KG/M2 | WEIGHT: 186.2 LBS | TEMPERATURE: 98.6 F

## 2023-02-08 DIAGNOSIS — G89.29 CHRONIC BILATERAL LOW BACK PAIN WITH LEFT-SIDED SCIATICA: Primary | ICD-10-CM

## 2023-02-08 DIAGNOSIS — R52 PAIN: ICD-10-CM

## 2023-02-08 DIAGNOSIS — M54.42 CHRONIC BILATERAL LOW BACK PAIN WITH LEFT-SIDED SCIATICA: Primary | ICD-10-CM

## 2023-02-08 DIAGNOSIS — M43.16 SPONDYLOLISTHESIS OF LUMBAR REGION: ICD-10-CM

## 2023-02-08 PROCEDURE — 99213 OFFICE O/P EST LOW 20 MIN: CPT | Performed by: NURSE PRACTITIONER

## 2023-02-08 PROCEDURE — 72100 X-RAY EXAM L-S SPINE 2/3 VWS: CPT | Performed by: NURSE PRACTITIONER

## 2023-02-08 RX ORDER — LIDOCAINE 50 MG/G
PATCH TOPICAL
Qty: 30 PATCH | Refills: 1 | Status: SHIPPED | OUTPATIENT
Start: 2023-02-08

## 2023-02-08 NOTE — PROGRESS NOTES
Patient Name: Summer Jefferson   YOB: 1955  Referring Primary Care Physician: Ruth Benitez APRN      Chief Complaint:    Chief Complaint   Patient presents with   • Lumbar Spine - Pain, Initial Evaluation        HPI:  Summer Jefferson is a 67 y.o. female who [facility] for follow-up of low back pain referring into the left lateral thigh.  She was initially evaluated by Dr. Marie in May 2022.  At that time MRI was obtained and she was referred for epidurals.  Epidurals are working quite well for her.  She is here to update order.  She has no significant change in her symptoms since last visit.    PFSH:  See attached    ROS: As per HPI, otherwise negative    Objective:    Vitals:    23 0928   Temp: 98.6 °F (37 °C)     Body mass index is 31.96 kg/m².      Physical Exam  Constitutional:       Appearance: She is well-developed and well-nourished.   Eyes:      General: No scleral icterus.  Skin:     General: Skin is intact.   Neurological:      Mental Status: She is alert.      Gait: Gait is intact.   Psychiatric:         Mood and Affect: Mood and affect normal.       Spine Musculoskeletal Exam    Gait    Gait is normal.    Strength    Thoracolumbar      Right      Extensor hallucis longus: 4+/5.       Tibialis posterior: 5/5.       Plantar flexion: 5/5.       Quadriceps: 5/5.       Hamstrin/5.       Hip abductors: 5/5.       Hip flexion: 5/5.       Hip adduction: 5/5.        Left      Extensor hallucis longus: 5/5.       Tibialis posterior: 5/5.       Plantar flexion: 5/5.       Quadriceps: 5/5.       Hamstrin/5.       Hip abductors: 5/5.       Hip flexion: 4+/5.       Hip adduction: 5/5.      Sensory    Thoracolumbar    Thoracolumbar sensation is normal.    Reflexes    Spine reflexes additional comments: Occasional left thigh paraesthesia    General      Constitutional: well-developed and well-nourished    Scleral icterus:  no    Labored breathing: no    Psychiatric: normal mood and affect and no acute distress    Neurological: alert and oriented x3    Skin: intact        IMAGING:     Indication: pain related symptoms,  Views: 2V AP&LAT lumbar  Findings: Reviewed and reveals slight dextroscoliosis, retrolisthesis L2 on L3, anterolisthesis L4 in respect L3 and L5, no fractures noted, degenerative change throughout most notable L1-L2 with anterior osteophyte  Comparison views: No significant change from prior film 5/17/2022    Assessment:           Diagnoses and all orders for this visit:    1. Chronic bilateral low back pain with left-sided sciatica (Primary)  -     lidocaine (LIDODERM) 5 %; Remove & Discard patch within 12 hours; use as needed for back pain  Dispense: 30 patch; Refill: 1  -     Epidural Block    2. Spondylolisthesis of lumbar region  -     Epidural Block    3. Pain  -     XR Spine Lumbar 2 or 3 View             Plan:  We will continue lumbar epidurals as is since symptoms have not changed and she has responded very well.  She has had therapy in the past, advised to continue exercises long-term.  Her hope is to avoid surgery lifelong, we will certainly try to help her along.  She understands to call if symptoms change dramatically or things fail to respond to the epidurals.  Follow-up as needed.      Return if symptoms worsen or fail to improve.

## 2023-03-20 ENCOUNTER — OFFICE VISIT (OUTPATIENT)
Dept: FAMILY MEDICINE CLINIC | Facility: CLINIC | Age: 68
End: 2023-03-20
Payer: MEDICARE

## 2023-03-20 VITALS
HEART RATE: 80 BPM | RESPIRATION RATE: 16 BRPM | TEMPERATURE: 96.7 F | SYSTOLIC BLOOD PRESSURE: 116 MMHG | WEIGHT: 188 LBS | BODY MASS INDEX: 32.1 KG/M2 | DIASTOLIC BLOOD PRESSURE: 60 MMHG | OXYGEN SATURATION: 97 % | HEIGHT: 64 IN

## 2023-03-20 DIAGNOSIS — E66.09 CLASS 1 OBESITY DUE TO EXCESS CALORIES WITH BODY MASS INDEX (BMI) OF 32.0 TO 32.9 IN ADULT, UNSPECIFIED WHETHER SERIOUS COMORBIDITY PRESENT: ICD-10-CM

## 2023-03-20 DIAGNOSIS — E11.65 CONTROLLED TYPE 2 DIABETES MELLITUS WITH HYPERGLYCEMIA, WITHOUT LONG-TERM CURRENT USE OF INSULIN: Primary | Chronic | ICD-10-CM

## 2023-03-20 DIAGNOSIS — M54.42 CHRONIC BILATERAL LOW BACK PAIN WITH LEFT-SIDED SCIATICA: ICD-10-CM

## 2023-03-20 DIAGNOSIS — G89.29 CHRONIC BILATERAL LOW BACK PAIN WITH LEFT-SIDED SCIATICA: ICD-10-CM

## 2023-03-20 LAB
EXPIRATION DATE: NORMAL
GLUCOSE BLDC GLUCOMTR-MCNC: 148 MG/DL (ref 70–130)
HBA1C MFR BLD: 7.9 %
Lab: NORMAL

## 2023-03-20 NOTE — PATIENT INSTRUCTIONS
HgA1c today 7.9, was 8.0 two months ago; blood glucose 148 today ws 199 two months ago; Jardiance was increased from 10 mg to 25 mg two months ago. Discussed all diabetes medication options. Patient not a good candidate for the GLP1 and GIP medication groups due to the risk of thyroid tumors/thyroid cancer. Patient already on jardiance-SGLT2 and metformin-biguanides. Recommend continuing on metformin 500 mg two tabs daily and Jardiance 25 mg daily; continue to work on eating a heart healthy diabetic diet and drinking plenty of water through out day. Patient does not need refills today. Will refill when needed. Call pharmacy for request.     Discussed dry needling for back; Patient to consider and then further discuss with her pain management and ortho. Provider.

## 2023-03-20 NOTE — PROGRESS NOTES
"Subjective     Summer Jefferson is a 67 y.o.. female.     Patient here today for follow up on diabetes. Patient concerned with weight. Patient stating she is still unable to exercise due to her on going chronic back pain. Patient stating her last epidural injection did not help back pain and now she has to wait till July for next injection. Patient wanting to talk about new diabetes medications that can help with weight loss. Patient is already on metformin and Jardiance.       The following portions of the patient's history were reviewed and updated as appropriate: allergies, current medications, past family history, past medical history, past social history, past surgical history and problem list.    Past Medical History:   Diagnosis Date   • Anxiety and depression 3/15/2022   • Backache    • Diabetes mellitus type 2, controlled ([facility])    • H/O bone density study     8/2014   • H/O mammogram     8/2014   • HSV (herpes simplex virus) infection 3/15/2022   • Hypercholesterolemia    • Hypertension    • Hypothyroidism    • Osteoarthritis of multiple joints        Past Surgical History:   Procedure Laterality Date   • ANKLE ARTHROSCOPY Right     1/2013 & 10/2013   • COLONOSCOPY      7/27/2007   • LAPAROSCOPIC GASTRIC BANDING  08/01/2007   • PAP SMEAR      12/2014   • ROTATOR CUFF REPAIR Right     5/2014   • TOTAL KNEE ARTHROPLASTY Right     12/2014       Review of Systems   Constitutional: Negative.    Respiratory: Negative.    Cardiovascular: Negative.    Gastrointestinal: Negative.        No Known Allergies    Objective     Vitals:    03/20/23 0956 03/20/23 1005   BP: 116/60    Pulse: 80    Resp: 16    Temp: 96.7 °F (35.9 °C)    SpO2: 90% 97%   Weight: 85.3 kg (188 lb)    Height: 162.6 cm (64.02\")      Body mass index is 32.25 kg/m².    Physical Exam  Vitals reviewed.   HENT:      Head: Normocephalic.   Eyes:      Pupils: Pupils are equal, round, and reactive to light.   Cardiovascular:      Rate and Rhythm: " Normal rate and regular rhythm.   Pulmonary:      Effort: Pulmonary effort is normal.      Breath sounds: Normal breath sounds.   Musculoskeletal:         General: Normal range of motion.   Skin:     General: Skin is warm and dry.   Neurological:      Mental Status: She is alert and oriented to person, place, and time.   Psychiatric:         Behavior: Behavior normal.           Current Outpatient Medications:   •  amLODIPine (NORVASC) 5 MG tablet, Take 1 tablet by mouth Daily., Disp: 90 tablet, Rfl: 3  •  celecoxib (CeleBREX) 200 MG capsule, Take 1 capsule by mouth 2 (Two) Times a Day., Disp: 180 capsule, Rfl: 3  •  empagliflozin (Jardiance) 25 MG tablet tablet, Take 1 tablet by mouth Daily., Disp: 90 tablet, Rfl: 2  •  famciclovir (FAMVIR) 500 MG tablet, Take 1 tablet by mouth 2 (Two) Times a Day., Disp: 180 tablet, Rfl: 0  •  FLUoxetine (PROzac) 20 MG capsule, Take 1 capsule by mouth Daily., Disp: 90 capsule, Rfl: 3  •  glucose blood test strip, Check blood glucose once a day, Disp: 100 each, Rfl: 3  •  hydroCHLOROthiazide (HYDRODIURIL) 25 MG tablet, Take 1 tablet by mouth Daily., Disp: 90 tablet, Rfl: 3  •  levothyroxine (SYNTHROID, LEVOTHROID) 50 MCG tablet, Take 1 tablet by mouth Daily., Disp: 90 tablet, Rfl: 3  •  lidocaine (LIDODERM) 5 %, Remove & Discard patch within 12 hours; use as needed for back pain, Disp: 30 patch, Rfl: 1  •  lisinopril (PRINIVIL,ZESTRIL) 10 MG tablet, Take 1 tablet by mouth Daily., Disp: 90 tablet, Rfl: 3  •  metFORMIN ER (GLUCOPHAGE-XR) 500 MG 24 hr tablet, Two tabs daily, Disp: 180 tablet, Rfl: 3  •  OneTouch Delica Lancets 33G misc, Check blood glucose once daily, Disp: 100 each, Rfl: 3  •  rosuvastatin (CRESTOR) 10 MG tablet, Take 1 tablet by mouth Daily., Disp: 90 tablet, Rfl: 3    Recent Results (from the past 168 hour(s))   POCT Glucose    Collection Time: 03/20/23 10:26 AM    Specimen: Blood   Result Value Ref Range    Glucose 148 (A) 70 - 130 mg/dL   POC Glycosylated  Hemoglobin (Hb A1C)    Collection Time: 03/20/23 10:43 AM    Specimen: Blood   Result Value Ref Range    Hemoglobin A1C 7.9 %    Lot Number [id number]     Expiration Date 4,302,025          Diagnoses and all orders for this visit:    1. Controlled type 2 diabetes mellitus with hyperglycemia, without long-term current use of insulin (HCC) (Primary)  -     Cancel: Hemoglobin A1c  -     POCT Glucose  -     POC Glycosylated Hemoglobin (Hb A1C)    2. Class 1 obesity due to excess calories with body mass index (BMI) of 32.0 to 32.9 in adult, unspecified whether serious comorbidity present    3. Chronic bilateral low back pain with left-sided sciatica        Patient Instructions   HgA1c today 7.9, was 8.0 two months ago; blood glucose 148 today ws 199 two months ago; Jardiance was increased from 10 mg to 25 mg two months ago. Discussed all diabetes medication options. Patient not a good candidate for the GLP1 and GIP medication groups due to the risk of thyroid tumors/thyroid cancer. Patient already on jardiance-SGLT2 and metformin-biguanides. Recommend continuing on metformin 500 mg two tabs daily and Jardiance 25 mg daily; continue to work on eating a heart healthy diabetic diet and drinking plenty of water through out day. Patient does not need refills today. Will refill when needed. Call pharmacy for request.     Discussed dry needling for back; Patient to consider and then further discuss with her pain management and ortho. Provider.       Return for rto in July for fasting labs and then follow up appt.

## 2023-03-27 ENCOUNTER — OFFICE VISIT (OUTPATIENT)
Dept: FAMILY MEDICINE CLINIC | Facility: CLINIC | Age: 68
End: 2023-03-27
Payer: MEDICARE

## 2023-03-27 VITALS
OXYGEN SATURATION: 98 % | TEMPERATURE: 98.6 F | HEIGHT: 64 IN | SYSTOLIC BLOOD PRESSURE: 118 MMHG | BODY MASS INDEX: 32.1 KG/M2 | RESPIRATION RATE: 16 BRPM | DIASTOLIC BLOOD PRESSURE: 64 MMHG | HEART RATE: 90 BPM | WEIGHT: 188 LBS

## 2023-03-27 DIAGNOSIS — H65.01 RIGHT ACUTE SEROUS OTITIS MEDIA, RECURRENCE NOT SPECIFIED: ICD-10-CM

## 2023-03-27 DIAGNOSIS — R05.1 ACUTE COUGH: ICD-10-CM

## 2023-03-27 DIAGNOSIS — J06.9 ACUTE URI: Primary | ICD-10-CM

## 2023-03-27 PROCEDURE — 3074F SYST BP LT 130 MM HG: CPT | Performed by: NURSE PRACTITIONER

## 2023-03-27 PROCEDURE — 3051F HG A1C>EQUAL 7.0%<8.0%: CPT | Performed by: NURSE PRACTITIONER

## 2023-03-27 PROCEDURE — 3078F DIAST BP <80 MM HG: CPT | Performed by: NURSE PRACTITIONER

## 2023-03-27 PROCEDURE — 99213 OFFICE O/P EST LOW 20 MIN: CPT | Performed by: NURSE PRACTITIONER

## 2023-03-27 RX ORDER — AZELASTINE 1 MG/ML
1 SPRAY, METERED NASAL 2 TIMES DAILY
Qty: 1 EACH | Refills: 0 | Status: SHIPPED | OUTPATIENT
Start: 2023-03-27 | End: 2023-04-10

## 2023-03-27 RX ORDER — AZITHROMYCIN 250 MG/1
TABLET, FILM COATED ORAL
Qty: 6 TABLET | Refills: 0 | OUTPATIENT
Start: 2023-03-27 | End: 2023-04-01

## 2023-03-27 RX ORDER — BENZONATATE 100 MG/1
100 CAPSULE ORAL 3 TIMES DAILY PRN
Qty: 30 CAPSULE | Refills: 0 | Status: SHIPPED | OUTPATIENT
Start: 2023-03-27

## 2023-03-27 NOTE — PATIENT INSTRUCTIONS
Drink plenty of fluids-water preferably, eat a heart healthy diet, get plenty of sleep and do warm salt water gargles twice a day until feeling better.

## 2023-03-27 NOTE — PROGRESS NOTES
"Mitzi Jefferson is a 67 y.o.. female.     URI   This is a new problem. Episode onset: 2 days. The problem has been unchanged. There has been no fever. Associated symptoms include coughing and headaches. Pertinent negatives include no congestion, diarrhea, ear pain, nausea, plugged ear sensation, rhinorrhea, sore throat or vomiting.       The following portions of the patient's history were reviewed and updated as appropriate: allergies, current medications, past family history, past medical history, past social history, past surgical history and problem list.    Past Medical History:   Diagnosis Date   • Anxiety and depression 3/15/2022   • Backache    • Diabetes mellitus type 2, controlled ([facility])    • H/O bone density study     8/2014   • H/O mammogram     8/2014   • HSV (herpes simplex virus) infection 3/15/2022   • Hypercholesterolemia    • Hypertension    • Hypothyroidism    • Osteoarthritis of multiple joints        Past Surgical History:   Procedure Laterality Date   • ANKLE ARTHROSCOPY Right     1/2013 & 10/2013   • COLONOSCOPY      7/27/2007   • LAPAROSCOPIC GASTRIC BANDING  08/01/2007   • PAP SMEAR      12/2014   • ROTATOR CUFF REPAIR Right     5/2014   • TOTAL KNEE ARTHROPLASTY Right     12/2014       Review of Systems   Constitutional: Negative for fever.   HENT: Negative for congestion, ear pain, postnasal drip, rhinorrhea and sore throat.    Respiratory: Positive for cough and shortness of breath (with cough).    Gastrointestinal: Negative for diarrhea, nausea and vomiting.   Neurological: Positive for headaches.       No Known Allergies    Objective     Vitals:    03/27/23 1602   BP: 118/64   Pulse: 90   Resp: 16   Temp: 98.6 °F (37 °C)   SpO2: 98%   Weight: 85.3 kg (188 lb)   Height: 162.6 cm (64.02\")     Body mass index is 32.25 kg/m².    Physical Exam  Vitals reviewed.   Constitutional:       Appearance: She is well-developed.   HENT:      Head: Normocephalic and atraumatic.      " Right Ear: A middle ear effusion (clear fluid) is present. Tympanic membrane is not erythematous.      Left Ear: Tympanic membrane normal. Tympanic membrane is not erythematous.      Nose: Congestion present.      Mouth/Throat:      Mouth: Mucous membranes are moist.      Pharynx: Oropharyngeal exudate (clear pnd) present. No pharyngeal swelling or posterior oropharyngeal erythema.   Eyes:      Conjunctiva/sclera: Conjunctivae normal.      Pupils: Pupils are equal, round, and reactive to light.   Cardiovascular:      Rate and Rhythm: Normal rate and regular rhythm.      Heart sounds: No murmur heard.  Pulmonary:      Effort: Pulmonary effort is normal. No accessory muscle usage or respiratory distress.      Breath sounds: No decreased breath sounds, wheezing, rhonchi or rales.   Musculoskeletal:         General: Normal range of motion.   Lymphadenopathy:      Cervical: Cervical adenopathy (shotty) present.   Skin:     General: Skin is warm and dry.   Neurological:      Mental Status: She is alert and oriented to person, place, and time.           Current Outpatient Medications:   •  amLODIPine (NORVASC) 5 MG tablet, Take 1 tablet by mouth Daily., Disp: 90 tablet, Rfl: 3  •  celecoxib (CeleBREX) 200 MG capsule, Take 1 capsule by mouth 2 (Two) Times a Day., Disp: 180 capsule, Rfl: 3  •  empagliflozin (Jardiance) 25 MG tablet tablet, Take 1 tablet by mouth Daily., Disp: 90 tablet, Rfl: 2  •  famciclovir (FAMVIR) 500 MG tablet, Take 1 tablet by mouth 2 (Two) Times a Day., Disp: 180 tablet, Rfl: 0  •  FLUoxetine (PROzac) 20 MG capsule, Take 1 capsule by mouth Daily., Disp: 90 capsule, Rfl: 3  •  glucose blood test strip, Check blood glucose once a day, Disp: 100 each, Rfl: 3  •  hydroCHLOROthiazide (HYDRODIURIL) 25 MG tablet, Take 1 tablet by mouth Daily., Disp: 90 tablet, Rfl: 3  •  levothyroxine (SYNTHROID, LEVOTHROID) 50 MCG tablet, Take 1 tablet by mouth Daily., Disp: 90 tablet, Rfl: 3  •  lidocaine (LIDODERM) 5 %,  Remove & Discard patch within 12 hours; use as needed for back pain, Disp: 30 patch, Rfl: 1  •  lisinopril (PRINIVIL,ZESTRIL) 10 MG tablet, Take 1 tablet by mouth Daily., Disp: 90 tablet, Rfl: 3  •  metFORMIN ER (GLUCOPHAGE-XR) 500 MG 24 hr tablet, Two tabs daily, Disp: 180 tablet, Rfl: 3  •  OneTouch Delica Lancets 33G misc, Check blood glucose once daily, Disp: 100 each, Rfl: 3  •  rosuvastatin (CRESTOR) 10 MG tablet, Take 1 tablet by mouth Daily., Disp: 90 tablet, Rfl: 3  •  azelastine (ASTELIN) 0.1 % nasal spray, 1 spray into the nostril(s) as directed by provider 2 (Two) Times a Day for 14 days. Use in each nostril as directed, Disp: 1 each, Rfl: 0  •  azithromycin (Zithromax) 250 MG tablet, Take 2 tablets the first day, then 1 tablet daily for 4 days., Disp: 6 tablet, Rfl: 0  •  benzonatate (Tessalon Perles) 100 MG capsule, Take 1 capsule by mouth 3 (Three) Times a Day As Needed for Cough., Disp: 30 capsule, Rfl: 0            Diagnoses and all orders for this visit:    1. Acute URI (Primary)    2. Acute cough  -     benzonatate (Tessalon Perles) 100 MG capsule; Take 1 capsule by mouth 3 (Three) Times a Day As Needed for Cough.  Dispense: 30 capsule; Refill: 0    3. Right acute serous otitis media, recurrence not specified  -     azithromycin (Zithromax) 250 MG tablet; Take 2 tablets the first day, then 1 tablet daily for 4 days.  Dispense: 6 tablet; Refill: 0  -     azelastine (ASTELIN) 0.1 % nasal spray; 1 spray into the nostril(s) as directed by provider 2 (Two) Times a Day for 14 days. Use in each nostril as directed  Dispense: 1 each; Refill: 0        Patient Instructions   Drink plenty of fluids-water preferably, eat a heart healthy diet, get plenty of sleep and do warm salt water gargles twice a day until feeling better.      Return if symptoms worsen or fail to improve.

## 2023-05-09 ENCOUNTER — TELEPHONE (OUTPATIENT)
Dept: FAMILY MEDICINE CLINIC | Facility: CLINIC | Age: 68
End: 2023-05-09

## 2023-05-09 NOTE — TELEPHONE ENCOUNTER
Caller: Summer Jefferson    Relationship: Self    Best call back number: [phone]    What is the medical concern/diagnosis: BACK SURGERY    What specialty or service is being requested: NEUROSURGERY    What is the provider, practice or medical service name: DR. RUIZ    What is the office location: [address] [place] [address]    What is the office phone number: [phone]    Any additional details: PATIENT STATES HER PREVIOUS PROVIDER RETIRED AND WAS RECOMMENDED DR. RUIZ, BUT WAS INFORMED SHE IS NEEDING A REFERRAL.     PLEASE ADVISE.

## 2023-05-10 DIAGNOSIS — M47.816 FACET ARTHROPATHY, LUMBAR: ICD-10-CM

## 2023-05-10 DIAGNOSIS — M54.42 CHRONIC BILATERAL LOW BACK PAIN WITH LEFT-SIDED SCIATICA: Primary | ICD-10-CM

## 2023-05-10 DIAGNOSIS — G89.29 CHRONIC BILATERAL LOW BACK PAIN WITH LEFT-SIDED SCIATICA: Primary | ICD-10-CM

## 2023-05-19 ENCOUNTER — TELEPHONE (OUTPATIENT)
Dept: FAMILY MEDICINE CLINIC | Facility: CLINIC | Age: 68
End: 2023-05-19

## 2023-05-19 NOTE — TELEPHONE ENCOUNTER
Caller: Summer Jefferson    Relationship: Self    Best call back number: [phone]    What medication are you requesting: MOUNJARO, OZEMPIC, OR WHATEVER GILBERT [facility]ONDS RECOMMENDS     What are your current symptoms: DIABETES, TROUBLE LOSING WEIGHT    How long have you been experiencing symptoms: ONGOING    Have you had these symptoms before:    [x] Yes  [] No    Have you been treated for these symptoms before:   [x] Yes  [] No    If a prescription is needed, what is your preferred pharmacy and phone number: [facility] [id number] [place], [place] - [address] [facility] AT [facility] & [address] - [phone] [facility] [phone] FX

## 2023-05-23 NOTE — TELEPHONE ENCOUNTER
Called and spoke with pt regarding estelita's note, pt has an appt in July and will discuss at that time.

## 2023-06-12 ENCOUNTER — TELEPHONE (OUTPATIENT)
Dept: ORTHOPEDIC SURGERY | Facility: CLINIC | Age: 68
End: 2023-06-12

## 2023-06-12 NOTE — TELEPHONE ENCOUNTER
I called pt and advised her of above msg per SRS. Pt stated the PT and injections are not anymore and that's why she talked to her PCP and was referred out to Dr. Whittaker. Pt wanted to get SRS views on Dr. Whittaker? I advised pt again of SRS msg and that Dr. Whittaker is at [address] [facility] [address] with SRS the 1st Friday of every mth.  Pt verbally understanding

## 2023-06-12 NOTE — TELEPHONE ENCOUNTER
Caller: Summer Jefferson    Relationship: Self    Best call back number:     Who are you requesting to speak with (clinical staff, provider,  specific staff member): LESLIE RODRIGUEZ    What was the call regarding: THE PATIENT WOULD LIKE TO SPEAK WITH LESLIE RODRIGUEZ ABOUT HER UPCOMING APPT WITH DR RUIZ.    Is it okay if the provider responds through MyChart: NO, PT PREFERS A CALL

## 2023-07-14 PROBLEM — M46.1 SI (SACROILIAC) JOINT INFLAMMATION: Status: ACTIVE | Noted: 2023-07-14

## 2023-07-14 PROBLEM — M54.16 LUMBAR RADICULOPATHY: Status: ACTIVE | Noted: 2023-07-14

## 2023-07-14 PROBLEM — M43.16 SPONDYLOLISTHESIS, LUMBAR REGION: Status: ACTIVE | Noted: 2023-07-14

## 2023-07-24 ENCOUNTER — OFFICE VISIT (OUTPATIENT)
Dept: FAMILY MEDICINE CLINIC | Facility: CLINIC | Age: 68
End: 2023-07-24
Payer: MEDICARE

## 2023-07-24 VITALS
RESPIRATION RATE: 15 BRPM | TEMPERATURE: 98.9 F | DIASTOLIC BLOOD PRESSURE: 70 MMHG | HEIGHT: 64 IN | BODY MASS INDEX: 31.6 KG/M2 | WEIGHT: 185.1 LBS | SYSTOLIC BLOOD PRESSURE: 124 MMHG | OXYGEN SATURATION: 96 % | HEART RATE: 72 BPM

## 2023-07-24 DIAGNOSIS — F41.9 ANXIETY AND DEPRESSION: ICD-10-CM

## 2023-07-24 DIAGNOSIS — E66.09 CLASS 1 OBESITY DUE TO EXCESS CALORIES WITH SERIOUS COMORBIDITY AND BODY MASS INDEX (BMI) OF 31.0 TO 31.9 IN ADULT: ICD-10-CM

## 2023-07-24 DIAGNOSIS — E11.65 CONTROLLED TYPE 2 DIABETES MELLITUS WITH HYPERGLYCEMIA, WITHOUT LONG-TERM CURRENT USE OF INSULIN: Primary | Chronic | ICD-10-CM

## 2023-07-24 DIAGNOSIS — E03.9 HYPOTHYROIDISM, ADULT: Chronic | ICD-10-CM

## 2023-07-24 DIAGNOSIS — E78.00 HYPERCHOLESTEROLEMIA: Chronic | ICD-10-CM

## 2023-07-24 DIAGNOSIS — F32.A ANXIETY AND DEPRESSION: ICD-10-CM

## 2023-07-24 DIAGNOSIS — I10 PRIMARY HYPERTENSION: Chronic | ICD-10-CM

## 2023-07-24 NOTE — PATIENT INSTRUCTIONS
Diabetes/obesity: glucose 163 and HgA1c 8.10. recommending addition to Patient's metformin 1000 mg daily and Jardiance 25 mg daily. Discussed Januvia. Patient requesting waiting on Januvia. Patient wanting to work on diet. Recommend Patient to continue to work on eating a heart healthy diabetic diet, drink plenty of water with goal 64 oz a day and start exercise routine 2-3 times a week, for more than 30 minutes at a time.Patient does not need refills today. Will refill when needed. Call pharmacy for request.    Hypertension: stable, continue amlodipine 5 mg 1 tab daily, hctz 25 mg 1 tab daily, lisinopril 10 mg 1 tab daily. Patient does not need refills today. Will refill when needed. Call pharmacy for request.    Hyperlipidemia: stable, continue rosuvastatin 10 mg 1 tab daily. Patient does not need refills today. Will refill when needed. Call pharmacy for request.    Hypothyroidism: stable, continue levothyroxine 50 mcg 1 tab daily. Patient does not need refills today. Will refill when needed. Call pharmacy for request.    Anxiety/depression: stable, continue fluoxetine 20 mg 1 tab daily. Patient does not need refills today. Will refill when needed. Call pharmacy for request.

## 2023-07-24 NOTE — PROGRESS NOTES
Subjective     Summer Jefferson is a 67 y.o.. female.     Patient here today for follow up on diabetes, hypertension, hyperlipidemia, hypothyroidism, anxiety and depression. Patient stating she is not eating healthy, is drinking water and is not exercising. Patient stating she has decided to go ahead with back  surgery and was recently started on gabapentin for her back pain. Patient stating gabapentin is helping some with nerve pain. Patient stating she is dong physical therapy twice a week.     PHQ-9 Depression Screening  Little interest or pleasure in doing things?  0   Feeling down, depressed, or hopeless?  0   Trouble falling or staying asleep, or sleeping too much?     Feeling tired or having little energy?     Poor appetite or overeating?     Feeling bad about yourself - or that you are a failure or have let yourself or your family down?     Trouble concentrating on things, such as reading the newspaper or watching television?     Moving or speaking so slowly that other people could have noticed? Or the opposite - being so fidgety or restless that you have been moving around a lot more than usual?     Thoughts that you would be better off dead, or of hurting yourself in some way?     PHQ-9 Total Score  0   If you checked off any problems, how difficult have these problems made it for you to do your work, take care of things at home, or get along with other people?        The following portions of the patient's history were reviewed and updated as appropriate: allergies, current medications, past family history, past medical history, past social history, past surgical history and problem list.    Past Medical History:   Diagnosis Date    Anxiety and depression 03/15/2022    Backache     Diabetes mellitus type 2, controlled     H/O bone density study     8/2014    H/O mammogram     8/2014    HSV (herpes simplex virus) infection 03/15/2022    Hypercholesterolemia     Hypertension     Hypothyroidism     Low  "back pain     Osteoarthritis of multiple joints        Past Surgical History:   Procedure Laterality Date    ANKLE ARTHROSCOPY Right     1/2013 & 10/2013    COLONOSCOPY      7/27/2007    EPIDURAL BLOCK      LAPAROSCOPIC GASTRIC BANDING  08/01/2007    PAP SMEAR      12/2014    ROTATOR CUFF REPAIR Right     5/2014    TOTAL KNEE ARTHROPLASTY Right     12/2014       Review of Systems   Constitutional: Negative.    Respiratory: Negative.     Cardiovascular: Negative.    Musculoskeletal:  Positive for back pain.   Neurological: Negative.    Psychiatric/Behavioral: Negative.  Negative for dysphoric mood, self-injury, sleep disturbance and suicidal ideas. The patient is not nervous/anxious.      No Known Allergies    Objective     Vitals:    07/24/23 0856   BP: 124/70   BP Location: Right arm   Patient Position: Sitting   Cuff Size: Adult   Pulse: 72   Resp: 15   Temp: 98.9 °F (37.2 °C)   TempSrc: Oral   SpO2: 96%   Weight: 84 kg (185 lb 1.6 oz)   Height: 162.6 cm (64.02\")     Body mass index is 31.76 kg/m².    Physical Exam  Vitals reviewed.   HENT:      Head: Normocephalic.   Eyes:      Pupils: Pupils are equal, round, and reactive to light.   Neck:      Vascular: No carotid bruit.   Cardiovascular:      Rate and Rhythm: Normal rate and regular rhythm.      Pulses: Normal pulses.   Pulmonary:      Effort: Pulmonary effort is normal.      Breath sounds: Normal breath sounds.   Musculoskeletal:         General: Normal range of motion.   Skin:     General: Skin is warm and dry.   Neurological:      Mental Status: She is alert and oriented to person, place, and time.   Psychiatric:         Behavior: Behavior normal.         Current Outpatient Medications:     amLODIPine (NORVASC) 5 MG tablet, Take 1 tablet by mouth Daily., Disp: 90 tablet, Rfl: 3    celecoxib (CeleBREX) 200 MG capsule, Take 1 capsule by mouth 2 (Two) Times a Day., Disp: 180 capsule, Rfl: 3    empagliflozin (Jardiance) 25 MG tablet tablet, Take 1 tablet by " mouth Daily., Disp: 90 tablet, Rfl: 2    famciclovir (FAMVIR) 500 MG tablet, Take 1 tablet by mouth 2 (Two) Times a Day., Disp: 180 tablet, Rfl: 0    FLUoxetine (PROzac) 20 MG capsule, Take 1 capsule by mouth Daily., Disp: 90 capsule, Rfl: 3    gabapentin (NEURONTIN) 100 MG capsule, Take 1 capsule by mouth 3 (Three) Times a Day for 7 days, THEN 2 capsules 3 (Three) Times a Day for 7 days, THEN 3 capsules 3 (Three) Times a Day for 76 days., Disp: 250 capsule, Rfl: 3    glucose blood test strip, Check blood glucose once a day, Disp: 100 each, Rfl: 3    hydroCHLOROthiazide (HYDRODIURIL) 25 MG tablet, Take 1 tablet by mouth Daily., Disp: 90 tablet, Rfl: 3    levothyroxine (SYNTHROID, LEVOTHROID) 50 MCG tablet, Take 1 tablet by mouth Daily., Disp: 90 tablet, Rfl: 3    lidocaine (LIDODERM) 5 %, Remove & Discard patch within 12 hours; use as needed for back pain, Disp: 30 patch, Rfl: 1    lisinopril (PRINIVIL,ZESTRIL) 10 MG tablet, Take 1 tablet by mouth Daily., Disp: 90 tablet, Rfl: 3    metFORMIN ER (GLUCOPHAGE-XR) 500 MG 24 hr tablet, Two tabs daily, Disp: 180 tablet, Rfl: 3    OneTouch Delica Lancets 33G misc, Check blood glucose once daily, Disp: 100 each, Rfl: 3    rosuvastatin (CRESTOR) 10 MG tablet, Take 1 tablet by mouth Daily., Disp: 90 tablet, Rfl: 3    Recent Results (from the past 2016 hour(s))   POC Glucose Once    Collection Time: 07/07/23  8:35 AM    Specimen: Blood   Result Value Ref Range    Glucose 186 (H) 70 - 130 mg/dL   CBC & Differential    Collection Time: 07/19/23  8:31 AM    Specimen: Blood   Result Value Ref Range    WBC 6.75 3.40 - 10.80 10*3/mm3    RBC 5.47 (H) 3.77 - 5.28 10*6/mm3    Hemoglobin 15.4 12.0 - 15.9 g/dL    Hematocrit 46.5 34.0 - 46.6 %    MCV 85.0 79.0 - 97.0 fL    MCH 28.2 26.6 - 33.0 pg    MCHC 33.1 31.5 - 35.7 g/dL    RDW 13.3 12.3 - 15.4 %    Platelets 287 140 - 450 10*3/mm3    Neutrophil Rel % 63.1 42.7 - 76.0 %    Lymphocyte Rel % 21.5 19.6 - 45.3 %    Monocyte Rel % 9.2 5.0  - 12.0 %    Eosinophil Rel % 5.2 0.3 - 6.2 %    Basophil Rel % 0.7 0.0 - 1.5 %    Neutrophils Absolute 4.26 1.70 - 7.00 10*3/mm3    Lymphocytes Absolute 1.45 0.70 - 3.10 10*3/mm3    Monocytes Absolute 0.62 0.10 - 0.90 10*3/mm3    Eosinophils Absolute 0.35 0.00 - 0.40 10*3/mm3    Basophils Absolute 0.05 0.00 - 0.20 10*3/mm3    Immature Granulocyte Rel % 0.3 0.0 - 0.5 %    Immature Grans Absolute 0.02 0.00 - 0.05 10*3/mm3    nRBC 0.0 0.0 - 0.2 /100 WBC   Comprehensive Metabolic Panel    Collection Time: 07/19/23  8:31 AM    Specimen: Blood   Result Value Ref Range    Glucose 163 (H) 65 - 99 mg/dL    BUN 16 8 - 23 mg/dL    Creatinine 0.89 0.57 - 1.00 mg/dL    EGFR Result 70.7 >60.0 mL/min/1.73    BUN/Creatinine Ratio 18.0 7.0 - 25.0    Sodium 142 136 - 145 mmol/L    Potassium 4.8 3.5 - 5.2 mmol/L    Chloride 104 98 - 107 mmol/L    Total CO2 27.2 22.0 - 29.0 mmol/L    Calcium 10.3 8.6 - 10.5 mg/dL    Total Protein 7.2 6.0 - 8.5 g/dL    Albumin 5.1 3.5 - 5.2 g/dL    Globulin 2.1 gm/dL    A/G Ratio 2.4 g/dL    Total Bilirubin 0.5 0.0 - 1.2 mg/dL    Alkaline Phosphatase 79 39 - 117 U/L    AST (SGOT) 16 1 - 32 U/L    ALT (SGPT) 22 1 - 33 U/L   Lipid Panel With LDL / HDL Ratio    Collection Time: 07/19/23  8:31 AM    Specimen: Blood   Result Value Ref Range    Total Cholesterol 193 0 - 200 mg/dL    Triglycerides 122 0 - 150 mg/dL    HDL Cholesterol 73 (H) 40 - 60 mg/dL    VLDL Cholesterol Gee 21 5 - 40 mg/dL    LDL Chol Calc (NIH) 99 0 - 100 mg/dL    LDL/HDL RATIO 1.31    TSH Rfx On Abnormal To Free T4    Collection Time: 07/19/23  8:31 AM    Specimen: Blood   Result Value Ref Range    TSH 1.990 0.270 - 4.200 uIU/mL   Hemoglobin A1c    Collection Time: 07/19/23  8:31 AM    Specimen: Blood   Result Value Ref Range    Hemoglobin A1C 8.10 (H) 4.80 - 5.60 %       [facility]  This procedure was auto-finalized with no dictation required.        Diagnoses and all orders for this visit:    1. Controlled type 2  diabetes mellitus with hyperglycemia, without long-term current use of insulin (Primary)    2. Class 1 obesity due to excess calories with serious comorbidity and body mass index (BMI) of 31.0 to 31.9 in adult    3. Primary hypertension    4. Hypercholesterolemia    5. Hypothyroidism, adult    6. Anxiety and depression        Patient Instructions   Diabetes/obesity: glucose 163 and HgA1c 8.10. recommending addition to Patient's metformin 1000 mg daily and Jardiance 25 mg daily. Discussed Januvia. Patient requesting waiting on Januvia. Patient wanting to work on diet. Recommend Patient to continue to work on eating a heart healthy diabetic diet, drink plenty of water with goal 64 oz a day and start exercise routine 2-3 times a week, for more than 30 minutes at a time.Patient does not need refills today. Will refill when needed. Call pharmacy for request.    Hypertension: stable, continue amlodipine 5 mg 1 tab daily, hctz 25 mg 1 tab daily, lisinopril 10 mg 1 tab daily. Patient does not need refills today. Will refill when needed. Call pharmacy for request.    Hyperlipidemia: stable, continue rosuvastatin 10 mg 1 tab daily. Patient does not need refills today. Will refill when needed. Call pharmacy for request.    Hypothyroidism: stable, continue levothyroxine 50 mcg 1 tab daily. Patient does not need refills today. Will refill when needed. Call pharmacy for request.    Anxiety/depression: stable, continue fluoxetine 20 mg 1 tab daily. Patient does not need refills today. Will refill when needed. Call pharmacy for request.      Return for fasting labs in 6 months, recheck in 6 months.

## 2023-07-25 ENCOUNTER — ANESTHESIA (OUTPATIENT)
Dept: PAIN MEDICINE | Facility: HOSPITAL | Age: 68
End: 2023-07-25
Payer: MEDICARE

## 2023-07-25 ENCOUNTER — HOSPITAL ENCOUNTER (OUTPATIENT)
Dept: PAIN MEDICINE | Facility: HOSPITAL | Age: 68
Discharge: HOME | End: 2023-07-25
Payer: MEDICARE

## 2023-07-25 ENCOUNTER — ANESTHESIA EVENT (OUTPATIENT)
Dept: PAIN MEDICINE | Facility: HOSPITAL | Age: 68
End: 2023-07-25
Payer: MEDICARE

## 2023-07-25 ENCOUNTER — HOSPITAL ENCOUNTER (OUTPATIENT)
Dept: GENERAL RADIOLOGY | Facility: HOSPITAL | Age: 68
Discharge: HOME | End: 2023-07-25
Payer: MEDICARE

## 2023-07-25 VITALS
HEART RATE: 70 BPM | RESPIRATION RATE: 16 BRPM | TEMPERATURE: 98.6 F | DIASTOLIC BLOOD PRESSURE: 86 MMHG | SYSTOLIC BLOOD PRESSURE: 122 MMHG | OXYGEN SATURATION: 96 %

## 2023-07-25 DIAGNOSIS — M46.1 SI (SACROILIAC) JOINT INFLAMMATION: Primary | ICD-10-CM

## 2023-07-25 DIAGNOSIS — R52 PAIN: ICD-10-CM

## 2023-07-25 PROCEDURE — 25010000002 METHYLPREDNISOLONE PER 80 MG: Performed by: ANESTHESIOLOGY

## 2023-07-25 PROCEDURE — 25010000002 BUPIVACAINE (PF) 0.5 % SOLUTION: Performed by: ANESTHESIOLOGY

## 2023-07-25 RX ORDER — MIDAZOLAM HYDROCHLORIDE 1 MG/ML
1 INJECTION INTRAMUSCULAR; INTRAVENOUS ONCE AS NEEDED
Status: DISCONTINUED | OUTPATIENT
Start: 2023-07-25 | End: 2023-07-27 | Stop reason: HOSPADM

## 2023-07-25 RX ORDER — METHYLPREDNISOLONE ACETATE 80 MG/ML
80 INJECTION, SUSPENSION INTRA-ARTICULAR; INTRALESIONAL; INTRAMUSCULAR; SOFT TISSUE ONCE
Status: COMPLETED | OUTPATIENT
Start: 2023-07-25 | End: 2023-07-25

## 2023-07-25 RX ORDER — FENTANYL CITRATE 50 UG/ML
50 INJECTION, SOLUTION INTRAMUSCULAR; INTRAVENOUS ONCE AS NEEDED
Status: DISCONTINUED | OUTPATIENT
Start: 2023-07-25 | End: 2023-07-27 | Stop reason: HOSPADM

## 2023-07-25 RX ORDER — BUPIVACAINE HYDROCHLORIDE 5 MG/ML
10 INJECTION, SOLUTION EPIDURAL; INTRACAUDAL ONCE
Status: COMPLETED | OUTPATIENT
Start: 2023-07-25 | End: 2023-07-25

## 2023-07-25 RX ADMIN — METHYLPREDNISOLONE ACETATE 80 MG: 80 INJECTION, SUSPENSION INTRA-ARTICULAR; INTRALESIONAL; INTRAMUSCULAR; SOFT TISSUE at 12:21

## 2023-07-25 RX ADMIN — BUPIVACAINE HYDROCHLORIDE 10 ML: 5 INJECTION, SOLUTION EPIDURAL; INTRACAUDAL; PERINEURAL at 12:21

## 2023-07-25 NOTE — ANESTHESIA PROCEDURE NOTES
PAIN SI joint injection      Patient reassessed immediately prior to procedure    Patient location during procedure: Pain Clinic  Start time: 7/25/2023 12:07 PM  Stop time: 7/25/2023 12:21 PM    Reason for block: procedure for pain  Performed by  Anesthesiologist: Abelardo Art MD  Preanesthetic Checklist  Completed: patient identified, site marked, risks and benefits discussed, surgical consent, monitors and equipment checked, pre-op evaluation and timeout performed  Prep:  Patient position: prone  Sterile barriers:gloves, mask, sterile barrier and cap  Prep: ChloraPrep  Patient monitoring: blood pressure monitoring, continuous pulse oximetry and EKG  Procedure:  Sedation:no  Guidance:fluoroscopy  Contrast Medium:no  Location:Left SIJ  Needle Type:Quincke  Needle Gauge:22 G  Aspiration:negative  Medications:  Depomedrol:80 mg  Comment:1 ml of 1/2% bupivacaine.    Post Assessment  Injection Assessment: negative  Patient Tolerance:comfortable throughout block  Complications:no  Additional Notes  Injection was performed under fluoroscopic guidance.    Post-Op Diagnosis Codes:     * Sacroiliitis [M46.1]

## 2023-07-25 NOTE — H&P
[facility]    History and Physical    Patient Name: Summer Jefferson  :  1955  MRN:  [id number]  Date of Admission: 2023    Subjective     Patient is a 67 y.o. female presents with chief complaint of chronic, constant, severe  left sacral and hip  pain.  Onset of symptoms was gradual starting several months ago.  Symptoms are associated/aggravated by activity, standing, or walking for more than a few minutes. Symptoms improve with rest.  On a pain scale from 0-10, she rates her pain as a 4 while at rest and an 8 with activity.  She describes the pain as sharp and stabbing in nature.  She was referred to the pain clinic for a series of left sacroiliac joint injections.    The following portions of the patients history were reviewed and updated as appropriate: current medications, allergies, past medical history, past surgical history, past family history, past social history, and problem list                Objective     Past Medical History:   Past Medical History:   Diagnosis Date    Anxiety and depression 03/15/2022    Backache     Diabetes mellitus type 2, controlled     H/O bone density study     2014    H/O mammogram     2014    HSV (herpes simplex virus) infection 03/15/2022    Hypercholesterolemia     Hypertension     Hypothyroidism     Low back pain     Osteoarthritis of multiple joints      Past Surgical History:   Past Surgical History:   Procedure Laterality Date    ANKLE ARTHROSCOPY Right     2013 & 10/2013    COLONOSCOPY      2007    EPIDURAL BLOCK      LAPAROSCOPIC GASTRIC BANDING  2007    PAP SMEAR      2014    ROTATOR CUFF REPAIR Right     2014    TOTAL KNEE ARTHROPLASTY Right     2014     Family History:   Family History   Problem Relation Age of Onset    Thyroid disease Mother     Diabetes Mother     Hypertension Mother     Heart disease Mother     Vision loss Father      Social History:   Social History     Socioeconomic History    Marital status:     Tobacco Use    Smoking status: Former     Passive exposure: Never    Smokeless tobacco: Never    Tobacco comments:     quit 15 yrs ago   Vaping Use    Vaping Use: Never used   Substance and Sexual Activity    Alcohol use: Yes     Comment: 1-2 a month    Drug use: Never    Sexual activity: Defer       Vital Signs Range for the last 24 hours  Temperature:     Temp Source:     BP:     Pulse:     Respirations:     SPO2:     O2 Amount (l/min):     O2 Devices     Weight:           --------------------------------------------------------------------------------    Current Outpatient Medications   Medication Sig Dispense Refill    amLODIPine (NORVASC) 5 MG tablet Take 1 tablet by mouth Daily. 90 tablet 3    celecoxib (CeleBREX) 200 MG capsule Take 1 capsule by mouth 2 (Two) Times a Day. 180 capsule 3    empagliflozin (Jardiance) 25 MG tablet tablet Take 1 tablet by mouth Daily. 90 tablet 2    famciclovir (FAMVIR) 500 MG tablet Take 1 tablet by mouth 2 (Two) Times a Day. 180 tablet 0    FLUoxetine (PROzac) 20 MG capsule Take 1 capsule by mouth Daily. 90 capsule 3    gabapentin (NEURONTIN) 100 MG capsule Take 1 capsule by mouth 3 (Three) Times a Day for 7 days, THEN 2 capsules 3 (Three) Times a Day for 7 days, THEN 3 capsules 3 (Three) Times a Day for 76 days. 250 capsule 3    glucose blood test strip Check blood glucose once a day 100 each 3    hydroCHLOROthiazide (HYDRODIURIL) 25 MG tablet Take 1 tablet by mouth Daily. 90 tablet 3    levothyroxine (SYNTHROID, LEVOTHROID) 50 MCG tablet Take 1 tablet by mouth Daily. 90 tablet 3    lidocaine (LIDODERM) 5 % Remove & Discard patch within 12 hours; use as needed for back pain 30 patch 1    lisinopril (PRINIVIL,ZESTRIL) 10 MG tablet Take 1 tablet by mouth Daily. 90 tablet 3    metFORMIN ER (GLUCOPHAGE-XR) 500 MG 24 hr tablet Two tabs daily 180 tablet 3    OneTouch Delica Lancets 33G misc Check blood glucose once daily 100 each 3    rosuvastatin (CRESTOR) 10 MG tablet  Take 1 tablet by mouth Daily. 90 tablet 3     No current facility-administered medications for this encounter.       --------------------------------------------------------------------------------  Assessment & Plan      Anesthesia Evaluation     Patient summary reviewed and Nursing notes reviewed                Airway   Mallampati: II  TM distance: >3 FB  Neck ROM: full  Dental - normal exam     Pulmonary - normal exam    breath sounds clear to auscultation  (+) a smoker Former,  Cardiovascular - normal exam    Rhythm: regular  Rate: normal    (+) hypertensionhyperlipidemia  (-) angina, orthopnea, PND, MARSHALL      Neuro/Psych  (+) psychiatric history Anxiety and Depression  GI/Hepatic/Renal/Endo    (+) diabetes mellitus type 2 well controlled, thyroid problem hypothyroidism    Musculoskeletal     (+) ONUR test      PE comment: Provocation tests:    Distraction is positive on the left  Compression is positive on the left  Gaenslen's is positive on the left  Sacral Thrust Test is positive on the left  Abdominal    Substance History - negative use     OB/GYN negative ob/gyn ROS         Other   arthritis,                Diagnosis and Plan    Treatment Plan  ASA 3      Procedures: Lumbar Epidural Steroid Injection(LESI) and Sacroiliac joint injection (Left), With fluoroscopy,      Anesthetic plan and risks discussed with patient.        Diagnosis     * Sacroiliitis [M46.1]

## 2023-08-07 NOTE — PROGRESS NOTES
Patient ID: Summer Jefferson is a 68 y.o. female is here today for follow-up.    Subjective     The patient is here in regards to lower back pain with some numbness and weakness in her left legs     History of Present Illness  Summer continues to have severe back pain as well as left-sided leg weakness.  She got her SI joint injections which were only mildly helpful although it seems that her groin pain has improved somewhat.  She continues to have radiculopathy in an L4 distribution.    While in the room and during my examination of the patient I wore a mask and eye protection.  I washed my hands before and after this patient encounter.  The patient was also wearing a mask.    The following portions of the patient's history were reviewed and updated as appropriate: allergies, current medications, past family history, past medical history, past social history, past surgical history and problem list.    Review of Systems   Musculoskeletal:  Positive for back pain.   Neurological:  Positive for weakness and numbness.      Past Medical History:   Diagnosis Date    Anxiety and depression 03/15/2022    Backache     Diabetes mellitus type 2, controlled     H/O bone density study     8/2014    H/O mammogram     8/2014    HSV (herpes simplex virus) infection 03/15/2022    Hypercholesterolemia     Hypertension     Hypothyroidism     Low back pain     Osteoarthritis of multiple joints        No Known Allergies    Family History   Problem Relation Age of Onset    Thyroid disease Mother     Diabetes Mother     Hypertension Mother     Heart disease Mother     Vision loss Father        Social History     Socioeconomic History    Marital status:    Tobacco Use    Smoking status: Former     Passive exposure: Never    Smokeless tobacco: Never    Tobacco comments:     quit 15 yrs ago   Vaping Use    Vaping Use: Never used   Substance and Sexual Activity    Alcohol use: Yes     Comment: 1-2 a month    Drug use: Never     Sexual activity: Defer       Past Surgical History:   Procedure Laterality Date    ANKLE ARTHROSCOPY Right     1/2013 & 10/2013    COLONOSCOPY      7/27/2007    EPIDURAL BLOCK      LAPAROSCOPIC GASTRIC BANDING  08/01/2007    PAP SMEAR      12/2014    ROTATOR CUFF REPAIR Right     5/2014    TOTAL KNEE ARTHROPLASTY Right     12/2014         Objective     Vitals:    08/09/23 1102   BP: 118/62     Body mass index is 31.76 kg/mý.    Physical Exam  Constitutional:       Appearance: Normal appearance.   HENT:      Head: Normocephalic and atraumatic.   Eyes:      Extraocular Movements: Extraocular movements intact.      Conjunctiva/sclera: Conjunctivae normal.      Pupils: Pupils are equal, round, and reactive to light.   Cardiovascular:      Rate and Rhythm: Normal rate and regular rhythm.      Pulses: Normal pulses.   Pulmonary:      Breath sounds: Normal breath sounds.   Abdominal:      Palpations: Abdomen is soft.   Musculoskeletal:         General: Normal range of motion.      Cervical back: Normal range of motion and neck supple.      Comments: Bilateral SI joint inflammation characterized by:    -Pelvic Distraction test   -Lateral Iliac compression test   -FABERS (Kareem's test)   -Ganslen's Test     Skin:     General: Skin is warm and dry.   Neurological:      Mental Status: She is alert and oriented to person, place, and time.      Cranial Nerves: Cranial nerves 2-12 are intact.      Motor: Motor function is intact. No weakness or atrophy.      Coordination: Coordination is intact. Romberg sign negative. Romberg Test normal.      Gait: Gait is intact. Gait normal.      Deep Tendon Reflexes: Reflexes are normal and symmetric.      Reflex Scores:       Tricep reflexes are 2+ on the right side and 2+ on the left side.       Bicep reflexes are 2+ on the right side and 2+ on the left side.       Brachioradialis reflexes are 2+ on the right side and 2+ on the left side.       Patellar reflexes are 2+ on the right side  and 2+ on the left side.       Achilles reflexes are 2+ on the right side and 2+ on the left side.  Psychiatric:         Speech: Speech normal.       Neurologic Exam     Mental Status   Oriented to person, place, and time.   Attention: normal. Concentration: normal.   Speech: speech is normal   Level of consciousness: alert    Cranial Nerves   Cranial nerves II through XII intact.     CN III, IV, VI   Pupils are equal, round, and reactive to light.    Motor Exam   Muscle bulk: normal  Overall muscle tone: normal    Strength   Strength 5/5 except as noted.   Left iliopsoas: 4/5  Left quadriceps: 4/5  Left hamstrin/5  Left glutei: 4/5  Left anterior tibial: 4/5  Left posterior tibial: 5/5    Sensory Exam   Light touch normal.     Gait, Coordination, and Reflexes     Gait  Gait: normal    Coordination   Romberg: negative    Reflexes   Reflexes 2+ except as noted.   Right brachioradialis: 2+  Left brachioradialis: 2+  Right biceps: 2+  Left biceps: 2+  Right triceps: 2+  Left triceps: 2+  Right patellar: 2+  Left patellar: 2+  Right achilles: 2+  Left achilles: 2+    Assessment & Plan   Independent Review of Radiographic Studies:      I personally reviewed the images from the following studies.    No new neuroimaging.    Assessment/Plan: Summer continues to have back pain and left-sided leg weakness.  Some of it may be pain limited but it is difficult to tell.  She did not get significant improvement in her pain with SI joint injections I think that her SI joint inflammation plays a small part in her overall pain.  We will reevaluate this when she comes back for her follow-up appointment.  She needs her scoliosis x-rays and x-ray flexion-extension so that we can do some surgical planning.  If her iliac crest is low enough we might be able to do a stand-alone L2-3 and a stand-alone L4-5 TLIF.    Ideally if possible I would like to do a stand-alone L2-3 OLIF with a left-sided facetectomy with a stand-alone L4-5 OLIF  and bilateral facetectomies, however I do feel like it is more likely she will need L2-S2 instrumentation in addition to an L2-3 OLIF and L4-5 TLIF with bilateral facetectomies based on her MRI scan.    Medical Decision Making:      X-ray scoliosis  X-ray flexion-extension         Diagnoses and all orders for this visit:    1. Lumbar radiculopathy (Primary)  -     XR Spine Lumbar Complete With Flex & Ext; Future    2. Spondylolisthesis, lumbar region  -     XR Spine Lumbar Complete With Flex & Ext; Future    3. SI (sacroiliac) joint inflammation             Patient Instructions/Recommendations:    Follow-up after x-ray scoliosis      Calderon Whittaker MD  08/09/23  11:30 EDT

## 2023-08-09 ENCOUNTER — HOSPITAL ENCOUNTER (OUTPATIENT)
Dept: GENERAL RADIOLOGY | Facility: HOSPITAL | Age: 68
Discharge: HOME | End: 2023-08-09
Admitting: NEUROLOGICAL SURGERY
Payer: MEDICARE

## 2023-08-09 ENCOUNTER — OFFICE VISIT (OUTPATIENT)
Dept: NEUROSURGERY | Facility: CLINIC | Age: 68
End: 2023-08-09
Payer: MEDICARE

## 2023-08-09 VITALS
DIASTOLIC BLOOD PRESSURE: 62 MMHG | HEIGHT: 64 IN | WEIGHT: 185 LBS | SYSTOLIC BLOOD PRESSURE: 118 MMHG | BODY MASS INDEX: 31.58 KG/M2

## 2023-08-09 DIAGNOSIS — M54.16 LUMBAR RADICULOPATHY: Primary | ICD-10-CM

## 2023-08-09 DIAGNOSIS — M43.16 SPONDYLOLISTHESIS, LUMBAR REGION: ICD-10-CM

## 2023-08-09 DIAGNOSIS — M54.16 LUMBAR RADICULOPATHY: ICD-10-CM

## 2023-08-09 DIAGNOSIS — M46.1 SI (SACROILIAC) JOINT INFLAMMATION: ICD-10-CM

## 2023-08-09 PROCEDURE — 72082 X-RAY EXAM ENTIRE SPI 2/3 VW: CPT

## 2023-08-09 PROCEDURE — 72114 X-RAY EXAM L-S SPINE BENDING: CPT

## 2023-08-16 ENCOUNTER — OFFICE VISIT (OUTPATIENT)
Dept: FAMILY MEDICINE CLINIC | Facility: CLINIC | Age: 68
End: 2023-08-16
Payer: MEDICARE

## 2023-08-16 VITALS
WEIGHT: 188 LBS | HEIGHT: 64 IN | HEART RATE: 72 BPM | TEMPERATURE: 98 F | OXYGEN SATURATION: 95 % | RESPIRATION RATE: 16 BRPM | BODY MASS INDEX: 32.1 KG/M2 | DIASTOLIC BLOOD PRESSURE: 74 MMHG | SYSTOLIC BLOOD PRESSURE: 130 MMHG

## 2023-08-16 DIAGNOSIS — B00.9 HSV (HERPES SIMPLEX VIRUS) INFECTION: ICD-10-CM

## 2023-08-16 DIAGNOSIS — E11.65 CONTROLLED TYPE 2 DIABETES MELLITUS WITH HYPERGLYCEMIA, WITHOUT LONG-TERM CURRENT USE OF INSULIN: Primary | Chronic | ICD-10-CM

## 2023-08-16 PROCEDURE — 3075F SYST BP GE 130 - 139MM HG: CPT | Performed by: NURSE PRACTITIONER

## 2023-08-16 PROCEDURE — 3052F HG A1C>EQUAL 8.0%<EQUAL 9.0%: CPT | Performed by: NURSE PRACTITIONER

## 2023-08-16 PROCEDURE — 1160F RVW MEDS BY RX/DR IN RCRD: CPT | Performed by: NURSE PRACTITIONER

## 2023-08-16 PROCEDURE — 1159F MED LIST DOCD IN RCRD: CPT | Performed by: NURSE PRACTITIONER

## 2023-08-16 PROCEDURE — 3078F DIAST BP <80 MM HG: CPT | Performed by: NURSE PRACTITIONER

## 2023-08-16 PROCEDURE — 99213 OFFICE O/P EST LOW 20 MIN: CPT | Performed by: NURSE PRACTITIONER

## 2023-08-16 RX ORDER — ORAL SEMAGLUTIDE 3 MG/1
3 TABLET ORAL DAILY
Qty: 30 TABLET | Refills: 1 | Status: SHIPPED | OUTPATIENT
Start: 2023-08-16 | End: 2023-08-18 | Stop reason: SDUPTHER

## 2023-08-16 RX ORDER — FAMCICLOVIR 500 MG/1
500 TABLET ORAL 2 TIMES DAILY
Qty: 180 TABLET | Refills: 0 | Status: SHIPPED | OUTPATIENT
Start: 2023-08-16

## 2023-08-16 NOTE — PROGRESS NOTES
Subjective     Summer Jefferson is a 68 y.o.. female.     Patient here today to re-discuss her diabetes. Patient has been thinking about all that was discussed at last visit and is ready to agree to add on medication to improve HgA1c. Patient's last HgA1c was 8.1. Patient currently on metformin 1000 mg daily and Jardiance 25 mg daily. Patient wanting to further discuss Rybelsus. Patient denies personal and/or family history of thyroid cancer; no personal history of pancreatitis. Patient stating she is eating healthy, drinking plenty of water, and denies exercising due to back.     Patient also requesting refill of famciclovir due to HSV outbreak at this time.     Diabetes      The following portions of the patient's history were reviewed and updated as appropriate: allergies, current medications, past family history, past medical history, past social history, past surgical history and problem list.    Past Medical History:   Diagnosis Date    Anxiety and depression 03/15/2022    Backache     Diabetes mellitus type 2, controlled     H/O bone density study     8/2014    H/O mammogram     8/2014    HSV (herpes simplex virus) infection 03/15/2022    Hypercholesterolemia     Hypertension     Hypothyroidism     Low back pain     Osteoarthritis of multiple joints        Past Surgical History:   Procedure Laterality Date    ANKLE ARTHROSCOPY Right     1/2013 & 10/2013    COLONOSCOPY      7/27/2007    EPIDURAL BLOCK      LAPAROSCOPIC GASTRIC BANDING  08/01/2007    PAP SMEAR      12/2014    ROTATOR CUFF REPAIR Right     5/2014    TOTAL KNEE ARTHROPLASTY Right     12/2014       Review of Systems   Constitutional: Negative.    Respiratory: Negative.     Cardiovascular: Negative.    Gastrointestinal: Negative.    Genitourinary:         HSV outbreak     No Known Allergies    Objective     Vitals:    08/16/23 1523   BP: 130/74   Pulse: 72   Resp: 16   Temp: 98 øF (36.7 øC)   SpO2: 95%   Weight: 85.3 kg (188 lb)   Height: 162  "cm (63.78\")     Body mass index is 32.49 kg/mý.    Physical Exam  Vitals reviewed.   HENT:      Head: Normocephalic.   Eyes:      Pupils: Pupils are equal, round, and reactive to light.   Cardiovascular:      Rate and Rhythm: Normal rate and regular rhythm.   Pulmonary:      Effort: Pulmonary effort is normal.      Breath sounds: Normal breath sounds.   Musculoskeletal:         General: Normal range of motion.   Skin:     General: Skin is warm and dry.   Neurological:      Mental Status: She is alert and oriented to person, place, and time.   Psychiatric:         Behavior: Behavior normal.         Current Outpatient Medications:     amLODIPine (NORVASC) 5 MG tablet, Take 1 tablet by mouth Daily., Disp: 90 tablet, Rfl: 3    celecoxib (CeleBREX) 200 MG capsule, Take 1 capsule by mouth 2 (Two) Times a Day., Disp: 180 capsule, Rfl: 3    empagliflozin (Jardiance) 25 MG tablet tablet, Take 1 tablet by mouth Daily., Disp: 90 tablet, Rfl: 2    famciclovir (FAMVIR) 500 MG tablet, Take 1 tablet by mouth 2 (Two) Times a Day., Disp: 180 tablet, Rfl: 0    FLUoxetine (PROzac) 20 MG capsule, Take 1 capsule by mouth Daily., Disp: 90 capsule, Rfl: 3    gabapentin (NEURONTIN) 100 MG capsule, Take 1 capsule by mouth 3 (Three) Times a Day for 7 days, THEN 2 capsules 3 (Three) Times a Day for 7 days, THEN 3 capsules 3 (Three) Times a Day for 76 days., Disp: 250 capsule, Rfl: 3    glucose blood test strip, Check blood glucose once a day, Disp: 100 each, Rfl: 3    hydroCHLOROthiazide (HYDRODIURIL) 25 MG tablet, Take 1 tablet by mouth Daily., Disp: 90 tablet, Rfl: 3    levothyroxine (SYNTHROID, LEVOTHROID) 50 MCG tablet, Take 1 tablet by mouth Daily., Disp: 90 tablet, Rfl: 3    lidocaine (LIDODERM) 5 %, Remove & Discard patch within 12 hours; use as needed for back pain, Disp: 30 patch, Rfl: 1    lisinopril (PRINIVIL,ZESTRIL) 10 MG tablet, Take 1 tablet by mouth Daily., Disp: 90 tablet, Rfl: 3    metFORMIN ER (GLUCOPHAGE-XR) 500 MG 24 hr " tablet, Two tabs daily, Disp: 180 tablet, Rfl: 3    OneTouch Delica Lancets 33G misc, Check blood glucose once daily, Disp: 100 each, Rfl: 3    rosuvastatin (CRESTOR) 10 MG tablet, Take 1 tablet by mouth Daily., Disp: 90 tablet, Rfl: 3    Semaglutide (Rybelsus) 3 MG tablet, Take 1 tablet by mouth Daily., Disp: 30 tablet, Rfl: 1        Diagnoses and all orders for this visit:    1. Controlled type 2 diabetes mellitus with hyperglycemia, without long-term current use of insulin (Primary)  -     Semaglutide (Rybelsus) 3 MG tablet; Take 1 tablet by mouth Daily.  Dispense: 30 tablet; Refill: 1  -     Hemoglobin A1c; Future  -     Comprehensive metabolic panel; Future  -     Microalbumin / Creatinine Urine Ratio - Urine, Clean Catch; Future    2. HSV (herpes simplex virus) infection  -     famciclovir (FAMVIR) 500 MG tablet; Take 1 tablet by mouth 2 (Two) Times a Day.  Dispense: 180 tablet; Refill: 0        Patient Instructions   Diabetes: 7/19/23 labs shown glucose 163 and HgA1c 8.10. continue metformin 1000 mg daily and Jardiance 25 mg daily. Starting on Rybelsus 3 mg daily, discussed potential side effects/adverse effects and Patient agreeing to medication. Recommend Patient to continue to work on eating a heart healthy diabetic diet, drink plenty of water with goal 64 oz a day and start exercise routine 2-3 times a week. Patient stating she will be scheduling back surgery for 2nd week of December.    Return for fasting labs in one month and then follow up in one month for DM.

## 2023-08-16 NOTE — PATIENT INSTRUCTIONS
Diabetes: 7/19/23 labs shown glucose 163 and HgA1c 8.10. continue metformin 1000 mg daily and Jardiance 25 mg daily. Starting on Rybelsus 3 mg daily, discussed potential side effects/adverse effects and Patient agreeing to medication. Recommend Patient to continue to work on eating a heart healthy diabetic diet, drink plenty of water with goal 64 oz a day and start exercise routine 2-3 times a week. Patient stating she will be scheduling back surgery for 2nd week of December.

## 2023-08-18 ENCOUNTER — TELEPHONE (OUTPATIENT)
Dept: FAMILY MEDICINE CLINIC | Facility: CLINIC | Age: 68
End: 2023-08-18

## 2023-08-18 DIAGNOSIS — E11.65 CONTROLLED TYPE 2 DIABETES MELLITUS WITH HYPERGLYCEMIA, WITHOUT LONG-TERM CURRENT USE OF INSULIN: Chronic | ICD-10-CM

## 2023-08-18 RX ORDER — ORAL SEMAGLUTIDE 3 MG/1
3 TABLET ORAL DAILY
Qty: 30 TABLET | Refills: 0 | Status: SHIPPED | OUTPATIENT
Start: 2023-08-18

## 2023-08-18 NOTE — TELEPHONE ENCOUNTER
PATIENT STATES THAT THE Semaglutide (Rybelsus) 3 MG tablet WAS SUPPOSED TO BE FOR 7MG AND THAT SHE ONLY WANTS 1 MONTH SUPPLY NOT TWO BECAUSE SHE IS CONCERNED IF SHE WILL BE ABLE TO TAKE IT OR NOT. PLEASE ADVISE. THANK  YOU.

## 2023-08-21 NOTE — PROGRESS NOTES
Patient ID: Summer Jefferson is a 68 y.o. female is here today for follow-up with new lumbar and scoliosis xray's.    Patient reports that symptoms are unchanged, patient is having low back pain with numbness and weakness In her L leg    Subjective     The patient is here in regards to   Chief Complaint   Patient presents with    Back Pain       History of Present Illness  Summer has had a plateau in terms of her SI joint pain improvement after those injections.  She was able to get those x-rays.  She is essentially had no significant improvement since then.    While in the room and during my examination of the patient I wore a mask and eye protection.  I washed my hands before and after this patient encounter.  The patient was also wearing a mask.    The following portions of the patient's history were reviewed and updated as appropriate: allergies, current medications, past family history, past medical history, past social history, past surgical history and problem list.    Review of Systems     Past Medical History:   Diagnosis Date    Anxiety and depression 03/15/2022    Backache     Diabetes mellitus type 2, controlled     H/O bone density study     8/2014    H/O mammogram     8/2014    HSV (herpes simplex virus) infection 03/15/2022    Hypercholesterolemia     Hypertension     Hypothyroidism     Low back pain     Osteoarthritis of multiple joints        No Known Allergies    Family History   Problem Relation Age of Onset    Thyroid disease Mother     Diabetes Mother     Hypertension Mother     Heart disease Mother     Vision loss Father        Social History     Socioeconomic History    Marital status:    Tobacco Use    Smoking status: Former     Passive exposure: Never    Smokeless tobacco: Never    Tobacco comments:     quit 15 yrs ago   Vaping Use    Vaping Use: Never used   Substance and Sexual Activity    Alcohol use: Yes     Comment: 1-2 a month    Drug use: Never    Sexual activity: Defer        Past Surgical History:   Procedure Laterality Date    ANKLE ARTHROSCOPY Right     1/2013 & 10/2013    COLONOSCOPY      7/27/2007    EPIDURAL BLOCK      LAPAROSCOPIC GASTRIC BANDING  08/01/2007    PAP SMEAR      12/2014    ROTATOR CUFF REPAIR Right     5/2014    TOTAL KNEE ARTHROPLASTY Right     12/2014         Objective     Vitals:    08/25/23 1111   BP: 119/78   Pulse: 78   Temp: 96.8 øF (36 øC)   SpO2: 95%     Body mass index is 32.49 kg/mý.    Physical Exam  Constitutional:       Appearance: Normal appearance.   HENT:      Head: Normocephalic and atraumatic.   Eyes:      Extraocular Movements: Extraocular movements intact.      Conjunctiva/sclera: Conjunctivae normal.      Pupils: Pupils are equal, round, and reactive to light.   Cardiovascular:      Rate and Rhythm: Normal rate and regular rhythm.      Pulses: Normal pulses.   Pulmonary:      Breath sounds: Normal breath sounds.   Abdominal:      Palpations: Abdomen is soft.   Musculoskeletal:         General: Normal range of motion.      Cervical back: Normal range of motion and neck supple.      Comments: Bilateral SI joint inflammation characterized by:    -Pelvic Distraction test   -Lateral Iliac compression test   -FABERS (Kareem's test)   -Ganslen's Test     Skin:     General: Skin is warm and dry.   Neurological:      Mental Status: She is alert and oriented to person, place, and time.      Cranial Nerves: Cranial nerves 2-12 are intact.      Motor: Motor function is intact. No weakness or atrophy.      Coordination: Coordination is intact. Romberg sign negative. Romberg Test normal.      Gait: Gait is intact. Gait normal.      Deep Tendon Reflexes: Reflexes are normal and symmetric.      Reflex Scores:       Tricep reflexes are 2+ on the right side and 2+ on the left side.       Bicep reflexes are 2+ on the right side and 2+ on the left side.       Brachioradialis reflexes are 2+ on the right side and 2+ on the left side.       Patellar reflexes  are 2+ on the right side and 2+ on the left side.       Achilles reflexes are 2+ on the right side and 2+ on the left side.  Psychiatric:         Speech: Speech normal.       Neurologic Exam     Mental Status   Oriented to person, place, and time.   Attention: normal. Concentration: normal.   Speech: speech is normal   Level of consciousness: alert    Cranial Nerves   Cranial nerves II through XII intact.     CN III, IV, VI   Pupils are equal, round, and reactive to light.    Motor Exam   Muscle bulk: normal  Overall muscle tone: normal    Strength   Strength 5/5 except as noted.     Sensory Exam   Light touch normal.     Gait, Coordination, and Reflexes     Gait  Gait: normal    Coordination   Romberg: negative    Reflexes   Reflexes 2+ except as noted.   Right brachioradialis: 2+  Left brachioradialis: 2+  Right biceps: 2+  Left biceps: 2+  Right triceps: 2+  Left triceps: 2+  Right patellar: 2+  Left patellar: 2+  Right achilles: 2+  Left achilles: 2+    Assessment & Plan   Independent Review of Radiographic Studies:      I personally reviewed the images from the following studies.    XR: XR of the whole standing spine was reviewed and shows positive sagittal alignment  XR of the cervical spine in flexion/extension was reviewed and shows no obvious dynamic subluxation, her iliac crest and below her L4-5 foramen    Assessment/Plan: Based on her x-ray, she is borderline in terms of being able to access her L4-5 level with potentially a TLIF or OLIF surgery.  I discussed with her that based on her body habitus, it may be difficult to approach that given the trajectory and distance needed to be overcome in order to access the oblique approach.  She may require a posterior L2-3 and L4-5 TLIF with instrumentation instead.  She would like to continue to lose weight using her semaglutide medication and follow-up in December to see how her weight loss is going.  We can make a plan then to address her spondylolisthesis  whether or not it be realistic to do an OLIF or we do a posterior TLIF.    Medical Decision Making:      Follow-up in December  Continue with weight loss         Diagnoses and all orders for this visit:    1. SI (sacroiliac) joint inflammation (Primary)    2. Lumbar radiculopathy    3. Spondylolisthesis, lumbar region             Patient Instructions/Recommendations:    Follow-up in December      Calderon Whittaker MD  08/25/23  11:59 EDT

## 2023-08-23 ENCOUNTER — TELEPHONE (OUTPATIENT)
Dept: FAMILY MEDICINE CLINIC | Facility: CLINIC | Age: 68
End: 2023-08-23

## 2023-08-23 NOTE — TELEPHONE ENCOUNTER
Hub staff attempted to follow warm transfer process and was unsuccessful     Caller: Summer Jefferson    Relationship to patient: Self    Best call back number: [phone]     Patient is needing: LAB APPOINTMENT RESCHEDULE

## 2023-08-25 ENCOUNTER — OFFICE VISIT (OUTPATIENT)
Dept: NEUROSURGERY | Facility: CLINIC | Age: 68
End: 2023-08-25
Payer: MEDICARE

## 2023-08-25 VITALS
BODY MASS INDEX: 32.1 KG/M2 | WEIGHT: 188 LBS | SYSTOLIC BLOOD PRESSURE: 119 MMHG | HEIGHT: 64 IN | TEMPERATURE: 96.8 F | DIASTOLIC BLOOD PRESSURE: 78 MMHG | OXYGEN SATURATION: 95 % | HEART RATE: 78 BPM

## 2023-08-25 DIAGNOSIS — M43.16 SPONDYLOLISTHESIS, LUMBAR REGION: ICD-10-CM

## 2023-08-25 DIAGNOSIS — M54.16 LUMBAR RADICULOPATHY: ICD-10-CM

## 2023-08-25 DIAGNOSIS — M46.1 SI (SACROILIAC) JOINT INFLAMMATION: Primary | ICD-10-CM

## 2023-09-15 ENCOUNTER — OFFICE VISIT (OUTPATIENT)
Dept: FAMILY MEDICINE CLINIC | Facility: CLINIC | Age: 68
End: 2023-09-15
Payer: MEDICARE

## 2023-09-15 VITALS
SYSTOLIC BLOOD PRESSURE: 126 MMHG | WEIGHT: 179 LBS | HEART RATE: 84 BPM | BODY MASS INDEX: 30.56 KG/M2 | OXYGEN SATURATION: 97 % | RESPIRATION RATE: 16 BRPM | DIASTOLIC BLOOD PRESSURE: 68 MMHG | HEIGHT: 64 IN | TEMPERATURE: 98 F

## 2023-09-15 DIAGNOSIS — E11.65 CONTROLLED TYPE 2 DIABETES MELLITUS WITH HYPERGLYCEMIA, WITHOUT LONG-TERM CURRENT USE OF INSULIN: Primary | Chronic | ICD-10-CM

## 2023-09-15 DIAGNOSIS — E66.09 CLASS 1 OBESITY DUE TO EXCESS CALORIES WITH SERIOUS COMORBIDITY AND BODY MASS INDEX (BMI) OF 30.0 TO 30.9 IN ADULT: ICD-10-CM

## 2023-09-15 PROCEDURE — 3051F HG A1C>EQUAL 7.0%<8.0%: CPT | Performed by: NURSE PRACTITIONER

## 2023-09-15 PROCEDURE — 1159F MED LIST DOCD IN RCRD: CPT | Performed by: NURSE PRACTITIONER

## 2023-09-15 PROCEDURE — 3078F DIAST BP <80 MM HG: CPT | Performed by: NURSE PRACTITIONER

## 2023-09-15 PROCEDURE — 3074F SYST BP LT 130 MM HG: CPT | Performed by: NURSE PRACTITIONER

## 2023-09-15 PROCEDURE — 99213 OFFICE O/P EST LOW 20 MIN: CPT | Performed by: NURSE PRACTITIONER

## 2023-09-15 PROCEDURE — 1160F RVW MEDS BY RX/DR IN RCRD: CPT | Performed by: NURSE PRACTITIONER

## 2023-09-15 RX ORDER — ORAL SEMAGLUTIDE 7 MG/1
1 TABLET ORAL DAILY
Qty: 30 TABLET | Refills: 1 | Status: SHIPPED | OUTPATIENT
Start: 2023-09-15

## 2023-09-15 NOTE — PATIENT INSTRUCTIONS
Continue to eat a low carb diet, continue to drink plenty of water  Increasing Rybelsus from 3 mg to 7 mg (Patient to double up on 3 mg to equal 6 mg at this time).   Continue to work on finding alternatives to staying occupied rather than eating/cooking.  Continue to monitor sadness/depression.

## 2023-09-15 NOTE — PROGRESS NOTES
"Mitzi Jefferson is a 68 y.o.. female.     Patient here for follow up on diabetes and overweight. Patient was started on Rybelsus last month. Patient eating a low carb diet. Patient has lost 9 lbs since last office visit. Patient denies any side effects/adverse effects. Patient denies any No GI issues. Patient stating she has noticed a decrease in her appetite. Patient's HgA1c went from 8.1 to 7.5. Patient's BMI went form 32.5 to 30.9.    8/10/23 [facility], Dr. Cardenas, negative diabetes retinopathy          The following portions of the patient's history were reviewed and updated as appropriate: allergies, current medications, past family history, past medical history, past social history, past surgical history and problem list.    Past Medical History:   Diagnosis Date    Anxiety and depression 03/15/2022    Backache     Diabetes mellitus type 2, controlled     H/O bone density study     8/2014    H/O mammogram     8/2014    HSV (herpes simplex virus) infection 03/15/2022    Hypercholesterolemia     Hypertension     Hypothyroidism     Low back pain     Osteoarthritis of multiple joints        Past Surgical History:   Procedure Laterality Date    ANKLE ARTHROSCOPY Right     1/2013 & 10/2013    COLONOSCOPY      7/27/2007    EPIDURAL BLOCK      LAPAROSCOPIC GASTRIC BANDING  08/01/2007    PAP SMEAR      12/2014    ROTATOR CUFF REPAIR Right     5/2014    TOTAL KNEE ARTHROPLASTY Right     12/2014       Review of Systems   Gastrointestinal: Negative.  Negative for constipation, diarrhea, nausea and vomiting.   Neurological:  Negative for dizziness and headaches.   Psychiatric/Behavioral:  Negative for dysphoric mood.      No Known Allergies    Objective     Vitals:    09/15/23 1103   BP: 126/68   Pulse: 84   Resp: 16   Temp: 98 °F (36.7 °C)   SpO2: 97%   Weight: 81.2 kg (179 lb)   Height: 162 cm (63.78\")     Body mass index is 30.94 kg/m².    BMI is >= 30 and <35. (Class 1 Obesity). The " following options were offered after discussion;: nutrition counseling/recommendations and increasing of Rybelsus.      Physical Exam  Vitals reviewed.   HENT:      Head: Normocephalic.   Eyes:      Pupils: Pupils are equal, round, and reactive to light.   Cardiovascular:      Rate and Rhythm: Normal rate and regular rhythm.   Pulmonary:      Effort: Pulmonary effort is normal.      Breath sounds: Normal breath sounds.   Musculoskeletal:         General: Normal range of motion.   Feet:      Comments: Diabetes microfilament testing done and wnl  Skin:     General: Skin is warm and dry.   Neurological:      Mental Status: She is alert and oriented to person, place, and time.   Psychiatric:         Behavior: Behavior normal.         Current Outpatient Medications:     amLODIPine (NORVASC) 5 MG tablet, Take 1 tablet by mouth Daily., Disp: 90 tablet, Rfl: 3    celecoxib (CeleBREX) 200 MG capsule, Take 1 capsule by mouth 2 (Two) Times a Day., Disp: 180 capsule, Rfl: 3    empagliflozin (Jardiance) 25 MG tablet tablet, Take 1 tablet by mouth Daily., Disp: 90 tablet, Rfl: 2    famciclovir (FAMVIR) 500 MG tablet, Take 1 tablet by mouth 2 (Two) Times a Day., Disp: 180 tablet, Rfl: 0    FLUoxetine (PROzac) 20 MG capsule, Take 1 capsule by mouth Daily., Disp: 90 capsule, Rfl: 3    gabapentin (NEURONTIN) 100 MG capsule, Take 1 capsule by mouth 3 (Three) Times a Day for 7 days, THEN 2 capsules 3 (Three) Times a Day for 7 days, THEN 3 capsules 3 (Three) Times a Day for 76 days., Disp: 250 capsule, Rfl: 3    glucose blood test strip, Check blood glucose once a day, Disp: 100 each, Rfl: 3    hydroCHLOROthiazide (HYDRODIURIL) 25 MG tablet, Take 1 tablet by mouth Daily., Disp: 90 tablet, Rfl: 3    levothyroxine (SYNTHROID, LEVOTHROID) 50 MCG tablet, Take 1 tablet by mouth Daily., Disp: 90 tablet, Rfl: 3    lidocaine (LIDODERM) 5 %, Remove & Discard patch within 12 hours; use as needed for back pain, Disp: 30 patch, Rfl: 1    lisinopril  (PRINIVIL,ZESTRIL) 10 MG tablet, Take 1 tablet by mouth Daily., Disp: 90 tablet, Rfl: 3    metFORMIN ER (GLUCOPHAGE-XR) 500 MG 24 hr tablet, Two tabs daily, Disp: 180 tablet, Rfl: 3    OneTouch Delica Lancets 33G misc, Check blood glucose once daily, Disp: 100 each, Rfl: 3    rosuvastatin (CRESTOR) 10 MG tablet, Take 1 tablet by mouth Daily., Disp: 90 tablet, Rfl: 3    Semaglutide (Rybelsus) 7 MG tablet, Take 7 mg by mouth Daily., Disp: 30 tablet, Rfl: 1    Recent Results (from the past 336 hour(s))   Hemoglobin A1c    Collection Time: 09/08/23 10:14 AM    Specimen: Blood   Result Value Ref Range    Hemoglobin A1C 7.50 (H) 4.80 - 5.60 %   Comprehensive metabolic panel    Collection Time: 09/08/23 10:14 AM    Specimen: Blood   Result Value Ref Range    Glucose 98 65 - 99 mg/dL    BUN 21 8 - 23 mg/dL    Creatinine 0.80 0.57 - 1.00 mg/dL    EGFR Result 79.9 >60.0 mL/min/1.73    BUN/Creatinine Ratio 26.3 (H) 7.0 - 25.0    Sodium 139 136 - 145 mmol/L    Potassium 3.7 3.5 - 5.2 mmol/L    Chloride 98 98 - 107 mmol/L    Total CO2 26.4 22.0 - 29.0 mmol/L    Calcium 10.2 8.6 - 10.5 mg/dL    Total Protein 7.6 6.0 - 8.5 g/dL    Albumin 5.3 (H) 3.5 - 5.2 g/dL    Globulin 2.3 gm/dL    A/G Ratio 2.3 g/dL    Total Bilirubin 0.5 0.0 - 1.2 mg/dL    Alkaline Phosphatase 64 39 - 117 U/L    AST (SGOT) 28 1 - 32 U/L    ALT (SGPT) 23 1 - 33 U/L   Unable To Void    Collection Time: 09/08/23 10:14 AM   Result Value Ref Range    Unable to Void Comment        XR Spine Scoliosis 2 or 3 Views  Narrative: XR SPINE SCOLIOSIS 2 -3 VIEWS    Date of Exam: 8/9/2023 2:14 PM EDT    Indication: AP and lateral, [place] only    Comparison: None available.    Findings:  There is 16 degrees of dextroscoliosis in the lumbar spine with advanced associated secondary degenerative changes. There is approximately 15 degrees of levoscoliosis centered in the lower thoracic region. There is mild thoracic spondylosis. There is   reversal of the normal cervical lordotic  curve with multilevel degenerative disc disease from C4-C7. There is mild anterolisthesis of C4 on C5 by approximately 2 mm. There is advanced facet disease in the cervical spine most prominent at the C3-4 level.  Impression: Impression:    1. Thoracic and lumbar scoliosis as described above with associated advanced degenerative changes in the lumbar spine.  2. Multilevel cervical degenerative disc disease with straightening and slight reversal of cervical lordosis as well as mild anterolisthesis of C4 on C5 by approximately 2 mm. There is advanced cervical facet disease.    Electronically Signed: Cedrick Infante    8/10/2023 4:36 PM EDT    Workstation ID: [id number]        Diagnoses and all orders for this visit:    1. Controlled type 2 diabetes mellitus with hyperglycemia, without long-term current use of insulin (Primary)  -     Semaglutide (Rybelsus) 7 MG tablet; Take 7 mg by mouth Daily.  Dispense: 30 tablet; Refill: 1    2. Class 1 obesity due to excess calories with serious comorbidity and body mass index (BMI) of 30.0 to 30.9 in adult        Patient Instructions   Continue to eat a low carb diet, continue to drink plenty of water  Increasing Rybelsus from 3 mg to 7 mg (Patient to double up on 3 mg to equal 6 mg at this time).   Continue to work on finding alternatives to staying occupied rather than eating/cooking.  Continue to monitor sadness/depression.      Return for fasting labs in 1 month, recheck in 1 month.

## 2023-10-09 ENCOUNTER — TELEPHONE (OUTPATIENT)
Dept: NEUROSURGERY | Facility: CLINIC | Age: 68
End: 2023-10-09
Payer: MEDICARE

## 2023-10-09 NOTE — TELEPHONE ENCOUNTER
Patient called in an stated she has lost the weight that she needed to have the surgery, Patient would like to schedule the surgery for sometime in December.

## 2023-10-12 DIAGNOSIS — E11.65 CONTROLLED TYPE 2 DIABETES MELLITUS WITH HYPERGLYCEMIA, WITHOUT LONG-TERM CURRENT USE OF INSULIN: Chronic | ICD-10-CM

## 2023-10-12 DIAGNOSIS — I10 PRIMARY HYPERTENSION: Primary | Chronic | ICD-10-CM

## 2023-10-13 ENCOUNTER — HOSPITAL ENCOUNTER (OUTPATIENT)
Dept: GENERAL RADIOLOGY | Facility: HOSPITAL | Age: 68
Discharge: HOME | End: 2023-10-13
Payer: MEDICARE

## 2023-10-13 ENCOUNTER — HOSPITAL ENCOUNTER (OUTPATIENT)
Dept: PAIN MEDICINE | Facility: HOSPITAL | Age: 68
Discharge: HOME | End: 2023-10-13
Payer: MEDICARE

## 2023-10-13 ENCOUNTER — ANESTHESIA EVENT (OUTPATIENT)
Dept: PAIN MEDICINE | Facility: HOSPITAL | Age: 68
End: 2023-10-13
Payer: MEDICARE

## 2023-10-13 ENCOUNTER — ANESTHESIA (OUTPATIENT)
Dept: PAIN MEDICINE | Facility: HOSPITAL | Age: 68
End: 2023-10-13
Payer: MEDICARE

## 2023-10-13 VITALS
SYSTOLIC BLOOD PRESSURE: 104 MMHG | OXYGEN SATURATION: 95 % | DIASTOLIC BLOOD PRESSURE: 71 MMHG | TEMPERATURE: 99 F | HEART RATE: 79 BPM | RESPIRATION RATE: 16 BRPM

## 2023-10-13 DIAGNOSIS — M46.1 SI (SACROILIAC) JOINT INFLAMMATION: ICD-10-CM

## 2023-10-13 DIAGNOSIS — M54.16 LUMBAR RADICULOPATHY: ICD-10-CM

## 2023-10-13 DIAGNOSIS — R52 PAIN: ICD-10-CM

## 2023-10-13 LAB
ALBUMIN SERPL-MCNC: 5 G/DL (ref 3.5–5.2)
ALBUMIN/GLOB SERPL: 2.3 G/DL
ALP SERPL-CCNC: 59 U/L (ref 39–117)
ALT SERPL-CCNC: 21 U/L (ref 1–33)
AST SERPL-CCNC: 23 U/L (ref 1–32)
BASOPHILS # BLD AUTO: 0.03 10*3/MM3 (ref 0–0.2)
BASOPHILS NFR BLD AUTO: 0.5 % (ref 0–1.5)
BILIRUB SERPL-MCNC: 0.5 MG/DL (ref 0–1.2)
BUN SERPL-MCNC: 21 MG/DL (ref 8–23)
BUN/CREAT SERPL: 28.8 (ref 7–25)
CALCIUM SERPL-MCNC: 10.4 MG/DL (ref 8.6–10.5)
CHLORIDE SERPL-SCNC: 95 MMOL/L (ref 98–107)
CHOLEST SERPL-MCNC: 150 MG/DL (ref 0–200)
CO2 SERPL-SCNC: 26.4 MMOL/L (ref 22–29)
CREAT SERPL-MCNC: 0.73 MG/DL (ref 0.57–1)
EGFRCR SERPLBLD CKD-EPI 2021: 89.2 ML/MIN/1.73
EOSINOPHIL # BLD AUTO: 0.1 10*3/MM3 (ref 0–0.4)
EOSINOPHIL NFR BLD AUTO: 1.6 % (ref 0.3–6.2)
ERYTHROCYTE [DISTWIDTH] IN BLOOD BY AUTOMATED COUNT: 13.9 % (ref 12.3–15.4)
GLOBULIN SER CALC-MCNC: 2.2 GM/DL
GLUCOSE SERPL-MCNC: 113 MG/DL (ref 65–99)
HBA1C MFR BLD: 6.2 % (ref 4.8–5.6)
HCT VFR BLD AUTO: 46.8 % (ref 34–46.6)
HDLC SERPL-MCNC: 47 MG/DL (ref 40–60)
HGB BLD-MCNC: 16 G/DL (ref 12–15.9)
IMM GRANULOCYTES # BLD AUTO: 0.01 10*3/MM3 (ref 0–0.05)
IMM GRANULOCYTES NFR BLD AUTO: 0.2 % (ref 0–0.5)
LDLC SERPL CALC-MCNC: 87 MG/DL (ref 0–100)
LDLC/HDLC SERPL: 1.83 {RATIO}
LYMPHOCYTES # BLD AUTO: 1.36 10*3/MM3 (ref 0.7–3.1)
LYMPHOCYTES NFR BLD AUTO: 21.5 % (ref 19.6–45.3)
MCH RBC QN AUTO: 28.9 PG (ref 26.6–33)
MCHC RBC AUTO-ENTMCNC: 34.2 G/DL (ref 31.5–35.7)
MCV RBC AUTO: 84.6 FL (ref 79–97)
MONOCYTES # BLD AUTO: 0.62 10*3/MM3 (ref 0.1–0.9)
MONOCYTES NFR BLD AUTO: 9.8 % (ref 5–12)
NEUTROPHILS # BLD AUTO: 4.22 10*3/MM3 (ref 1.7–7)
NEUTROPHILS NFR BLD AUTO: 66.4 % (ref 42.7–76)
NRBC BLD AUTO-RTO: 0 /100 WBC (ref 0–0.2)
PLATELET # BLD AUTO: 270 10*3/MM3 (ref 140–450)
POTASSIUM SERPL-SCNC: 4 MMOL/L (ref 3.5–5.2)
PROT SERPL-MCNC: 7.2 G/DL (ref 6–8.5)
RBC # BLD AUTO: 5.53 10*6/MM3 (ref 3.77–5.28)
SODIUM SERPL-SCNC: 137 MMOL/L (ref 136–145)
TRIGL SERPL-MCNC: 86 MG/DL (ref 0–150)
VLDLC SERPL CALC-MCNC: 16 MG/DL (ref 5–40)
WBC # BLD AUTO: 6.34 10*3/MM3 (ref 3.4–10.8)

## 2023-10-13 PROCEDURE — 25010000002 METHYLPREDNISOLONE PER 80 MG: Performed by: ANESTHESIOLOGY

## 2023-10-13 PROCEDURE — 25510000001 IOPAMIDOL 41 % SOLUTION: Performed by: ANESTHESIOLOGY

## 2023-10-13 PROCEDURE — 77003 FLUOROGUIDE FOR SPINE INJECT: CPT

## 2023-10-13 RX ORDER — IOPAMIDOL 408 MG/ML
3 INJECTION, SOLUTION INTRATHECAL
Status: COMPLETED | OUTPATIENT
Start: 2023-10-13 | End: 2023-10-13

## 2023-10-13 RX ORDER — METHYLPREDNISOLONE ACETATE 80 MG/ML
80 INJECTION, SUSPENSION INTRA-ARTICULAR; INTRALESIONAL; INTRAMUSCULAR; SOFT TISSUE ONCE
Status: COMPLETED | OUTPATIENT
Start: 2023-10-13 | End: 2023-10-13

## 2023-10-13 RX ORDER — LIDOCAINE HYDROCHLORIDE 10 MG/ML
1 INJECTION, SOLUTION INFILTRATION; PERINEURAL ONCE
Status: DISCONTINUED | OUTPATIENT
Start: 2023-10-13 | End: 2023-10-14 | Stop reason: HOSPADM

## 2023-10-13 RX ORDER — MIDAZOLAM HYDROCHLORIDE 1 MG/ML
1 INJECTION INTRAMUSCULAR; INTRAVENOUS
Status: DISCONTINUED | OUTPATIENT
Start: 2023-10-13 | End: 2023-10-14 | Stop reason: HOSPADM

## 2023-10-13 RX ORDER — FENTANYL CITRATE 50 UG/ML
50 INJECTION, SOLUTION INTRAMUSCULAR; INTRAVENOUS AS NEEDED
Status: DISCONTINUED | OUTPATIENT
Start: 2023-10-13 | End: 2023-10-14 | Stop reason: HOSPADM

## 2023-10-13 RX ADMIN — IOPAMIDOL 20 ML: 408 INJECTION, SOLUTION INTRATHECAL at 09:40

## 2023-10-13 RX ADMIN — METHYLPREDNISOLONE ACETATE 80 MG: 80 INJECTION, SUSPENSION INTRA-ARTICULAR; INTRALESIONAL; INTRAMUSCULAR; SOFT TISSUE at 09:41

## 2023-10-13 NOTE — PROGRESS NOTES
Patient ID: Summer Jefferson is a 68 y.o. female is here today for follow-up on her spondylolisthesis and to possibly discuss surgery. No new imaging has been done. Patient has lost 20lbs since last visit. She also had an epidural about a week ago.     Subjective     The patient is here in regards to   Chief Complaint   Patient presents with    Spondylolisthesis       History of Present Illness  Jeannie has successfully lost 25 pounds since we last spoke.  She plans to lose another 20 before December.  She continues to have left-sided radiculopathy in an L3 and L5 distribution.      While in the room and during my examination of the patient I wore a mask and eye protection.  I washed my hands before and after this patient encounter.  The patient was also wearing a mask.    The following portions of the patient's history were reviewed and updated as appropriate: allergies, current medications, past family history, past medical history, past social history, past surgical history and problem list.    Review of Systems   Musculoskeletal:  Positive for back pain. Negative for gait problem.        Past Medical History:   Diagnosis Date    Anxiety and depression 03/15/2022    Backache     Diabetes mellitus type 2, controlled     H/O bone density study     8/2014    H/O mammogram     8/2014    HSV (herpes simplex virus) infection 03/15/2022    Hypercholesterolemia     Hypertension     Hypothyroidism     Low back pain     Osteoarthritis of multiple joints        No Known Allergies    Family History   Problem Relation Age of Onset    Thyroid disease Mother     Diabetes Mother     Hypertension Mother     Heart disease Mother     Vision loss Father        Social History     Socioeconomic History    Marital status:    Tobacco Use    Smoking status: Former     Passive exposure: Never    Smokeless tobacco: Never    Tobacco comments:     quit 15 yrs ago   Vaping Use    Vaping Use: Never used   Substance and Sexual  Activity    Alcohol use: Yes     Comment: 1-2 a month    Drug use: Never    Sexual activity: Defer       Past Surgical History:   Procedure Laterality Date    ANKLE ARTHROSCOPY Right     1/2013 & 10/2013    COLONOSCOPY      7/27/2007    EPIDURAL BLOCK      LAPAROSCOPIC GASTRIC BANDING  08/01/2007    PAP SMEAR      12/2014    ROTATOR CUFF REPAIR Right     5/2014    TOTAL KNEE ARTHROPLASTY Right     12/2014         Objective     Vitals:    10/19/23 0920   BP: 116/80   Pulse: 87   Resp: 16   Temp: 98 °F (36.7 °C)   SpO2: 92%     Body mass index is 28.86 kg/m².    Physical Exam  Constitutional:       Appearance: Normal appearance.   HENT:      Head: Normocephalic and atraumatic.   Eyes:      Extraocular Movements: Extraocular movements intact.      Conjunctiva/sclera: Conjunctivae normal.      Pupils: Pupils are equal, round, and reactive to light.   Cardiovascular:      Rate and Rhythm: Normal rate and regular rhythm.      Pulses: Normal pulses.   Pulmonary:      Breath sounds: Normal breath sounds.   Abdominal:      Palpations: Abdomen is soft.   Musculoskeletal:         General: Normal range of motion.      Cervical back: Normal range of motion and neck supple.   Skin:     General: Skin is warm and dry.   Neurological:      Mental Status: She is alert and oriented to person, place, and time.      Cranial Nerves: Cranial nerves 2-12 are intact.      Motor: Motor function is intact. No weakness or atrophy.      Coordination: Coordination is intact. Romberg sign negative. Romberg Test normal.      Gait: Gait is intact. Gait normal.      Deep Tendon Reflexes: Reflexes are normal and symmetric.      Reflex Scores:       Tricep reflexes are 2+ on the right side and 2+ on the left side.       Bicep reflexes are 2+ on the right side and 2+ on the left side.       Brachioradialis reflexes are 2+ on the right side and 2+ on the left side.       Patellar reflexes are 2+ on the right side and 2+ on the left side.       Achilles  reflexes are 2+ on the right side and 2+ on the left side.  Psychiatric:         Speech: Speech normal.         Neurologic Exam     Mental Status   Oriented to person, place, and time.   Attention: normal. Concentration: normal.   Speech: speech is normal   Level of consciousness: alert    Cranial Nerves   Cranial nerves II through XII intact.     CN III, IV, VI   Pupils are equal, round, and reactive to light.    Motor Exam   Muscle bulk: normal  Overall muscle tone: normal    Strength   Strength 5/5 except as noted.     Sensory Exam   Light touch normal.     Gait, Coordination, and Reflexes     Gait  Gait: normal    Coordination   Romberg: negative    Reflexes   Reflexes 2+ except as noted.   Right brachioradialis: 2+  Left brachioradialis: 2+  Right biceps: 2+  Left biceps: 2+  Right triceps: 2+  Left triceps: 2+  Right patellar: 2+  Left patellar: 2+  Right achilles: 2+  Left achilles: 2+      Assessment & Plan   Independent Review of Radiographic Studies:      I personally reviewed the images from the following studies.    XR: XR of the lumbar spine was reviewed and shows her iliac crest rests below her L4-5 foramen, no dynamic subluxation on flexion-extension  XR of the whole standing spine was reviewed and shows slight positive sagittal alignment    Assessment/Plan: We will try L2-3 and L4-5 OLIF/DLIF with neuro monitoring.  This should hopefully address her spondylolisthesis.  With the stand-alone cages and anterior plating, hopefully will not require posterior instrumentation.  There is a risk that by fusing L2-3 and L4-5, her L3-4 level will gradually wear out later down the line that may require additional surgery at a later time.  We discussed the risk benefits alternatives of surgery including CSF leak, injury to nerve root and nerves traversing the iliopsoas muscle, need for further surgery down the line.  She understands as well to proceed with surgery.    Medical Decision Making:      L2-3, L4-5 OLIF  with possible conversion to L4-5 MIS TLIF if unable to get correct angle at L4-5 for OLIF.         Diagnoses and all orders for this visit:    1. Lumbar radiculopathy (Primary)  -     Case Request; Standing  -     CBC & Differential; Future  -     Comprehensive Metabolic Panel; Future  -     aPTT; Future  -     Protime-INR; Future  -     Type & Screen; Future  -     ceFAZolin (ANCEF) 2,000 mg in sodium chloride 0.9 % 100 mL IVPB  -     Case Request    2. SI (sacroiliac) joint inflammation    3. Spondylolisthesis, lumbar region  -     Case Request; Standing  -     CBC & Differential; Future  -     Comprehensive Metabolic Panel; Future  -     aPTT; Future  -     Protime-INR; Future  -     Type & Screen; Future  -     ceFAZolin (ANCEF) 2,000 mg in sodium chloride 0.9 % 100 mL IVPB  -     Case Request    4. Abnormal coagulation profile  -     aPTT; Future  -     Protime-INR; Future    Other orders  -     Inpatient Admission; Standing  -     Follow Anesthesia Guidelines / Protocol; Future  -     Follow Anesthesia Guidelines / Protocol; Standing  -     Verify / Perform Chlorhexidine Skin Prep; Standing  -     Obtain Informed Consent; Future  -     Provide NPO Instructions to Patient; Future  -     Chlorhexidine Skin Prep; Future             Patient Instructions/Recommendations:    Call with any questions or concerns      Calderon Whittaker MD  10/19/23  09:53 EDT

## 2023-10-13 NOTE — H&P
INTERVAL HISTORY:    The patient returns for another Lumbar epidural steroid injection 2 today.  They have received 75% improvement since their last injection with a pain level of 6/10 at its worst recently.    Conservative measures tried in the interim. Daily activities are still affected by the pain.    Radiology reports and/or previous notes have been reviewed and are consistent with their diagnosis of Post-Op Diagnosis Codes:     * Lumbar radiculopathy [M54.16]     * Spinal stenosis of lumbar region with neurogenic claudication [M48.062]    Alert and oriented  MP - 2  Lungs - clear  CV - rrr    Diagnosis:  Post-Op Diagnosis Codes:     * Lumbar radiculopathy [M54.16]     * Spinal stenosis of lumbar region with neurogenic claudication [M48.062]      Plan:  Lumbar epidural steroid injection under fluoroscopic guidance        Target : L4-5    I have encouraged them to continue:  1.  Physical therapy exercises at home as prescribed by physical therapy or from the pain clinic handout (given to the patient).  Continuation of these exercises every day, or multiple times per week, even when the patient has good pain relief, was stressed to the patient as a preventative measure to decrease the frequency and severity of future pain episodes.  2.  Continue pain medicines as already prescribed.  If patient not currently taking any, it is recommended to begin Acetaminophen 1000 mg po q 8 hours.  If other medicines containing Acetaminophen are currently prescribed, maintain daily dose at 3000mg.    3.  If they can tolerate NSAIDS, it is recommended to take Ibuprofen 600 mg po q 6 hours for 7 days during pain exacerbations.   Alternatively, they may substitute an NSAID of their choice (e.g. Aleve)  4.  Heat and ice to the affected area as tolerated for pain control.  It was discussed that heating pads can cause burns.  5.  Low impact exercise such as walking or water exercise was recommended to maintain overall health and aid  in weight control.   6.  Follow up as needed for subsequent injections.  7.  Patient was counseled to abstain from tobacco products.    Time :   16 min

## 2023-10-13 NOTE — DISCHARGE INSTRUCTIONS

## 2023-10-13 NOTE — ANESTHESIA PROCEDURE NOTES
PAIN Epidural block    Pre-sedation assessment completed: 10/13/2023 9:39 AM    Patient reassessed immediately prior to procedure    Patient location during procedure: pain clinic  Start Time: 10/13/2023 9:39 AM  Stop Time: 10/13/2023 9:46 AM  Indication:at surgeon's request and procedure for pain  Performed By  Anesthesiologist: Cedrick Hammonds MD  Preanesthetic Checklist  Completed: patient identified, IV checked, site marked, risks and benefits discussed, surgical consent, monitors and equipment checked, pre-op evaluation and timeout performed  Additional Notes  Dx:  Post-Op Diagnosis Codes:     * Lumbar radiculopathy [M54.16]     * Spinal stenosis of lumbar region with neurogenic claudication [M48.062]  80 mg depomedrol in epid    Plan : return to clinic as needed  Prep:  Pt Position:prone (prone)  Sterile Tech:cap, gloves, mask and sterile barrier  Prep:chlorhexidine gluconate and isopropyl alcohol  Monitoring:blood pressure monitoring, EKG and continuous pulse oximetry  Procedure:Sedation: no     Approach:midline  Guidance: fluoroscopy and c arm pa and lat and loss of resistance  Location:lumbar  Level:4-5 (interlaminar)  Needle Type:[employer]  Needle Gauge:20  Aspiration:negative  Medications:  Preservative Free Saline:3mL  Isovue:1mL  Depomedrol:80 mg  Post Assessment:  Pt Tolerance:patient tolerated the procedure well with no apparent complications  Complications:no

## 2023-10-17 ENCOUNTER — OFFICE VISIT (OUTPATIENT)
Dept: FAMILY MEDICINE CLINIC | Facility: CLINIC | Age: 68
End: 2023-10-17
Payer: MEDICARE

## 2023-10-17 VITALS
BODY MASS INDEX: 28.85 KG/M2 | OXYGEN SATURATION: 97 % | HEIGHT: 64 IN | WEIGHT: 169 LBS | DIASTOLIC BLOOD PRESSURE: 64 MMHG | RESPIRATION RATE: 16 BRPM | SYSTOLIC BLOOD PRESSURE: 112 MMHG | HEART RATE: 74 BPM | TEMPERATURE: 98.6 F

## 2023-10-17 DIAGNOSIS — E66.09 CLASS 1 OBESITY DUE TO EXCESS CALORIES WITH SERIOUS COMORBIDITY AND BODY MASS INDEX (BMI) OF 30.0 TO 30.9 IN ADULT: ICD-10-CM

## 2023-10-17 DIAGNOSIS — E11.65 CONTROLLED TYPE 2 DIABETES MELLITUS WITH HYPERGLYCEMIA, WITHOUT LONG-TERM CURRENT USE OF INSULIN: Primary | Chronic | ICD-10-CM

## 2023-10-17 PROCEDURE — 3078F DIAST BP <80 MM HG: CPT | Performed by: NURSE PRACTITIONER

## 2023-10-17 PROCEDURE — 1159F MED LIST DOCD IN RCRD: CPT | Performed by: NURSE PRACTITIONER

## 2023-10-17 PROCEDURE — 3074F SYST BP LT 130 MM HG: CPT | Performed by: NURSE PRACTITIONER

## 2023-10-17 PROCEDURE — 99214 OFFICE O/P EST MOD 30 MIN: CPT | Performed by: NURSE PRACTITIONER

## 2023-10-17 PROCEDURE — 1160F RVW MEDS BY RX/DR IN RCRD: CPT | Performed by: NURSE PRACTITIONER

## 2023-10-17 PROCEDURE — 3044F HG A1C LEVEL LT 7.0%: CPT | Performed by: NURSE PRACTITIONER

## 2023-10-17 RX ORDER — METFORMIN HYDROCHLORIDE 500 MG/1
TABLET, EXTENDED RELEASE ORAL
Qty: 180 TABLET | Refills: 3 | Status: SHIPPED | OUTPATIENT
Start: 2023-10-17

## 2023-10-17 NOTE — PROGRESS NOTES
Subjective     Summer Jefferson is a 68 y.o.. female.     Patient here today for follow up on diabetes. On 9/15 Increased Rybelsus from 3 mg to 7 mg; Patient doubled up on 3 mg to equal 6 mg at that time due to left over of 3 mg; Patient stating she just started 7 mg yesterday. Patient stating she has had no stomach issues. Patient stating she had a minor stomach ache yesterday but that is it.     Patient c/o left ear pain for past couple of days. Patient stating she thought she had some blood on qtip yesterday from her ear.    Patient stating she has followed up appt with surgeon this Thursday to see if back surgery can be scheduled.         The following portions of the patient's history were reviewed and updated as appropriate: allergies, current medications, past family history, past medical history, past social history, past surgical history and problem list.    Past Medical History:   Diagnosis Date    Anxiety and depression 03/15/2022    Backache     Diabetes mellitus type 2, controlled     H/O bone density study     8/2014    H/O mammogram     8/2014    HSV (herpes simplex virus) infection 03/15/2022    Hypercholesterolemia     Hypertension     Hypothyroidism     Low back pain     Osteoarthritis of multiple joints        Past Surgical History:   Procedure Laterality Date    ANKLE ARTHROSCOPY Right     1/2013 & 10/2013    COLONOSCOPY      7/27/2007    EPIDURAL BLOCK      LAPAROSCOPIC GASTRIC BANDING  08/01/2007    PAP SMEAR      12/2014    ROTATOR CUFF REPAIR Right     5/2014    TOTAL KNEE ARTHROPLASTY Right     12/2014       Review of Systems   Constitutional: Negative.    Respiratory: Negative.     Cardiovascular: Negative.    Gastrointestinal:  Positive for constipation. Negative for abdominal pain, diarrhea, nausea and vomiting.        Laxative every 4 days   Neurological: Negative.  Negative for dizziness and light-headedness.       No Known Allergies    Objective     Vitals:    10/17/23 1054   BP:  "112/64   Pulse: 74   Resp: 16   Temp: 98.6 °F (37 °C)   SpO2: 97%   Weight: 76.7 kg (169 lb)   Height: 162 cm (63.78\")     Body mass index is 29.21 kg/m².    Physical Exam  Vitals reviewed.   HENT:      Head: Normocephalic.   Eyes:      Pupils: Pupils are equal, round, and reactive to light.   Neck:      Vascular: No carotid bruit.   Cardiovascular:      Rate and Rhythm: Normal rate and regular rhythm.      Pulses: Normal pulses.   Pulmonary:      Effort: Pulmonary effort is normal.      Breath sounds: Normal breath sounds.   Musculoskeletal:         General: Normal range of motion.      Right lower leg: No edema.      Left lower leg: No edema.   Skin:     General: Skin is warm and dry.   Neurological:      Mental Status: She is alert and oriented to person, place, and time.   Psychiatric:         Behavior: Behavior normal.           Current Outpatient Medications:     amLODIPine (NORVASC) 5 MG tablet, Take 1 tablet by mouth Daily., Disp: 90 tablet, Rfl: 3    celecoxib (CeleBREX) 200 MG capsule, Take 1 capsule by mouth 2 (Two) Times a Day., Disp: 180 capsule, Rfl: 3    empagliflozin (Jardiance) 25 MG tablet tablet, Take 1 tablet by mouth Daily., Disp: 90 tablet, Rfl: 2    famciclovir (FAMVIR) 500 MG tablet, Take 1 tablet by mouth 2 (Two) Times a Day., Disp: 180 tablet, Rfl: 0    FLUoxetine (PROzac) 20 MG capsule, Take 1 capsule by mouth Daily., Disp: 90 capsule, Rfl: 3    glucose blood test strip, Check blood glucose once a day, Disp: 100 each, Rfl: 3    hydroCHLOROthiazide (HYDRODIURIL) 25 MG tablet, Take 1 tablet by mouth Daily., Disp: 90 tablet, Rfl: 3    levothyroxine (SYNTHROID, LEVOTHROID) 50 MCG tablet, Take 1 tablet by mouth Daily., Disp: 90 tablet, Rfl: 3    lidocaine (LIDODERM) 5 %, Remove & Discard patch within 12 hours; use as needed for back pain, Disp: 30 patch, Rfl: 1    lisinopril (PRINIVIL,ZESTRIL) 10 MG tablet, Take 1 tablet by mouth Daily., Disp: 90 tablet, Rfl: 3    metFORMIN ER (GLUCOPHAGE-XR) " 500 MG 24 hr tablet, One tab daily, Disp: 180 tablet, Rfl: 3    OneTouch Delica Lancets 33G misc, Check blood glucose once daily, Disp: 100 each, Rfl: 3    rosuvastatin (CRESTOR) 10 MG tablet, Take 1 tablet by mouth Daily., Disp: 90 tablet, Rfl: 3    Semaglutide (Rybelsus) 7 MG tablet, Take 7 mg by mouth Daily., Disp: 30 tablet, Rfl: 1    gabapentin (NEURONTIN) 100 MG capsule, Take 1 capsule by mouth 3 (Three) Times a Day for 7 days, THEN 2 capsules 3 (Three) Times a Day for 7 days, THEN 3 capsules 3 (Three) Times a Day for 76 days., Disp: 250 capsule, Rfl: 3    Recent Results (from the past 1008 hour(s))   Hemoglobin A1c    Collection Time: 09/08/23 10:14 AM    Specimen: Blood   Result Value Ref Range    Hemoglobin A1C 7.50 (H) 4.80 - 5.60 %   Comprehensive metabolic panel    Collection Time: 09/08/23 10:14 AM    Specimen: Blood   Result Value Ref Range    Glucose 98 65 - 99 mg/dL    BUN 21 8 - 23 mg/dL    Creatinine 0.80 0.57 - 1.00 mg/dL    EGFR Result 79.9 >60.0 mL/min/1.73    BUN/Creatinine Ratio 26.3 (H) 7.0 - 25.0    Sodium 139 136 - 145 mmol/L    Potassium 3.7 3.5 - 5.2 mmol/L    Chloride 98 98 - 107 mmol/L    Total CO2 26.4 22.0 - 29.0 mmol/L    Calcium 10.2 8.6 - 10.5 mg/dL    Total Protein 7.6 6.0 - 8.5 g/dL    Albumin 5.3 (H) 3.5 - 5.2 g/dL    Globulin 2.3 gm/dL    A/G Ratio 2.3 g/dL    Total Bilirubin 0.5 0.0 - 1.2 mg/dL    Alkaline Phosphatase 64 39 - 117 U/L    AST (SGOT) 28 1 - 32 U/L    ALT (SGPT) 23 1 - 33 U/L   Unable To Void    Collection Time: 09/08/23 10:14 AM   Result Value Ref Range    Unable to Void Comment    CBC & Differential    Collection Time: 10/12/23 10:29 AM    Specimen: Blood   Result Value Ref Range    WBC 6.34 3.40 - 10.80 10*3/mm3    RBC 5.53 (H) 3.77 - 5.28 10*6/mm3    Hemoglobin 16.0 (H) 12.0 - 15.9 g/dL    Hematocrit 46.8 (H) 34.0 - 46.6 %    MCV 84.6 79.0 - 97.0 fL    MCH 28.9 26.6 - 33.0 pg    MCHC 34.2 31.5 - 35.7 g/dL    RDW 13.9 12.3 - 15.4 %    Platelets 270 140 - 450  10*3/mm3    Neutrophil Rel % 66.4 42.7 - 76.0 %    Lymphocyte Rel % 21.5 19.6 - 45.3 %    Monocyte Rel % 9.8 5.0 - 12.0 %    Eosinophil Rel % 1.6 0.3 - 6.2 %    Basophil Rel % 0.5 0.0 - 1.5 %    Neutrophils Absolute 4.22 1.70 - 7.00 10*3/mm3    Lymphocytes Absolute 1.36 0.70 - 3.10 10*3/mm3    Monocytes Absolute 0.62 0.10 - 0.90 10*3/mm3    Eosinophils Absolute 0.10 0.00 - 0.40 10*3/mm3    Basophils Absolute 0.03 0.00 - 0.20 10*3/mm3    Immature Granulocyte Rel % 0.2 0.0 - 0.5 %    Immature Grans Absolute 0.01 0.00 - 0.05 10*3/mm3    nRBC 0.0 0.0 - 0.2 /100 WBC   Comprehensive Metabolic Panel    Collection Time: 10/12/23 10:29 AM    Specimen: Blood   Result Value Ref Range    Glucose 113 (H) 65 - 99 mg/dL    BUN 21 8 - 23 mg/dL    Creatinine 0.73 0.57 - 1.00 mg/dL    EGFR Result 89.2 >60.0 mL/min/1.73    BUN/Creatinine Ratio 28.8 (H) 7.0 - 25.0    Sodium 137 136 - 145 mmol/L    Potassium 4.0 3.5 - 5.2 mmol/L    Chloride 95 (L) 98 - 107 mmol/L    Total CO2 26.4 22.0 - 29.0 mmol/L    Calcium 10.4 8.6 - 10.5 mg/dL    Total Protein 7.2 6.0 - 8.5 g/dL    Albumin 5.0 3.5 - 5.2 g/dL    Globulin 2.2 gm/dL    A/G Ratio 2.3 g/dL    Total Bilirubin 0.5 0.0 - 1.2 mg/dL    Alkaline Phosphatase 59 39 - 117 U/L    AST (SGOT) 23 1 - 32 U/L    ALT (SGPT) 21 1 - 33 U/L   Lipid Panel With LDL / HDL Ratio    Collection Time: 10/12/23 10:29 AM    Specimen: Blood   Result Value Ref Range    Total Cholesterol 150 0 - 200 mg/dL    Triglycerides 86 0 - 150 mg/dL    HDL Cholesterol 47 40 - 60 mg/dL    VLDL Cholesterol Gee 16 5 - 40 mg/dL    LDL Chol Calc (NIH) 87 0 - 100 mg/dL    LDL/HDL RATIO 1.83    Hemoglobin A1c    Collection Time: 10/12/23 10:29 AM    Specimen: Blood   Result Value Ref Range    Hemoglobin A1C 6.20 (H) 4.80 - 5.60 %       Diagnoses and all orders for this visit:    1. Controlled type 2 diabetes mellitus with hyperglycemia, without long-term current use of insulin (Primary)  -     metFORMIN ER (GLUCOPHAGE-XR) 500 MG 24 hr  tablet; One tab daily  Dispense: 180 tablet; Refill: 3  -     Comprehensive metabolic panel; Future  -     Hemoglobin A1c; Future  -     Urinalysis With Culture If Indicated - Urine, Clean Catch; Future  -     Microalbumin / Creatinine Urine Ratio - Urine, Clean Catch; Future    2. Class 1 obesity due to excess calories with serious comorbidity and body mass index (BMI) of 30.0 to 30.9 in adult  -     Comprehensive metabolic panel; Future  -     Hemoglobin A1c; Future  -     Urinalysis With Culture If Indicated - Urine, Clean Catch; Future  -     Microalbumin / Creatinine Urine Ratio - Urine, Clean Catch; Future        Patient Instructions   Patient has a 19 lbs weight loss in 2 months.   Continue eating a heart healthy low fat/low sugar diet  Continue to drink plenty of water through out day  Continue Rybelsus 7 mg daily   Follow up in 1 month    Return for fasting labs in 1 month, recheck in 1 month.

## 2023-10-17 NOTE — PATIENT INSTRUCTIONS
Patient has a 19 lbs weight loss in 2 months.   Continue eating a heart healthy low fat/low sugar diet  Continue to drink plenty of water through out day  Continue Rybelsus 7 mg daily   Follow up in 1 month

## 2023-10-19 ENCOUNTER — OFFICE VISIT (OUTPATIENT)
Dept: NEUROSURGERY | Facility: CLINIC | Age: 68
End: 2023-10-19
Payer: MEDICARE

## 2023-10-19 VITALS
HEIGHT: 64 IN | HEART RATE: 87 BPM | DIASTOLIC BLOOD PRESSURE: 80 MMHG | RESPIRATION RATE: 16 BRPM | WEIGHT: 167 LBS | SYSTOLIC BLOOD PRESSURE: 116 MMHG | OXYGEN SATURATION: 92 % | BODY MASS INDEX: 28.51 KG/M2 | TEMPERATURE: 98 F

## 2023-10-19 DIAGNOSIS — M54.16 LUMBAR RADICULOPATHY: Primary | ICD-10-CM

## 2023-10-19 DIAGNOSIS — R79.1 ABNORMAL COAGULATION PROFILE: ICD-10-CM

## 2023-10-19 DIAGNOSIS — M46.1 SI (SACROILIAC) JOINT INFLAMMATION: ICD-10-CM

## 2023-10-19 DIAGNOSIS — M43.16 SPONDYLOLISTHESIS, LUMBAR REGION: ICD-10-CM

## 2023-10-19 RX ORDER — CEFAZOLIN SODIUM 2 G/100ML
2000 INJECTION, SOLUTION INTRAVENOUS ONCE
OUTPATIENT
Start: 2023-12-11 | End: 2023-10-19

## 2023-11-04 DIAGNOSIS — E11.65 CONTROLLED TYPE 2 DIABETES MELLITUS WITH HYPERGLYCEMIA, WITHOUT LONG-TERM CURRENT USE OF INSULIN: Chronic | ICD-10-CM

## 2023-11-06 RX ORDER — EMPAGLIFLOZIN 25 MG/1
25 TABLET, FILM COATED ORAL DAILY
Qty: 90 TABLET | Refills: 10 | Status: SHIPPED | OUTPATIENT
Start: 2023-11-06

## 2023-11-08 ENCOUNTER — LAB (OUTPATIENT)
Dept: FAMILY MEDICINE CLINIC | Facility: CLINIC | Age: 68
End: 2023-11-08
Payer: MEDICARE

## 2023-11-08 DIAGNOSIS — E11.65 CONTROLLED TYPE 2 DIABETES MELLITUS WITH HYPERGLYCEMIA, WITHOUT LONG-TERM CURRENT USE OF INSULIN: Chronic | ICD-10-CM

## 2023-11-08 DIAGNOSIS — E66.09 CLASS 1 OBESITY DUE TO EXCESS CALORIES WITH SERIOUS COMORBIDITY AND BODY MASS INDEX (BMI) OF 30.0 TO 30.9 IN ADULT: ICD-10-CM

## 2023-11-08 LAB
ALBUMIN SERPL-MCNC: 5.1 G/DL (ref 3.5–5.2)
ALBUMIN/GLOB SERPL: 2.4 G/DL
ALP SERPL-CCNC: 53 U/L (ref 39–117)
ALT SERPL-CCNC: 28 U/L (ref 1–33)
AST SERPL-CCNC: 28 U/L (ref 1–32)
BILIRUB SERPL-MCNC: 0.5 MG/DL (ref 0–1.2)
BUN SERPL-MCNC: 20 MG/DL (ref 8–23)
BUN/CREAT SERPL: 27.4 (ref 7–25)
CALCIUM SERPL-MCNC: 10.3 MG/DL (ref 8.6–10.5)
CHLORIDE SERPL-SCNC: 102 MMOL/L (ref 98–107)
CO2 SERPL-SCNC: 28.3 MMOL/L (ref 22–29)
CREAT SERPL-MCNC: 0.73 MG/DL (ref 0.57–1)
EGFRCR SERPLBLD CKD-EPI 2021: 89.2 ML/MIN/1.73
GLOBULIN SER CALC-MCNC: 2.1 GM/DL
GLUCOSE SERPL-MCNC: 93 MG/DL (ref 65–99)
HBA1C MFR BLD: 6 % (ref 4.8–5.6)
POTASSIUM SERPL-SCNC: 3.8 MMOL/L (ref 3.5–5.2)
PROT SERPL-MCNC: 7.2 G/DL (ref 6–8.5)
SODIUM SERPL-SCNC: 141 MMOL/L (ref 136–145)
UNABLE TO VOID: NORMAL

## 2023-11-14 ENCOUNTER — OFFICE VISIT (OUTPATIENT)
Dept: FAMILY MEDICINE CLINIC | Facility: CLINIC | Age: 68
End: 2023-11-14
Payer: MEDICARE

## 2023-11-14 VITALS
HEART RATE: 81 BPM | RESPIRATION RATE: 16 BRPM | DIASTOLIC BLOOD PRESSURE: 70 MMHG | HEIGHT: 64 IN | OXYGEN SATURATION: 95 % | SYSTOLIC BLOOD PRESSURE: 116 MMHG | WEIGHT: 165.34 LBS | BODY MASS INDEX: 28.23 KG/M2 | TEMPERATURE: 98 F

## 2023-11-14 DIAGNOSIS — E11.9 CONTROLLED TYPE 2 DIABETES MELLITUS WITHOUT COMPLICATION, WITHOUT LONG-TERM CURRENT USE OF INSULIN: Primary | Chronic | ICD-10-CM

## 2023-11-14 DIAGNOSIS — E66.3 OVERWEIGHT (BMI 25.0-29.9): ICD-10-CM

## 2023-11-14 PROBLEM — E66.811 CLASS 1 OBESITY DUE TO EXCESS CALORIES WITH SERIOUS COMORBIDITY AND BODY MASS INDEX (BMI) OF 30.0 TO 30.9 IN ADULT: Status: RESOLVED | Noted: 2022-08-03 | Resolved: 2023-11-14

## 2023-11-14 PROBLEM — E66.09 CLASS 1 OBESITY DUE TO EXCESS CALORIES WITH SERIOUS COMORBIDITY AND BODY MASS INDEX (BMI) OF 30.0 TO 30.9 IN ADULT: Status: RESOLVED | Noted: 2022-08-03 | Resolved: 2023-11-14

## 2023-11-14 NOTE — PROGRESS NOTES
"Subjective     Summer Jefferson is a 68 y.o.. female.     Patient here today for follow up on her diabetes. Patient currently taking Rybelsus 7 mg daily, metformin 500 mg 1 tab daily, and Jardiance 25 mg daily. Patient in mid August with HgA1c of 8.1 and weight of 188 lbs. Patient stating she is still eating a heart healthy diabetic diet and drinking water through out day. Patient to have back  surgery on 12/11/23.     Diabetes        The following portions of the patient's history were reviewed and updated as appropriate: allergies, current medications, past family history, past medical history, past social history, past surgical history and problem list.    Past Medical History:   Diagnosis Date    Anxiety and depression 03/15/2022    Backache     Diabetes mellitus type 2, controlled     H/O bone density study     8/2014    H/O mammogram     8/2014    HSV (herpes simplex virus) infection 03/15/2022    Hypercholesterolemia     Hypertension     Hypothyroidism     Low back pain     Osteoarthritis of multiple joints        Past Surgical History:   Procedure Laterality Date    ANKLE ARTHROSCOPY Right     1/2013 & 10/2013    COLONOSCOPY      7/27/2007    EPIDURAL BLOCK      LAPAROSCOPIC GASTRIC BANDING  08/01/2007    PAP SMEAR      12/2014    ROTATOR CUFF REPAIR Right     5/2014    TOTAL KNEE ARTHROPLASTY Right     12/2014       Review of Systems   Respiratory: Negative.     Cardiovascular: Negative.    Gastrointestinal:  Positive for constipation. Negative for diarrhea, nausea and vomiting.       No Known Allergies    Objective     Vitals:    11/14/23 0949   BP: 116/70   Pulse: 81   Resp: 16   Temp: 98 °F (36.7 °C)   SpO2: 95%   Weight: 75 kg (165 lb 5.5 oz)   Height: 162 cm (63.78\")     Body mass index is 28.58 kg/m².    Physical Exam  Vitals reviewed.   HENT:      Head: Normocephalic.   Eyes:      Pupils: Pupils are equal, round, and reactive to light.   Cardiovascular:      Rate and Rhythm: Normal rate and " regular rhythm.   Pulmonary:      Effort: Pulmonary effort is normal.      Breath sounds: Normal breath sounds.   Musculoskeletal:         General: Normal range of motion.   Skin:     General: Skin is warm and dry.   Neurological:      Mental Status: She is alert and oriented to person, place, and time.   Psychiatric:         Behavior: Behavior normal.           Current Outpatient Medications:     amLODIPine (NORVASC) 5 MG tablet, Take 1 tablet by mouth Daily., Disp: 90 tablet, Rfl: 3    celecoxib (CeleBREX) 200 MG capsule, Take 1 capsule by mouth 2 (Two) Times a Day., Disp: 180 capsule, Rfl: 3    famciclovir (FAMVIR) 500 MG tablet, Take 1 tablet by mouth 2 (Two) Times a Day., Disp: 180 tablet, Rfl: 0    FLUoxetine (PROzac) 20 MG capsule, Take 1 capsule by mouth Daily., Disp: 90 capsule, Rfl: 3    glucose blood test strip, Check blood glucose once a day, Disp: 100 each, Rfl: 3    hydroCHLOROthiazide (HYDRODIURIL) 25 MG tablet, Take 1 tablet by mouth Daily., Disp: 90 tablet, Rfl: 3    levothyroxine (SYNTHROID, LEVOTHROID) 50 MCG tablet, Take 1 tablet by mouth Daily., Disp: 90 tablet, Rfl: 3    lidocaine (LIDODERM) 5 %, Remove & Discard patch within 12 hours; use as needed for back pain, Disp: 30 patch, Rfl: 1    lisinopril (PRINIVIL,ZESTRIL) 10 MG tablet, Take 1 tablet by mouth Daily., Disp: 90 tablet, Rfl: 3    metFORMIN ER (GLUCOPHAGE-XR) 500 MG 24 hr tablet, One tab daily, Disp: 180 tablet, Rfl: 3    OneTouch Delica Lancets 33G misc, Check blood glucose once daily, Disp: 100 each, Rfl: 3    rosuvastatin (CRESTOR) 10 MG tablet, Take 1 tablet by mouth Daily., Disp: 90 tablet, Rfl: 3    empagliflozin (Jardiance) 10 MG tablet tablet, Take 1 tablet by mouth Daily., Disp: 30 tablet, Rfl: 0    gabapentin (NEURONTIN) 100 MG capsule, Take 1 capsule by mouth 3 (Three) Times a Day for 7 days, THEN 2 capsules 3 (Three) Times a Day for 7 days, THEN 3 capsules 3 (Three) Times a Day for 76 days., Disp: 250 capsule, Rfl: 3     Semaglutide 14 MG tablet, Take 1 tablet by mouth Daily., Disp: 30 tablet, Rfl: 1    Recent Results (from the past 168 hour(s))   Comprehensive metabolic panel    Collection Time: 11/08/23 11:03 AM    Specimen: Blood   Result Value Ref Range    Glucose 93 65 - 99 mg/dL    BUN 20 8 - 23 mg/dL    Creatinine 0.73 0.57 - 1.00 mg/dL    EGFR Result 89.2 >60.0 mL/min/1.73    BUN/Creatinine Ratio 27.4 (H) 7.0 - 25.0    Sodium 141 136 - 145 mmol/L    Potassium 3.8 3.5 - 5.2 mmol/L    Chloride 102 98 - 107 mmol/L    Total CO2 28.3 22.0 - 29.0 mmol/L    Calcium 10.3 8.6 - 10.5 mg/dL    Total Protein 7.2 6.0 - 8.5 g/dL    Albumin 5.1 3.5 - 5.2 g/dL    Globulin 2.1 gm/dL    A/G Ratio 2.4 g/dL    Total Bilirubin 0.5 0.0 - 1.2 mg/dL    Alkaline Phosphatase 53 39 - 117 U/L    AST (SGOT) 28 1 - 32 U/L    ALT (SGPT) 28 1 - 33 U/L   Hemoglobin A1c    Collection Time: 11/08/23 11:03 AM   Result Value Ref Range    Hemoglobin A1C 6.00 (H) 4.80 - 5.60 %   Unable To Void    Collection Time: 11/08/23 11:03 AM   Result Value Ref Range    Unable to Void Comment            Diagnoses and all orders for this visit:    1. Controlled type 2 diabetes mellitus without complication, without long-term current use of insulin (Primary)  -     empagliflozin (Jardiance) 10 MG tablet tablet; Take 1 tablet by mouth Daily.  Dispense: 30 tablet; Refill: 0  -     Semaglutide 14 MG tablet; Take 1 tablet by mouth Daily.  Dispense: 30 tablet; Refill: 1  -     Comprehensive Metabolic Panel; Future  -     Hemoglobin A1c; Future  -     Lipase; Future    2. Overweight (BMI 25.0-29.9)        Patient Instructions   Reducing Jardiance from 25 to 10 mg daily.   Increasing Rybelsus from 7 to 14 mg daily  Plan is to stop Jardiance in 1 month if able.  Plan is to reduce Rybelsus after off of Jardiance and HgA1c stays stable.  Continue metformin 500 mg daily.  Continue to eat a heart healthy diabetic diet and drink plenty of water.     Return for fasting labs in 1 month,  recheck in 1 month (after surgery).

## 2023-11-14 NOTE — PATIENT INSTRUCTIONS
Reducing Jardiance from 25 to 10 mg daily.   Increasing Rybelsus from 7 to 14 mg daily  Plan is to stop Jardiance in 1 month if able.  Plan is to reduce Rybelsus after off of Jardiance and HgA1c stays stable.  Continue metformin 500 mg daily.  Continue to eat a heart healthy diabetic diet and drink plenty of water.

## 2023-12-04 ENCOUNTER — PRE-ADMISSION TESTING (OUTPATIENT)
Dept: PREADMISSION TESTING | Facility: HOSPITAL | Age: 68
End: 2023-12-04
Payer: MEDICARE

## 2023-12-04 ENCOUNTER — OFFICE VISIT (OUTPATIENT)
Dept: FAMILY MEDICINE CLINIC | Facility: CLINIC | Age: 68
End: 2023-12-04
Payer: MEDICARE

## 2023-12-04 VITALS
BODY MASS INDEX: 27.06 KG/M2 | OXYGEN SATURATION: 98 % | WEIGHT: 162.4 LBS | HEART RATE: 102 BPM | SYSTOLIC BLOOD PRESSURE: 106 MMHG | HEIGHT: 65 IN | RESPIRATION RATE: 18 BRPM | TEMPERATURE: 98 F | DIASTOLIC BLOOD PRESSURE: 66 MMHG

## 2023-12-04 VITALS
WEIGHT: 162 LBS | TEMPERATURE: 99.1 F | HEART RATE: 100 BPM | SYSTOLIC BLOOD PRESSURE: 126 MMHG | HEIGHT: 64 IN | RESPIRATION RATE: 16 BRPM | BODY MASS INDEX: 27.66 KG/M2 | DIASTOLIC BLOOD PRESSURE: 70 MMHG | OXYGEN SATURATION: 94 %

## 2023-12-04 DIAGNOSIS — R05.9 COUGH, UNSPECIFIED TYPE: ICD-10-CM

## 2023-12-04 DIAGNOSIS — J06.9 ACUTE URI: Primary | ICD-10-CM

## 2023-12-04 DIAGNOSIS — M43.16 SPONDYLOLISTHESIS, LUMBAR REGION: ICD-10-CM

## 2023-12-04 DIAGNOSIS — M54.16 LUMBAR RADICULOPATHY: ICD-10-CM

## 2023-12-04 DIAGNOSIS — R79.1 ABNORMAL COAGULATION PROFILE: ICD-10-CM

## 2023-12-04 LAB
ABO GROUP BLD: NORMAL
ALBUMIN SERPL-MCNC: 4.5 G/DL (ref 3.5–5.2)
ALBUMIN/GLOB SERPL: 1.7 G/DL
ALP SERPL-CCNC: 58 U/L (ref 39–117)
ALT SERPL W P-5'-P-CCNC: 23 U/L (ref 1–33)
ANION GAP SERPL CALCULATED.3IONS-SCNC: 14.1 MMOL/L (ref 5–15)
APTT PPP: 25.3 SECONDS (ref 22.7–35.4)
AST SERPL-CCNC: 27 U/L (ref 1–32)
BASOPHILS # BLD AUTO: 0.03 10*3/MM3 (ref 0–0.2)
BASOPHILS NFR BLD AUTO: 0.5 % (ref 0–1.5)
BILIRUB SERPL-MCNC: 0.5 MG/DL (ref 0–1.2)
BLD GP AB SCN SERPL QL: NEGATIVE
BUN SERPL-MCNC: 18 MG/DL (ref 8–23)
BUN/CREAT SERPL: 25 (ref 7–25)
CALCIUM SPEC-SCNC: 10.1 MG/DL (ref 8.6–10.5)
CHLORIDE SERPL-SCNC: 94 MMOL/L (ref 98–107)
CO2 SERPL-SCNC: 27.9 MMOL/L (ref 22–29)
CREAT SERPL-MCNC: 0.72 MG/DL (ref 0.57–1)
DEPRECATED RDW RBC AUTO: 41.9 FL (ref 37–54)
EGFRCR SERPLBLD CKD-EPI 2021: 90.6 ML/MIN/1.73
EOSINOPHIL # BLD AUTO: 0.09 10*3/MM3 (ref 0–0.4)
EOSINOPHIL NFR BLD AUTO: 1.4 % (ref 0.3–6.2)
ERYTHROCYTE [DISTWIDTH] IN BLOOD BY AUTOMATED COUNT: 13.3 % (ref 12.3–15.4)
EXPIRATION DATE: NORMAL
FLUAV AG UPPER RESP QL IA.RAPID: NOT DETECTED
FLUBV AG UPPER RESP QL IA.RAPID: NOT DETECTED
GLOBULIN UR ELPH-MCNC: 2.7 GM/DL
GLUCOSE SERPL-MCNC: 110 MG/DL (ref 65–99)
HCT VFR BLD AUTO: 43.2 % (ref 34–46.6)
HGB BLD-MCNC: 14.5 G/DL (ref 12–15.9)
IMM GRANULOCYTES # BLD AUTO: 0.02 10*3/MM3 (ref 0–0.05)
IMM GRANULOCYTES NFR BLD AUTO: 0.3 % (ref 0–0.5)
INR PPP: 0.99 (ref 0.9–1.1)
INTERNAL CONTROL: NORMAL
LYMPHOCYTES # BLD AUTO: 0.81 10*3/MM3 (ref 0.7–3.1)
LYMPHOCYTES NFR BLD AUTO: 12.2 % (ref 19.6–45.3)
Lab: NORMAL
MCH RBC QN AUTO: 28.9 PG (ref 26.6–33)
MCHC RBC AUTO-ENTMCNC: 33.6 G/DL (ref 31.5–35.7)
MCV RBC AUTO: 86.2 FL (ref 79–97)
MONOCYTES # BLD AUTO: 0.56 10*3/MM3 (ref 0.1–0.9)
MONOCYTES NFR BLD AUTO: 8.4 % (ref 5–12)
NEUTROPHILS NFR BLD AUTO: 5.15 10*3/MM3 (ref 1.7–7)
NEUTROPHILS NFR BLD AUTO: 77.2 % (ref 42.7–76)
NRBC BLD AUTO-RTO: 0 /100 WBC (ref 0–0.2)
PLATELET # BLD AUTO: 220 10*3/MM3 (ref 140–450)
PMV BLD AUTO: 10.4 FL (ref 6–12)
POTASSIUM SERPL-SCNC: 3.4 MMOL/L (ref 3.5–5.2)
PROT SERPL-MCNC: 7.2 G/DL (ref 6–8.5)
PROTHROMBIN TIME: 13.2 SECONDS (ref 11.7–14.2)
QT INTERVAL: 342 MS
QTC INTERVAL: 437 MS
RBC # BLD AUTO: 5.01 10*6/MM3 (ref 3.77–5.28)
RH BLD: NEGATIVE
SARS-COV-2 AG UPPER RESP QL IA.RAPID: NOT DETECTED
SODIUM SERPL-SCNC: 136 MMOL/L (ref 136–145)
T&S EXPIRATION DATE: NORMAL
WBC NRBC COR # BLD AUTO: 6.66 10*3/MM3 (ref 3.4–10.8)

## 2023-12-04 PROCEDURE — 86900 BLOOD TYPING SEROLOGIC ABO: CPT

## 2023-12-04 PROCEDURE — 86901 BLOOD TYPING SEROLOGIC RH(D): CPT

## 2023-12-04 PROCEDURE — 36415 COLL VENOUS BLD VENIPUNCTURE: CPT

## 2023-12-04 PROCEDURE — 85610 PROTHROMBIN TIME: CPT

## 2023-12-04 PROCEDURE — 3078F DIAST BP <80 MM HG: CPT | Performed by: NURSE PRACTITIONER

## 2023-12-04 PROCEDURE — 86850 RBC ANTIBODY SCREEN: CPT

## 2023-12-04 PROCEDURE — 1160F RVW MEDS BY RX/DR IN RCRD: CPT | Performed by: NURSE PRACTITIONER

## 2023-12-04 PROCEDURE — 85730 THROMBOPLASTIN TIME PARTIAL: CPT

## 2023-12-04 PROCEDURE — 99213 OFFICE O/P EST LOW 20 MIN: CPT | Performed by: NURSE PRACTITIONER

## 2023-12-04 PROCEDURE — 1159F MED LIST DOCD IN RCRD: CPT | Performed by: NURSE PRACTITIONER

## 2023-12-04 PROCEDURE — 93005 ELECTROCARDIOGRAM TRACING: CPT

## 2023-12-04 PROCEDURE — 80053 COMPREHEN METABOLIC PANEL: CPT

## 2023-12-04 PROCEDURE — 87428 SARSCOV & INF VIR A&B AG IA: CPT | Performed by: NURSE PRACTITIONER

## 2023-12-04 PROCEDURE — 85025 COMPLETE CBC W/AUTO DIFF WBC: CPT

## 2023-12-04 PROCEDURE — 3044F HG A1C LEVEL LT 7.0%: CPT | Performed by: NURSE PRACTITIONER

## 2023-12-04 PROCEDURE — 3074F SYST BP LT 130 MM HG: CPT | Performed by: NURSE PRACTITIONER

## 2023-12-04 RX ORDER — BENZONATATE 100 MG/1
100 CAPSULE ORAL 3 TIMES DAILY PRN
Qty: 30 CAPSULE | Refills: 0 | Status: ON HOLD | OUTPATIENT
Start: 2023-12-04

## 2023-12-04 RX ORDER — AZITHROMYCIN 250 MG/1
TABLET, FILM COATED ORAL
Qty: 6 TABLET | Refills: 0 | Status: ON HOLD | OUTPATIENT
Start: 2023-12-04

## 2023-12-04 NOTE — PROGRESS NOTES
"Mitzi Jefferson is a 68 y.o.. female.     Patient's back surgery scheduled for next week.    Cough  This is a new problem. Episode onset: 3 days. The problem has been unchanged. The cough is Non-productive. Pertinent negatives include no chills, ear pain, fever, headaches, nasal congestion, postnasal drip, rhinorrhea or sore throat. She has tried nothing for the symptoms.       The following portions of the patient's history were reviewed and updated as appropriate: allergies, current medications, past family history, past medical history, past social history, past surgical history and problem list.    Past Medical History:   Diagnosis Date    Anxiety and depression 03/15/2022    Backache     Diabetes mellitus type 2, controlled     HSV (herpes simplex virus) infection 03/15/2022    Hypercholesterolemia     Hypertension     Hypothyroidism     Osteoarthritis of multiple joints     Spinal stenosis     LUMBAR       Past Surgical History:   Procedure Laterality Date    ANKLE ARTHROSCOPY Right     1/2013 & 10/2013    COLONOSCOPY      7/27/2007    EPIDURAL BLOCK      X5    LAPAROSCOPIC GASTRIC BANDING  08/01/2007    ROTATOR CUFF REPAIR Right     5/2014    TOTAL KNEE ARTHROPLASTY Right     12/2014       Review of Systems   Constitutional:  Negative for chills and fever.   HENT:  Negative for congestion, ear pain, postnasal drip, rhinorrhea and sore throat.    Respiratory:  Positive for cough.    Gastrointestinal:  Negative for diarrhea, nausea and vomiting.   Neurological:  Negative for headaches.       No Known Allergies    Objective     Vitals:    12/04/23 1258   BP: 126/70   Pulse: 100   Resp: 16   Temp: 99.1 °F (37.3 °C)   SpO2: 94%   Weight: 73.5 kg (162 lb)   Height: 163.8 cm (64.49\")     Body mass index is 27.39 kg/m².    Physical Exam  Vitals reviewed.   Constitutional:       Appearance: She is well-developed.   HENT:      Head: Normocephalic and atraumatic.      Right Ear: Tympanic membrane " normal. No middle ear effusion. Tympanic membrane is not erythematous.      Left Ear: Tympanic membrane normal.  No middle ear effusion. Tympanic membrane is not erythematous.      Nose: Nose normal.      Mouth/Throat:      Mouth: Mucous membranes are moist.      Pharynx: Oropharyngeal exudate (clear pnd) present. No pharyngeal swelling or posterior oropharyngeal erythema.   Eyes:      Conjunctiva/sclera: Conjunctivae normal.      Pupils: Pupils are equal, round, and reactive to light.   Cardiovascular:      Rate and Rhythm: Normal rate and regular rhythm.      Heart sounds: No murmur heard.  Pulmonary:      Effort: Pulmonary effort is normal.      Breath sounds: Normal breath sounds. No wheezing, rhonchi or rales.   Musculoskeletal:         General: Normal range of motion.   Lymphadenopathy:      Cervical: No cervical adenopathy.   Skin:     General: Skin is warm and dry.   Neurological:      Mental Status: She is alert and oriented to person, place, and time.   Psychiatric:         Behavior: Behavior normal.           Current Outpatient Medications:     amLODIPine (NORVASC) 5 MG tablet, Take 1 tablet by mouth Daily., Disp: 90 tablet, Rfl: 3    celecoxib (CeleBREX) 200 MG capsule, Take 1 capsule by mouth 2 (Two) Times a Day. (Patient taking differently: Take 1 capsule by mouth 2 (Two) Times a Day. FOLLOW MD GUIDELINE ON HOLDING FOR DOS), Disp: 180 capsule, Rfl: 3    empagliflozin (Jardiance) 10 MG tablet tablet, Take 1 tablet by mouth Daily. (Patient taking differently: Take 12.5 mg by mouth Daily.), Disp: 30 tablet, Rfl: 0    famciclovir (FAMVIR) 500 MG tablet, Take 1 tablet by mouth 2 (Two) Times a Day. (Patient taking differently: Take 1 tablet by mouth Every 12 (Twelve) Hours As Needed.), Disp: 180 tablet, Rfl: 0    FLUoxetine (PROzac) 20 MG capsule, Take 1 capsule by mouth Daily., Disp: 90 capsule, Rfl: 3    gabapentin (NEURONTIN) 100 MG capsule, Take 1 capsule by mouth 3 (Three) Times a Day for 7 days, THEN 2  capsules 3 (Three) Times a Day for 7 days, THEN 3 capsules 3 (Three) Times a Day for 76 days. (Patient taking differently: Take 1 capsule by mouth 3 (Three) Times a Day for 7 days, THEN 2 capsules 3 (Three) Times a Day for 7 days, THEN 3 capsules 3 (Three) Times a Day for 76 days.   SOME DAYS REPORTS TAKING PRN ONLY), Disp: 250 capsule, Rfl: 3    hydroCHLOROthiazide (HYDRODIURIL) 25 MG tablet, Take 1 tablet by mouth Daily., Disp: 90 tablet, Rfl: 3    levothyroxine (SYNTHROID, LEVOTHROID) 50 MCG tablet, Take 1 tablet by mouth Daily., Disp: 90 tablet, Rfl: 3    lidocaine (LIDODERM) 5 %, Remove & Discard patch within 12 hours; use as needed for back pain (Patient taking differently: Place 1 patch on the skin as directed by provider Daily. Remove & Discard patch within 12 hours; use as needed for back pain), Disp: 30 patch, Rfl: 1    lisinopril (PRINIVIL,ZESTRIL) 10 MG tablet, Take 1 tablet by mouth Daily., Disp: 90 tablet, Rfl: 3    metFORMIN ER (GLUCOPHAGE-XR) 500 MG 24 hr tablet, One tab daily (Patient taking differently: Take 1 tablet by mouth 2 (Two) Times a Day. One tab daily), Disp: 180 tablet, Rfl: 3    rosuvastatin (CRESTOR) 10 MG tablet, Take 1 tablet by mouth Daily., Disp: 90 tablet, Rfl: 3    Semaglutide 14 MG tablet, Take 1 tablet by mouth Daily., Disp: 30 tablet, Rfl: 1    benzonatate (Tessalon Perles) 100 MG capsule, Take 1 capsule by mouth 3 (Three) Times a Day As Needed for Cough., Disp: 30 capsule, Rfl: 0    Recent Results (from the past 84 hour(s))   Comprehensive Metabolic Panel    Collection Time: 12/04/23  9:35 AM    Specimen: Blood   Result Value Ref Range    Glucose 110 (H) 65 - 99 mg/dL    BUN 18 8 - 23 mg/dL    Creatinine 0.72 0.57 - 1.00 mg/dL    Sodium 136 136 - 145 mmol/L    Potassium 3.4 (L) 3.5 - 5.2 mmol/L    Chloride 94 (L) 98 - 107 mmol/L    CO2 27.9 22.0 - 29.0 mmol/L    Calcium 10.1 8.6 - 10.5 mg/dL    Total Protein 7.2 6.0 - 8.5 g/dL    Albumin 4.5 3.5 - 5.2 g/dL    ALT (SGPT) 23 1 -  33 U/L    AST (SGOT) 27 1 - 32 U/L    Alkaline Phosphatase 58 39 - 117 U/L    Total Bilirubin 0.5 0.0 - 1.2 mg/dL    Globulin 2.7 gm/dL    A/G Ratio 1.7 g/dL    BUN/Creatinine Ratio 25.0 7.0 - 25.0    Anion Gap 14.1 5.0 - 15.0 mmol/L    eGFR 90.6 >60.0 mL/min/1.73   aPTT    Collection Time: 12/04/23  9:35 AM    Specimen: Blood   Result Value Ref Range    PTT 25.3 22.7 - 35.4 seconds   Protime-INR    Collection Time: 12/04/23  9:35 AM    Specimen: Blood   Result Value Ref Range    Protime 13.2 11.7 - 14.2 Seconds    INR 0.99 0.90 - 1.10   Type & Screen    Collection Time: 12/04/23  9:35 AM    Specimen: Blood   Result Value Ref Range    ABO Type AB     RH type Negative     Antibody Screen Negative     T&S Expiration Date 12/19/2023 11:59:00 PM    CBC Auto Differential    Collection Time: 12/04/23  9:35 AM    Specimen: Blood   Result Value Ref Range    WBC 6.66 3.40 - 10.80 10*3/mm3    RBC 5.01 3.77 - 5.28 10*6/mm3    Hemoglobin 14.5 12.0 - 15.9 g/dL    Hematocrit 43.2 34.0 - 46.6 %    MCV 86.2 79.0 - 97.0 fL    MCH 28.9 26.6 - 33.0 pg    MCHC 33.6 31.5 - 35.7 g/dL    RDW 13.3 12.3 - 15.4 %    RDW-SD 41.9 37.0 - 54.0 fl    MPV 10.4 6.0 - 12.0 fL    Platelets 220 140 - 450 10*3/mm3    Neutrophil % 77.2 (H) 42.7 - 76.0 %    Lymphocyte % 12.2 (L) 19.6 - 45.3 %    Monocyte % 8.4 5.0 - 12.0 %    Eosinophil % 1.4 0.3 - 6.2 %    Basophil % 0.5 0.0 - 1.5 %    Immature Grans % 0.3 0.0 - 0.5 %    Neutrophils, Absolute 5.15 1.70 - 7.00 10*3/mm3    Lymphocytes, Absolute 0.81 0.70 - 3.10 10*3/mm3    Monocytes, Absolute 0.56 0.10 - 0.90 10*3/mm3    Eosinophils, Absolute 0.09 0.00 - 0.40 10*3/mm3    Basophils, Absolute 0.03 0.00 - 0.20 10*3/mm3    Immature Grans, Absolute 0.02 0.00 - 0.05 10*3/mm3    nRBC 0.0 0.0 - 0.2 /100 WBC   ECG 12 Lead    Collection Time: 12/04/23  9:59 AM   Result Value Ref Range    QT Interval 342 ms    QTC Interval 437 ms   POCT SARS-CoV-2 + Flu Antigen CHITRA    Collection Time: 12/04/23  1:20 PM    Specimen:  Swab   Result Value Ref Range    SARS Antigen Not Detected Not Detected, Presumptive Negative    Influenza A Antigen CHITRA Not Detected Not Detected    Influenza B Antigen CHITRA Not Detected Not Detected    Internal Control Passed Passed    Lot Number [id number]     Expiration Date 10-          Diagnoses and all orders for this visit:    1. Acute URI (Primary)    2. Cough, unspecified type  -     POCT SARS-CoV-2 + Flu Antigen CHITRA  -     benzonatate (Tessalon Perles) 100 MG capsule; Take 1 capsule by mouth 3 (Three) Times a Day As Needed for Cough.  Dispense: 30 capsule; Refill: 0        Patient Instructions   Drink plenty of fluids-water preferably, eat a heart healthy diet, get plenty of sleep and do warm salt water gargles twice a day until feeling better    Return if symptoms worsen or fail to improve.

## 2023-12-04 NOTE — DISCHARGE INSTRUCTIONS
Arrive to hospital on your day of surgery at 530AM    Take the following medications the morning of surgery: AMLODIPINE, FLUOXETINE, AND LEVOTHYROXINE AND GABAPENTIN IF NEEDED      If you are on prescription narcotic pain medication to control your pain you may also take that medication the morning of surgery.    General Instructions:  Do not eat solid food after midnight the night before surgery.  You may drink clear liquids day of surgery but must stop at least one hour before your hospital arrival time.  It is beneficial for you to have a clear drink that contains carbohydrates the day of surgery.  We suggest a 12 to 20 ounce bottle of Gatorade or Powerade for non-diabetic patients or a 12 to 20 ounce bottle of G2 or Powerade Zero for diabetic patients. (Pediatric patients, are not advised to drink a 12 to 20 ounce carbohydrate drink)    Clear liquids are liquids you can see through.  Nothing red in color.     Plain water                               Sports drinks  Sodas                                   Gelatin (Jell-O)  Fruit juices without pulp such as white grape juice and apple juice  Popsicles that contain no fruit or yogurt  Tea or coffee (no cream or milk added)  Gatorade / Powerade  G2 / Powerade Zero    Infants may have breast milk up to four hours before surgery.  Infants drinking formula may drink formula up to six hours before surgery.   Patients who avoid smoking, chewing tobacco and alcohol for 4 weeks prior to surgery have a reduced risk of post-operative complications.  Quit smoking as many days before surgery as you can.  Do not smoke, use chewing tobacco or drink alcohol the day of surgery.   If applicable bring your C-PAP/ BI-PAP machine in with you to preop day of surgery.  Bring any papers given to you in the doctor’s office.  Wear clean comfortable clothes.  Do not wear contact lenses, false eyelashes or make-up.  Bring a case for your glasses.   Bring crutches or walker if  applicable.  Remove all piercings.  Leave jewelry and any other valuables at home.  Hair extensions with metal clips must be removed prior to surgery.  The Pre-Admission Testing nurse will instruct you to bring medications if unable to obtain an accurate list in Pre-Admission Testing.        If you were given a blood bank ID arm band remember to bring it with you the day of surgery.    Preventing a Surgical Site Infection:  For 2 to 3 days before surgery, avoid shaving with a razor because the razor can irritate skin and make it easier to develop an infection.    Any areas of open skin can increase the risk of a post-operative wound infection by allowing bacteria to enter and travel throughout the body.  Notify your surgeon if you have any skin wounds / rashes even if it is not near the expected surgical site.  The area will need assessed to determine if surgery should be delayed until it is healed.  The night prior to surgery shower using a fresh bar of anti-bacterial soap (such as Dial) and clean washcloth.  Sleep in a clean bed with clean clothing.  Do not allow pets to sleep with you.  Shower on the morning of surgery using a fresh bar of anti-bacterial soap (such as Dial) and clean washcloth.  Dry with a clean towel and dress in clean clothing.  Ask your surgeon if you will be receiving antibiotics prior to surgery.  Make sure you, your family, and all healthcare providers clean their hands with soap and water or an alcohol based hand  before caring for you or your wound.    Day of surgery:  Your arrival time is approximately two hours before your scheduled surgery time.  Upon arrival, a Pre-op nurse and Anesthesiologist will review your health history, obtain vital signs, and answer questions you may have.  The only belongings needed at this time will be a list of your home medications and if applicable your C-PAP/BI-PAP machine.  A Pre-op nurse will start an IV and you may receive medication in  preparation for surgery, including something to help you relax.     Please be aware that surgery does come with discomfort.  We want to make every effort to control your discomfort so please discuss any uncontrolled symptoms with your nurse.   Your doctor will most likely have prescribed pain medications.      If you are going home after surgery you will receive individualized written care instructions before being discharged.  A responsible adult must drive you to and from the hospital on the day of your surgery and stay with you for 24 hours.  Discharge prescriptions can be filled by the hospital pharmacy during regular pharmacy hours.  If you are having surgery late in the day/evening your prescription may be e-prescribed to your pharmacy.  Please verify your pharmacy hours or chose a 24 hour pharmacy to avoid not having access to your prescription because your pharmacy has closed for the day.    If you are staying overnight following surgery, you will be transported to your hospital room following the recovery period.  [facility] has all private rooms.    If you have any questions please call Pre-Admission Testing at [phone].  Deductibles and co-payments are collected on the day of service. Please be prepared to pay the required co-pay, deductible or deposit on the day of service as defined by your plan.    Call your surgeon immediately if you experience any of the following symptoms:  Sore Throat  Shortness of Breath or difficulty breathing  Cough  Chills  Body soreness or muscle pain  Headache  Fever  New loss of taste or smell  Do not arrive for your surgery ill.  Your procedure will need to be rescheduled to another time.  You will need to call your physician before the day of surgery to avoid any unnecessary exposure to hospital staff as well as other patients.          CHLORHEXIDINE CLOTH INSTRUCTIONS  The morning of surgery follow these instructions using the Chlorhexidine cloths  you've been given.  These steps reduce bacteria on the body.  Do not use the cloths near your eyes, ears mouth, genitalia or on open wounds.  Throw the cloths away after use but do not try to flush them down a toilet.      Open and remove one cloth at a time from the package.    Leave the cloth unfolded and begin the bathing.  Massage the skin with the cloths using gentle pressure to remove bacteria.  Do not scrub harshly.   Follow the steps below with one 2% CHG cloth per area (6 total cloths).  One cloth for neck, shoulders and chest.  One cloth for both arms, hands, fingers and underarms (do underarms last).  One cloth for the abdomen followed by groin.  One cloth for right leg and foot including between the toes.  One cloth for left leg and foot including between the toes.  The last cloth is to be used for the back of the neck, back and buttocks.    Allow the CHG to air dry 3 minutes on the skin which will give it time to work and decrease the chance of irritation.  The skin may feel sticky until it is dry.  Do not rinse with water or any other liquid or you will lose the beneficial effects of the CHG.  If mild skin irritation occurs, do rinse the skin to remove the CHG.  Report this to the nurse at time of admission.  Do not apply lotions, creams, ointments, deodorants or perfumes after using the clothes. Dress in clean clothes before coming to the hospital.

## 2023-12-05 DIAGNOSIS — E11.9 CONTROLLED TYPE 2 DIABETES MELLITUS WITHOUT COMPLICATION, WITHOUT LONG-TERM CURRENT USE OF INSULIN: Chronic | ICD-10-CM

## 2023-12-05 RX ORDER — EMPAGLIFLOZIN 10 MG/1
TABLET, FILM COATED ORAL
Qty: 30 TABLET | Refills: 1 | Status: ON HOLD | OUTPATIENT
Start: 2023-12-05

## 2023-12-05 NOTE — TELEPHONE ENCOUNTER
Rx Refill Note  Requested Prescriptions     Pending Prescriptions Disp Refills    Jardiance 10 MG tablet tablet [Pharmacy Med Name: JARDIANCE 10 MG Tablet] 30 tablet 3     Sig: TAKE 1 TABLET EVERY DAY (NEW DOSE)      Last office visit with prescribing clinician: 12/4/2023   Last telemedicine visit with prescribing clinician: Visit date not found   Next office visit with prescribing clinician: 12/28/2023                         Would you like a call back once the refill request has been completed: [] Yes [] No    If the office needs to give you a call back, can they leave a voicemail: [] Yes [] No    Janelle Gunn MA  12/05/23, 09:43 EST

## 2023-12-11 ENCOUNTER — ANESTHESIA EVENT (OUTPATIENT)
Dept: PERIOP | Facility: HOSPITAL | Age: 68
End: 2023-12-11
Payer: MEDICARE

## 2023-12-11 ENCOUNTER — HOSPITAL ENCOUNTER (INPATIENT)
Facility: HOSPITAL | Age: 68
LOS: 3 days | End: 2023-12-14
Attending: NEUROLOGICAL SURGERY | Admitting: NEUROLOGICAL SURGERY
Payer: MEDICARE

## 2023-12-11 ENCOUNTER — ANESTHESIA (OUTPATIENT)
Dept: PERIOP | Facility: HOSPITAL | Age: 68
End: 2023-12-11
Payer: MEDICARE

## 2023-12-11 ENCOUNTER — APPOINTMENT (OUTPATIENT)
Dept: GENERAL RADIOLOGY | Facility: HOSPITAL | Age: 68
End: 2023-12-11
Payer: MEDICARE

## 2023-12-11 DIAGNOSIS — M43.16 SPONDYLOLISTHESIS, LUMBAR REGION: ICD-10-CM

## 2023-12-11 DIAGNOSIS — M54.16 LUMBAR RADICULOPATHY: ICD-10-CM

## 2023-12-11 LAB
ANION GAP SERPL CALCULATED.3IONS-SCNC: 10 MMOL/L (ref 5–15)
ANION GAP SERPL CALCULATED.3IONS-SCNC: 12 MMOL/L (ref 5–15)
BUN SERPL-MCNC: 11 MG/DL (ref 8–23)
BUN SERPL-MCNC: 14 MG/DL (ref 8–23)
BUN/CREAT SERPL: 19 (ref 7–25)
BUN/CREAT SERPL: 24.1 (ref 7–25)
CALCIUM SPEC-SCNC: 8 MG/DL (ref 8.6–10.5)
CALCIUM SPEC-SCNC: 8.1 MG/DL (ref 8.6–10.5)
CHLORIDE SERPL-SCNC: 104 MMOL/L (ref 98–107)
CHLORIDE SERPL-SCNC: 105 MMOL/L (ref 98–107)
CO2 SERPL-SCNC: 21 MMOL/L (ref 22–29)
CO2 SERPL-SCNC: 23 MMOL/L (ref 22–29)
CREAT SERPL-MCNC: 0.58 MG/DL (ref 0.57–1)
CREAT SERPL-MCNC: 0.58 MG/DL (ref 0.57–1)
DEPRECATED RDW RBC AUTO: 40.4 FL (ref 37–54)
EGFRCR SERPLBLD CKD-EPI 2021: 98.1 ML/MIN/1.73
EGFRCR SERPLBLD CKD-EPI 2021: 98.1 ML/MIN/1.73
ERYTHROCYTE [DISTWIDTH] IN BLOOD BY AUTOMATED COUNT: 13.2 % (ref 12.3–15.4)
GLUCOSE BLDC GLUCOMTR-MCNC: 136 MG/DL (ref 70–130)
GLUCOSE BLDC GLUCOMTR-MCNC: 158 MG/DL (ref 70–130)
GLUCOSE SERPL-MCNC: 128 MG/DL (ref 65–99)
GLUCOSE SERPL-MCNC: 161 MG/DL (ref 65–99)
HCT VFR BLD AUTO: 34.5 % (ref 34–46.6)
HGB BLD-MCNC: 11.2 G/DL (ref 12–15.9)
MAGNESIUM SERPL-MCNC: 2.6 MG/DL (ref 1.6–2.4)
MCH RBC QN AUTO: 27.5 PG (ref 26.6–33)
MCHC RBC AUTO-ENTMCNC: 32.5 G/DL (ref 31.5–35.7)
MCV RBC AUTO: 84.8 FL (ref 79–97)
PLATELET # BLD AUTO: 237 10*3/MM3 (ref 140–450)
PMV BLD AUTO: 9.7 FL (ref 6–12)
POTASSIUM SERPL-SCNC: 2.5 MMOL/L (ref 3.5–5.2)
POTASSIUM SERPL-SCNC: 3.3 MMOL/L (ref 3.5–5.2)
RBC # BLD AUTO: 4.07 10*6/MM3 (ref 3.77–5.28)
SODIUM SERPL-SCNC: 137 MMOL/L (ref 136–145)
SODIUM SERPL-SCNC: 138 MMOL/L (ref 136–145)
WBC NRBC COR # BLD AUTO: 13.75 10*3/MM3 (ref 3.4–10.8)

## 2023-12-11 PROCEDURE — C1769 GUIDE WIRE: HCPCS | Performed by: NEUROLOGICAL SURGERY

## 2023-12-11 PROCEDURE — 25810000003 SODIUM CHLORIDE 0.9 % SOLUTION 250 ML FLEX CONT: Performed by: NURSE ANESTHETIST, CERTIFIED REGISTERED

## 2023-12-11 PROCEDURE — 80048 BASIC METABOLIC PNL TOTAL CA: CPT | Performed by: NURSE ANESTHETIST, CERTIFIED REGISTERED

## 2023-12-11 PROCEDURE — 25010000002 PHENYLEPHRINE 10 MG/ML SOLUTION: Performed by: NURSE ANESTHETIST, CERTIFIED REGISTERED

## 2023-12-11 PROCEDURE — 25010000002 MAGNESIUM SULFATE PER 500 MG OF MAGNESIUM: Performed by: NURSE ANESTHETIST, CERTIFIED REGISTERED

## 2023-12-11 PROCEDURE — C1713 ANCHOR/SCREW BN/BN,TIS/BN: HCPCS | Performed by: NEUROLOGICAL SURGERY

## 2023-12-11 PROCEDURE — 25010000002 MIDAZOLAM PER 1 MG: Performed by: NURSE ANESTHETIST, CERTIFIED REGISTERED

## 2023-12-11 PROCEDURE — 25010000002 PHENYLEPHRINE 10 MG/ML SOLUTION 5 ML VIAL: Performed by: NURSE ANESTHETIST, CERTIFIED REGISTERED

## 2023-12-11 PROCEDURE — 25010000002 FENTANYL CITRATE (PF) 50 MCG/ML SOLUTION: Performed by: NURSE ANESTHETIST, CERTIFIED REGISTERED

## 2023-12-11 PROCEDURE — 82948 REAGENT STRIP/BLOOD GLUCOSE: CPT

## 2023-12-11 PROCEDURE — 25810000003 SODIUM CHLORIDE 0.9 % SOLUTION: Performed by: NURSE ANESTHETIST, CERTIFIED REGISTERED

## 2023-12-11 PROCEDURE — 25010000002 DEXAMETHASONE SODIUM PHOSPHATE 20 MG/5ML SOLUTION: Performed by: NURSE ANESTHETIST, CERTIFIED REGISTERED

## 2023-12-11 PROCEDURE — 85027 COMPLETE CBC AUTOMATED: CPT | Performed by: NURSE ANESTHETIST, CERTIFIED REGISTERED

## 2023-12-11 PROCEDURE — 0SG10KJ FUSION OF 2 OR MORE LUMBAR VERTEBRAL JOINTS WITH NONAUTOLOGOUS TISSUE SUBSTITUTE, POSTERIOR APPROACH, ANTERIOR COLUMN, OPEN APPROACH: ICD-10-PCS | Performed by: NEUROLOGICAL SURGERY

## 2023-12-11 PROCEDURE — 25010000002 HYDROMORPHONE PER 4 MG: Performed by: NEUROLOGICAL SURGERY

## 2023-12-11 PROCEDURE — S0260 H&P FOR SURGERY: HCPCS | Performed by: NEUROLOGICAL SURGERY

## 2023-12-11 PROCEDURE — 22846 INSERT SPINE FIXATION DEVICE: CPT | Performed by: NEUROLOGICAL SURGERY

## 2023-12-11 PROCEDURE — 25010000002 FENTANYL CITRATE (PF) 50 MCG/ML SOLUTION: Performed by: ANESTHESIOLOGY

## 2023-12-11 PROCEDURE — 25810000003 LACTATED RINGERS PER 1000 ML: Performed by: ANESTHESIOLOGY

## 2023-12-11 PROCEDURE — 22558 ARTHRD ANT NTRBD MIN DSC LUM: CPT | Performed by: NEUROLOGICAL SURGERY

## 2023-12-11 PROCEDURE — 25010000002 HYDROMORPHONE PER 4 MG: Performed by: NURSE ANESTHETIST, CERTIFIED REGISTERED

## 2023-12-11 PROCEDURE — 25010000002 MIDAZOLAM PER 1 MG: Performed by: ANESTHESIOLOGY

## 2023-12-11 PROCEDURE — 0SG10AJ FUSION OF 2 OR MORE LUMBAR VERTEBRAL JOINTS WITH INTERBODY FUSION DEVICE, POSTERIOR APPROACH, ANTERIOR COLUMN, OPEN APPROACH: ICD-10-PCS | Performed by: NEUROLOGICAL SURGERY

## 2023-12-11 PROCEDURE — 25010000002 POTASSIUM CHLORIDE 10 MEQ/100ML SOLUTION: Performed by: NEUROLOGICAL SURGERY

## 2023-12-11 PROCEDURE — 22853 INSJ BIOMECHANICAL DEVICE: CPT | Performed by: NEUROLOGICAL SURGERY

## 2023-12-11 PROCEDURE — 25010000002 PROPOFOL 10 MG/ML EMULSION: Performed by: NURSE ANESTHETIST, CERTIFIED REGISTERED

## 2023-12-11 PROCEDURE — 3E0U0GB INTRODUCTION OF RECOMBINANT BONE MORPHOGENETIC PROTEIN INTO JOINTS, OPEN APPROACH: ICD-10-PCS | Performed by: NEUROLOGICAL SURGERY

## 2023-12-11 PROCEDURE — 25010000002 HYDROMORPHONE 1 MG/ML SOLUTION: Performed by: NURSE ANESTHETIST, CERTIFIED REGISTERED

## 2023-12-11 PROCEDURE — 25010000002 CEFAZOLIN IN DEXTROSE 2-4 GM/100ML-% SOLUTION: Performed by: NURSE ANESTHETIST, CERTIFIED REGISTERED

## 2023-12-11 PROCEDURE — 76000 FLUOROSCOPY <1 HR PHYS/QHP: CPT

## 2023-12-11 PROCEDURE — 25010000002 ONDANSETRON PER 1 MG: Performed by: NURSE ANESTHETIST, CERTIFIED REGISTERED

## 2023-12-11 PROCEDURE — 25810000003 LACTATED RINGERS PER 1000 ML: Performed by: NURSE ANESTHETIST, CERTIFIED REGISTERED

## 2023-12-11 PROCEDURE — 25010000002 PROPOFOL 200 MG/20ML EMULSION: Performed by: NURSE ANESTHETIST, CERTIFIED REGISTERED

## 2023-12-11 PROCEDURE — 25010000002 CEFAZOLIN IN DEXTROSE 2-4 GM/100ML-% SOLUTION: Performed by: NEUROLOGICAL SURGERY

## 2023-12-11 PROCEDURE — 22585 ARTHRD ANT NTRBD MIN DSC EA: CPT | Performed by: NEUROLOGICAL SURGERY

## 2023-12-11 PROCEDURE — 80048 BASIC METABOLIC PNL TOTAL CA: CPT | Performed by: NEUROLOGICAL SURGERY

## 2023-12-11 PROCEDURE — 83735 ASSAY OF MAGNESIUM: CPT | Performed by: NEUROLOGICAL SURGERY

## 2023-12-11 PROCEDURE — 25010000002 SUCCINYLCHOLINE PER 20 MG: Performed by: NURSE ANESTHETIST, CERTIFIED REGISTERED

## 2023-12-11 DEVICE — FLOSEAL WITH RECOTHROM - 10ML.
Type: IMPLANTABLE DEVICE | Site: SPINE LUMBAR | Status: FUNCTIONAL
Brand: FLOSEAL HEMOSTATIC MATRIX

## 2023-12-11 DEVICE — NANO SPACER 46221245  12DG 20W 12X7.8X45
Type: IMPLANTABLE DEVICE | Site: SPINE LUMBAR | Status: FUNCTIONAL
Brand: ANTERALIGN SPINAL SYSTEM WITH TITAN NANOLOCK SURFACE TECHNOLOGY

## 2023-12-11 DEVICE — HEMOST ABS SURGIFOAM SZ100 8X12 10MM: Type: IMPLANTABLE DEVICE | Site: SPINE LUMBAR | Status: FUNCTIONAL

## 2023-12-11 DEVICE — MEDLINE BONEWAX YELLOW
Type: IMPLANTABLE DEVICE | Site: SPINE LUMBAR | Status: FUNCTIONAL
Brand: MEDLINE BONEWAX YELLOW

## 2023-12-11 DEVICE — LIGACLIP MCA MULTIPLE CLIP APPLIERS, 20 MEDIUM CLIPS
Type: IMPLANTABLE DEVICE | Site: SPINE LUMBAR | Status: FUNCTIONAL
Brand: LIGACLIP

## 2023-12-11 DEVICE — PUTTY DBM GRAFTON 6CC: Type: IMPLANTABLE DEVICE | Site: SPINE LUMBAR | Status: FUNCTIONAL

## 2023-12-11 DEVICE — BONE GRAFT KIT 7510600 INFUSE LARGE
Type: IMPLANTABLE DEVICE | Site: SPINE LUMBAR | Status: FUNCTIONAL
Brand: INFUSE® BONE GRAFT

## 2023-12-11 RX ORDER — MAGNESIUM HYDROXIDE 1200 MG/15ML
LIQUID ORAL AS NEEDED
Status: DISCONTINUED | OUTPATIENT
Start: 2023-12-11 | End: 2023-12-11 | Stop reason: HOSPADM

## 2023-12-11 RX ORDER — FLUOXETINE HYDROCHLORIDE 20 MG/1
20 CAPSULE ORAL DAILY
Status: DISCONTINUED | OUTPATIENT
Start: 2023-12-11 | End: 2023-12-14 | Stop reason: HOSPADM

## 2023-12-11 RX ORDER — PROMETHAZINE HYDROCHLORIDE 25 MG/1
25 TABLET ORAL ONCE AS NEEDED
Status: DISCONTINUED | OUTPATIENT
Start: 2023-12-11 | End: 2023-12-11 | Stop reason: HOSPADM

## 2023-12-11 RX ORDER — FENTANYL CITRATE 50 UG/ML
INJECTION, SOLUTION INTRAMUSCULAR; INTRAVENOUS AS NEEDED
Status: DISCONTINUED | OUTPATIENT
Start: 2023-12-11 | End: 2023-12-11 | Stop reason: SURG

## 2023-12-11 RX ORDER — OXYCODONE HYDROCHLORIDE 5 MG/1
5 TABLET ORAL EVERY 4 HOURS PRN
Status: DISCONTINUED | OUTPATIENT
Start: 2023-12-11 | End: 2023-12-12

## 2023-12-11 RX ORDER — ENOXAPARIN SODIUM 100 MG/ML
40 INJECTION SUBCUTANEOUS NIGHTLY
Status: DISCONTINUED | OUTPATIENT
Start: 2023-12-12 | End: 2023-12-14 | Stop reason: HOSPADM

## 2023-12-11 RX ORDER — MIDAZOLAM HYDROCHLORIDE 1 MG/ML
INJECTION INTRAMUSCULAR; INTRAVENOUS AS NEEDED
Status: DISCONTINUED | OUTPATIENT
Start: 2023-12-11 | End: 2023-12-11 | Stop reason: SURG

## 2023-12-11 RX ORDER — CEFAZOLIN SODIUM 2 G/100ML
2000 INJECTION, SOLUTION INTRAVENOUS ONCE
Status: COMPLETED | OUTPATIENT
Start: 2023-12-11 | End: 2023-12-11

## 2023-12-11 RX ORDER — SODIUM CHLORIDE 9 MG/ML
40 INJECTION, SOLUTION INTRAVENOUS AS NEEDED
Status: DISCONTINUED | OUTPATIENT
Start: 2023-12-11 | End: 2023-12-14 | Stop reason: HOSPADM

## 2023-12-11 RX ORDER — EPHEDRINE SULFATE 50 MG/ML
5 INJECTION, SOLUTION INTRAVENOUS ONCE AS NEEDED
Status: DISCONTINUED | OUTPATIENT
Start: 2023-12-11 | End: 2023-12-11 | Stop reason: HOSPADM

## 2023-12-11 RX ORDER — MAGNESIUM SULFATE HEPTAHYDRATE 500 MG/ML
INJECTION, SOLUTION INTRAMUSCULAR; INTRAVENOUS AS NEEDED
Status: DISCONTINUED | OUTPATIENT
Start: 2023-12-11 | End: 2023-12-11 | Stop reason: SURG

## 2023-12-11 RX ORDER — DEXAMETHASONE SODIUM PHOSPHATE 4 MG/ML
INJECTION, SOLUTION INTRA-ARTICULAR; INTRALESIONAL; INTRAMUSCULAR; INTRAVENOUS; SOFT TISSUE AS NEEDED
Status: DISCONTINUED | OUTPATIENT
Start: 2023-12-11 | End: 2023-12-11 | Stop reason: SURG

## 2023-12-11 RX ORDER — HYDROMORPHONE HYDROCHLORIDE 1 MG/ML
0.25 INJECTION, SOLUTION INTRAMUSCULAR; INTRAVENOUS; SUBCUTANEOUS
Status: DISCONTINUED | OUTPATIENT
Start: 2023-12-11 | End: 2023-12-11 | Stop reason: HOSPADM

## 2023-12-11 RX ORDER — DIPHENHYDRAMINE HYDROCHLORIDE 50 MG/ML
12.5 INJECTION INTRAMUSCULAR; INTRAVENOUS
Status: DISCONTINUED | OUTPATIENT
Start: 2023-12-11 | End: 2023-12-11 | Stop reason: HOSPADM

## 2023-12-11 RX ORDER — FENTANYL CITRATE 50 UG/ML
INJECTION, SOLUTION INTRAMUSCULAR; INTRAVENOUS
Status: COMPLETED | OUTPATIENT
Start: 2023-12-11 | End: 2023-12-11

## 2023-12-11 RX ORDER — ONDANSETRON 2 MG/ML
4 INJECTION INTRAMUSCULAR; INTRAVENOUS ONCE AS NEEDED
Status: DISCONTINUED | OUTPATIENT
Start: 2023-12-11 | End: 2023-12-11 | Stop reason: HOSPADM

## 2023-12-11 RX ORDER — LISINOPRIL 10 MG/1
10 TABLET ORAL DAILY
Status: DISCONTINUED | OUTPATIENT
Start: 2023-12-12 | End: 2023-12-14 | Stop reason: HOSPADM

## 2023-12-11 RX ORDER — ACETAMINOPHEN 325 MG/1
650 TABLET ORAL
Status: DISCONTINUED | OUTPATIENT
Start: 2023-12-11 | End: 2023-12-14 | Stop reason: HOSPADM

## 2023-12-11 RX ORDER — PROMETHAZINE HYDROCHLORIDE 25 MG/1
25 SUPPOSITORY RECTAL ONCE AS NEEDED
Status: DISCONTINUED | OUTPATIENT
Start: 2023-12-11 | End: 2023-12-11 | Stop reason: HOSPADM

## 2023-12-11 RX ORDER — PHENYLEPHRINE HYDROCHLORIDE 10 MG/ML
INJECTION INTRAVENOUS AS NEEDED
Status: DISCONTINUED | OUTPATIENT
Start: 2023-12-11 | End: 2023-12-11 | Stop reason: SURG

## 2023-12-11 RX ORDER — AMOXICILLIN 250 MG
1 CAPSULE ORAL 2 TIMES DAILY
Status: DISCONTINUED | OUTPATIENT
Start: 2023-12-11 | End: 2023-12-14 | Stop reason: HOSPADM

## 2023-12-11 RX ORDER — HYDROCODONE BITARTRATE AND ACETAMINOPHEN 5; 325 MG/1; MG/1
1 TABLET ORAL ONCE AS NEEDED
Status: DISCONTINUED | OUTPATIENT
Start: 2023-12-11 | End: 2023-12-11 | Stop reason: HOSPADM

## 2023-12-11 RX ORDER — DROPERIDOL 2.5 MG/ML
0.62 INJECTION, SOLUTION INTRAMUSCULAR; INTRAVENOUS
Status: DISCONTINUED | OUTPATIENT
Start: 2023-12-11 | End: 2023-12-11 | Stop reason: HOSPADM

## 2023-12-11 RX ORDER — AMLODIPINE BESYLATE 5 MG/1
5 TABLET ORAL DAILY
Status: DISCONTINUED | OUTPATIENT
Start: 2023-12-12 | End: 2023-12-14 | Stop reason: HOSPADM

## 2023-12-11 RX ORDER — SODIUM CHLORIDE 0.9 % (FLUSH) 0.9 %
10 SYRINGE (ML) INJECTION EVERY 12 HOURS SCHEDULED
Status: DISCONTINUED | OUTPATIENT
Start: 2023-12-11 | End: 2023-12-14 | Stop reason: HOSPADM

## 2023-12-11 RX ORDER — LEVOTHYROXINE SODIUM 0.05 MG/1
50 TABLET ORAL DAILY
Status: DISCONTINUED | OUTPATIENT
Start: 2023-12-11 | End: 2023-12-14 | Stop reason: HOSPADM

## 2023-12-11 RX ORDER — HYDROCHLOROTHIAZIDE 25 MG/1
25 TABLET ORAL DAILY
Status: DISCONTINUED | OUTPATIENT
Start: 2023-12-11 | End: 2023-12-14 | Stop reason: HOSPADM

## 2023-12-11 RX ORDER — MIDAZOLAM HYDROCHLORIDE 1 MG/ML
0.5 INJECTION INTRAMUSCULAR; INTRAVENOUS
Status: DISCONTINUED | OUTPATIENT
Start: 2023-12-11 | End: 2023-12-11 | Stop reason: HOSPADM

## 2023-12-11 RX ORDER — LIDOCAINE HYDROCHLORIDE 10 MG/ML
0.5 INJECTION, SOLUTION INFILTRATION; PERINEURAL ONCE AS NEEDED
Status: DISCONTINUED | OUTPATIENT
Start: 2023-12-11 | End: 2023-12-11 | Stop reason: HOSPADM

## 2023-12-11 RX ORDER — ONDANSETRON 2 MG/ML
4 INJECTION INTRAMUSCULAR; INTRAVENOUS EVERY 6 HOURS PRN
Status: DISCONTINUED | OUTPATIENT
Start: 2023-12-11 | End: 2023-12-14 | Stop reason: HOSPADM

## 2023-12-11 RX ORDER — GLYCOPYRROLATE 0.2 MG/ML
INJECTION INTRAMUSCULAR; INTRAVENOUS AS NEEDED
Status: DISCONTINUED | OUTPATIENT
Start: 2023-12-11 | End: 2023-12-11 | Stop reason: SURG

## 2023-12-11 RX ORDER — METFORMIN HYDROCHLORIDE 500 MG/1
500 TABLET, EXTENDED RELEASE ORAL 2 TIMES DAILY
Status: DISCONTINUED | OUTPATIENT
Start: 2023-12-11 | End: 2023-12-14 | Stop reason: HOSPADM

## 2023-12-11 RX ORDER — SODIUM CHLORIDE 0.9 % (FLUSH) 0.9 %
3 SYRINGE (ML) INJECTION EVERY 12 HOURS SCHEDULED
Status: DISCONTINUED | OUTPATIENT
Start: 2023-12-11 | End: 2023-12-11 | Stop reason: HOSPADM

## 2023-12-11 RX ORDER — FENTANYL CITRATE 50 UG/ML
50 INJECTION, SOLUTION INTRAMUSCULAR; INTRAVENOUS ONCE
Status: DISCONTINUED | OUTPATIENT
Start: 2023-12-11 | End: 2023-12-11 | Stop reason: HOSPADM

## 2023-12-11 RX ORDER — LABETALOL HYDROCHLORIDE 5 MG/ML
5 INJECTION, SOLUTION INTRAVENOUS
Status: DISCONTINUED | OUTPATIENT
Start: 2023-12-11 | End: 2023-12-11 | Stop reason: HOSPADM

## 2023-12-11 RX ORDER — FENTANYL CITRATE 50 UG/ML
25 INJECTION, SOLUTION INTRAMUSCULAR; INTRAVENOUS
Status: DISCONTINUED | OUTPATIENT
Start: 2023-12-11 | End: 2023-12-11 | Stop reason: HOSPADM

## 2023-12-11 RX ORDER — BUPIVACAINE HYDROCHLORIDE AND EPINEPHRINE 5; 5 MG/ML; UG/ML
INJECTION, SOLUTION PERINEURAL AS NEEDED
Status: DISCONTINUED | OUTPATIENT
Start: 2023-12-11 | End: 2023-12-11 | Stop reason: HOSPADM

## 2023-12-11 RX ORDER — FENTANYL CITRATE 50 UG/ML
50 INJECTION, SOLUTION INTRAMUSCULAR; INTRAVENOUS ONCE AS NEEDED
Status: DISCONTINUED | OUTPATIENT
Start: 2023-12-11 | End: 2023-12-11 | Stop reason: HOSPADM

## 2023-12-11 RX ORDER — HYDROMORPHONE HYDROCHLORIDE 1 MG/ML
0.25 INJECTION, SOLUTION INTRAMUSCULAR; INTRAVENOUS; SUBCUTANEOUS
Status: DISCONTINUED | OUTPATIENT
Start: 2023-12-11 | End: 2023-12-14 | Stop reason: HOSPADM

## 2023-12-11 RX ORDER — PROPOFOL 10 MG/ML
INJECTION, EMULSION INTRAVENOUS AS NEEDED
Status: DISCONTINUED | OUTPATIENT
Start: 2023-12-11 | End: 2023-12-11 | Stop reason: SURG

## 2023-12-11 RX ORDER — HYDROCODONE BITARTRATE AND ACETAMINOPHEN 7.5; 325 MG/1; MG/1
1 TABLET ORAL EVERY 4 HOURS PRN
Status: DISCONTINUED | OUTPATIENT
Start: 2023-12-11 | End: 2023-12-11 | Stop reason: HOSPADM

## 2023-12-11 RX ORDER — MINERAL OIL
OIL (ML) MISCELLANEOUS AS NEEDED
Status: DISCONTINUED | OUTPATIENT
Start: 2023-12-11 | End: 2023-12-11 | Stop reason: HOSPADM

## 2023-12-11 RX ORDER — SUCCINYLCHOLINE CHLORIDE 20 MG/ML
INJECTION INTRAMUSCULAR; INTRAVENOUS AS NEEDED
Status: DISCONTINUED | OUTPATIENT
Start: 2023-12-11 | End: 2023-12-11 | Stop reason: SURG

## 2023-12-11 RX ORDER — SODIUM CHLORIDE 0.9 % (FLUSH) 0.9 %
10 SYRINGE (ML) INJECTION AS NEEDED
Status: DISCONTINUED | OUTPATIENT
Start: 2023-12-11 | End: 2023-12-14 | Stop reason: HOSPADM

## 2023-12-11 RX ORDER — PHENYLEPHRINE HCL IN 0.9% NACL 0.5 MG/5ML
.5-3 SYRINGE (ML) INTRAVENOUS
Status: DISCONTINUED | OUTPATIENT
Start: 2023-12-11 | End: 2023-12-11 | Stop reason: HOSPADM

## 2023-12-11 RX ORDER — MIDAZOLAM HYDROCHLORIDE 1 MG/ML
2 INJECTION INTRAMUSCULAR; INTRAVENOUS ONCE
Status: DISCONTINUED | OUTPATIENT
Start: 2023-12-11 | End: 2023-12-11 | Stop reason: HOSPADM

## 2023-12-11 RX ORDER — SODIUM CHLORIDE, SODIUM LACTATE, POTASSIUM CHLORIDE, CALCIUM CHLORIDE 600; 310; 30; 20 MG/100ML; MG/100ML; MG/100ML; MG/100ML
9 INJECTION, SOLUTION INTRAVENOUS CONTINUOUS
Status: DISCONTINUED | OUTPATIENT
Start: 2023-12-11 | End: 2023-12-14 | Stop reason: HOSPADM

## 2023-12-11 RX ORDER — FAMOTIDINE 10 MG/ML
20 INJECTION, SOLUTION INTRAVENOUS ONCE
Status: COMPLETED | OUTPATIENT
Start: 2023-12-11 | End: 2023-12-11

## 2023-12-11 RX ORDER — MIDAZOLAM HYDROCHLORIDE 1 MG/ML
INJECTION INTRAMUSCULAR; INTRAVENOUS
Status: COMPLETED | OUTPATIENT
Start: 2023-12-11 | End: 2023-12-11

## 2023-12-11 RX ORDER — SODIUM CHLORIDE, SODIUM LACTATE, POTASSIUM CHLORIDE, CALCIUM CHLORIDE 600; 310; 30; 20 MG/100ML; MG/100ML; MG/100ML; MG/100ML
INJECTION, SOLUTION INTRAVENOUS CONTINUOUS PRN
Status: DISCONTINUED | OUTPATIENT
Start: 2023-12-11 | End: 2023-12-11 | Stop reason: SURG

## 2023-12-11 RX ORDER — TRANEXAMIC ACID 100 MG/ML
INJECTION, SOLUTION INTRAVENOUS AS NEEDED
Status: DISCONTINUED | OUTPATIENT
Start: 2023-12-11 | End: 2023-12-11 | Stop reason: SURG

## 2023-12-11 RX ORDER — HYDRALAZINE HYDROCHLORIDE 20 MG/ML
5 INJECTION INTRAMUSCULAR; INTRAVENOUS
Status: DISCONTINUED | OUTPATIENT
Start: 2023-12-11 | End: 2023-12-11 | Stop reason: HOSPADM

## 2023-12-11 RX ORDER — METHOCARBAMOL 750 MG/1
750 TABLET, FILM COATED ORAL 4 TIMES DAILY PRN
Status: DISCONTINUED | OUTPATIENT
Start: 2023-12-11 | End: 2023-12-12

## 2023-12-11 RX ORDER — ROSUVASTATIN CALCIUM 10 MG/1
10 TABLET, COATED ORAL NIGHTLY
Status: DISCONTINUED | OUTPATIENT
Start: 2023-12-11 | End: 2023-12-14 | Stop reason: HOSPADM

## 2023-12-11 RX ORDER — LIDOCAINE HYDROCHLORIDE 20 MG/ML
INJECTION, SOLUTION INFILTRATION; PERINEURAL AS NEEDED
Status: DISCONTINUED | OUTPATIENT
Start: 2023-12-11 | End: 2023-12-11 | Stop reason: SURG

## 2023-12-11 RX ORDER — NALOXONE HCL 0.4 MG/ML
0.4 VIAL (ML) INJECTION
Status: DISCONTINUED | OUTPATIENT
Start: 2023-12-11 | End: 2023-12-14 | Stop reason: HOSPADM

## 2023-12-11 RX ORDER — FLUMAZENIL 0.1 MG/ML
0.2 INJECTION INTRAVENOUS AS NEEDED
Status: DISCONTINUED | OUTPATIENT
Start: 2023-12-11 | End: 2023-12-11 | Stop reason: HOSPADM

## 2023-12-11 RX ORDER — BENZONATATE 100 MG/1
100 CAPSULE ORAL 3 TIMES DAILY PRN
Status: DISCONTINUED | OUTPATIENT
Start: 2023-12-11 | End: 2023-12-14 | Stop reason: HOSPADM

## 2023-12-11 RX ORDER — NALOXONE HCL 0.4 MG/ML
0.2 VIAL (ML) INJECTION AS NEEDED
Status: DISCONTINUED | OUTPATIENT
Start: 2023-12-11 | End: 2023-12-11 | Stop reason: HOSPADM

## 2023-12-11 RX ORDER — POTASSIUM CHLORIDE 7.45 MG/ML
10 INJECTION INTRAVENOUS
Status: DISPENSED | OUTPATIENT
Start: 2023-12-11 | End: 2023-12-11

## 2023-12-11 RX ORDER — SODIUM CHLORIDE 0.9 % (FLUSH) 0.9 %
3-10 SYRINGE (ML) INJECTION AS NEEDED
Status: DISCONTINUED | OUTPATIENT
Start: 2023-12-11 | End: 2023-12-11 | Stop reason: HOSPADM

## 2023-12-11 RX ORDER — ONDANSETRON 2 MG/ML
INJECTION INTRAMUSCULAR; INTRAVENOUS AS NEEDED
Status: DISCONTINUED | OUTPATIENT
Start: 2023-12-11 | End: 2023-12-11 | Stop reason: SURG

## 2023-12-11 RX ORDER — EPHEDRINE SULFATE 50 MG/ML
INJECTION INTRAVENOUS AS NEEDED
Status: DISCONTINUED | OUTPATIENT
Start: 2023-12-11 | End: 2023-12-11 | Stop reason: SURG

## 2023-12-11 RX ORDER — CEFAZOLIN SODIUM 2 G/100ML
INJECTION, SOLUTION INTRAVENOUS AS NEEDED
Status: DISCONTINUED | OUTPATIENT
Start: 2023-12-11 | End: 2023-12-11 | Stop reason: SURG

## 2023-12-11 RX ORDER — IPRATROPIUM BROMIDE AND ALBUTEROL SULFATE 2.5; .5 MG/3ML; MG/3ML
3 SOLUTION RESPIRATORY (INHALATION) ONCE AS NEEDED
Status: DISCONTINUED | OUTPATIENT
Start: 2023-12-11 | End: 2023-12-11 | Stop reason: HOSPADM

## 2023-12-11 RX ORDER — ONDANSETRON 4 MG/1
4 TABLET, FILM COATED ORAL EVERY 6 HOURS PRN
Status: DISCONTINUED | OUTPATIENT
Start: 2023-12-11 | End: 2023-12-14 | Stop reason: HOSPADM

## 2023-12-11 RX ORDER — SODIUM CHLORIDE 9 MG/ML
INJECTION, SOLUTION INTRAVENOUS CONTINUOUS PRN
Status: DISCONTINUED | OUTPATIENT
Start: 2023-12-11 | End: 2023-12-11 | Stop reason: SURG

## 2023-12-11 RX ADMIN — PHENYLEPHRINE HYDROCHLORIDE 0.5 MCG/KG/MIN: 10 INJECTION, SOLUTION INTRAVENOUS at 07:54

## 2023-12-11 RX ADMIN — METHOCARBAMOL TABLETS 750 MG: 750 TABLET, COATED ORAL at 21:30

## 2023-12-11 RX ADMIN — METFORMIN HYDROCHLORIDE 500 MG: 500 TABLET, EXTENDED RELEASE ORAL at 21:30

## 2023-12-11 RX ADMIN — ONDANSETRON 4 MG: 2 INJECTION INTRAMUSCULAR; INTRAVENOUS at 13:01

## 2023-12-11 RX ADMIN — ACETAMINOPHEN 650 MG: 325 TABLET, FILM COATED ORAL at 18:08

## 2023-12-11 RX ADMIN — MAGNESIUM SULFATE HEPTAHYDRATE 2 G: 500 INJECTION, SOLUTION INTRAMUSCULAR; INTRAVENOUS at 08:01

## 2023-12-11 RX ADMIN — FENTANYL CITRATE 25 MCG: 50 INJECTION, SOLUTION INTRAMUSCULAR; INTRAVENOUS at 14:53

## 2023-12-11 RX ADMIN — FENTANYL CITRATE 50 MCG: 50 INJECTION, SOLUTION INTRAMUSCULAR; INTRAVENOUS at 06:58

## 2023-12-11 RX ADMIN — Medication 10 ML: at 21:31

## 2023-12-11 RX ADMIN — FENTANYL CITRATE 50 MCG: 50 INJECTION, SOLUTION INTRAMUSCULAR; INTRAVENOUS at 07:47

## 2023-12-11 RX ADMIN — PHENYLEPHRINE HYDROCHLORIDE 200 MCG: 10 INJECTION INTRAVENOUS at 13:16

## 2023-12-11 RX ADMIN — SODIUM CHLORIDE, POTASSIUM CHLORIDE, SODIUM LACTATE AND CALCIUM CHLORIDE 9 ML/HR: 600; 310; 30; 20 INJECTION, SOLUTION INTRAVENOUS at 07:15

## 2023-12-11 RX ADMIN — LIDOCAINE HYDROCHLORIDE 60 MG: 20 INJECTION, SOLUTION INFILTRATION; PERINEURAL at 07:43

## 2023-12-11 RX ADMIN — PHENYLEPHRINE HYDROCHLORIDE 200 MCG: 10 INJECTION INTRAVENOUS at 07:54

## 2023-12-11 RX ADMIN — POTASSIUM CHLORIDE 10 MEQ: 7.46 INJECTION, SOLUTION INTRAVENOUS at 18:20

## 2023-12-11 RX ADMIN — SODIUM CHLORIDE: 9 INJECTION, SOLUTION INTRAVENOUS at 08:20

## 2023-12-11 RX ADMIN — CEFAZOLIN SODIUM 2 G: 2 INJECTION, SOLUTION INTRAVENOUS at 11:29

## 2023-12-11 RX ADMIN — EPHEDRINE SULFATE 5 MG: 50 INJECTION INTRAVENOUS at 10:50

## 2023-12-11 RX ADMIN — SODIUM CHLORIDE: 9 INJECTION, SOLUTION INTRAVENOUS at 12:29

## 2023-12-11 RX ADMIN — MIDAZOLAM 0.5 MG: 1 INJECTION INTRAMUSCULAR; INTRAVENOUS at 07:52

## 2023-12-11 RX ADMIN — MIDAZOLAM 2 MG: 1 INJECTION INTRAMUSCULAR; INTRAVENOUS at 06:54

## 2023-12-11 RX ADMIN — HYDROMORPHONE HYDROCHLORIDE 0.25 MG: 1 INJECTION, SOLUTION INTRAMUSCULAR; INTRAVENOUS; SUBCUTANEOUS at 15:40

## 2023-12-11 RX ADMIN — FENTANYL CITRATE 25 MCG: 50 INJECTION, SOLUTION INTRAMUSCULAR; INTRAVENOUS at 14:40

## 2023-12-11 RX ADMIN — GLYCOPYRROLATE 0.1 MG: 0.2 INJECTION INTRAMUSCULAR; INTRAVENOUS at 07:54

## 2023-12-11 RX ADMIN — PHENYLEPHRINE HYDROCHLORIDE 100 MCG: 10 INJECTION INTRAVENOUS at 12:43

## 2023-12-11 RX ADMIN — SUCCINYLCHOLINE CHLORIDE 140 MG: 20 INJECTION, SOLUTION INTRAMUSCULAR; INTRAVENOUS; PARENTERAL at 07:43

## 2023-12-11 RX ADMIN — POTASSIUM CHLORIDE 10 MEQ: 7.46 INJECTION, SOLUTION INTRAVENOUS at 14:44

## 2023-12-11 RX ADMIN — ACETAMINOPHEN 650 MG: 325 TABLET, FILM COATED ORAL at 21:30

## 2023-12-11 RX ADMIN — FENTANYL CITRATE 25 MCG: 50 INJECTION, SOLUTION INTRAMUSCULAR; INTRAVENOUS at 14:59

## 2023-12-11 RX ADMIN — POTASSIUM CHLORIDE 10 MEQ: 7.46 INJECTION, SOLUTION INTRAVENOUS at 16:00

## 2023-12-11 RX ADMIN — HYDROMORPHONE HYDROCHLORIDE 0.25 MG: 1 INJECTION, SOLUTION INTRAMUSCULAR; INTRAVENOUS; SUBCUTANEOUS at 16:19

## 2023-12-11 RX ADMIN — TRANEXAMIC ACID 1000 MG: 1 INJECTION, SOLUTION INTRAVENOUS at 11:49

## 2023-12-11 RX ADMIN — HYDROMORPHONE HYDROCHLORIDE 0.25 MG: 1 INJECTION, SOLUTION INTRAMUSCULAR; INTRAVENOUS; SUBCUTANEOUS at 15:45

## 2023-12-11 RX ADMIN — HYDROMORPHONE HYDROCHLORIDE 0.5 MG: 1 INJECTION, SOLUTION INTRAMUSCULAR; INTRAVENOUS; SUBCUTANEOUS at 12:52

## 2023-12-11 RX ADMIN — HYDROMORPHONE HYDROCHLORIDE 0.25 MG: 1 INJECTION, SOLUTION INTRAMUSCULAR; INTRAVENOUS; SUBCUTANEOUS at 16:14

## 2023-12-11 RX ADMIN — PHENYLEPHRINE HYDROCHLORIDE 0.5 MCG/KG/MIN: 50 INJECTION INTRAVENOUS at 13:42

## 2023-12-11 RX ADMIN — FAMOTIDINE 20 MG: 10 INJECTION INTRAVENOUS at 07:15

## 2023-12-11 RX ADMIN — DEXAMETHASONE SODIUM PHOSPHATE 6 MG: 4 INJECTION, SOLUTION INTRAMUSCULAR; INTRAVENOUS at 07:54

## 2023-12-11 RX ADMIN — PHENYLEPHRINE HYDROCHLORIDE 200 MCG: 10 INJECTION INTRAVENOUS at 08:00

## 2023-12-11 RX ADMIN — SODIUM CHLORIDE, POTASSIUM CHLORIDE, SODIUM LACTATE AND CALCIUM CHLORIDE: 600; 310; 30; 20 INJECTION, SOLUTION INTRAVENOUS at 07:35

## 2023-12-11 RX ADMIN — PROPOFOL 150 MG: 10 INJECTION, EMULSION INTRAVENOUS at 07:43

## 2023-12-11 RX ADMIN — HYDROMORPHONE HYDROCHLORIDE 0.5 MG: 1 INJECTION, SOLUTION INTRAMUSCULAR; INTRAVENOUS; SUBCUTANEOUS at 08:37

## 2023-12-11 RX ADMIN — OXYCODONE HYDROCHLORIDE 5 MG: 5 TABLET ORAL at 18:08

## 2023-12-11 RX ADMIN — PROPOFOL 40 MG: 10 INJECTION, EMULSION INTRAVENOUS at 07:51

## 2023-12-11 RX ADMIN — EPHEDRINE SULFATE 5 MG: 50 INJECTION INTRAVENOUS at 09:52

## 2023-12-11 RX ADMIN — CEFAZOLIN SODIUM 2000 MG: 2 INJECTION, SOLUTION INTRAVENOUS at 07:29

## 2023-12-11 RX ADMIN — PHENYLEPHRINE HYDROCHLORIDE 100 MCG: 10 INJECTION INTRAVENOUS at 11:45

## 2023-12-11 RX ADMIN — ROSUVASTATIN CALCIUM 10 MG: 10 TABLET, FILM COATED ORAL at 21:30

## 2023-12-11 RX ADMIN — FENTANYL CITRATE 25 MCG: 50 INJECTION, SOLUTION INTRAMUSCULAR; INTRAVENOUS at 14:13

## 2023-12-11 RX ADMIN — EPHEDRINE SULFATE 5 MG: 50 INJECTION INTRAVENOUS at 13:23

## 2023-12-11 RX ADMIN — PROPOFOL 200 MCG/KG/MIN: 10 INJECTION, EMULSION INTRAVENOUS at 07:47

## 2023-12-11 RX ADMIN — POTASSIUM CHLORIDE 10 MEQ: 7.46 INJECTION, SOLUTION INTRAVENOUS at 20:15

## 2023-12-11 RX ADMIN — HYDROMORPHONE HYDROCHLORIDE 0.25 MG: 1 INJECTION, SOLUTION INTRAMUSCULAR; INTRAVENOUS; SUBCUTANEOUS at 21:20

## 2023-12-11 NOTE — ANESTHESIA POSTPROCEDURE EVALUATION
Patient: Summer Jefferson    Procedure Summary       Date: 12/11/23 Room / Location: [facility] OR [address] [place] [address] MAIN OR    Anesthesia Start: 0735 Anesthesia Stop: 1336    Procedures:       PRONE OBLIQUE LATERAL LUMBAR TWO TO THREE, FOUR TO FIVE INTERBODY FUSION WITH ANTERIOR PLATING AND NEUROMONITORING (Left: Spine Lumbar)      LUMBAR FUSION MINIMALLY INVASIVE NAVIGATION (Spine Lumbar) Diagnosis:       Lumbar radiculopathy      Spondylolisthesis, lumbar region      (Lumbar radiculopathy [M54.16])      (Spondylolisthesis, lumbar region [M43.16])    Surgeons: Calderon Whittaker MD Provider: Pierre Gamboa MD    Anesthesia Type: general ASA Status: 3            Anesthesia Type: general    Vitals  Vitals Value Taken Time   /64 12/11/23 1600   Temp 36.7 °C (98 °F) 12/11/23 1600   Pulse 100 12/11/23 1609   Resp 14 12/11/23 1600   SpO2 96 % 12/11/23 1609   Vitals shown include unfiled device data.        Anesthesia Post Evaluation

## 2023-12-11 NOTE — ANESTHESIA PROCEDURE NOTES
Arterial Line      Patient reassessed immediately prior to procedure    Start time: 12/11/2023 6:52 AM  Stop Time:12/11/2023 6:59 AM       Performed By   Anesthesiologist: Pierre Gamboa MD   Preanesthetic Checklist  Completed: patient identified, risks and benefits discussed, surgical consent, pre-op evaluation and timeout performed  Arterial Line Prep    Sterile Tech: cap, gloves and mask  Prep: alcohol swabs and ChloraPrep  Patient monitoring: blood pressure monitoring, continuous pulse oximetry and EKG  Arterial Line Procedure   Laterality:right  Location:  radial artery  Catheter size: 20 G   Guidance: ultrasound guided  PROCEDURE NOTE/ULTRASOUND INTERPRETATION.  Using ultrasound guidance the potential vascular sites for insertion of the catheter were visualized to determine the patency of the vessel to be used for vascular access.  After selecting the appropriate site for insertion, the needle was visualized under ultrasound being inserted into the radial artery, followed by ultrasound confirmation of wire and catheter placement. There were no abnormalities seen on ultrasound; an image was taken; and the patient tolerated the procedure with no complications.   Number of attempts: 1  Successful placement: yes Images: still images not obtained  Post Assessment   Dressing Type: occlusive dressing applied, secured with tape and wrist guard applied.   Complications no   Patient Tolerance: patient tolerated the procedure well with no apparent complications  Additional Notes  Ultrasound Interpretation:  Using ultrasound guidance the potential vascular sites for insertion of the catheter were visualized to determine the patency of the vessel to be used for vascular access.  After selecting the appropriate site for insertion, the needle was visualized under ultrasound being inserted into the vessel, followed by ultrasound confirmation of wire and catheter placement.  There were no abnormalities seen on  ultrasound; an image was taken/ and the patient tolerated the procedure with no complications.

## 2023-12-11 NOTE — ANESTHESIA PROCEDURE NOTES
Airway  Urgency: elective    Date/Time: 12/11/2023 7:47 AM  Airway not difficult    General Information and Staff    Patient location during procedure: OR    Indications and Patient Condition  Indications for airway management: airway protection    Preoxygenated: yes  Mask difficulty assessment: 0 - not attempted    Final Airway Details  Final airway type: endotracheal airway      Successful airway: ETT  Cuffed: yes   Successful intubation technique: direct laryngoscopy  Facilitating devices/methods: intubating stylet  Endotracheal tube insertion site: oral  Blade: Humberto  Blade size: 3  ETT size (mm): 7.0  Cormack-Lehane Classification: grade I - full view of glottis  Placement verified by: chest auscultation and capnometry   Measured from: lips  ETT/EBT  to lips (cm): 21  Number of attempts at approach: 1  Assessment: lips, teeth, and gum same as pre-op and atraumatic intubation

## 2023-12-11 NOTE — H&P
We discussed the risk and benefits and alternatives of surgery including CSF leak, injury to nerve roots, need for further surgery down the line. Summer has been able to lose up to 30 pounds in anticipation of the surgery to make the anterior approach more feasible.  However, I did mention to her that sometimes the anatomy of the iliac crest makes it difficult to access the L4-5 space anteriorly and are backup plan is to a MIS posterior TLIF if we cannot do an anterior OLIF at L4-5.  I am confident that we will be able to get the L2-3 level fixated properly.  She understood and was willing to proceed with surgery.    Calderon Whittaker MD  12/11/23  07:17 EST      From her previous record:     Summer Jefferson is a 68 y.o. female is here today for follow-up on her spondylolisthesis and to possibly discuss surgery. No new imaging has been done. Patient has lost 20lbs since last visit. She also had an epidural about a week ago.         Subjective   The patient is here in regards to       Chief Complaint   Patient presents with    Spondylolisthesis         History of Present Illness  Jeannie has successfully lost 25 pounds since we last spoke.  She plans to lose another 20 before December.  She continues to have left-sided radiculopathy in an L3 and L5 distribution.        While in the room and during my examination of the patient I wore a mask and eye protection.  I washed my hands before and after this patient encounter.  The patient was also wearing a mask.     The following portions of the patient's history were reviewed and updated as appropriate: allergies, current medications, past family history, past medical history, past social history, past surgical history and problem list.     Review of Systems   Musculoskeletal:  Positive for back pain. Negative for gait problem.         Medical History        Past Medical History:   Diagnosis Date    Anxiety and depression 03/15/2022    Backache      Diabetes mellitus  type 2, controlled      H/O bone density study       8/2014    H/O mammogram       8/2014    HSV (herpes simplex virus) infection 03/15/2022    Hypercholesterolemia      Hypertension      Hypothyroidism      Low back pain      Osteoarthritis of multiple joints              No Known Allergies           Family History   Problem Relation Age of Onset    Thyroid disease Mother      Diabetes Mother      Hypertension Mother      Heart disease Mother      Vision loss Father           Social History   Social History            Socioeconomic History    Marital status:    Tobacco Use    Smoking status: Former       Passive exposure: Never    Smokeless tobacco: Never    Tobacco comments:       quit 15 yrs ago   Vaping Use    Vaping Use: Never used   Substance and Sexual Activity    Alcohol use: Yes       Comment: 1-2 a month    Drug use: Never    Sexual activity: Defer            Surgical History         Past Surgical History:   Procedure Laterality Date    ANKLE ARTHROSCOPY Right       1/2013 & 10/2013    COLONOSCOPY         7/27/2007    EPIDURAL BLOCK        LAPAROSCOPIC GASTRIC BANDING   08/01/2007    PAP SMEAR         12/2014    ROTATOR CUFF REPAIR Right       5/2014    TOTAL KNEE ARTHROPLASTY Right       12/2014                     Objective       Vitals:     10/19/23 0920   BP: 116/80   Pulse: 87   Resp: 16   Temp: 98 °F (36.7 °C)   SpO2: 92%      Body mass index is 28.86 kg/m².     Physical Exam  Constitutional:       Appearance: Normal appearance.   HENT:      Head: Normocephalic and atraumatic.   Eyes:      Extraocular Movements: Extraocular movements intact.      Conjunctiva/sclera: Conjunctivae normal.      Pupils: Pupils are equal, round, and reactive to light.   Cardiovascular:      Rate and Rhythm: Normal rate and regular rhythm.      Pulses: Normal pulses.   Pulmonary:      Breath sounds: Normal breath sounds.   Abdominal:      Palpations: Abdomen is soft.   Musculoskeletal:         General: Normal range  of motion.      Cervical back: Normal range of motion and neck supple.   Skin:     General: Skin is warm and dry.   Neurological:      Mental Status: She is alert and oriented to person, place, and time.      Cranial Nerves: Cranial nerves 2-12 are intact.      Motor: Motor function is intact. No weakness or atrophy.      Coordination: Coordination is intact. Romberg sign negative. Romberg Test normal.      Gait: Gait is intact. Gait normal.      Deep Tendon Reflexes: Reflexes are normal and symmetric.      Reflex Scores:       Tricep reflexes are 2+ on the right side and 2+ on the left side.       Bicep reflexes are 2+ on the right side and 2+ on the left side.       Brachioradialis reflexes are 2+ on the right side and 2+ on the left side.       Patellar reflexes are 2+ on the right side and 2+ on the left side.       Achilles reflexes are 2+ on the right side and 2+ on the left side.  Psychiatric:         Speech: Speech normal.            Neurologic Exam      Mental Status   Oriented to person, place, and time.   Attention: normal. Concentration: normal.   Speech: speech is normal   Level of consciousness: alert     Cranial Nerves   Cranial nerves II through XII intact.      CN III, IV, VI   Pupils are equal, round, and reactive to light.     Motor Exam   Muscle bulk: normal  Overall muscle tone: normal     Strength   Strength 5/5 except as noted.      Sensory Exam   Light touch normal.      Gait, Coordination, and Reflexes      Gait  Gait: normal     Coordination   Romberg: negative     Reflexes   Reflexes 2+ except as noted.   Right brachioradialis: 2+  Left brachioradialis: 2+  Right biceps: 2+  Left biceps: 2+  Right triceps: 2+  Left triceps: 2+  Right patellar: 2+  Left patellar: 2+  Right achilles: 2+  Left achilles: 2+              Assessment & Plan  Independent Review of Radiographic Studies:      I personally reviewed the images from the following studies.     XR: XR of the lumbar spine was reviewed and  shows her iliac crest rests below her L4-5 foramen, no dynamic subluxation on flexion-extension  XR of the whole standing spine was reviewed and shows slight positive sagittal alignment     Assessment/Plan: We will try L2-3 and L4-5 OLIF/DLIF with neuro monitoring.  This should hopefully address her spondylolisthesis.  With the stand-alone cages and anterior plating, hopefully will not require posterior instrumentation.  There is a risk that by fusing L2-3 and L4-5, her L3-4 level will gradually wear out later down the line that may require additional surgery at a later time.  We discussed the risk benefits alternatives of surgery including CSF leak, injury to nerve root and nerves traversing the iliopsoas muscle, need for further surgery down the line.  She understands as well to proceed with surgery.     Medical Decision Making:       L2-3, L4-5 OLIF with possible conversion to L4-5 MIS TLIF if unable to get correct angle at L4-5 for OLIF.           Assessment   Diagnoses and all orders for this visit:     1. Lumbar radiculopathy (Primary)  -     Case Request; Standing  -     CBC & Differential; Future  -     Comprehensive Metabolic Panel; Future  -     aPTT; Future  -     Protime-INR; Future  -     Type & Screen; Future  -     ceFAZolin (ANCEF) 2,000 mg in sodium chloride 0.9 % 100 mL IVPB  -     Case Request     2. SI (sacroiliac) joint inflammation     3. Spondylolisthesis, lumbar region  -     Case Request; Standing  -     CBC & Differential; Future  -     Comprehensive Metabolic Panel; Future  -     aPTT; Future  -     Protime-INR; Future  -     Type & Screen; Future  -     ceFAZolin (ANCEF) 2,000 mg in sodium chloride 0.9 % 100 mL IVPB  -     Case Request     4. Abnormal coagulation profile  -     aPTT; Future  -     Protime-INR; Future     Other orders  -     Inpatient Admission; Standing  -     Follow Anesthesia Guidelines / Protocol; Future  -     Follow Anesthesia Guidelines / Protocol; Standing  -      Verify / Perform Chlorhexidine Skin Prep; Standing  -     Obtain Informed Consent; Future  -     Provide NPO Instructions to Patient; Future  -     Chlorhexidine Skin Prep; Future                    Patient Instructions/Recommendations:     Call with any questions or concerns        Calderon Whittaker MD  10/19/23  09:53 EDT

## 2023-12-11 NOTE — OP NOTE
Oblique Lateral Interbody Fusion Procedure Note    Summer Jefferson  12/11/2023  [id number]    SURGEON  Calderon Whittaker MD    Assistant:  Crissy Woodward CSA was present throughout the entire surgery to provide intraoperative suction, retraction, suturing, and irrigation to promote visualization by the operative surgeon and assist with opening and closure.  The skilled assistance was necessary for the success of this case.  Our practice does not have a relationship with neurosurgical residents.    Indication: Summer Jefferson is a 68 y.o. female who presents with left-sided L3 and L5 radiculopathy secondary to spondylolisthesis at L2-3 and L4-5.  The symptoms were refractory to conservative management including epidural steroid injections and physical therapy.  We discussed the risk and benefits and alternatives of surgery including CSF leak, injury to nerve roots, need for further surgery down the line as well as injury to vascular structures and soreness of the psoas muscle postoperatively.  She understood and was willing to proceed with surgery.      Pre-op Diagnosis:   Lumbar radiculopathy [M54.16]  Spondylolisthesis, lumbar region [M43.16]    Post-op Diagnosis:     Post-Op Diagnosis Codes:     * Lumbar radiculopathy [M54.16]     * Spondylolisthesis, lumbar region [M43.16]    Procedure Performed:  Procedure(s):  PRONE OBLIQUE LATERAL LUMBAR TWO TO THREE, FOUR TO FIVE INTERBODY FUSION WITH ANTERIOR PLATING AND NEUROMONITORING  LUMBAR FUSION MINIMALLY INVASIVE NAVIGATION    Spinal Surgery Levels Completed:2 Levels    1. Anterior lumbar arthrodesis via oblique lateral approach, including removal of the disk L2-L3    2. Anterior lumbar arthrodesis via oblique lateral approach, including removal of the disk L4-L5    3.  Anterior titanium plating at L2-3    4.  Anterior titanium plating at L4-5    5. Placement of bone allograft, bone morphogenic protein within the OLIF cages at L2-3 and L4-5    6. Stealth  O-arm navigation    7.  Intraoperative neuromonitoring    Anesthesia: General    Staff:   Circulator: Maryanne Moon RN; Layla Cross, RN; Leobardo Acosta, ASHWIN  Radiology Technologist: Marge Wei  Scrub Person: Crissy Woodward CSA; Candelaria Villalba; Rachael Lam  Vendor Representative: Pablo Newton Tony    Estimated Blood Loss:  50 mL    Specimens:    Order Name Source Comment Collection Info Order Time   BASIC METABOLIC PANEL   Collected By: Brigitte Fernandez RN 12/11/2023  1:37 PM     Release to patient   Routine Release        CBC (NO DIFF)   Collected By: Brigitte Fernandez RN 12/11/2023  1:37 PM     Release to patient   Routine Release             Drains: None    Findings: Severe spondylolisthesis    Complications: None apparent    Narrative Description:     Positioning:  After proper informed consent, the patient was taken to the OR in stable condition and placed under general anesthesia. Once anesthetized the patient was turned prone on an open Cedric table and was prepped and draped in the usual sterile fashion. A needle was used to localize posterior iliac crest using anatomic landmarks and palpation and then a short stab incision was infiltrated with local anesthesia. A long Medtronic hip pin was placed into the posterior iliac crest on the left and a stealth navigation array was attached to the hip pin. At this point the O-arm was brought into the room and a CT scan was performed on the patient in prone position.    Lumbar decompression and postero-lateral fusion: Using the synthetic x-ray from the O-arm imaging, the disc at L2-3 was marked on the patient's skin and an incision approximately 4 cm in length was planned to finger lengths anterior to the disc base in question along Radha's lines.  Bovie cautery was used to dissect through the fatty plane down to the level of the muscle fascia.  This muscle fascia was opened using Metzenbaum scissors and the muscle was  identified as the external oblique and spread until a deeper layer of muscle fascia was found.  This muscle fascia was opened with Metzenbaum scissors and the muscle was identified as the internal oblique and the muscle spread until a deeper layer of muscle fascia was found.  This muscle fascia was opened with Metzenbaum scissors and the muscle was identified as the transversalis and was spread until fascia was found and was opened.  Using blunt dissection the peritoneum was gently dissected off of the iliac crest and psoas muscle until the anterior belly of the psoas muscle and osteophytes of the spine were palpable with finger dissection.  Then a Kitner was used to gently tease the fibers of the psoas muscle posteriorly and a K wire was introduced into the disc base under navigated guidance.  The Phelps retractor system was then set up and progressive dilators were then used to spread the operative approach and the Phelps system was then connected to the Attune RTD OLIF retractor.  The 15 cm retractor was then opened laterally until the L2-3 disc space was readily visible and adequately exposed.    Arthrodesis: The annulus was incised with a 15 scalpel and a navigated Galicia was used to scrape the disc material off of the superior and inferior endplates and open the contralateral annulus with semisharp force.  A disc space distractor was used to further open the contralateral annulus and a navigated uterine curette was used to scrape and remove disc material from the endplates.  Any residual disc material was removed using a pituitary forcep.  A 12 degree 12 x 45 mm Anteralign spacer was trialed into the disc space under navigated guidance and a final implant of that size filled with BMP and Ortho blend was inserted into the disc space.  Then a small offset Anteralign plate was used to secure the interbody cage to the inferior endplate of L2 with a 20 millimeter screw.  The retractor was then removed and each  layer of the muscle fascia was closed using an 0 Vicryl stitch.  The dermal layer was then closed with inverted 3-0 Vicryl's and the skin was closed with Dermabond.  C arm was brought in for final AP and lateral x-rays and screw placement and interbody placement appear to be adequate.  This process was repeated at L4-5 using a 12 degree 14 x 50 mm cage and a similar small offset plate with a 30 mm screw.      Closure:  The left lateral incision was irrigated and hemostasis was achieved with bipolar electrocautery of the muscle. The fascia was closed with 0 Vicryl suture and the superficial subcutaneous tissue closed with 3-0 Vicryl suture interrupted, and the superficial skin was closed with subcuticular 4-0 Monocryl and dressed with Dermabond. No intraoperative complications are recorded and patient was transferred to postop recovery in stable condition.    Calderon Whittaker MD     Date: 12/11/2023  Time: 14:27 EST

## 2023-12-11 NOTE — ANESTHESIA PREPROCEDURE EVALUATION
Anesthesia Evaluation     NPO Solid Status: > 8 hours             Airway   Mallampati: I  TM distance: >3 FB  Neck ROM: full  Dental - normal exam     Pulmonary - normal exam   Cardiovascular - normal exam    (+) hypertension, hyperlipidemia      Neuro/Psych  (+) psychiatric history Anxiety and Depression  GI/Hepatic/Renal/Endo    (+) diabetes mellitus type 2    Musculoskeletal     Abdominal    Substance History      OB/GYN          Other                    Anesthesia Plan    ASA 3     general   Rapid sequence  (2 PIVs, arterial line.    Did not take wegovy this morning.  Would still RSI given risk of delayed gastric emptying)  intravenous induction     Anesthetic plan, risks, benefits, and alternatives have been provided, discussed and informed consent has been obtained with: patient.    CODE STATUS:

## 2023-12-11 NOTE — PERIOPERATIVE NURSING NOTE
Full head to toe assessment not completed due to time constraints. Patient is alert oriented and in no acute distress.  at bsd. Patient has back pain that sometimes radiates down the left leg---denies numbness or tingling

## 2023-12-12 ENCOUNTER — APPOINTMENT (OUTPATIENT)
Dept: GENERAL RADIOLOGY | Facility: HOSPITAL | Age: 68
End: 2023-12-12
Payer: MEDICARE

## 2023-12-12 LAB
ALBUMIN SERPL-MCNC: 3.7 G/DL (ref 3.5–5.2)
ALBUMIN/GLOB SERPL: 1.8 G/DL
ALP SERPL-CCNC: 39 U/L (ref 39–117)
ALT SERPL W P-5'-P-CCNC: 17 U/L (ref 1–33)
ANION GAP SERPL CALCULATED.3IONS-SCNC: 9.2 MMOL/L (ref 5–15)
AST SERPL-CCNC: 25 U/L (ref 1–32)
BILIRUB SERPL-MCNC: 0.2 MG/DL (ref 0–1.2)
BUN SERPL-MCNC: 8 MG/DL (ref 8–23)
BUN/CREAT SERPL: 14.8 (ref 7–25)
CALCIUM SPEC-SCNC: 8.3 MG/DL (ref 8.6–10.5)
CHLORIDE SERPL-SCNC: 108 MMOL/L (ref 98–107)
CO2 SERPL-SCNC: 24.8 MMOL/L (ref 22–29)
CREAT SERPL-MCNC: 0.54 MG/DL (ref 0.57–1)
DEPRECATED RDW RBC AUTO: 40.4 FL (ref 37–54)
EGFRCR SERPLBLD CKD-EPI 2021: 99.8 ML/MIN/1.73
ERYTHROCYTE [DISTWIDTH] IN BLOOD BY AUTOMATED COUNT: 13.3 % (ref 12.3–15.4)
GLOBULIN UR ELPH-MCNC: 2.1 GM/DL
GLUCOSE SERPL-MCNC: 116 MG/DL (ref 65–99)
HCT VFR BLD AUTO: 33.6 % (ref 34–46.6)
HGB BLD-MCNC: 11.1 G/DL (ref 12–15.9)
MCH RBC QN AUTO: 27.9 PG (ref 26.6–33)
MCHC RBC AUTO-ENTMCNC: 33 G/DL (ref 31.5–35.7)
MCV RBC AUTO: 84.4 FL (ref 79–97)
PLATELET # BLD AUTO: 217 10*3/MM3 (ref 140–450)
PMV BLD AUTO: 9.6 FL (ref 6–12)
POTASSIUM SERPL-SCNC: 3.4 MMOL/L (ref 3.5–5.2)
POTASSIUM SERPL-SCNC: 3.4 MMOL/L (ref 3.5–5.2)
POTASSIUM SERPL-SCNC: 3.5 MMOL/L (ref 3.5–5.2)
PROT SERPL-MCNC: 5.8 G/DL (ref 6–8.5)
RBC # BLD AUTO: 3.98 10*6/MM3 (ref 3.77–5.28)
SODIUM SERPL-SCNC: 142 MMOL/L (ref 136–145)
WBC NRBC COR # BLD AUTO: 9.8 10*3/MM3 (ref 3.4–10.8)

## 2023-12-12 PROCEDURE — 85027 COMPLETE CBC AUTOMATED: CPT | Performed by: NEUROLOGICAL SURGERY

## 2023-12-12 PROCEDURE — 25010000002 ENOXAPARIN PER 10 MG: Performed by: NEUROLOGICAL SURGERY

## 2023-12-12 PROCEDURE — 97162 PT EVAL MOD COMPLEX 30 MIN: CPT

## 2023-12-12 PROCEDURE — 80053 COMPREHEN METABOLIC PANEL: CPT | Performed by: NEUROLOGICAL SURGERY

## 2023-12-12 PROCEDURE — 84132 ASSAY OF SERUM POTASSIUM: CPT | Performed by: NEUROLOGICAL SURGERY

## 2023-12-12 PROCEDURE — 72100 X-RAY EXAM L-S SPINE 2/3 VWS: CPT

## 2023-12-12 PROCEDURE — 97530 THERAPEUTIC ACTIVITIES: CPT

## 2023-12-12 RX ORDER — OXYCODONE HYDROCHLORIDE 5 MG/1
10 TABLET ORAL EVERY 4 HOURS PRN
Status: DISCONTINUED | OUTPATIENT
Start: 2023-12-12 | End: 2023-12-14

## 2023-12-12 RX ORDER — METHOCARBAMOL 750 MG/1
750 TABLET, FILM COATED ORAL EVERY 6 HOURS SCHEDULED
Status: DISCONTINUED | OUTPATIENT
Start: 2023-12-12 | End: 2023-12-13

## 2023-12-12 RX ORDER — POTASSIUM CHLORIDE 750 MG/1
40 TABLET, FILM COATED, EXTENDED RELEASE ORAL EVERY 4 HOURS
Status: COMPLETED | OUTPATIENT
Start: 2023-12-12 | End: 2023-12-12

## 2023-12-12 RX ORDER — POTASSIUM CHLORIDE 750 MG/1
40 TABLET, FILM COATED, EXTENDED RELEASE ORAL EVERY 4 HOURS
Status: COMPLETED | OUTPATIENT
Start: 2023-12-12 | End: 2023-12-13

## 2023-12-12 RX ADMIN — Medication 10 ML: at 20:14

## 2023-12-12 RX ADMIN — METHOCARBAMOL TABLETS 750 MG: 750 TABLET, COATED ORAL at 11:15

## 2023-12-12 RX ADMIN — OXYCODONE HYDROCHLORIDE 10 MG: 5 TABLET ORAL at 20:05

## 2023-12-12 RX ADMIN — OXYCODONE HYDROCHLORIDE 10 MG: 5 TABLET ORAL at 11:15

## 2023-12-12 RX ADMIN — ACETAMINOPHEN 650 MG: 325 TABLET, FILM COATED ORAL at 22:19

## 2023-12-12 RX ADMIN — ACETAMINOPHEN 650 MG: 325 TABLET, FILM COATED ORAL at 06:12

## 2023-12-12 RX ADMIN — METHOCARBAMOL TABLETS 750 MG: 750 TABLET, COATED ORAL at 18:49

## 2023-12-12 RX ADMIN — POTASSIUM CHLORIDE 40 MEQ: 750 TABLET, EXTENDED RELEASE ORAL at 20:05

## 2023-12-12 RX ADMIN — DOCUSATE SODIUM 50MG AND SENNOSIDES 8.6MG 1 TABLET: 8.6; 5 TABLET, FILM COATED ORAL at 09:07

## 2023-12-12 RX ADMIN — FLUOXETINE HYDROCHLORIDE 20 MG: 20 CAPSULE ORAL at 09:07

## 2023-12-12 RX ADMIN — OXYCODONE HYDROCHLORIDE 10 MG: 5 TABLET ORAL at 15:34

## 2023-12-12 RX ADMIN — DOCUSATE SODIUM 50MG AND SENNOSIDES 8.6MG 1 TABLET: 8.6; 5 TABLET, FILM COATED ORAL at 20:05

## 2023-12-12 RX ADMIN — OXYCODONE HYDROCHLORIDE 5 MG: 5 TABLET ORAL at 01:29

## 2023-12-12 RX ADMIN — LEVOTHYROXINE SODIUM 50 MCG: 50 TABLET ORAL at 09:07

## 2023-12-12 RX ADMIN — METFORMIN HYDROCHLORIDE 500 MG: 500 TABLET, EXTENDED RELEASE ORAL at 09:07

## 2023-12-12 RX ADMIN — POTASSIUM CHLORIDE 40 MEQ: 750 TABLET, EXTENDED RELEASE ORAL at 11:15

## 2023-12-12 RX ADMIN — ENOXAPARIN SODIUM 40 MG: 100 INJECTION SUBCUTANEOUS at 20:05

## 2023-12-12 RX ADMIN — ACETAMINOPHEN 650 MG: 325 TABLET, FILM COATED ORAL at 18:49

## 2023-12-12 RX ADMIN — OXYCODONE HYDROCHLORIDE 5 MG: 5 TABLET ORAL at 07:32

## 2023-12-12 RX ADMIN — POTASSIUM CHLORIDE 40 MEQ: 750 TABLET, EXTENDED RELEASE ORAL at 07:33

## 2023-12-12 RX ADMIN — Medication 10 ML: at 09:10

## 2023-12-12 RX ADMIN — ACETAMINOPHEN 650 MG: 325 TABLET, FILM COATED ORAL at 09:07

## 2023-12-12 RX ADMIN — EMPAGLIFLOZIN 10 MG: 10 TABLET, FILM COATED ORAL at 09:08

## 2023-12-12 RX ADMIN — METFORMIN HYDROCHLORIDE 500 MG: 500 TABLET, EXTENDED RELEASE ORAL at 20:05

## 2023-12-12 RX ADMIN — ACETAMINOPHEN 650 MG: 325 TABLET, FILM COATED ORAL at 14:36

## 2023-12-12 RX ADMIN — ROSUVASTATIN CALCIUM 10 MG: 10 TABLET, FILM COATED ORAL at 20:05

## 2023-12-12 NOTE — CASE MANAGEMENT/SOCIAL WORK
Discharge Planning Assessment  [facility]     Patient Name: Summer Jefferson  MRN: [id number]  Today's Date: 12/12/2023    Admit Date: 12/11/2023    Plan: referral pending to [facility]   Discharge Needs Assessment       Row Name 12/12/23 1456       Living Environment    People in Home spouse    Name(s) of People in Home Ta lechuga    Current Living Arrangements home    Potentially Unsafe Housing Conditions none    Primary Care Provided by self    Provides Primary Care For no one    Family Caregiver if Needed spouse    Family Caregiver Names Garth    Quality of Family Relationships helpful;involved;supportive    Able to Return to Prior Arrangements yes       Resource/Environmental Concerns    Resource/Environmental Concerns none    Transportation Concerns no car       Transition Planning    Patient/Family Anticipates Transition to home with family    Patient/Family Anticipated Services at Transition none    Transportation Anticipated family or friend will provide       Discharge Needs Assessment    Readmission Within the Last 30 Days no previous admission in last 30 days    Equipment Currently Used at Home none    Concerns to be Addressed denies needs/concerns at this time    Anticipated Changes Related to Illness none                   Discharge Plan       Row Name 12/12/23 1449       Plan    Plan referral pending to [facility]    Patient/Family in Agreement with Plan yes    Plan Comments Spoek with patient at bedside. Introduced self and explained role. Facesheet verified. Patient lives with her , Garth [phone], in a single story house with 5 steps to enter. At baseline, she is IADLS. Patient states that she is interested in [facility] [place]. She is concerned that  will not provide enough therapy. She is not interested in skilled nursing facilities at this time. Referral placed in EPIC to [facility]. Patient understands that [facility] will eval and  determine if she meets criteria. CCP will follow.                  Continued Care and Services - Admitted Since 12/11/2023    Coordination has not been started for this encounter.       Expected Discharge Date and Time       Expected Discharge Date Expected Discharge Time    Dec 15, 2023            Demographic Summary       Row Name 12/12/23 1456       General Information    Admission Type inpatient    Arrived From emergency department    Required Notices Provided Important Message from Medicare    Referral Source admission list    Reason for Consult discharge planning    Preferred Language English                   Functional Status       Row Name 12/12/23 1456       Functional Status    Usual Activity Tolerance good    Current Activity Tolerance moderate       Functional Status, IADL    Medications independent    Meal Preparation independent    Housekeeping independent    Laundry independent    Shopping independent       Mental Status    General Appearance WDL WDL       Mental Status Summary    Recent Changes in Mental Status/Cognitive Functioning no changes                   Psychosocial    No documentation.                  Abuse/Neglect    No documentation.                  Legal    No documentation.                  Substance Abuse    No documentation.                  Patient Forms    No documentation.                     Justine Lozoya RN

## 2023-12-12 NOTE — PLAN OF CARE
Goal Outcome Evaluation:  Plan of Care Reviewed With: patient        Progress: improving  Outcome Evaluation: Pain managed with PO pain medication. Scheduled robaxin given per mar. Elias cath pulled today, purewick in place. Patient expresses possibly wanting to go to rehab upon discharge rather than home, until more comfortable with ambulation.

## 2023-12-12 NOTE — PLAN OF CARE
Goal Outcome Evaluation:           Progress: no change  Outcome Evaluation: Elias in place. Potassium replacement infusing. PO pain meds given per pt request. Family at bedside. 2L of O2 due to sats falling when sleeping.

## 2023-12-12 NOTE — PLAN OF CARE
Goal Outcome Evaluation:  Plan of Care Reviewed With: patient, family           Outcome Evaluation: Summer Jefferson is a 68 year old female seen POD1 prone oblique-lateral L2-3, L4-5 interbody fusion with anterior plating and neuromonitoring. Today, pt. reporting extreme pain however pleasant and agreeable to therapy evaluation. Pt. lives in a single level home with 4 SHANTELLE. She lives with spouse and has no hx of falls. Today, increased time educating on log roll sequencing to spare spine when transitioning from supine>sit. Pt. performs rolling L<>R and sidyling<>sit with min A using bedrail and VC. Pt. performs STS with supervision A-CGA with cueing for hand placement on RW. Pt. able to ambulate ~20 ft. within room supervision-CGA with RW, however steps are slowed and shuffled, and pt. needing reminders to keep eyes open while ambulating. Pt. also educated on breath control. PT recommending home with assist/HH at d/c vs short term SNF stay pending progress and pain control.      Anticipated Discharge Disposition (PT): home with assist, home with 24/7 care, home with home health, skilled nursing facility

## 2023-12-12 NOTE — PROGRESS NOTES
[facility]    Referral received. Noted first PT note. Will await OT eval and will see tomorrow to discuss dc plan.     Amber Lynn RN  Acute Rehab Admission Nurse

## 2023-12-12 NOTE — PROGRESS NOTES
NEUROSURGERY PROGRESS NOTE     LOS: 1 day   Patient Care Team:  Ruth Benitez APRN as PCP - General (Family Medicine)    Chief Complaint:  No chief complaint on file.  Lumbar spondylolisthesis with left-sided radiculopathy    Subjective     Interval History: NAEO.  Radiculopathy resolved.  Significant amount of back pain.  Not a lot of incisional pain.  No flatus yet.  She had low potassium after surgery yesterday this was replaced in the PACU.    While in the room and during my examination of the patient I wore a mask and eye protection.  I washed my hands before and after this patient encounter.  The patient was also wearing a mask.     History taken from: patient    Objective      Vital Signs  Temp:  [97.6 °F (36.4 °C)-98.4 °F (36.9 °C)] 97.6 °F (36.4 °C)  Heart Rate:  [] 95  Resp:  [12-20] 16  BP: ()/(41-68) 96/56  Body mass index is 27.55 kg/m².    Intake/Output last 3 shifts:  I/O last 3 completed shifts:  In: 3920 [P.O.:120; I.V.:3000; Other:800]  Out: 2550 [Urine:2250; Blood:300]    Intake/Output this shift:  I/O this shift:  In: 260 [P.O.:260]  Out: -     Physical    GENERAL: No acute distress. Appears well nourished. Appears stated age.  HEENT: Atraumatic and normocephalic  CARDIO: RRR on monitoring. Pulses 2+  PULM: breathing nonlabored on room air  NEURO: AAOx3. EOMI. PERRL. CNi. Strength 5/5 in all extremities. Sensation intact to light touch. Reflexes 2+ throughout.  WOUND: Incision CDI, closed with Dermabond    Results Review:  I reviewed the patient's new clinical results.    Labs:    Lab Results (last 24 hours)       Procedure Component Value Units Date/Time    POC Glucose Once [[id number]]  (Abnormal) Collected: 12/11/23 1335    Specimen: Blood Updated: 12/11/23 1336     Glucose 158 mg/dL     Basic Metabolic Panel [[id number]]  (Abnormal) Collected: 12/11/23 1338    Specimen: Blood Updated: 12/11/23 1423     Glucose 161 mg/dL      BUN 14 mg/dL      Creatinine 0.58 mg/dL      Sodium 138  mmol/L      Potassium 2.5 mmol/L      Chloride 105 mmol/L      CO2 21.0 mmol/L      Calcium 8.0 mg/dL      BUN/Creatinine Ratio 24.1     Anion Gap 12.0 mmol/L      eGFR 98.1 mL/min/1.73     Narrative:      GFR Normal >60  Chronic Kidney Disease <60  Kidney Failure <15      CBC (No Diff) [[id number]]  (Abnormal) Collected: 12/11/23 1338    Specimen: Blood Updated: 12/11/23 1356     WBC 13.75 10*3/mm3      RBC 4.07 10*6/mm3      Hemoglobin 11.2 g/dL      Hematocrit 34.5 %      MCV 84.8 fL      MCH 27.5 pg      MCHC 32.5 g/dL      RDW 13.2 %      RDW-SD 40.4 fl      MPV 9.7 fL      Platelets 237 10*3/mm3     Magnesium [[id number]]  (Abnormal) Collected: 12/11/23 1338    Specimen: Blood Updated: 12/11/23 1452     Magnesium 2.6 mg/dL     Basic Metabolic Panel [[id number]]  (Abnormal) Collected: 12/11/23 1754    Specimen: Blood Updated: 12/11/23 1831     Glucose 128 mg/dL      BUN 11 mg/dL      Creatinine 0.58 mg/dL      Sodium 137 mmol/L      Potassium 3.3 mmol/L      Chloride 104 mmol/L      CO2 23.0 mmol/L      Calcium 8.1 mg/dL      BUN/Creatinine Ratio 19.0     Anion Gap 10.0 mmol/L      eGFR 98.1 mL/min/1.73     Narrative:      GFR Normal >60  Chronic Kidney Disease <60  Kidney Failure <15      Potassium [[id number]]  (Abnormal) Collected: 12/12/23 0226    Specimen: Blood Updated: 12/12/23 0300     Potassium 3.4 mmol/L     CBC (No Diff) [[id number]]  (Abnormal) Collected: 12/12/23 0226    Specimen: Blood Updated: 12/12/23 0239     WBC 9.80 10*3/mm3      RBC 3.98 10*6/mm3      Hemoglobin 11.1 g/dL      Hematocrit 33.6 %      MCV 84.4 fL      MCH 27.9 pg      MCHC 33.0 g/dL      RDW 13.3 %      RDW-SD 40.4 fl      MPV 9.6 fL      Platelets 217 10*3/mm3     Comprehensive Metabolic Panel [[id number]]  (Abnormal) Collected: 12/12/23 0226    Specimen: Blood Updated: 12/12/23 0300     Glucose 116 mg/dL      BUN 8 mg/dL      Creatinine 0.54 mg/dL      Sodium 142 mmol/L      Potassium 3.4 mmol/L      Chloride 108 mmol/L      CO2  24.8 mmol/L      Calcium 8.3 mg/dL      Total Protein 5.8 g/dL      Albumin 3.7 g/dL      ALT (SGPT) 17 U/L      AST (SGOT) 25 U/L      Alkaline Phosphatase 39 U/L      Total Bilirubin 0.2 mg/dL      Globulin 2.1 gm/dL      A/G Ratio 1.8 g/dL      BUN/Creatinine Ratio 14.8     Anion Gap 9.2 mmol/L      eGFR 99.8 mL/min/1.73     Narrative:      GFR Normal >60  Chronic Kidney Disease <60  Kidney Failure <15              Imaging:    No new neuroimaging.    Current Medications:   Scheduled Meds:acetaminophen, 650 mg, Oral, Q4H  amLODIPine, 5 mg, Oral, Daily  empagliflozin, 10 mg, Oral, Daily  enoxaparin, 40 mg, Subcutaneous, Nightly  FLUoxetine, 20 mg, Oral, Daily  hydroCHLOROthiazide, 25 mg, Oral, Daily  levothyroxine, 50 mcg, Oral, Daily  lisinopril, 10 mg, Oral, Daily  metFORMIN ER, 500 mg, Oral, BID  potassium chloride ER, 40 mEq, Oral, Q4H  rosuvastatin, 10 mg, Oral, Nightly  Semaglutide, 14 mg, Oral, Daily  senna-docusate sodium, 1 tablet, Oral, BID  sodium chloride, 10 mL, Intravenous, Q12H      Continuous Infusions:lactated ringers, 9 mL/hr, Last Rate: 9 mL/hr (12/11/23 0715)        Assessment & Plan       Spondylolisthesis of lumbar region    Spondylolisthesis, lumbar region    Lumbar radiculopathy      Assessment/Plan:  Summer Jefferson is a 68 y.o. female with L2-3, L4-5 OLIF with anterior plating    Neuro: Continue routine neuro checks.  Encouraged patient to use pain medications as ordered to stay ahead of her pain.  Will increase her pain and bowel regimen.  Waiting on postoperative x-ray.  I counseled her that back pain is expected since we have changed the confirmation of her back and fixed her spondylolisthesis at 2 levels.  Hopefully that will settle down over the next 1 to 2 days.  Her potassium this morning is back to her baseline around 3.5 after IV replacement yesterday in the PACU.  Wound/Incision: Continue dressing.  Cardio: Goal Normotensive  Pulm: Room air.  Activity: No restrictions to  "activity., Encourage ambulation., Work with PT/OT.  Diet: Advance diet as tolerated.  Clear liquid diet, advance as tolerated once flatus is present.  DVT Prophylaxis: Prophylactic Lovenox  GI Prophylaxis: Not indicated due to low risk.  Dispo: Dispo pending PT/OT assessment      Calderon Whittaker MD  12/12/23  07:56 EST    \"Dictated utilizing Dragon dictation\".      "

## 2023-12-12 NOTE — NURSING NOTE
Patient felt severe pain at Iv site during potassium infusion, refused final dose. Educated patient on importance, family at bedside as well both refused last dose.

## 2023-12-12 NOTE — PLAN OF CARE
Goal Outcome Evaluation:      Patient had a restful night, vss, free of falls, adequate pain control and compliant to nursing care.    Patient and patient family refused last dose of IV potassium due to uncontrolled pain at IV site and back. Educated/reinforced on importance of medication but patient refused.       Problem: Adult Inpatient Plan of Care  Goal: Plan of Care Review  Outcome: Ongoing, Progressing  Goal: Patient-Specific Goal (Individualized)  Outcome: Ongoing, Progressing  Goal: Absence of Hospital-Acquired Illness or Injury  Outcome: Ongoing, Progressing  Intervention: Identify and Manage Fall Risk  Recent Flowsheet Documentation  Taken 12/11/2023 2120 by Bernardino Casey RN  Safety Promotion/Fall Prevention:   activity supervised   assistive device/personal items within reach   clutter free environment maintained   fall prevention program maintained   gait belt   lighting adjusted  Intervention: Prevent Skin Injury  Recent Flowsheet Documentation  Taken 12/11/2023 2120 by Bernardino Casey RN  Body Position: position changed independently  Intervention: Prevent and Manage VTE (Venous Thromboembolism) Risk  Recent Flowsheet Documentation  Taken 12/12/2023 0020 by Bernardino Casey RN  VTE Prevention/Management:   bilateral   sequential compression devices on  Taken 12/11/2023 2120 by Bernardino Casey RN  Activity Management: back to bed  VTE Prevention/Management:   bilateral   sequential compression devices on  Range of Motion: active ROM (range of motion) encouraged  Goal: Optimal Comfort and Wellbeing  Outcome: Ongoing, Progressing  Intervention: Monitor Pain and Promote Comfort  Recent Flowsheet Documentation  Taken 12/11/2023 2130 by Bernardino Casey RN  Pain Management Interventions: awakened for pain meds per patient request  Intervention: Provide Person-Centered Care  Recent Flowsheet Documentation  Taken 12/11/2023 2120 by Bernardino Casey RN  Trust Relationship/Rapport:   care explained   choices provided   emotional  support provided   empathic listening provided  Goal: Readiness for Transition of Care  Outcome: Ongoing, Progressing     Problem: Bleeding (Spinal Surgery)  Goal: Absence of Bleeding  Outcome: Ongoing, Progressing     Problem: Bowel Motility Impaired (Spinal Surgery)  Goal: Effective Bowel Elimination  Outcome: Ongoing, Progressing     Problem: Fluid and Electrolyte Imbalance (Spinal Surgery)  Goal: Fluid and Electrolyte Balance  Outcome: Ongoing, Progressing     Problem: Functional Ability Impaired (Spinal Surgery)  Goal: Optimal Functional Ability  Outcome: Ongoing, Progressing  Intervention: Optimize Functional Status  Recent Flowsheet Documentation  Taken 12/11/2023 2120 by Benrardino Casey RN  Activity Management: back to bed  Positioning/Transfer Devices:   pillows   in use     Problem: Infection (Spinal Surgery)  Goal: Absence of Infection Signs and Symptoms  Outcome: Ongoing, Progressing     Problem: Neurologic Impairment (Spinal Surgery)  Goal: Optimal Neurologic Function  Outcome: Ongoing, Progressing  Intervention: Optimize Neurologic Function  Recent Flowsheet Documentation  Taken 12/11/2023 2120 by Bernardino Casey RN  Body Position: position changed independently  Range of Motion: active ROM (range of motion) encouraged     Problem: Ongoing Anesthesia Effects (Spinal Surgery)  Goal: Anesthesia/Sedation Recovery  Outcome: Ongoing, Progressing  Intervention: Optimize Anesthesia Recovery  Recent Flowsheet Documentation  Taken 12/11/2023 2200 by Bernardino Casey RN  Administration (IS):   proper technique demonstrated   self-administered  Taken 12/11/2023 2120 by Bernardino Casey RN  Safety Promotion/Fall Prevention:   activity supervised   assistive device/personal items within reach   clutter free environment maintained   fall prevention program maintained   gait belt   lighting adjusted  Administration (IS):   proper technique demonstrated   self-administered  Taken 12/11/2023 1951 by Bernardino Casey, RN  Administration  (IS):   proper technique demonstrated   self-administered     Problem: Pain (Spinal Surgery)  Goal: Acceptable Pain Control  Outcome: Ongoing, Progressing  Intervention: Prevent or Manage Pain  Recent Flowsheet Documentation  Taken 12/11/2023 2130 by Bernardino Casey RN  Pain Management Interventions: awakened for pain meds per patient request     Problem: Postoperative Nausea and Vomiting (Spinal Surgery)  Goal: Nausea and Vomiting Relief  Outcome: Ongoing, Progressing     Problem: Postoperative Urinary Retention (Spinal Surgery)  Goal: Effective Urinary Elimination  Outcome: Ongoing, Progressing     Problem: Respiratory Compromise (Spinal Surgery)  Goal: Effective Oxygenation and Ventilation  Outcome: Ongoing, Progressing  Intervention: Optimize Oxygenation and Ventilation  Recent Flowsheet Documentation  Taken 12/11/2023 2120 by Bernardino Casey, RN  Head of Bed (HOB) Positioning: HOB elevated

## 2023-12-12 NOTE — THERAPY EVALUATION
Patient Name: Summer Jefferson  : 1955    MRN: [id number]                              Today's Date: 2023       Admit Date: 2023    Visit Dx:     ICD-10-CM ICD-9-CM   1. Lumbar radiculopathy  M54.16 724.4   2. Spondylolisthesis, lumbar region  M43.16 738.4     Patient Active Problem List   Diagnosis    Diabetes mellitus type 2, controlled    Hypothyroidism, adult    Hypertension    Hypercholesterolemia    HSV (herpes simplex virus) infection    Anxiety and depression    Chronic bilateral low back pain with left-sided sciatica    Facet arthropathy, lumbar    Spondylolisthesis, lumbar region    Lumbar radiculopathy    SI (sacroiliac) joint inflammation    Spondylolisthesis of lumbar region    Overweight (BMI 25.0-29.9)     Past Medical History:   Diagnosis Date    Anxiety and depression 03/15/2022    Backache     Diabetes mellitus type 2, controlled     HSV (herpes simplex virus) infection 03/15/2022    Hypercholesterolemia     Hypertension     Hypothyroidism     Osteoarthritis of multiple joints     Spinal stenosis     LUMBAR     Past Surgical History:   Procedure Laterality Date    ANKLE ARTHROSCOPY Right     2013 & 10/2013    COLONOSCOPY      2007    EPIDURAL BLOCK      X5    LAPAROSCOPIC GASTRIC BANDING  2007    LUMBAR FUSION Left 2023    Procedure: PRONE OBLIQUE LATERAL LUMBAR TWO TO THREE, FOUR TO FIVE INTERBODY FUSION WITH ANTERIOR PLATING AND NEUROMONITORING;  Surgeon: Calderon Whittaker MD;  Location: [facility];  Service: Neurosurgery;  Laterality: Left;    LUMBAR FUSION N/A 2023    Procedure: LUMBAR FUSION MINIMALLY INVASIVE NAVIGATION;  Surgeon: Calderon Whittaker MD;  Location: [facility];  Service: Neurosurgery;  Laterality: N/A;    ROTATOR CUFF REPAIR Right     2014    TOTAL KNEE ARTHROPLASTY Right     2014      General Information       Row Name 23 1124          Physical Therapy Time and Intention    Document Type evaluation  -ER     Mode of  Treatment individual therapy;physical therapy  -ER       Row Name 12/12/23 1124          General Information    Patient Profile Reviewed yes  -ER     Prior Level of Function independent:  Hasnt been doing household chores/tasks  -ER     Existing Precautions/Restrictions spinal  log roll, no lifting more than 5lbs, encourage ambulation as much as possible  -ER     Barriers to Rehab previous functional deficit  -ER       Row Name 12/12/23 1124          Living Environment    People in Home spouse  -ER       Row Name 12/12/23 1124          Home Main Entrance    Number of Stairs, Main Entrance four  -ER     Stair Railings, Main Entrance railings on both sides of stairs  -ER       Row Name 12/12/23 1124          Stairs Within Home, Primary    Number of Stairs, Within Home, Primary none  -ER       Row Name 12/12/23 1124          Cognition    Orientation Status (Cognition) oriented x 3  -ER       Row Name 12/12/23 1124          Safety Issues, Functional Mobility    Impairments Affecting Function (Mobility) pain;endurance/activity tolerance  -ER               User Key  (r) = Recorded By, (t) = Taken By, (c) = Cosigned By      Initials Name Provider Type    ER Stefany Stover PT Physical Therapist                   Mobility       Row Name 12/12/23 1126          Bed Mobility    Bed Mobility rolling left;rolling right;sidelying-sit;sit-sidelying  -ER     Rolling Left [place] (Bed Mobility) minimum assist (75% patient effort)  -ER     Rolling Right [place] (Bed Mobility) minimum assist (75% patient effort)  -ER     Sidelying-Sit [place] (Bed Mobility) minimum assist (75% patient effort)  -ER     Sit-Sidelying [place] (Bed Mobility) minimum assist (75% patient effort)  -ER     Assistive Device (Bed Mobility) other (see comments)  bed flat for log roll  -ER       Row Name 12/12/23 1126          Sit-Stand Transfer    Sit-Stand [place] (Transfers) supervision;contact guard  -ER     Assistive Device (Sit-Stand  Transfers) walker, front-wheeled  -ER       Row Name 12/12/23 1126          Gait/Stairs (Locomotion)    [place] Level (Gait) supervision;contact guard  -ER     Assistive Device (Gait) walker, front-wheeled  -ER     Patient was able to Ambulate yes  -ER     Distance in Feet (Gait) 20  -ER     Deviations/Abnormal Patterns (Gait) antalgic;weight shifting decreased;stride length decreased;josesito decreased;bilateral deviations;gait speed decreased  -ER     Bilateral Gait Deviations heel strike decreased  -ER     Comment, (Gait/Stairs) Pt. needing VC to keep eyes open while ambulating  -ER               User Key  (r) = Recorded By, (t) = Taken By, (c) = Cosigned By      Initials Name Provider Type    ER Stefany Stover, PT Physical Therapist                   Obj/Interventions       Row Name 12/12/23 1128          Range of Motion Comprehensive    Comment, General Range of Motion spinal precautions, BLE WFL  -ER       Row Name 12/12/23 1128          Strength Comprehensive (MMT)    Comment, General Manual Muscle Testing (MMT) Assessment Generalized post op weakness  -ER       Row Name 12/12/23 1128          Balance    Balance Assessment sitting static balance;sit to stand dynamic balance;standing static balance  -ER     Static Sitting Balance supervision  -ER     Position, Sitting Balance sitting edge of bed;unsupported  -ER     Sit to Stand Dynamic Balance contact guard  -ER     Static Standing Balance contact guard  -ER     Position/Device Used, Standing Balance walker, front-wheeled  -ER     Balance Interventions sitting;standing;sit to stand;supported;static  -ER       Row Name 12/12/23 1128          Sensory Assessment (Somatosensory)    Sensory Assessment (Somatosensory) sensation intact  -ER               User Key  (r) = Recorded By, (t) = Taken By, (c) = Cosigned By      Initials Name Provider Type    ER Stefany Stover, PT Physical Therapist                   Goals/Plan       Row Name 12/12/23 1134          Bed  Mobility Goal 1 (PT)    Activity/Assistive Device (Bed Mobility Goal 1, PT) bed mobility activities, all  -ER     [place] Level/Cues Needed (Bed Mobility Goal 1, PT) supervision required  -ER     Time Frame (Bed Mobility Goal 1, PT) 2 weeks  -ER       Row Name 12/12/23 1134          Transfer Goal 1 (PT)    Activity/Assistive Device (Transfer Goal 1, PT) transfers, all  -ER     [place] Level/Cues Needed (Transfer Goal 1, PT) supervision required  -ER     Time Frame (Transfer Goal 1, PT) 2 weeks  -ER       Row Name 12/12/23 1134          Gait Training Goal 1 (PT)    Activity/Assistive Device (Gait Training Goal 1, PT) gait (walking locomotion)  -ER     [place] Level (Gait Training Goal 1, PT) standby assist  -ER     Distance (Gait Training Goal 1, PT) 100  -ER     Time Frame (Gait Training Goal 1, PT) 2 weeks  -ER       Row Name 12/12/23 1134          Stairs Goal 1 (PT)    Activity/Assistive Device (Stairs Goal 1, PT) stairs, all skills  -ER     [place] Level/Cues Needed (Stairs Goal 1, PT) supervision required  -ER     Number of Stairs (Stairs Goal 1, PT) 4  -ER     Time Frame (Stairs Goal 1, PT) 2 weeks  -ER       Row Name 12/12/23 1132          Therapy Assessment/Plan (PT)    Planned Therapy Interventions (PT) balance training;bed mobility training;gait training;home exercise program;patient/family education;strengthening;stair training;ROM (range of motion);transfer training  -ER               User Key  (r) = Recorded By, (t) = Taken By, (c) = Cosigned By      Initials Name Provider Type    ER Stefany Stover, PT Physical Therapist                   Clinical Impression       Row Name 12/12/23 1128          Pain    Pretreatment Pain Rating 8/10  -ER     Posttreatment Pain Rating 8/10  -ER     Pain Location generalized  -ER     Pain Location - back  -ER     Pain Intervention(s) Rest;Repositioned;Ambulation/increased activity  -ER       Row Name 12/12/23 1128          Plan of Care Review    Plan of  Care Reviewed With patient;family  -ER     Outcome Evaluation Summer Jefferson is a 68 year old female seen POD1 prone oblique-lateral L2-3, L4-5 interbody fusion with anterior plating and neuromonitoring. Today, pt. reporting extreme pain however pleasant and agreeable to therapy evaluation. Pt. lives in a single level home with 4 SHANTEL[address]. She lives with spouse and has no hx of falls. Today, increased time educating on log roll sequencing to spare spine when transitioning from supine>sit. Pt. performs rolling L<>R and sidyling<>sit with min A using bedrail and VC. Pt. performs STS with supervision A-CGA with cueing for hand placement on RW. Pt. able to ambulate ~20 ft. within room supervision-CGA with RW, however steps are slowed and shuffled, and pt. needing reminders to keep eyes open while ambulating. Pt. also educated on breath control. PT recommending home with assist/HH at d/c vs short term SNF stay pending progress and pain control.  -ER       Row Name 12/12/23 1128          Therapy Assessment/Plan (PT)    Rehab Potential (PT) good, to achieve stated therapy goals  -ER     Criteria for Skilled Interventions Met (PT) yes  -ER     Therapy Frequency (PT) daily  -ER       Row Name 12/12/23 1128          Positioning and Restraints    Pre-Treatment Position in bed  -ER     Post Treatment Position bed  -ER     In Bed notified nsg;call light within reach;encouraged to call for assist;exit alarm on;with family/caregiver  -ER               User Key  (r) = Recorded By, (t) = Taken By, (c) = Cosigned By      Initials Name Provider Type    ER Stefany Stover, PT Physical Therapist                   Outcome Measures       Row Name 12/12/23 1137          How much help from another person do you currently need...    Turning from your back to your side while in flat bed without using bedrails? 3  -ER     Moving from lying on back to sitting on the side of a flat bed without bedrails? 3  -ER     Moving to and from a bed to a  chair (including a wheelchair)? 3  -ER     Standing up from a chair using your arms (e.g., wheelchair, bedside chair)? 3  -ER     Climbing 3-5 steps with a railing? 2  -ER     To walk in hospital room? 3  -ER     AM-PAC 6 Clicks Score (PT) 17  -ER     Highest Level of Mobility Goal 5 --> Static standing  -ER       Row Name 12/12/23 1134          Functional Assessment    Outcome Measure Options AM-PAC 6 Clicks Basic Mobility (PT)  -ER               User Key  (r) = Recorded By, (t) = Taken By, (c) = Cosigned By      Initials Name Provider Type    ER Stefany Stover, PT Physical Therapist                                 Physical Therapy Education       Title: PT OT SLP Therapies (Done)       Topic: Physical Therapy (Done)       Point: Mobility training (Done)       Learning Progress Summary             Patient Acceptance, E, VU by ER at 12/12/2023 1135                         Point: Home exercise program (Done)       Learning Progress Summary             Patient Acceptance, E, VU by ER at 12/12/2023 1135                         Point: Body mechanics (Done)       Learning Progress Summary             Patient Acceptance, E, VU by ER at 12/12/2023 1135                         Point: Precautions (Done)       Learning Progress Summary             Patient Acceptance, E, VU by ER at 12/12/2023 1135                                         User Key       Initials Effective Dates Name Provider Type Discipline    ER 10/15/23 -  Stefany Stover, PT Physical Therapist PT                  PT Recommendation and Plan  Planned Therapy Interventions (PT): balance training, bed mobility training, gait training, home exercise program, patient/family education, strengthening, stair training, ROM (range of motion), transfer training  Plan of Care Reviewed With: patient, family  Outcome Evaluation: Summer Jefferson is a 68 year old female seen POD1 prone oblique-lateral L2-3, L4-5 interbody fusion with anterior plating and neuromonitoring.  Today, pt. reporting extreme pain however pleasant and agreeable to therapy evaluation. Pt. lives in a single level home with 4 [address]. She lives with spouse and has no hx of falls. Today, increased time educating on log roll sequencing to spare spine when transitioning from supine>sit. Pt. performs rolling L<>R and sidyling<>sit with min A using bedrail and VC. Pt. performs STS with supervision A-CGA with cueing for hand placement on RW. Pt. able to ambulate ~20 ft. within room supervision-CGA with RW, however steps are slowed and shuffled, and pt. needing reminders to keep eyes open while ambulating. Pt. also educated on breath control. PT recommending home with assist/HH at d/c vs short term SNF stay pending progress and pain control.     Time Calculation:         PT Charges       Row Name 12/12/23 1135             Time Calculation    Start Time 0919  -ER      Stop Time 0946  -ER      Time Calculation (min) 27 min  -ER      PT Received On 12/12/23  -ER      PT - Next Appointment 12/13/23  -ER      PT Goal Re-Cert Due Date 12/26/23  -ER         Time Calculation- PT    Total Timed Code Minutes- PT 19 minute(s)  -ER         Timed Charges    80990 - PT Therapeutic Activity Minutes 19  -ER         Untimed Charges    PT Eval/Re-eval Minutes 8  -ER         Total Minutes    Timed Charges Total Minutes 19  -ER      Untimed Charges Total Minutes 8  -ER       Total Minutes 27  -ER                User Key  (r) = Recorded By, (t) = Taken By, (c) = Cosigned By      Initials Name Provider Type    ER Stefany Stover, PT Physical Therapist                  Therapy Charges for Today       Code Description Service Date Service Provider Modifiers Qty    91970946232 HC PT THERAPEUTIC ACT EA 15 MIN 12/12/2023 Stefany Stover, PT GP 1    52026031148 HC PT EVAL MOD COMPLEXITY 3 12/12/2023 Stefany Stover, PT GP 1            PT G-Codes  Outcome Measure Options: AM-PAC 6 Clicks Basic Mobility (PT)  AM-PAC 6 Clicks Score (PT): 17  PT Discharge  Summary  Anticipated Discharge Disposition (PT): home with assist, home with 24/7 care, home with home health, skilled nursing facility    Stefany Stover, PT  12/12/2023

## 2023-12-13 LAB
ANION GAP SERPL CALCULATED.3IONS-SCNC: 8.7 MMOL/L (ref 5–15)
BUN SERPL-MCNC: 9 MG/DL (ref 8–23)
BUN/CREAT SERPL: 14.5 (ref 7–25)
CALCIUM SPEC-SCNC: 8.6 MG/DL (ref 8.6–10.5)
CHLORIDE SERPL-SCNC: 108 MMOL/L (ref 98–107)
CO2 SERPL-SCNC: 24.3 MMOL/L (ref 22–29)
CREAT SERPL-MCNC: 0.62 MG/DL (ref 0.57–1)
EGFRCR SERPLBLD CKD-EPI 2021: 96.5 ML/MIN/1.73
GLUCOSE SERPL-MCNC: 115 MG/DL (ref 65–99)
POTASSIUM SERPL-SCNC: 4.6 MMOL/L (ref 3.5–5.2)
SODIUM SERPL-SCNC: 141 MMOL/L (ref 136–145)

## 2023-12-13 PROCEDURE — 97530 THERAPEUTIC ACTIVITIES: CPT

## 2023-12-13 PROCEDURE — 80048 BASIC METABOLIC PNL TOTAL CA: CPT | Performed by: NEUROLOGICAL SURGERY

## 2023-12-13 PROCEDURE — 25010000002 ENOXAPARIN PER 10 MG: Performed by: NEUROLOGICAL SURGERY

## 2023-12-13 PROCEDURE — 97535 SELF CARE MNGMENT TRAINING: CPT

## 2023-12-13 PROCEDURE — 99024 POSTOP FOLLOW-UP VISIT: CPT

## 2023-12-13 PROCEDURE — 97166 OT EVAL MOD COMPLEX 45 MIN: CPT

## 2023-12-13 RX ORDER — METHOCARBAMOL 750 MG/1
750 TABLET, FILM COATED ORAL EVERY 6 HOURS PRN
Status: DISCONTINUED | OUTPATIENT
Start: 2023-12-13 | End: 2023-12-14 | Stop reason: HOSPADM

## 2023-12-13 RX ADMIN — EMPAGLIFLOZIN 10 MG: 10 TABLET, FILM COATED ORAL at 08:20

## 2023-12-13 RX ADMIN — ACETAMINOPHEN 650 MG: 325 TABLET, FILM COATED ORAL at 21:01

## 2023-12-13 RX ADMIN — METFORMIN HYDROCHLORIDE 500 MG: 500 TABLET, EXTENDED RELEASE ORAL at 21:01

## 2023-12-13 RX ADMIN — ENOXAPARIN SODIUM 40 MG: 100 INJECTION SUBCUTANEOUS at 21:01

## 2023-12-13 RX ADMIN — POTASSIUM CHLORIDE 40 MEQ: 750 TABLET, EXTENDED RELEASE ORAL at 00:17

## 2023-12-13 RX ADMIN — AMLODIPINE BESYLATE 5 MG: 5 TABLET ORAL at 08:18

## 2023-12-13 RX ADMIN — DOCUSATE SODIUM 50MG AND SENNOSIDES 8.6MG 1 TABLET: 8.6; 5 TABLET, FILM COATED ORAL at 08:19

## 2023-12-13 RX ADMIN — DOCUSATE SODIUM 50MG AND SENNOSIDES 8.6MG 1 TABLET: 8.6; 5 TABLET, FILM COATED ORAL at 21:01

## 2023-12-13 RX ADMIN — Medication 10 ML: at 10:33

## 2023-12-13 RX ADMIN — METHOCARBAMOL TABLETS 750 MG: 750 TABLET, COATED ORAL at 21:23

## 2023-12-13 RX ADMIN — FLUOXETINE HYDROCHLORIDE 20 MG: 20 CAPSULE ORAL at 08:19

## 2023-12-13 RX ADMIN — ROSUVASTATIN CALCIUM 10 MG: 10 TABLET, FILM COATED ORAL at 21:01

## 2023-12-13 RX ADMIN — METFORMIN HYDROCHLORIDE 500 MG: 500 TABLET, EXTENDED RELEASE ORAL at 08:18

## 2023-12-13 RX ADMIN — OXYCODONE HYDROCHLORIDE 10 MG: 5 TABLET ORAL at 08:19

## 2023-12-13 RX ADMIN — OXYCODONE HYDROCHLORIDE 10 MG: 5 TABLET ORAL at 00:17

## 2023-12-13 RX ADMIN — HYDROCHLOROTHIAZIDE 25 MG: 25 TABLET ORAL at 08:18

## 2023-12-13 RX ADMIN — ACETAMINOPHEN 650 MG: 325 TABLET, FILM COATED ORAL at 14:24

## 2023-12-13 RX ADMIN — Medication 10 ML: at 21:02

## 2023-12-13 RX ADMIN — LISINOPRIL 10 MG: 10 TABLET ORAL at 08:18

## 2023-12-13 RX ADMIN — ACETAMINOPHEN 650 MG: 325 TABLET, FILM COATED ORAL at 10:33

## 2023-12-13 RX ADMIN — ACETAMINOPHEN 650 MG: 325 TABLET, FILM COATED ORAL at 05:52

## 2023-12-13 RX ADMIN — METHOCARBAMOL TABLETS 750 MG: 750 TABLET, COATED ORAL at 00:17

## 2023-12-13 RX ADMIN — OXYCODONE HYDROCHLORIDE 10 MG: 5 TABLET ORAL at 04:21

## 2023-12-13 RX ADMIN — OXYCODONE HYDROCHLORIDE 10 MG: 5 TABLET ORAL at 15:19

## 2023-12-13 RX ADMIN — LEVOTHYROXINE SODIUM 50 MCG: 50 TABLET ORAL at 08:20

## 2023-12-13 NOTE — PLAN OF CARE
Goal Outcome Evaluation:  Plan of Care Reviewed With: patient        Progress: improving  Outcome Evaluation: patient ambulating with assistance x1 in room, still dtv, bladder scan currently 348cc, PO medication given for pain, potassium replacement given, educated on b/p monitoring

## 2023-12-13 NOTE — PROGRESS NOTES
Continued Stay Note  [facility]     Patient Name: Summre Jefferson  MRN: [id number]  Today's Date: 12/13/2023    Admit Date: 12/11/2023    Plan: referral pending to [facility]   Discharge Plan       Row Name 12/13/23 1559       Plan    Plan Comments Per Angle, pt has been approved by Dr. Roberts and bed available tomorrow 12/14. Plan will be to d/c to [facility]                   Discharge Codes    No documentation.                 Expected Discharge Date and Time       Expected Discharge Date Expected Discharge Time    Dec 15, 2023               Rosi Ferraro RN

## 2023-12-13 NOTE — PLAN OF CARE
Goal Outcome Evaluation:         Patient still exhibiting confusion post administration of oral narcotic pain medication. MD aware. Family at bedside.

## 2023-12-13 NOTE — PLAN OF CARE
Goal Outcome Evaluation:  Plan of Care Reviewed With: patient, son, spouse           Outcome Evaluation: Pt seen for PT this AM, limited today by lightheadedness/dizziness. Pt transferred to EOB vis log roll with CGA and cues. Pt stood with CGA and ambulated 30ft with CGA and rwx. Pt cued throughout to keep her eyes open, lightheadedness worsening with progressed gait distance, prompting return to bed. Pt briefly sat for rest but no improvement in symptoms and pt losing her balance. Pt assisted min A x1 back to supine via log roll. BP checked 127/83 though family reports issues with hypotension and poor response to pain meds this admission. Pt assisted with repositioning and left with YOSSI. PT will continue to follow to progress mobility as tolerated. Anticipate DC to [facility] pending progress.      Anticipated Discharge Disposition (PT): inpatient rehabilitation facility

## 2023-12-13 NOTE — THERAPY EVALUATION
Patient Name: Summer Jefferson  : 1955    MRN: [id number]                              Today's Date: 2023       Admit Date: 2023    Visit Dx:     ICD-10-CM ICD-9-CM   1. Lumbar radiculopathy  M54.16 724.4   2. Spondylolisthesis, lumbar region  M43.16 738.4     Patient Active Problem List   Diagnosis    Diabetes mellitus type 2, controlled    Hypothyroidism, adult    Hypertension    Hypercholesterolemia    HSV (herpes simplex virus) infection    Anxiety and depression    Chronic bilateral low back pain with left-sided sciatica    Facet arthropathy, lumbar    Spondylolisthesis, lumbar region    Lumbar radiculopathy    SI (sacroiliac) joint inflammation    Spondylolisthesis of lumbar region    Overweight (BMI 25.0-29.9)     Past Medical History:   Diagnosis Date    Anxiety and depression 03/15/2022    Backache     Diabetes mellitus type 2, controlled     HSV (herpes simplex virus) infection 03/15/2022    Hypercholesterolemia     Hypertension     Hypothyroidism     Osteoarthritis of multiple joints     Spinal stenosis     LUMBAR     Past Surgical History:   Procedure Laterality Date    ANKLE ARTHROSCOPY Right     2013 & 10/2013    COLONOSCOPY      2007    EPIDURAL BLOCK      X5    LAPAROSCOPIC GASTRIC BANDING  2007    LUMBAR FUSION Left 2023    Procedure: PRONE OBLIQUE LATERAL LUMBAR TWO TO THREE, FOUR TO FIVE INTERBODY FUSION WITH ANTERIOR PLATING AND NEUROMONITORING;  Surgeon: Calderon Whittaker MD;  Location: [facility];  Service: Neurosurgery;  Laterality: Left;    LUMBAR FUSION N/A 2023    Procedure: LUMBAR FUSION MINIMALLY INVASIVE NAVIGATION;  Surgeon: Calderon Whittaker MD;  Location: [facility];  Service: Neurosurgery;  Laterality: N/A;    ROTATOR CUFF REPAIR Right     2014    TOTAL KNEE ARTHROPLASTY Right     2014      General Information       Row Name 23 1358          OT Time and Intention    Document Type evaluation  -JW     Mode of Treatment  occupational therapy  -       Row Name 12/13/23 1353          General Information    Patient Profile Reviewed yes  -     Prior Level of Function independent:;ADL's;all household mobility  -     Existing Precautions/Restrictions spinal;fall  -     Barriers to Rehab none identified  -       Row Name 12/13/23 1358          Living Environment    People in Home spouse  -       Row Name 12/13/23 1358          Home Main Entrance    Number of Stairs, Main Entrance four  -     Stair Railings, Main Entrance railings on both sides of stairs  -       Row Name 12/13/23 1358          Stairs Within Home, Primary    Number of Stairs, Within Home, Primary none  -       Row Name 12/13/23 1358          Cognition    Orientation Status (Cognition) oriented x 3  -       Row Name 12/13/23 1351          Safety Issues, Functional Mobility    Impairments Affecting Function (Mobility) pain;endurance/activity tolerance  -               User Key  (r) = Recorded By, (t) = Taken By, (c) = Cosigned By      Initials Name Provider Type     Pooja Hoskins OT Occupational Therapist                     Mobility/ADL's       Row Name 12/13/23 6323          Bed Mobility    Bed Mobility supine-sit;sit-supine  -     Supine-Sit [place] (Bed Mobility) standby assist;verbal cues  -     Sit-Supine [place] (Bed Mobility) minimum assist (75% patient effort);verbal cues  -     Assistive Device (Bed Mobility) bed rails  -     Comment, (Bed Mobility) VCs for log roll. Assist for BLEs back into bed  -       Row Name 12/13/23 1355          Transfers    Transfers sit-stand transfer;toilet transfer  -       Row Name 12/13/23 1352          Sit-Stand Transfer    Sit-Stand [place] (Transfers) standby assist;contact guard  -     Assistive Device (Sit-Stand Transfers) walker, front-wheeled  -       Row Name 12/13/23 6011          Toilet Transfer    Type (Toilet Transfer) sit-stand;stand-sit  -     [place] Level  (Toilet Transfer) standby assist;contact guard  -     Assistive Device (Toilet Transfer) commode;grab bars/safety frame;walker, front-wheeled  -       Row Name 12/13/23 5568          Functional Mobility    Functional Mobility- Ind. Level contact guard assist  -     Functional Mobility- Comment fxl ambulation into bathroom using RW. Slow mobility d/t pain  -       Row Name 12/13/23 1359          Activities of Daily Living    BADL Assessment/Intervention lower body dressing;toileting;grooming  -       Row Name 12/13/23 [facility]          Lower Body Dressing Assessment/Training    [place] Level (Lower Body Dressing) doff;don;socks;dependent (less than 25% patient effort)  -     Position (Lower Body Dressing) edge of bed sitting  -     Comment, (Lower Body Dressing) unable to attain fig-4 position for LB dressing to don/doff socks while adhering to spinal px. Will need to introduce AE  -[facility] Name 12/13/23 9146          Toileting Assessment/Training    [place] Level (Toileting) toileting skills;standby assist;supervision  -     Assistive Devices (Toileting) commode;grab bar/safety frame  -     Position (Toileting) unsupported sitting;unsupported standing  -       Row Name 12/13/23 6510          Grooming Assessment/Training    [place] Level (Grooming) oral care regimen;wash face, hands;supervision  -     Position (Grooming) sink side;unsupported standing  -     Comment, (Grooming) standing at the sink for grooming tasks with spv. Tolerates ~3 mins at sink but frequent weight shifting d/t pain  -               User Key  (r) = Recorded By, (t) = Taken By, (c) = Cosigned By      Initials Name Provider Type    Pooja Hayes OT Occupational Therapist                   Obj/Interventions       Row Name 12/13/23 1401          Sensory Assessment (Somatosensory)    Sensory Assessment (Somatosensory) sensation intact  -       Row Name 12/13/23 1401          Vision  Assessment/Intervention    Visual Impairment/Limitations WNL  -       Row Name 12/13/23 1401          Range of Motion Comprehensive    General Range of Motion bilateral upper extremity ROM WNL  -       Row Name 12/13/23 1401          Strength Comprehensive (MMT)    Comment, General Manual Muscle Testing (MMT) Assessment UEs WFL  -       Row Name 12/13/23 1401          Motor Skills    Motor Skills functional endurance  -     Functional Endurance fair  -       Row Name 12/13/23 1401          Balance    Balance Assessment standing dynamic balance  -     Static Sitting Balance supervision  -JW     Position, Sitting Balance sitting edge of bed  -     Static Standing Balance standby assist  -     Dynamic Standing Balance contact guard  -     Position/Device Used, Standing Balance supported;walker, front-wheeled  -     Balance Interventions occupation based/functional task  -               User Key  (r) = Recorded By, (t) = Taken By, (c) = Cosigned By      Initials Name Provider Type    Pooja Hayes OT Occupational Therapist                   Goals/Plan       Row Name 12/13/23 1406          Transfer Goal 1 (OT)    Activity/Assistive Device (Transfer Goal 1, OT) transfers, all  -     [place] Level/Cues Needed (Transfer Goal 1, OT) modified independence  -     Time Frame (Transfer Goal 1, OT) short term goal (STG);2 weeks  -     Strategies/Barriers (Transfers Goal 1, OT) AD as needed  -     Progress/Outcome (Transfer Goal 1, OT) goal ongoing  -       Row Name 12/13/23 1406          Bathing Goal 1 (OT)    Activity/Device (Bathing Goal 1, OT) lower body bathing;grab bar, tub/shower;hand-held shower spray hose;long-handled sponge;shower chair  -     [place] Level/Cues Needed (Bathing Goal 1, OT) set-up required;supervision required  -     Time Frame (Bathing Goal 1, OT) short term goal (STG);2 weeks  -     Progress/Outcomes (Bathing Goal 1, OT) goal ongoing  -       Row Name  12/13/23 1406          Dressing Goal 1 (OT)    Activity/Device (Dressing Goal 1, OT) lower body dressing;reacher;long-handled shoe horn;sock-aid  -     [place]/Cues Needed (Dressing Goal 1, OT) modified independence  -JW     Time Frame (Dressing Goal 1, OT) short term goal (STG);2 weeks  -JW     Progress/Outcome (Dressing Goal 1, OT) goal ongoing  -       Row Name 12/13/23 1406          Toileting Goal 1 (OT)    Activity/Device (Toileting Goal 1, OT) toileting skills, all  -     [place] Level/Cues Needed (Toileting Goal 1, OT) modified independence  -JW     Time Frame (Toileting Goal 1, OT) short term goal (STG);2 weeks  -     Progress/Outcome (Toileting Goal 1, OT) goal ongoing  -       Row Name 12/13/23 1406          Grooming Goal 1 (OT)    Activity/Device (Grooming Goal 1, OT) grooming skills, all  -     [place] (Grooming Goal 1, OT) independent  -     Time Frame (Grooming Goal 1, OT) short term goal (STG);2 weeks  -     Strategies/Barriers (Grooming Goal 1, OT) sitting/standing at the sink  -     Progress/Outcome (Grooming Goal 1, OT) goal ongoing  -       Row Name 12/13/23 1406          Strength Goal 1 (OT)    Strength Goal 1 (OT) Pt will participate in UB therex to promote strength for ADLs  -     Time Frame (Strength Goal 1, OT) short term goal (STG);2 weeks  -     Progress/Outcome (Strength Goal 1, OT) goal ongoing  -       Row Name 12/13/23 1406          Therapy Assessment/Plan (OT)    Planned Therapy Interventions (OT) activity tolerance training;BADL retraining;functional balance retraining;occupation/activity based interventions;patient/caregiver education/training;transfer/mobility retraining;strengthening exercise  -               User Key  (r) = Recorded By, (t) = Taken By, (c) = Cosigned By      Initials Name Provider Type    JW Pooja Hoskins, OT Occupational Therapist                   Clinical Impression       Row Name 12/13/23 1402          Pain Assessment     Pretreatment Pain Rating 5/10  -     Posttreatment Pain Rating 7/10  -     Pain Location - back  -     Pain Intervention(s) Repositioned;Ambulation/increased activity  -       Row Name 12/13/23 1402          Plan of Care Review    Plan of Care Reviewed With patient  -     Outcome Evaluation Pt is a 69 y/o F s/p L2-5 fusion. She lives with her spouse and is typically indep with ADLs and fxl mobility w/o AD. OT provided education on spinal px and ADL strategies. SBA to sit EOB using log roll, total A to don socks d/t dec fxl reach and unable to attain fig-4 position. Fxl amb into bathroom using RW with SBA-CGA with slow mobility noted. Completes toilet TF CGA and toileting tasks sba-spv. Grooming standing at the sink x3 mins with pain limiting. Pt returns to supine in bed. OT rec rehab to address fxl deficits. will cont to follow in acute care setting  -       Row Name 12/13/23 9152          Therapy Assessment/Plan (OT)    Rehab Potential (OT) good, to achieve stated therapy goals  -     Criteria for Skilled Therapeutic Interventions Met (OT) yes;skilled treatment is necessary  -     Therapy Frequency (OT) 5 times/wk  -       Row Name 12/13/23 1402          Therapy Plan Review/Discharge Plan (OT)    Anticipated Discharge Disposition (OT) inpatient rehabilitation facility  -       Row Name 12/13/23 1402          Positioning and Restraints    Pre-Treatment Position in bed  -     Post Treatment Position bed  -JW     In Bed fowlers;call light within reach;encouraged to call for assist;exit alarm on;with family/caregiver  -               User Key  (r) = Recorded By, (t) = Taken By, (c) = Cosigned By      Initials Name Provider Type    Pooja Hayes, JIM Occupational Therapist                   Outcome Measures       Row Name 12/13/23 7107          How much help from another is currently needed...    Putting on and taking off regular lower body clothing? 2  -JW     Bathing (including washing,  rinsing, and drying) 2  -     Toileting (which includes using toilet bed pan or urinal) 3  -JW     Putting on and taking off regular upper body clothing 3  -     Taking care of personal grooming (such as brushing teeth) 3  -     Eating meals 4  -     AM-PAC 6 Clicks Score (OT) 17  -       Row Name 12/13/23 1303          How much help from another person do you currently need...    Turning from your back to your side while in flat bed without using bedrails? 3  -     Moving from lying on back to sitting on the side of a flat bed without bedrails? 3  -BH     Moving to and from a bed to a chair (including a wheelchair)? 3  -     Standing up from a chair using your arms (e.g., wheelchair, bedside chair)? 3  -     Climbing 3-5 steps with a railing? 2  -     To walk in hospital room? 3  -     AM-PAC 6 Clicks Score (PT) 17  -     Highest Level of Mobility Goal 5 --> Static standing  -       Row Name 12/13/23 1407 12/13/23 1303       Functional Assessment    Outcome Measure Options AM-PAC 6 Clicks Daily Activity (OT)  - AM-PAC 6 Clicks Basic Mobility (PT)  -              User Key  (r) = Recorded By, (t) = Taken By, (c) = Cosigned By      Initials Name Provider Type     Geovanna Jimenez, PT Physical Therapist    Pooja Hayes OT Occupational Therapist                    Occupational Therapy Education       Title: PT OT SLP Therapies (In Progress)       Topic: Occupational Therapy (In Progress)       Point: ADL training (Done)       Description:   Instruct learner(s) on proper safety adaptation and remediation techniques during self care or transfers.   Instruct in proper use of assistive devices.                  Learning Progress Summary             Patient Acceptance, E, VU,NR by PERICO at 12/13/2023 1407    Comment: spinal px, safety   Family Acceptance, E, VU,NR by PERICO at 12/13/2023 1407    Comment: spinal px, safety                         Point: Home exercise program (Not Started)        Description:   Instruct learner(s) on appropriate technique for monitoring, assisting and/or progressing therapeutic exercises/activities.                  Learner Progress:  Not documented in this visit.              Point: Precautions (Done)       Description:   Instruct learner(s) on prescribed precautions during self-care and functional transfers.                  Learning Progress Summary             Patient Acceptance, E, VU,NR by  at 12/13/2023 1407    Comment: spinal px, safety   Family Acceptance, E, VU,NR by  at 12/13/2023 1407    Comment: spinal px, safety                         Point: Body mechanics (Done)       Description:   Instruct learner(s) on proper positioning and spine alignment during self-care, functional mobility activities and/or exercises.                  Learning Progress Summary             Patient Acceptance, E, VU,NR by  at 12/13/2023 1407    Comment: spinal px, safety   Family Acceptance, E, VU,NR by  at 12/13/2023 1407    Comment: spinal px, safety                                         User Key       Initials Effective Dates Name Provider Type Discipline     06/10/21 -  Pooja Hoskins OT Occupational Therapist OT                  OT Recommendation and Plan  Planned Therapy Interventions (OT): activity tolerance training, BADL retraining, functional balance retraining, occupation/activity based interventions, patient/caregiver education/training, transfer/mobility retraining, strengthening exercise  Therapy Frequency (OT): 5 times/wk  Plan of Care Review  Plan of Care Reviewed With: patient  Outcome Evaluation: Pt is a 67 y/o F s/p L2-5 fusion. She lives with her spouse and is typically indep with ADLs and fxl mobility w/o AD. OT provided education on spinal px and ADL strategies. SBA to sit EOB using log roll, total A to don socks d/t dec fxl reach and unable to attain fig-4 position. Fxl amb into bathroom using RW with SBA-CGA with slow mobility noted. Completes toilet TF  CGA and toileting tasks sba-spv. Grooming standing at the sink x3 mins with pain limiting. Pt returns to supine in bed. OT rec rehab to address fxl deficits. will cont to follow in acute care setting     Time Calculation:   Evaluation Complexity (OT)  Review Occupational Profile/Medical/Therapy History Complexity: expanded/moderate complexity  Assessment, Occupational Performance/Identification of Deficit Complexity: 3-5 performance deficits  Clinical Decision Making Complexity (OT): detailed assessment/moderate complexity  Overall Complexity of Evaluation (OT): moderate complexity     Time Calculation- OT       Row Name 12/13/23 1408 12/13/23 1303          Time Calculation- OT    OT Start Time 1330  - --     OT Stop Time 1356  - --     OT Time Calculation (min) 26 min  - --     Total Timed Code Minutes- OT 16 minute(s)  - --     OT Received On 12/13/23  - --     OT - Next Appointment 12/14/23 - --     OT Goal Re-Cert Due Date 12/27/23 - --        Timed Charges    93078 - Gait Training Minutes  -- 9  -     64247 - OT Self Care/Mgmt Minutes 16  -JW --        Untimed Charges    OT Eval/Re-eval Minutes 10  - --        Total Minutes    Timed Charges Total Minutes 16  - 9  -     Untimed Charges Total Minutes 10  - --      Total Minutes 26  - 9  -               User Key  (r) = Recorded By, (t) = Taken By, (c) = Cosigned By      Initials Name Provider Type     Geovanna Jimenez, PT Physical Therapist    JW Pooja Hoskins OT Occupational Therapist                  Therapy Charges for Today       Code Description Service Date Service Provider Modifiers Qty    [id number]  OT SELF CARE/MGMT/TRAIN EA 15 MIN 12/13/2023 Pooja Hoskins OT GO 1    [id number]  OT EVAL MOD COMPLEXITY 3 12/13/2023 Pooja Hoskins OT GO 1                 Pooja Hoskins OT  12/13/2023

## 2023-12-13 NOTE — PROGRESS NOTES
[facility] THORACIC/LUMBAR NEUROSURGERY POSTOP NOTE      CC: POD #2 status post oblique lateral lumbar 2-3, 4-5 interbody fusion.      Subjective     Interval History: Pain better controlled today.  Has a little bit more sleepiness today possibly due to scheduled Robaxin-will make as needed.  Worked with PT today and did okay but was sleepy and had some decreased endurance.        Objective     Vital signs in last 24 hours:  Temp:  [97.5 °F (36.4 °C)-98.6 °F (37 °C)] 98.2 °F (36.8 °C)  Heart Rate:  [80-99] 85  Resp:  [16-17] 17  BP: (101-125)/(61-79) 109/64    Intake/Output this shift:  I/O this shift:  In: 200 [P.O.:200]  Out: 100 [Urine:100]      LABS:  .  Results from last 7 days   Lab Units 12/12/23  0226 12/11/23  1338   WBC 10*3/mm3 9.80 13.75*   HEMOGLOBIN g/dL 11.1* 11.2*   HEMATOCRIT % 33.6* 34.5   PLATELETS 10*3/mm3 217 237     .  Results from last 7 days   Lab Units 12/13/23  0441 12/12/23  1619 12/12/23  0226 12/11/23  1754   SODIUM mmol/L 141  --  142 137   POTASSIUM mmol/L 4.6 3.5 3.4*  3.4* 3.3*   CHLORIDE mmol/L 108*  --  108* 104   CO2 mmol/L 24.3  --  24.8 23.0   BUN mg/dL 9  --  8 11   CREATININE mg/dL 0.62  --  0.54* 0.58   CALCIUM mg/dL 8.6  --  8.3* 8.1*   BILIRUBIN mg/dL  --   --  0.2  --    ALK PHOS U/L  --   --  39  --    ALT (SGPT) U/L  --   --  17  --    AST (SGOT) U/L  --   --  25  --    GLUCOSE mg/dL 115*  --  116* 128*         IMAGING STUDIES:  X-ray lumbar spine: 12/12/2023:  Shows L2-3 and L4-5 interbody fusion with interbody disc spacers and left lateral plate screw fixations.  Hardware is in adequate position and shows no signs of fracture or backing out.  Retrolisthesis L2 on 3 unchanged and grade 1 anterolisthesis of L4 on 5.    I personally viewed and interpreted the patient's labs, imaging study, medications and chart.    Meds reviewed/changed: Yes  Tylenol 650 mg every 4 hours  Lovenox 40 mg nightly  Robaxin switch to every 6 hours as needed  Brittanie-Colace 2 times daily  Oxycodone  10 mg every 4 hours as needed: 6 doses since yesterday      Physical Exam:    General:   Awake, alert.  Back:    SLR positive on left side causing hip pain  Abdomen:   Mildly distended, mildly tender.  Soft to palpation   Motor:   Muscle strength relatively 4/5 with slightly weaker in proximal left lower extremity.  Bulk and tone in lower extremities.  No fasciculations, rigidity, spasticity, or abnormal movements.  Reflexes:   2+ in the lower extremities. no clonus  Sensation:   Normal to light touch carlos LEs  Station and Gait:             Per PT note 12/12/2023:Summer Jefferson is a 68 year old female seen POD1 prone oblique-lateral L2-3, L4-5 interbody fusion with anterior plating and neuromonitoring. Today, pt. reporting extreme pain however pleasant and agreeable to therapy evaluation. Pt. lives in a single level home with 4 [place]ANTEL[place]. She lives with spouse and has no hx of falls. Today, increased time educating on log roll sequencing to spare spine when transitioning from supine>sit. Pt. performs rolling L<>R and sidyling<>sit with min A using bedrail and VC. Pt. performs STS with supervision A-CGA with cueing for hand placement on RW. Pt. able to ambulate ~20 ft. within room supervision-CGA with RW, however steps are slowed and shuffled, and pt. needing reminders to keep eyes open while ambulating. Pt. also educated on breath control. PT recommending home with assist/HH at d/c vs short term SNF stay pending progress and pain control.   Extremities:   Wearing SCD      Assessment & Plan     ASSESSMENT:      Spondylolisthesis of lumbar region    Spondylolisthesis, lumbar region    Lumbar radiculopathy    68-year-old female day #2 status post oblique lateral lumbar 2-3, 4-5 interbody fusion.  She is managing pain satisfactorily-we will keep her on current pain med regimen today.  Please continue to mobilize her today.  She is yet to have a bowel movement or pass flatus and has some mild abdominal distention.  Given  "postop status and DM 2 this is not uncommon.  We will continue to watch this and continue bowel regimen.      PLAN:     Continue current pain medication regimen  Utilize ice  Mobilize at least 3 times daily  PT  Bowel regimen  Muscle relaxers as needed  Incentive spirometry  SCD/DVT prophylaxis        I discussed the patient's findings and my recommendations with patient, family, and Dr. Whittaker.    During patient visit, I utilized appropriate personal protective equipment including mask and gloves.  Mask used was standard procedure mask. Appropriate PPE was worn during the entire visit.  Hand hygiene was completed before and after.      LOS: 2 days       [facility], APRN  12/13/2023  11:48 EST    \"Dictated utilizing Dragon dictation\".    "

## 2023-12-13 NOTE — PROGRESS NOTES
[facility] Acute Rehab    Spoke with pt. Acute Rehab discussed. OT in room to eval. Will need to discuss with Dr. Roberts once OT eval is in     [facility] informed    Amber Lynn RN

## 2023-12-13 NOTE — THERAPY TREATMENT NOTE
Patient Name: Summer Jefferson  : 1955    MRN: [id number]                              Today's Date: 2023       Admit Date: 2023    Visit Dx:     ICD-10-CM ICD-9-CM   1. Lumbar radiculopathy  M54.16 724.4   2. Spondylolisthesis, lumbar region  M43.16 738.4     Patient Active Problem List   Diagnosis    Diabetes mellitus type 2, controlled    Hypothyroidism, adult    Hypertension    Hypercholesterolemia    HSV (herpes simplex virus) infection    Anxiety and depression    Chronic bilateral low back pain with left-sided sciatica    Facet arthropathy, lumbar    Spondylolisthesis, lumbar region    Lumbar radiculopathy    SI (sacroiliac) joint inflammation    Spondylolisthesis of lumbar region    Overweight (BMI 25.0-29.9)     Past Medical History:   Diagnosis Date    Anxiety and depression 03/15/2022    Backache     Diabetes mellitus type 2, controlled     HSV (herpes simplex virus) infection 03/15/2022    Hypercholesterolemia     Hypertension     Hypothyroidism     Osteoarthritis of multiple joints     Spinal stenosis     LUMBAR     Past Surgical History:   Procedure Laterality Date    ANKLE ARTHROSCOPY Right     2013 & 10/2013    COLONOSCOPY      2007    EPIDURAL BLOCK      X5    LAPAROSCOPIC GASTRIC BANDING  2007    LUMBAR FUSION Left 2023    Procedure: PRONE OBLIQUE LATERAL LUMBAR TWO TO THREE, FOUR TO FIVE INTERBODY FUSION WITH ANTERIOR PLATING AND NEUROMONITORING;  Surgeon: Calderon Whittaker MD;  Location: [facility];  Service: Neurosurgery;  Laterality: Left;    LUMBAR FUSION N/A 2023    Procedure: LUMBAR FUSION MINIMALLY INVASIVE NAVIGATION;  Surgeon: Calderon Whittaker MD;  Location: [facility];  Service: Neurosurgery;  Laterality: N/A;    ROTATOR CUFF REPAIR Right     2014    TOTAL KNEE ARTHROPLASTY Right     2014      General Information       Row Name 23 1249          Physical Therapy Time and Intention    Document Type therapy note (daily note)  -      Mode of Treatment individual therapy;physical therapy  -[facility] Name 12/13/23 1249          General Information    Patient Profile Reviewed yes  -     Existing Precautions/Restrictions spinal  -[facility] Name 12/13/23 1249          Cognition    Orientation Status (Cognition) oriented x 3  -[facility] Name 12/13/23 1249          Safety Issues, Functional Mobility    Impairments Affecting Function (Mobility) pain;endurance/activity tolerance  -               User Key  (r) = Recorded By, (t) = Taken By, (c) = Cosigned By      Initials Name Provider Type     Geovanna Jimenez PT Physical Therapist                   Mobility       Row Name 12/13/23 1249          Bed Mobility    Bed Mobility rolling right;sidelying-sit;sit-sidelying  -     Rolling Right [place] (Bed Mobility) supervision;verbal cues  -     Sidelying-Sit [place] (Bed Mobility) contact guard  -     Sit-Sidelying [place] (Bed Mobility) minimum assist (75% patient effort)  -     Assistive Device (Bed Mobility) bed rails  -[facility] Name 12/13/23 1249          Sit-Stand Transfer    Sit-Stand [place] (Transfers) contact guard  -     Assistive Device (Sit-Stand Transfers) walker, front-wheeled  -[facility] Name 12/13/23 1249          Gait/Stairs (Locomotion)    [place] Level (Gait) contact guard  -     Assistive Device (Gait) walker, front-wheeled  -     Distance in Feet (Gait) 30ft  -     Deviations/Abnormal Patterns (Gait) antalgic;weight shifting decreased;stride length decreased;josesito decreased;bilateral deviations;gait speed decreased  -     Bilateral Gait Deviations heel strike decreased  -     Comment, (Gait/Stairs) Lightheaded/dizzy with mobility, cues to keep her eyes open  -               User Key  (r) = Recorded By, (t) = Taken By, (c) = Cosigned By      Initials Name Provider Type     Geovanna Jimenez PT Physical Therapist                   Obj/Interventions    No  documentation.                  Goals/Plan    No documentation.                  Clinical Impression       Row Name 12/13/23 1259          Pain    Additional Documentation Pain Scale: FACES Pre/Post-Treatment (Group)  -       Row Name 12/13/23 8272          Pain Scale: FACES Pre/Post-Treatment    Pain: FACES Scale, Pretreatment 2-->hurts little bit  -     Posttreatment Pain Rating 4-->hurts little more  -     Pain Location lower  -     Pain Location - back  -       Row Name 12/13/23 1839          Plan of Care Review    Plan of Care Reviewed With patient;son;spouse  -     Outcome Evaluation Pt seen for PT this AM, limited today by lightheadedness/dizziness. Pt transferred to EOB vis log roll with CGA and cues. Pt stood with CGA and ambulated 30ft with CGA and rwx. Pt cued throughout to keep her eyes open, lightheadedness worsening with progressed gait distance, prompting return to bed. Pt briefly sat for rest but no improvement in symptoms and pt losing her balance. Pt assisted min A x1 back to supine via log roll. BP checked 127/83 though family reports issues with hypotension and poor response to pain meds this admission. Pt assisted with repositioning and left with YOSSI. PT will continue to follow to progress mobility as tolerated. Anticipate DC to IPR pending progress.  -               User Key  (r) = Recorded By, (t) = Taken By, (c) = Cosigned By      Initials Name Provider Type     Geovanna Jimenez, PT Physical Therapist                   Outcome Measures       Row Name 12/13/23 5357          How much help from another person do you currently need...    Turning from your back to your side while in flat bed without using bedrails? 3  -BH     Moving from lying on back to sitting on the side of a flat bed without bedrails? 3  -BH     Moving to and from a bed to a chair (including a wheelchair)? 3  -BH     Standing up from a chair using your arms (e.g., wheelchair, bedside chair)? 3  -BH     Climbing  3-5 steps with a railing? 2  -     To walk in hospital room? 3  -     AM-PAC 6 Clicks Score (PT) 17  -     Highest Level of Mobility Goal 5 --> Static standing  -       Row Name 12/13/23 1303          Functional Assessment    Outcome Measure Options AM-PAC 6 Clicks Basic Mobility (PT)  -               User Key  (r) = Recorded By, (t) = Taken By, (c) = Cosigned By      Initials Name Provider Type     Geovanna Jimenez, PT Physical Therapist                                 Physical Therapy Education       Title: PT OT SLP Therapies (Done)       Topic: Physical Therapy (Done)       Point: Mobility training (Done)       Learning Progress Summary             Patient Acceptance, E,TB,D, VU,NR by  at 12/13/2023 1303    Acceptance, E, VU by ER at 12/12/2023 1135                         Point: Home exercise program (Done)       Learning Progress Summary             Patient Acceptance, E,TB,D, VU,NR by  at 12/13/2023 1303    Acceptance, E, VU by ER at 12/12/2023 1135                         Point: Body mechanics (Done)       Learning Progress Summary             Patient Acceptance, E,TB,D, VU,NR by  at 12/13/2023 1303    Acceptance, E, VU by ER at 12/12/2023 1135                         Point: Precautions (Done)       Learning Progress Summary             Patient Acceptance, E,TB,D, VU,NR by  at 12/13/2023 1303    Acceptance, E, VU by ER at 12/12/2023 1135                                         User Key       Initials Effective Dates Name Provider Type Discipline     04/08/22 -  Geovanna Jimenez PT Physical Therapist PT    ER 10/15/23 -  Stefany Stover PT Physical Therapist PT                  PT Recommendation and Plan     Plan of Care Reviewed With: patient, son, spouse  Outcome Evaluation: Pt seen for PT this AM, limited today by lightheadedness/dizziness. Pt transferred to EOB vis log roll with CGA and cues. Pt stood with CGA and ambulated 30ft with CGA and rwx. Pt cued throughout to keep her eyes  open, lightheadedness worsening with progressed gait distance, prompting return to bed. Pt briefly sat for rest but no improvement in symptoms and pt losing her balance. Pt assisted min A x1 back to supine via log roll. BP checked 127/83 though family reports issues with hypotension and poor response to pain meds this admission. Pt assisted with repositioning and left with YOSSI. PT will continue to follow to progress mobility as tolerated. Anticipate DC to IPR pending progress.     Time Calculation:         PT Charges       Row Name 12/13/23 1303             Time Calculation    Start Time 0928  -      Stop Time 0947  -      Time Calculation (min) 19 min  -      PT Received On 12/13/23  -      PT - Next Appointment 12/14/23  -         Time Calculation- PT    Total Timed Code Minutes- PT 19 minute(s)  -         Timed Charges    51100 - Gait Training Minutes  9  -      25036 - PT Therapeutic Activity Minutes 10  -         Total Minutes    Timed Charges Total Minutes 19  -       Total Minutes 19  -                User Key  (r) = Recorded By, (t) = Taken By, (c) = Cosigned By      Initials Name Provider Type     Geovanna Jimenez, PT Physical Therapist                  Therapy Charges for Today       Code Description Service Date Service Provider Modifiers Qty    [id number]  PT THERAPEUTIC ACT EA 15 MIN 12/13/2023 Geovanna Jimenez, PT GP 1            PT G-Codes  Outcome Measure Options: AM-PAC 6 Clicks Basic Mobility (PT)  AM-PAC 6 Clicks Score (PT): 17  PT Discharge Summary  Anticipated Discharge Disposition (PT): inpatient rehabilitation facility    Geovanna Jimenez PT  12/13/2023

## 2023-12-13 NOTE — PLAN OF CARE
Goal Outcome Evaluation:  Plan of Care Reviewed With: patient           Outcome Evaluation: Pt is a 69 y/o F s/p L2-5 fusion. She lives with her spouse and is typically indep with ADLs and fxl mobility w/o AD. OT provided education on spinal px and ADL strategies. SBA to sit EOB using log roll, total A to don socks d/t dec fxl reach and unable to attain fig-4 position. Fxl amb into bathroom using RW with SBA-CGA with slow mobility noted. Completes toilet TF CGA and toileting tasks sba-spv. Grooming standing at the sink x3 mins with pain limiting. Pt returns to supine in bed. OT rec rehab to address fxl deficits. will cont to follow in acute care setting      Anticipated Discharge Disposition (OT): inpatient rehabilitation facility

## 2023-12-13 NOTE — PROGRESS NOTES
[facility]    Discussed with Dr. Roberts.  He has accepted. A bed is available tomorrow if she is medically ready.     Call placed to inform pt  Call placed to inform CCP    Amber Lynn RN  Acute Rehab Admission Nurse

## 2023-12-14 ENCOUNTER — HOSPITAL ENCOUNTER (INPATIENT)
Facility: HOSPITAL | Age: 68
DRG: 552 | End: 2023-12-14
Attending: PHYSICAL MEDICINE & REHABILITATION | Admitting: PHYSICAL MEDICINE & REHABILITATION
Payer: MEDICARE

## 2023-12-14 VITALS
BODY MASS INDEX: 27.16 KG/M2 | HEART RATE: 94 BPM | DIASTOLIC BLOOD PRESSURE: 65 MMHG | SYSTOLIC BLOOD PRESSURE: 101 MMHG | RESPIRATION RATE: 16 BRPM | OXYGEN SATURATION: 94 % | HEIGHT: 65 IN | WEIGHT: 163 LBS | TEMPERATURE: 97 F

## 2023-12-14 DIAGNOSIS — Z98.1 S/P LUMBAR FUSION: Primary | ICD-10-CM

## 2023-12-14 DIAGNOSIS — E11.65 CONTROLLED TYPE 2 DIABETES MELLITUS WITH HYPERGLYCEMIA, WITHOUT LONG-TERM CURRENT USE OF INSULIN: Chronic | ICD-10-CM

## 2023-12-14 DIAGNOSIS — M54.16 LUMBAR RADICULOPATHY: ICD-10-CM

## 2023-12-14 DIAGNOSIS — B00.9 HSV (HERPES SIMPLEX VIRUS) INFECTION: ICD-10-CM

## 2023-12-14 PROCEDURE — 25010000002 ENOXAPARIN PER 10 MG: Performed by: NURSE PRACTITIONER

## 2023-12-14 PROCEDURE — 99024 POSTOP FOLLOW-UP VISIT: CPT | Performed by: NURSE PRACTITIONER

## 2023-12-14 RX ORDER — AMLODIPINE BESYLATE 5 MG/1
5 TABLET ORAL DAILY
Status: DISCONTINUED | OUTPATIENT
Start: 2023-12-15 | End: 2023-12-19 | Stop reason: HOSPADM

## 2023-12-14 RX ORDER — LISINOPRIL 10 MG/1
10 TABLET ORAL DAILY
Status: DISCONTINUED | OUTPATIENT
Start: 2023-12-15 | End: 2023-12-19 | Stop reason: HOSPADM

## 2023-12-14 RX ORDER — AMLODIPINE BESYLATE 5 MG/1
5 TABLET ORAL DAILY
Status: CANCELLED | OUTPATIENT
Start: 2023-12-15

## 2023-12-14 RX ORDER — HYDROMORPHONE HYDROCHLORIDE 1 MG/ML
0.25 INJECTION, SOLUTION INTRAMUSCULAR; INTRAVENOUS; SUBCUTANEOUS
Status: DISCONTINUED | OUTPATIENT
Start: 2023-12-14 | End: 2023-12-14

## 2023-12-14 RX ORDER — ENOXAPARIN SODIUM 100 MG/ML
40 INJECTION SUBCUTANEOUS NIGHTLY
Status: DISCONTINUED | OUTPATIENT
Start: 2023-12-14 | End: 2023-12-19

## 2023-12-14 RX ORDER — AMOXICILLIN 250 MG
1 CAPSULE ORAL 2 TIMES DAILY
Status: CANCELLED | OUTPATIENT
Start: 2023-12-14

## 2023-12-14 RX ORDER — METFORMIN HYDROCHLORIDE 500 MG/1
500 TABLET, EXTENDED RELEASE ORAL 2 TIMES DAILY
Status: CANCELLED | OUTPATIENT
Start: 2023-12-14

## 2023-12-14 RX ORDER — LEVOTHYROXINE SODIUM 0.05 MG/1
50 TABLET ORAL DAILY
Status: CANCELLED | OUTPATIENT
Start: 2023-12-15

## 2023-12-14 RX ORDER — METHOCARBAMOL 750 MG/1
750 TABLET, FILM COATED ORAL EVERY 6 HOURS PRN
Status: CANCELLED | OUTPATIENT
Start: 2023-12-14

## 2023-12-14 RX ORDER — AMOXICILLIN 250 MG
1 CAPSULE ORAL 2 TIMES DAILY
Status: DISCONTINUED | OUTPATIENT
Start: 2023-12-14 | End: 2023-12-19 | Stop reason: HOSPADM

## 2023-12-14 RX ORDER — ENOXAPARIN SODIUM 100 MG/ML
40 INJECTION SUBCUTANEOUS NIGHTLY
Status: CANCELLED | OUTPATIENT
Start: 2023-12-14

## 2023-12-14 RX ORDER — BENZONATATE 100 MG/1
100 CAPSULE ORAL 3 TIMES DAILY PRN
Status: DISCONTINUED | OUTPATIENT
Start: 2023-12-14 | End: 2023-12-19

## 2023-12-14 RX ORDER — ONDANSETRON 2 MG/ML
4 INJECTION INTRAMUSCULAR; INTRAVENOUS EVERY 6 HOURS PRN
Status: CANCELLED | OUTPATIENT
Start: 2023-12-14

## 2023-12-14 RX ORDER — OXYCODONE HYDROCHLORIDE 5 MG/1
5 TABLET ORAL EVERY 4 HOURS PRN
Status: DISCONTINUED | OUTPATIENT
Start: 2023-12-14 | End: 2023-12-19 | Stop reason: HOSPADM

## 2023-12-14 RX ORDER — ONDANSETRON 4 MG/1
4 TABLET, FILM COATED ORAL EVERY 6 HOURS PRN
Status: CANCELLED | OUTPATIENT
Start: 2023-12-14

## 2023-12-14 RX ORDER — LISINOPRIL 10 MG/1
10 TABLET ORAL DAILY
Status: CANCELLED | OUTPATIENT
Start: 2023-12-15

## 2023-12-14 RX ORDER — HYDROCHLOROTHIAZIDE 25 MG/1
25 TABLET ORAL DAILY
Status: DISCONTINUED | OUTPATIENT
Start: 2023-12-15 | End: 2023-12-19 | Stop reason: HOSPADM

## 2023-12-14 RX ORDER — ONDANSETRON 2 MG/ML
4 INJECTION INTRAMUSCULAR; INTRAVENOUS EVERY 6 HOURS PRN
Status: DISCONTINUED | OUTPATIENT
Start: 2023-12-14 | End: 2023-12-19

## 2023-12-14 RX ORDER — FLUOXETINE HYDROCHLORIDE 20 MG/1
20 CAPSULE ORAL DAILY
Status: DISCONTINUED | OUTPATIENT
Start: 2023-12-15 | End: 2023-12-19 | Stop reason: HOSPADM

## 2023-12-14 RX ORDER — ROSUVASTATIN CALCIUM 10 MG/1
10 TABLET, COATED ORAL NIGHTLY
Status: DISCONTINUED | OUTPATIENT
Start: 2023-12-14 | End: 2023-12-19 | Stop reason: HOSPADM

## 2023-12-14 RX ORDER — NALOXONE HCL 0.4 MG/ML
0.4 VIAL (ML) INJECTION
Status: DISCONTINUED | OUTPATIENT
Start: 2023-12-14 | End: 2023-12-19

## 2023-12-14 RX ORDER — ACETAMINOPHEN 325 MG/1
650 TABLET ORAL
Status: CANCELLED | OUTPATIENT
Start: 2023-12-14

## 2023-12-14 RX ORDER — BENZONATATE 100 MG/1
100 CAPSULE ORAL 3 TIMES DAILY PRN
Status: CANCELLED | OUTPATIENT
Start: 2023-12-14

## 2023-12-14 RX ORDER — METHOCARBAMOL 500 MG/1
750 TABLET, FILM COATED ORAL EVERY 6 HOURS PRN
Status: DISCONTINUED | OUTPATIENT
Start: 2023-12-14 | End: 2023-12-19 | Stop reason: HOSPADM

## 2023-12-14 RX ORDER — ACETAMINOPHEN 325 MG/1
650 TABLET ORAL EVERY 6 HOURS SCHEDULED
Status: DISCONTINUED | OUTPATIENT
Start: 2023-12-14 | End: 2023-12-15

## 2023-12-14 RX ORDER — ONDANSETRON 4 MG/1
4 TABLET, FILM COATED ORAL EVERY 6 HOURS PRN
Status: DISCONTINUED | OUTPATIENT
Start: 2023-12-14 | End: 2023-12-19

## 2023-12-14 RX ORDER — OXYCODONE HYDROCHLORIDE 5 MG/1
5 TABLET ORAL EVERY 4 HOURS PRN
Status: CANCELLED | OUTPATIENT
Start: 2023-12-14 | End: 2023-12-18

## 2023-12-14 RX ORDER — NALOXONE HCL 0.4 MG/ML
0.4 VIAL (ML) INJECTION
Status: CANCELLED | OUTPATIENT
Start: 2023-12-14

## 2023-12-14 RX ORDER — METFORMIN HYDROCHLORIDE 500 MG/1
500 TABLET, EXTENDED RELEASE ORAL 2 TIMES DAILY
Status: DISCONTINUED | OUTPATIENT
Start: 2023-12-14 | End: 2023-12-16

## 2023-12-14 RX ORDER — OXYCODONE HYDROCHLORIDE 5 MG/1
5 TABLET ORAL EVERY 4 HOURS PRN
Status: DISCONTINUED | OUTPATIENT
Start: 2023-12-14 | End: 2023-12-14 | Stop reason: HOSPADM

## 2023-12-14 RX ORDER — LEVOTHYROXINE SODIUM 0.05 MG/1
50 TABLET ORAL DAILY
Status: DISCONTINUED | OUTPATIENT
Start: 2023-12-15 | End: 2023-12-19 | Stop reason: HOSPADM

## 2023-12-14 RX ORDER — ROSUVASTATIN CALCIUM 10 MG/1
10 TABLET, COATED ORAL NIGHTLY
Status: CANCELLED | OUTPATIENT
Start: 2023-12-14

## 2023-12-14 RX ORDER — SODIUM CHLORIDE 0.9 % (FLUSH) 0.9 %
10 SYRINGE (ML) INJECTION AS NEEDED
Status: CANCELLED | OUTPATIENT
Start: 2023-12-14

## 2023-12-14 RX ORDER — HYDROCHLOROTHIAZIDE 25 MG/1
25 TABLET ORAL DAILY
Status: CANCELLED | OUTPATIENT
Start: 2023-12-15

## 2023-12-14 RX ORDER — HYDROMORPHONE HYDROCHLORIDE 1 MG/ML
0.25 INJECTION, SOLUTION INTRAMUSCULAR; INTRAVENOUS; SUBCUTANEOUS
Status: CANCELLED | OUTPATIENT
Start: 2023-12-14 | End: 2023-12-18

## 2023-12-14 RX ORDER — ACETAMINOPHEN 325 MG/1
650 TABLET ORAL
Status: DISCONTINUED | OUTPATIENT
Start: 2023-12-14 | End: 2023-12-14

## 2023-12-14 RX ORDER — FLUOXETINE HYDROCHLORIDE 20 MG/1
20 CAPSULE ORAL DAILY
Status: CANCELLED | OUTPATIENT
Start: 2023-12-15

## 2023-12-14 RX ORDER — SODIUM CHLORIDE, SODIUM LACTATE, POTASSIUM CHLORIDE, CALCIUM CHLORIDE 600; 310; 30; 20 MG/100ML; MG/100ML; MG/100ML; MG/100ML
9 INJECTION, SOLUTION INTRAVENOUS CONTINUOUS
Status: CANCELLED | OUTPATIENT
Start: 2023-12-14

## 2023-12-14 RX ADMIN — OXYCODONE HYDROCHLORIDE 5 MG: 5 TABLET ORAL at 09:04

## 2023-12-14 RX ADMIN — AMLODIPINE BESYLATE 5 MG: 5 TABLET ORAL at 08:32

## 2023-12-14 RX ADMIN — ACETAMINOPHEN 650 MG: 325 TABLET, FILM COATED ORAL at 05:58

## 2023-12-14 RX ADMIN — EMPAGLIFLOZIN 10 MG: 10 TABLET, FILM COATED ORAL at 08:32

## 2023-12-14 RX ADMIN — METFORMIN HYDROCHLORIDE 500 MG: 500 TABLET, EXTENDED RELEASE ORAL at 20:29

## 2023-12-14 RX ADMIN — ROSUVASTATIN CALCIUM 10 MG: 10 TABLET, FILM COATED ORAL at 20:29

## 2023-12-14 RX ADMIN — LISINOPRIL 10 MG: 10 TABLET ORAL at 08:31

## 2023-12-14 RX ADMIN — ACETAMINOPHEN 650 MG: 325 TABLET ORAL at 18:08

## 2023-12-14 RX ADMIN — Medication 10 ML: at 08:35

## 2023-12-14 RX ADMIN — ENOXAPARIN SODIUM 40 MG: 100 INJECTION SUBCUTANEOUS at 20:29

## 2023-12-14 RX ADMIN — ACETAMINOPHEN 650 MG: 325 TABLET, FILM COATED ORAL at 11:17

## 2023-12-14 RX ADMIN — LEVOTHYROXINE SODIUM 50 MCG: 50 TABLET ORAL at 08:31

## 2023-12-14 RX ADMIN — HYDROCHLOROTHIAZIDE 25 MG: 25 TABLET ORAL at 08:32

## 2023-12-14 RX ADMIN — SENNOSIDES AND DOCUSATE SODIUM 1 TABLET: 50; 8.6 TABLET ORAL at 20:29

## 2023-12-14 RX ADMIN — DOCUSATE SODIUM 50MG AND SENNOSIDES 8.6MG 1 TABLET: 8.6; 5 TABLET, FILM COATED ORAL at 08:32

## 2023-12-14 RX ADMIN — FLUOXETINE HYDROCHLORIDE 20 MG: 20 CAPSULE ORAL at 09:04

## 2023-12-14 RX ADMIN — OXYCODONE HYDROCHLORIDE 5 MG: 5 TABLET ORAL at 16:11

## 2023-12-14 RX ADMIN — ACETAMINOPHEN 650 MG: 325 TABLET, FILM COATED ORAL at 01:01

## 2023-12-14 RX ADMIN — METHOCARBAMOL TABLETS 750 MG: 750 TABLET, COATED ORAL at 05:58

## 2023-12-14 RX ADMIN — OXYCODONE HYDROCHLORIDE 5 MG: 5 TABLET ORAL at 20:29

## 2023-12-14 RX ADMIN — METFORMIN HYDROCHLORIDE 500 MG: 500 TABLET, EXTENDED RELEASE ORAL at 08:31

## 2023-12-14 RX ADMIN — MAGNESIUM HYDROXIDE 10 ML: 2400 SUSPENSION ORAL at 01:15

## 2023-12-14 NOTE — DISCHARGE SUMMARY
Summer Jefferson  1955    Patient Care Team:  Ruth Benitez APRN as PCP - General (Family Medicine)    Date of Admit: 12/11/2023    Date of Discharge:  12/14/2023    Discharge Diagnosis:  Spondylolisthesis of lumbar region    Spondylolisthesis, lumbar region    Lumbar radiculopathy      Procedures Performed  Procedure(s):  PRONE OBLIQUE LATERAL LUMBAR TWO TO THREE, FOUR TO FIVE INTERBODY FUSION WITH ANTERIOR PLATING AND NEUROMONITORING  LUMBAR FUSION MINIMALLY INVASIVE NAVIGATION       Complications: None    Consultants:   Consults       No orders found from 11/12/2023 to 12/12/2023.            Condition on Discharge: stable    Discharge disposition: BAR      Brief HPI: Patient evaluated in office for complaints of left-sided radiculopathy symptoms. Imaging revealed lumbar spondylolisthesis. RBAs of treatment were discussed including the above procedure. Patient consented to above procedure.    Hospital Course: Patient admitted for above procedure. The procedure itself was without complication. The patient was [facility] following recovery.  Patient had expected postop pain during her recovery stay.  She has worked with PT who recommend short-term rehab.  Patient has tolerated diet, is voiding without difficulty.  Patient being transferred to [facility] today.      Discharge Physical Exam:    Temp:  [97 °F (36.1 °C)-98.4 °F (36.9 °C)] 97 °F (36.1 °C)  Heart Rate:  [69-94] 94  Resp:  [16-17] 16  BP: (101-117)/(65-74) 101/65    Current labs:  Lab Results (last 24 hours)       ** No results found for the last 24 hours. **          GENERAL: No acute distress. Appears well nourished. Appears stated age.  HEENT: Atraumatic and normocephalic  CARDIO: RRR on monitoring. Pulses 2+  PULM: breathing nonlabored on room air  NEURO: AAOx3. EOMI. PERRL. CNi. Strength 5/5 in all extremities. Sensation intact to light touch. Reflexes 2+ throughout.  WOUND: Incision CDI, closed with Dermabond      Discharge  Medications  MAGED has been reviewed and narcotic consent is on file in the patient's chart.     Your medication list        ASK your doctor about these medications        Instructions Last Dose Given Next Dose Due   amLODIPine 5 MG tablet  Commonly known as: NORVASC      Take 1 tablet by mouth Daily.       azithromycin 250 MG tablet  Commonly known as: Zithromax      Take 2 tablets the first day, then 1 tablet daily for 4 days.       benzonatate 100 MG capsule  Commonly known as: Tessalon Perles      Take 1 capsule by mouth 3 (Three) Times a Day As Needed for Cough.       celecoxib 200 MG capsule  Commonly known as: CeleBREX      Take 1 capsule by mouth 2 (Two) Times a Day.       famciclovir 500 MG tablet  Commonly known as: FAMVIR      Take 1 tablet by mouth 2 (Two) Times a Day.       FLUoxetine 20 MG capsule  Commonly known as: PROzac      Take 1 capsule by mouth Daily.       gabapentin 100 MG capsule  Commonly known as: NEURONTIN  Start taking on: July 14, 2023      Take 1 capsule by mouth 3 (Three) Times a Day for 7 days, THEN 2 capsules 3 (Three) Times a Day for 7 days, THEN 3 capsules 3 (Three) Times a Day for 76 days.       hydroCHLOROthiazide 25 MG tablet  Commonly known as: HYDRODIURIL      Take 1 tablet by mouth Daily.       Jardiance 10 MG tablet tablet  Generic drug: empagliflozin      TAKE 1 TABLET EVERY DAY (NEW DOSE)       levothyroxine 50 MCG tablet  Commonly known as: SYNTHROID, LEVOTHROID      Take 1 tablet by mouth Daily.       lidocaine 5 %  Commonly known as: LIDODERM      Remove & Discard patch within 12 hours; use as needed for back pain       lisinopril 10 MG tablet  Commonly known as: PRINIVIL,ZESTRIL      Take 1 tablet by mouth Daily.       metFORMIN  MG 24 hr tablet  Commonly known as: GLUCOPHAGE-XR      One tab daily       rosuvastatin 10 MG tablet  Commonly known as: CRESTOR      Take 1 tablet by mouth Daily.       Semaglutide 14 MG tablet      Take 1 tablet by mouth Daily.      "           Discharge Diet:     Diet Order   Procedures    Diet: Regular/House Diet; Texture: Regular Texture (IDDSI 7); Fluid Consistency: Thin (IDDSI 0)       Activity at Discharge:       Call for: questions or concerns    Follow-up Appointments  Future Appointments   Date Time Provider Department Center   12/21/2023 10:15 AM [facility]   12/27/2023 10:15 AM Calderon Whittaker MD [facility]   12/28/2023 11:30 AM Ruth Benitez APRN [facility]   1/15/2024  9:30 AM TREATMENT RM 1 [facility]   1/17/2024  9:15 AM [facility]   1/24/2024  9:00 AM Ruth Benitez APRN [facility]      Follow-up Information       Ruth Benitez APRN .    Specialties: Family Medicine, Nurse Practitioner  Contact information:  [address] [place], [place] [id number] [phone]                               Test Results Pending at Discharge     None    I discussed the discharge instructions with patient and GILBERT Carbone  12/14/23  11:23 EST    \"Dictated utilizing Dragon dictation\".      "

## 2023-12-14 NOTE — H&P
[facility]   HISTORY AND PHYSICAL    Patient Name: Summer Jefferson  : 1955  MRN: [id number]  Primary Care Physician:  Ruth Benitez APRN  Date of admission: 2023    Subjective   Subjective     Chief Complaint:   LUMBAR TWO TO THREE, FOUR TO FIVE INTERBODY FUSION Dec 11, 2023 for spondylolisthesis  Impaired mobility  Impaired self care  DVT prophylaxis - SCDs/ Lovenox  Pain management - scheduled Tylenol/ methocarbamol/ oxycodone - MAGED report reviewed  HTN - amlodipine/ hydrochlorothiazide / lisinopril/  DM - empagliflozine-Jardiance, Metformin XR. Was also on semaglutide at home  Depression - fluoxetine  Hypothyroid - on replacement    History of Present Illness  68 year old female admitted to [facility] on Dec 11 with history of spondylolisthesis - now s/p LUMBAR TWO TO THREE, FOUR TO FIVE INTERBODY FUSION . Expected post op pain. Voiding without difficulty.   With OT on Dec 13 - [SBA to sit EOB using log roll, total A to don socks d/t dec fxl reach and unable to attain fig-4 position. Fxl amb into bathroom using RW with SBA-CGA with slow mobility noted. Completes toilet TF CGA and toileting tasks sba-spv. Grooming standing at the sink x3 mins with pain limiting ]  With PT on Dec 13 - [Pt stood with CGA and ambulated 30ft with CGA and rwx. Pt cued throughout to keep her eyes open, lightheadedness worsening with progressed gait distance, prompting return to bed. Pt briefly sat for rest but no improvement in symptoms and pt losing her balance. Pt assisted min A x1 back to supine via log roll. ]    Other issues include:  DVT prophylaxis - SCDs/ Lovenox  Pain management - scheduled Tylenol/ methocarbamol/oxycodone  HTN - amlodipine/ hydrochlorothiazide / lisinopril/  DM - empagliflozine-Jardiance, Metformin XR. Was also on semaglutide at home  Depression - fluoxetine  Hypothyroid - on replacement    Given her functional impairments and co-morbidities, now admit to acute inpatient rehab.    She  complains of expected back pain.  She had some limp with the left lower extremity prior to surgery.  Denies any radicular pain.  Has not had a bowel movement in the hospital.  Voiding okay.  She took oxy codon 10 mg 4 times yesterday but did not tolerate it well and oxycodone's been decreased to 5 mg dose      Review of Systems     Personal History     Past Medical History:   Diagnosis Date    Anxiety and depression 03/15/2022    Backache     Diabetes mellitus type 2, controlled     HSV (herpes simplex virus) infection 03/15/2022    Hypercholesterolemia     Hypertension     Hypothyroidism     Osteoarthritis of multiple joints     Spinal stenosis     LUMBAR       Past Surgical History:   Procedure Laterality Date    ANKLE ARTHROSCOPY Right     1/2013 & 10/2013    COLONOSCOPY      7/27/2007    EPIDURAL BLOCK      X5    LAPAROSCOPIC GASTRIC BANDING  08/01/2007    LUMBAR FUSION Left 12/11/2023    Procedure: PRONE OBLIQUE LATERAL LUMBAR TWO TO THREE, FOUR TO FIVE INTERBODY FUSION WITH ANTERIOR PLATING AND NEUROMONITORING;  Surgeon: Calderon Whittaker MD;  Location: [facility];  Service: Neurosurgery;  Laterality: Left;    LUMBAR FUSION N/A 12/11/2023    Procedure: LUMBAR FUSION MINIMALLY INVASIVE NAVIGATION;  Surgeon: Calderon Whittaker MD;  Location: [facility];  Service: Neurosurgery;  Laterality: N/A;    ROTATOR CUFF REPAIR Right     5/2014    TOTAL KNEE ARTHROPLASTY Right     12/2014       Family History: family history includes Diabetes in her mother; Heart disease in her mother; Hypertension in her mother; Thyroid disease in her mother; Vision loss in her father. Otherwise pertinent FHx was reviewed and not pertinent to current issue.    Social History:  reports that she has quit smoking. She has never been exposed to tobacco smoke. She has never used smokeless tobacco. She reports current alcohol use. She reports that she does not use drugs.  Patient lives with her ,   in a single story house with 5 steps to  enter. At baseline, she is IADLS   Home Medications:  FLUoxetine, Semaglutide, amLODIPine, azithromycin, benzonatate, celecoxib, empagliflozin, famciclovir, gabapentin, hydroCHLOROthiazide, levothyroxine, lidocaine, lisinopril, metFORMIN ER, and rosuvastatin    Allergies:  No Known Allergies    Scheduled Meds:acetaminophen, 650 mg, Oral, Q6H  [START ON 12/15/2023] amLODIPine, 5 mg, Oral, Daily  [START ON 12/15/2023] empagliflozin, 10 mg, Oral, Daily  enoxaparin, 40 mg, Subcutaneous, Nightly  [START ON 12/15/2023] FLUoxetine, 20 mg, Oral, Daily  [START ON 12/15/2023] hydroCHLOROthiazide, 25 mg, Oral, Daily  [START ON 12/15/2023] levothyroxine, 50 mcg, Oral, Daily  [START ON 12/15/2023] lisinopril, 10 mg, Oral, Daily  metFORMIN ER, 500 mg, Oral, BID  rosuvastatin, 10 mg, Oral, Nightly  [START ON 12/15/2023] Semaglutide, 14 mg, Oral, Daily  senna-docusate sodium, 1 tablet, Oral, BID      Continuous Infusions:   PRN Meds:.  benzonatate    magnesium hydroxide    methocarbamol    [DISCONTINUED] HYDROmorphone **AND** naloxone    ondansetron **OR** ondansetron    oxyCODONE    Objective    Objective     Vitals:   Temp:  [97 °F (36.1 °C)-98.4 °F (36.9 °C)] 97 °F (36.1 °C)  Heart Rate:  [87-94] 94  Resp:  [16-17] 16  BP: (101-117)/(65-74) 101/65    Physical Exam    MENTAL STATUS -  AWAKE / ALERT  HEENT-  SCLERAE ANICTERIC, CONJUNCTIVAE PINK, OP MOIST, NO JVD,    LUNGS - NORMAL RESPIRATIONS.  Clear to auscultation without wheezes rales or rhonchi  HEART- RRR, no rub murmur or gallop  ABD -  SOFT, NONDISTENDED, NABS soft nontender  Left flank incision s and left lumbar incision clean dry and intact dressing replaced  EXT - NO EDEMA OR CYANOSIS  NEURO - AWAKE, ALERT  MOTOR EXAM -she has pain inhibition proximally in the bilateral lower extremities but is able to do antigravity knee extension.  She takes resistance with bilateral ankle dorsiflexion EHL and ankle plantarflexion    Result Review    Result Review:    Results from  last 7 days   Lab Units 12/12/23  0226 12/11/23  1338   WBC 10*3/mm3 9.80 13.75*   HEMOGLOBIN g/dL 11.1* 11.2*   HEMATOCRIT % 33.6* 34.5   PLATELETS 10*3/mm3 217 237     Results from last 7 days   Lab Units 12/13/23  0441 12/12/23  1619 12/12/23  0226 12/11/23  1754   SODIUM mmol/L 141  --  142 137   POTASSIUM mmol/L 4.6 3.5 3.4*  3.4* 3.3*   CHLORIDE mmol/L 108*  --  108* 104   CO2 mmol/L 24.3  --  24.8 23.0   BUN mg/dL 9  --  8 11   CREATININE mg/dL 0.62  --  0.54* 0.58   CALCIUM mg/dL 8.6  --  8.3* 8.1*   BILIRUBIN mg/dL  --   --  0.2  --    ALK PHOS U/L  --   --  39  --    ALT (SGPT) U/L  --   --  17  --    AST (SGOT) U/L  --   --  25  --    GLUCOSE mg/dL 115*  --  116* 128*         Assessment & Plan   Assessment / Plan        Active Hospital Problems:  Active Hospital Problems    Diagnosis     **S/P lumbar fusion       LUMBAR TWO TO THREE, FOUR TO FIVE INTERBODY FUSION Dec 11, 2023 for spondylolisthesis  Impaired mobility  Impaired self care  DVT prophylaxis - SCDs/ Lovenox  Pain management - scheduled Tylenol/ methocarbamol/ oxycodone - MAGED report reviewed  HTN - amlodipine/ hydrochlorothiazide / lisinopril/  DM - empagliflozine-Jardiance, Metformin XR. Was also on semaglutide at home  Depression - fluoxetine  Hypothyroid - on replacement    Now admit for comprehensive acute inpatient rehabilitation .  This would be an interdisciplinary program with physical therapy 1.5 hour,  occupational therapy 1.5 hour,  5 days a week.  Rehabilitation nursing for carryover, monitoring of  neurologic and pain management   status, bowel and bladder, and skin  Ongoing physician follow-up.  Weekly team conferences.  Goals are to achieve a level of supervision/modified independent with  mobility and self-care and improved activity tolerance.   Rehabilitation prognosis fair.  Medical prognosis fair.  Estimated length of stay is approximately one week , but is only an estimation.   The patient's functional status and clinical  status is unchanged from preadmission assessment and the patient continues appropriate for acute inpatient rehabilitation.  Goal is for home with  outpatient   therapies.  Barrier to discharge:  impaired mobility and self care  - work on  ADLS with adaptive equipment and proper back mechanics, functional mobility, transfer training and progressive ambulation  to overcome.         DVT prophylaxis:  Medical and mechanical DVT prophylaxis orders are present.    CODE STATUS:    Code Status (Patient has no pulse and is not breathing): CPR (Attempt to Resuscitate)  Medical Interventions (Patient has pulse or is breathing): Full Support    Admission Status:  I believe this patient meets inpatient status.    Cedrick Roberts MD

## 2023-12-14 NOTE — PROGRESS NOTES
Continued Stay Note  [facility]     Patient Name: Summer Jefferson  MRN: [id number]  Today's Date: 12/14/2023    Admit Date: 12/11/2023    Plan: referral pending to [facility]   Discharge Plan       Row Name 12/14/23 1547       Plan    Final Discharge Disposition Code 62 - inpatient rehab facility    Final Note Pt d/c'ed to [facility]                   Discharge Codes    No documentation.                 Expected Discharge Date and Time       Expected Discharge Date Expected Discharge Time    Dec 14, 2023               Rosi Ferraro RN

## 2023-12-14 NOTE — PROGRESS NOTES
NEUROSURGERY PROGRESS NOTE     LOS: 3 days   Patient Care Team:  Ruth Benitez APRN as PCP - General (Family Medicine)    Chief Complaint:  No chief complaint on file.  Lumbar spondylolisthesis with left-sided radiculopathy    Subjective     Interval History: NAEO.  Pain much better improved today compared to yesterday.  She was able to get up to the bathroom unassisted.  Not requiring pain medications overnight.    While in the room and during my examination of the patient I wore a mask and eye protection.  I washed my hands before and after this patient encounter.  The patient was also wearing a mask.     History taken from: patient    Objective      Vital Signs  Temp:  [98 °F (36.7 °C)-98.4 °F (36.9 °C)] 98.4 °F (36.9 °C)  Heart Rate:  [69-88] 88  Resp:  [16-17] 16  BP: (101-117)/(64-74) 113/73  Body mass index is 27.55 kg/m².    Intake/Output last 3 shifts:  I/O last 3 completed shifts:  In: 2630 [P.O.:2630]  Out: 2450 [Urine:2450]    Intake/Output this shift:  No intake/output data recorded.    Physical    GENERAL: No acute distress. Appears well nourished. Appears stated age.  HEENT: Atraumatic and normocephalic  CARDIO: RRR on monitoring. Pulses 2+  PULM: breathing nonlabored on room air  NEURO: AAOx3. EOMI. PERRL. CNi. Strength 5/5 in all extremities. Sensation intact to light touch. Reflexes 2+ throughout.  WOUND: Incision CDI, closed with Dermabond    Results Review:  I reviewed the patient's new clinical results.    Labs:    Lab Results (last 24 hours)       ** No results found for the last 24 hours. **            Imaging:    No new neuroimaging.    Current Medications:   Scheduled Meds:acetaminophen, 650 mg, Oral, Q4H  amLODIPine, 5 mg, Oral, Daily  empagliflozin, 10 mg, Oral, Daily  enoxaparin, 40 mg, Subcutaneous, Nightly  FLUoxetine, 20 mg, Oral, Daily  hydroCHLOROthiazide, 25 mg, Oral, Daily  levothyroxine, 50 mcg, Oral, Daily  lisinopril, 10 mg, Oral, Daily  metFORMIN ER, 500 mg, Oral,  "BID  rosuvastatin, 10 mg, Oral, Nightly  Semaglutide, 14 mg, Oral, Daily  senna-docusate sodium, 1 tablet, Oral, BID  sodium chloride, 10 mL, Intravenous, Q12H      Continuous Infusions:lactated ringers, 9 mL/hr, Last Rate: 9 mL/hr (12/11/23 0868)        Assessment & Plan       Spondylolisthesis of lumbar region    Spondylolisthesis, lumbar region    Lumbar radiculopathy      Assessment/Plan:  Summer Jefferson is a 68 y.o. female with L2-3, L4-5 OLIF with anterior plating    Neuro: Continue routine neuro checks.  X-rays appear stable.  We will wean her off of oxycodone 10 and moved to oxycodone 5.  She will benefit from acute rehab stent prior to discharge home.  Wound/Incision: Continue dressing.  Cardio: Goal Normotensive  Pulm: Room air.  Activity: No restrictions to activity., Encourage ambulation., Work with PT/OT.  Diet: Advance diet as tolerated.   DVT Prophylaxis: Prophylactic Lovenox  GI Prophylaxis: Not indicated due to low risk.  Dispo:  Discharge to acute rehab today.      Calderon Whittaker MD  12/14/23  08:45 EST    \"Dictated utilizing Dragon dictation\".      "

## 2023-12-14 NOTE — PROGRESS NOTES
[facility] Acute Rehab    Pt has been accepted and a bed is available today in Acute Rehab. Please complete the DC/ Readmit along with a DC Summary before moving to rehab.     CCP aware    Amber Lynn RN  Acute Rehab Admission Nurse

## 2023-12-14 NOTE — PLAN OF CARE
Goal Outcome Evaluation:  Plan of Care Reviewed With: patient        Progress: improving  Outcome Evaluation: patient ambulating with assistance to bathroom, voiding without difficulty, continues to rate pain elevated at times but has better mobility with muscle relaxer than pain pills, patient drowsy and difficulty to keep awake when given oxycodone, plans to d/c to [facility] 12/14/23, educated on b/p monitoring

## 2023-12-15 DIAGNOSIS — I10 PRIMARY HYPERTENSION: Primary | Chronic | ICD-10-CM

## 2023-12-15 DIAGNOSIS — E11.9 CONTROLLED TYPE 2 DIABETES MELLITUS WITHOUT COMPLICATION, WITHOUT LONG-TERM CURRENT USE OF INSULIN: Chronic | ICD-10-CM

## 2023-12-15 DIAGNOSIS — Z13.29 SCREENING FOR THYROID DISORDER: ICD-10-CM

## 2023-12-15 LAB
BASOPHILS # BLD AUTO: 0.03 10*3/MM3 (ref 0–0.2)
BASOPHILS NFR BLD AUTO: 0.4 % (ref 0–1.5)
DEPRECATED RDW RBC AUTO: 43 FL (ref 37–54)
EOSINOPHIL # BLD AUTO: 0.23 10*3/MM3 (ref 0–0.4)
EOSINOPHIL NFR BLD AUTO: 3.3 % (ref 0.3–6.2)
ERYTHROCYTE [DISTWIDTH] IN BLOOD BY AUTOMATED COUNT: 13.4 % (ref 12.3–15.4)
HCT VFR BLD AUTO: 39.5 % (ref 34–46.6)
HGB BLD-MCNC: 13 G/DL (ref 12–15.9)
IMM GRANULOCYTES # BLD AUTO: 0.02 10*3/MM3 (ref 0–0.05)
IMM GRANULOCYTES NFR BLD AUTO: 0.3 % (ref 0–0.5)
LYMPHOCYTES # BLD AUTO: 1.29 10*3/MM3 (ref 0.7–3.1)
LYMPHOCYTES NFR BLD AUTO: 18.4 % (ref 19.6–45.3)
MCH RBC QN AUTO: 29 PG (ref 26.6–33)
MCHC RBC AUTO-ENTMCNC: 32.9 G/DL (ref 31.5–35.7)
MCV RBC AUTO: 88 FL (ref 79–97)
MONOCYTES # BLD AUTO: 0.57 10*3/MM3 (ref 0.1–0.9)
MONOCYTES NFR BLD AUTO: 8.1 % (ref 5–12)
NEUTROPHILS NFR BLD AUTO: 4.88 10*3/MM3 (ref 1.7–7)
NEUTROPHILS NFR BLD AUTO: 69.5 % (ref 42.7–76)
NRBC BLD AUTO-RTO: 0 /100 WBC (ref 0–0.2)
PLATELET # BLD AUTO: 264 10*3/MM3 (ref 140–450)
PMV BLD AUTO: 10.1 FL (ref 6–12)
RBC # BLD AUTO: 4.49 10*6/MM3 (ref 3.77–5.28)
WBC NRBC COR # BLD AUTO: 7.02 10*3/MM3 (ref 3.4–10.8)

## 2023-12-15 PROCEDURE — 85025 COMPLETE CBC W/AUTO DIFF WBC: CPT | Performed by: PHYSICAL MEDICINE & REHABILITATION

## 2023-12-15 PROCEDURE — 25010000002 ENOXAPARIN PER 10 MG: Performed by: NURSE PRACTITIONER

## 2023-12-15 PROCEDURE — 97166 OT EVAL MOD COMPLEX 45 MIN: CPT

## 2023-12-15 PROCEDURE — 97530 THERAPEUTIC ACTIVITIES: CPT

## 2023-12-15 PROCEDURE — 97535 SELF CARE MNGMENT TRAINING: CPT

## 2023-12-15 PROCEDURE — 97162 PT EVAL MOD COMPLEX 30 MIN: CPT

## 2023-12-15 PROCEDURE — 63710000001 ONDANSETRON PER 8 MG: Performed by: NURSE PRACTITIONER

## 2023-12-15 PROCEDURE — 97110 THERAPEUTIC EXERCISES: CPT

## 2023-12-15 RX ORDER — ACETAMINOPHEN 325 MG/1
650 TABLET ORAL
Status: DISCONTINUED | OUTPATIENT
Start: 2023-12-15 | End: 2023-12-17

## 2023-12-15 RX ADMIN — HYDROCHLOROTHIAZIDE 25 MG: 25 TABLET ORAL at 09:27

## 2023-12-15 RX ADMIN — FLUOXETINE HYDROCHLORIDE 20 MG: 20 CAPSULE ORAL at 09:27

## 2023-12-15 RX ADMIN — ONDANSETRON HYDROCHLORIDE 4 MG: 4 TABLET, FILM COATED ORAL at 21:08

## 2023-12-15 RX ADMIN — LEVOTHYROXINE SODIUM 50 MCG: 50 TABLET ORAL at 09:27

## 2023-12-15 RX ADMIN — ENOXAPARIN SODIUM 40 MG: 100 INJECTION SUBCUTANEOUS at 19:57

## 2023-12-15 RX ADMIN — SENNOSIDES AND DOCUSATE SODIUM 1 TABLET: 50; 8.6 TABLET ORAL at 09:27

## 2023-12-15 RX ADMIN — ROSUVASTATIN CALCIUM 10 MG: 10 TABLET, FILM COATED ORAL at 20:09

## 2023-12-15 RX ADMIN — OXYCODONE HYDROCHLORIDE 5 MG: 5 TABLET ORAL at 08:02

## 2023-12-15 RX ADMIN — LISINOPRIL 10 MG: 10 TABLET ORAL at 09:27

## 2023-12-15 RX ADMIN — ACETAMINOPHEN 650 MG: 325 TABLET ORAL at 06:33

## 2023-12-15 RX ADMIN — EMPAGLIFLOZIN 10 MG: 10 TABLET, FILM COATED ORAL at 09:28

## 2023-12-15 RX ADMIN — METHOCARBAMOL TABLETS 750 MG: 500 TABLET, COATED ORAL at 23:43

## 2023-12-15 RX ADMIN — METFORMIN HYDROCHLORIDE 500 MG: 500 TABLET, EXTENDED RELEASE ORAL at 09:27

## 2023-12-15 RX ADMIN — ACETAMINOPHEN 650 MG: 325 TABLET ORAL at 01:37

## 2023-12-15 RX ADMIN — ACETAMINOPHEN 650 MG: 325 TABLET, FILM COATED ORAL at 19:57

## 2023-12-15 RX ADMIN — ACETAMINOPHEN 650 MG: 325 TABLET ORAL at 12:11

## 2023-12-15 RX ADMIN — OXYCODONE HYDROCHLORIDE 5 MG: 5 TABLET ORAL at 19:57

## 2023-12-15 RX ADMIN — AMLODIPINE BESYLATE 5 MG: 5 TABLET ORAL at 09:27

## 2023-12-15 RX ADMIN — ACETAMINOPHEN 650 MG: 325 TABLET ORAL at 16:06

## 2023-12-15 NOTE — PROGRESS NOTES
Case Management  Inpatient Rehabilitation Plan of Care and Discharge Plan Note    Rehabilitation Diagnosis:  LUMBAR TWO TO THREE, FOUR TO FIVE INTERBODY FUSION  Dec 11, 2023 for spondylolisthesis  Date of Onset:  12/11/2023    Medical Summary:  68 year old female admitted to [facility] on Dec 11 with history of  spondylolisthesis - now s/p LUMBAR TWO TO THREE, FOUR TO FIVE INTERBODY FUSION .  Expected post op pain. Voiding without difficulty.  With OT on Dec 13 - [SBA to sit EOB using log roll, total A to don socks d/t dec  fxl reach and unable to attain fig-4 position. Fxl amb into bathroom using RW  with SBA-CGA with slow mobility noted. Completes toilet TF CGA and toileting  tasks sba-spv. Grooming standing at the sink x3 mins with pain limiting ]  With PT on Dec 13 - [Pt stood with CGA and ambulated 30ft with CGA and rwx. Pt  cued throughout to keep her eyes open, lightheadedness worsening with progressed  gait distance, prompting return to bed. Pt briefly sat for rest but no  improvement in symptoms and pt losing her balance. Pt assisted min A x1 back to  supine via log roll. ]    Other issues include:  DVT prophylaxis - SCDs/ Lovenox  Pain management - scheduled Tylenol/ methocarbamol/oxycodone  HTN - amlodipine/ hydrochlorothiazide / lisinopril/  DM - empagliflozine-Jardiance, Metformin XR. Was also on semaglutide at home  Depression - fluoxetine  Hypothyroid - on replacement    Given her functional impairments and co-morbidities, now admit to acute  inpatient rehab.    She complains of expected back pain.  She had some limp with the left lower  extremity prior to surgery.  Denies any radicular pain.  Has not had a bowel  movement in the hospital.  Voiding okay.  She took oxy codon 10 mg 4 times  yesterday but did not tolerate it well and oxycodone's been decreased to 5 mg  dose  Past Medical History: Past Medical History:  Diagnosis Date  ? Anxiety and depression 03/15/2022  ? Backache  ? Diabetes mellitus type 2,  controlled  ? HSV (herpes simplex virus) infection 03/15/2022  ? Hypercholesterolemia  ? Hypertension  ? Hypothyroidism  ? Osteoarthritis of multiple joints  ? Spinal stenosis    LUMBAR        Surgical History  Past Surgical History:  Procedure Laterality Date  ? ANKLE ARTHROSCOPY Right    1/2013 & 10/2013  ? COLONOSCOPY    7/27/2007  ? EPIDURAL BLOCK    X5  ? LAPAROSCOPIC GASTRIC BANDING   08/01/2007  ? LUMBAR FUSION Left 12/11/2023    Procedure: PRONE OBLIQUE LATERAL LUMBAR TWO TO THREE, FOUR TO FIVE INTERBODY  FUSION WITH ANTERIOR PLATING AND NEUROMONITORING;  Surgeon: Calderon Whittaker MD;  Location: [facility];  Service: Neurosurgery;  Laterality: Left;  ? LUMBAR FUSION N/A 12/11/2023    Procedure: LUMBAR FUSION MINIMALLY INVASIVE NAVIGATION;  Surgeon: Calderon Whittaker MD;  Location: [facility];  Service: Neurosurgery;  Laterality: N/A;  ? ROTATOR CUFF REPAIR Right    5/2014  ? TOTAL KNEE ARTHROPLASTY Right    12/2014    Plan of Care  Updated Problems/Interventions  Field    Expected Intensity:  Average of 3 hours of therapy 5 days/week.  Interdisciplinary Team:  Interdisciplinary Team: Medical Supervision and 24 Hour Rehabilitation Nursing.,  Physical Therapy:, Occupational Therapy:, Social Work, Therapeutic Recreation.,  Registered Dietitian.  Physical Therapy Intensity/Duration: 1.5 hours / day, 5 days / week, for  approximately one week.  Occupational Therapy Intensity/Duration: 1.5 hours / day, 5 days / week, for  approximately one week.  Estimated Length of Stay/Anticipated Discharge Date: One week  Anticipated Discharge Destination:  Anticipated discharge destination from inpatient rehabilitation is community  discharge with assistance. Return home with 24/7 assist of spouse.      Based on the patient's medical and functional status, their prognosis and  expected level of functional improvement is:  Goals are to achieve a level of  supervision/modified independent with  mobility and self-care and  improved  activity tolerance.   Rehabilitation prognosis fair.  Medical prognosis fair.    Signed by: Iza Corrales RN

## 2023-12-15 NOTE — PLAN OF CARE
Goal Outcome Evaluation:      PT admitted to floor, oriented to REHAB schedule. VSS. Pain managed with PRN PO meds. Surgical dressings to back examined by Dr. Roberts and replaced with border dressings. Incision lines dry and intact. UP with assist x 1  and walker. Needs met. WCTM.

## 2023-12-15 NOTE — PROGRESS NOTES
Recreational Therapy Note    Patient Name: Summer Jefferson   MRN: [id number]    Therapeutic Recreation Eval and Treat (last 12 hours)       Therapeutic Recreation Eval & Treat       Row Name 12/15/23 1500       Lifestyle/Recreational History/Interest    Current Living Situation with family  -SS    Transportation Situation car, drives self  -SS      Row Name 12/15/23 1500       Recreational History and Interests    How Important is Recreation to You? somewhat important  -SS    Satisfied With Leisure Lifestyle? yes  -SS    Participate Regularly in Leisure Activity? yes  -SS    Are You a Social Person? yes  -SS    Current Hobbies/Interests family functions;group/social activities  -SS    Group/Social Activities dining out;shopping;[employer]-related activity  -      Row Name 12/15/23 1500       Coping/Wellbeing    Current State of Wellbeing calm  -    Factors Impacting Current State of Wellbeing no significant issues  -      Row Name 12/15/23 1500       Therapeutic Recreation Participation    Orientation to Therapeutic Recreation patient  -SS      Row Name 12/15/23 1500       Therapeutic Recreation Assessment/Plan    Recreation Plan independent leisure participation  -              User Key  (r) = Recorded By, (t) = Taken By, (c) = Cosigned By      Initials Name Provider Type    SS Joann Wade, CTRS Recreational Therapist                      MARY Philip  12/15/2023

## 2023-12-15 NOTE — PROGRESS NOTES
LOS: 1 day   Patient Care Team:  Ruth Benitez APRN as PCP - General (Family Medicine)      CLAUDIO JORDANON  1955      ADMITTING DIAGNOSIS:    LUMBAR TWO TO THREE, FOUR TO FIVE INTERBODY FUSION Dec 11, 2023 for spondylolisthesis     Subjective     Tolerates initial therapies.  No drainage from her incisions.  Comfortable with her breathing.  Still with proximal weakness in the lower extremities.    Objective     Vitals:    12/15/23 1321   BP: 129/64   Pulse: 87   Resp: 18   Temp: 97.5 °F (36.4 °C)   SpO2: 96%       Intake/Output Summary (Last 24 hours) at 12/15/2023 1620  Last data filed at 12/15/2023 1210  Gross per 24 hour   Intake 580 ml   Output --   Net 580 ml     PHYSICAL EXAM:   MENTAL STATUS -  AWAKE / ALERT  HEENT-  SCLERAE ANICTERIC, CONJUNCTIVAE PINK, OP MOIST, NO JVD,    LUNGS - NORMAL RESPIRATIONS.  Clear to auscultation without wheezes rales or rhonchi  HEART- RRR, no rub murmur or gallop  ABD -  SOFT, NONDISTENDED, NABS soft nontender  Left flank incision s and left lumbar incision-dressed EXT - NO EDEMA OR CYANOSIS  NEURO - AWAKE, ALERT  MOTOR EXAM -she has pain inhibition proximally in the bilateral lower extremities .She takes resistance with bilateral knee extension, ankle dorsiflexion , and ankle plantarflexion  Good movement with inversion eversion.      MEDICATIONS  Scheduled Meds:acetaminophen, 650 mg, Oral, Q6H  amLODIPine, 5 mg, Oral, Daily  empagliflozin, 10 mg, Oral, Daily  enoxaparin, 40 mg, Subcutaneous, Nightly  FLUoxetine, 20 mg, Oral, Daily  hydroCHLOROthiazide, 25 mg, Oral, Daily  levothyroxine, 50 mcg, Oral, Daily  lisinopril, 10 mg, Oral, Daily  metFORMIN ER, 500 mg, Oral, BID  rosuvastatin, 10 mg, Oral, Nightly  senna-docusate sodium, 1 tablet, Oral, BID      Continuous Infusions:   PRN Meds:.  benzonatate    magnesium hydroxide    methocarbamol    [DISCONTINUED] HYDROmorphone **AND** naloxone    ondansetron **OR** ondansetron    oxyCODONE      RESULTS  No results  "found for: \"POCGLU\"  Results from last 7 days   Lab Units 12/15/23  0753 12/12/23  0226 12/11/23  1338   WBC 10*3/mm3 7.02 9.80 13.75*   HEMOGLOBIN g/dL 13.0 11.1* 11.2*   HEMATOCRIT % 39.5 33.6* 34.5   PLATELETS 10*3/mm3 264 217 237     Results from last 7 days   Lab Units 12/13/23  0441 12/12/23  1619 12/12/23  0226 12/11/23  1754   SODIUM mmol/L 141  --  142 137   POTASSIUM mmol/L 4.6 3.5 3.4*  3.4* 3.3*   CHLORIDE mmol/L 108*  --  108* 104   CO2 mmol/L 24.3  --  24.8 23.0   BUN mg/dL 9  --  8 11   CREATININE mg/dL 0.62  --  0.54* 0.58   CALCIUM mg/dL 8.6  --  8.3* 8.1*   BILIRUBIN mg/dL  --   --  0.2  --    ALK PHOS U/L  --   --  39  --    ALT (SGPT) U/L  --   --  17  --    AST (SGOT) U/L  --   --  25  --    GLUCOSE mg/dL 115*  --  116* 128*           ASSESSMENT and PLAN    S/P lumbar fusion     LUMBAR TWO TO THREE, FOUR TO FIVE INTERBODY FUSION Dec 11, 2023 for spondylolisthesis    Impaired mobility  Impaired self care    DVT prophylaxis - SCDs/ Lovenox    Pain management - scheduled Tylenol/ methocarbamol/ oxycodone - MAGED report reviewed    HTN - amlodipine/ hydrochlorothiazide / lisinopril/    DM - empagliflozine-Jardiance, Metformin XR. Was also on semaglutide at home for weight loss but no longer continuing    Depression - fluoxetine    Hypothyroid - on replacement      Functional status-December 15-bed mobility standby assist.  Gait 160 feet contact-guard rolling walker.  Did 12 stairs contact-guard bilateral railings.  Transfers contact-guard.    Goal is for home with  outpatient   therapies.  Barrier to discharge:  impaired mobility and self care  - work on  ADLS with adaptive equipment and proper back mechanics, functional mobility, transfer training and progressive ambulation  to overcome.        Cedrick Roberts MD      Appropriate PPE was worn during the entire visit.  Hand hygiene was completed before and after.      "

## 2023-12-15 NOTE — PROGRESS NOTES
Inpatient Rehabilitation Plan of Care Note    Plan of Care  Care Plan Reviewed - Updates as Follows    Safety    [RN] Potential for Injury(Active)  Current Status(12/15/2023): Patient is at increased risk for falls/injury r/t  recent surgery and hospitalization.  Weekly Goal(12/22/2023): Patient will call for assistance OOB as needed.  Discharge Goal: Patient will remain free from falls/injury.        Psychosocial    [RN] Coping/Adjustment(Active)  Current Status(12/15/2023): Patient is at risk for impaired coping r/t recent  surgery and hospitalization.  Weekly Goal(12/22/2023): Patient will verbalize needs, concerns, feelings to  staff as needed.  Discharge Goal: Patient will verbalize adequate coping strategies that she can  use at home.        Sphincter Control    [RN] Bowel and bladder (Active)  Current Status(12/15/2023): Patient is reportedly continent.  Weekly Goal(12/22/2023): Patient will remain continent. Pt will have BM q 3 days  or within normal pattern.  Discharge Goal: Pt will remain continent.    Signed by: Yumi Larson RN

## 2023-12-15 NOTE — PLAN OF CARE
Goal Outcome Evaluation:              Outcome Evaluation: Pt is alert and oriented, assist x1 w/ walker, at room air. Meds taken with water. Continent of B/B - last BM 12/15. Pt complaints of pain -Tylenol given at scheduled time and requested to have Oxycodone at night. Pt has 3 lateral incisions -dressing changed today. Safety precautions maintained.

## 2023-12-15 NOTE — PROGRESS NOTES
Inpatient Rehabilitation Plan of Care Note    Plan of Care  Care Plan Reviewed - No updates at this time.    Safety    Performed Intervention(s)  Fall precautions  Hourly rounding      Psychosocial    Performed Intervention(s)  Allow extra time  for pt to verbalize feelings, needs, concerns.  Provide distraction and/or relaxation as needed.      Sphincter Control    Performed Intervention(s)  Monitor I/O.  Bowel meds prn.      Body Systems    Performed Intervention(s)  Skin assessment q shift and prn.  Wound care, as ordered.    Signed by: Dhara Ackerman RN

## 2023-12-15 NOTE — CONSULTS
Nutrition Services    Patient Name:  Summer Jefferson  YOB: 1955  MRN: [id number]  Admit Date:  12/14/2023    Assessment Date:  12/15/23    Summary: Rehab Admission Assessment    Pt is a 68 year old female admitted to acute rehab following hospitalization for lumbar surgery on 12/11/23. Pt's current diet is Regular. Appetite/intake is fair (50%). Labs and skin status reviewed, lumbar spine incision CDI. Pt not available at time of rounding. Left Care Cuisine menu at bedside with RD contact info.    Plan/Recommend:  Encourage PO intake.  Monitor intake, labs, weight and skin status.   RD to follow while on rehab.    CLINICAL NUTRITION ASSESSMENT      Reason for Assessment Nurse Admission Screen     Admitting Diagnosis   S/P lumbar fusion       Medical/Surgical History Past Medical History:   Diagnosis Date    Anxiety and depression 03/15/2022    Backache     Diabetes mellitus type 2, controlled     HSV (herpes simplex virus) infection 03/15/2022    Hypercholesterolemia     Hypertension     Hypothyroidism     Osteoarthritis of multiple joints     Spinal stenosis     LUMBAR       Past Surgical History:   Procedure Laterality Date    ANKLE ARTHROSCOPY Right     1/2013 & 10/2013    COLONOSCOPY      7/27/2007    EPIDURAL BLOCK      X5    LAPAROSCOPIC GASTRIC BANDING  08/01/2007    LUMBAR FUSION Left 12/11/2023    Procedure: PRONE OBLIQUE LATERAL LUMBAR TWO TO THREE, FOUR TO FIVE INTERBODY FUSION WITH ANTERIOR PLATING AND NEUROMONITORING;  Surgeon: Calderon Whittaker MD;  Location: [facility];  Service: Neurosurgery;  Laterality: Left;    LUMBAR FUSION N/A 12/11/2023    Procedure: LUMBAR FUSION MINIMALLY INVASIVE NAVIGATION;  Surgeon: Calderon Whittaker MD;  Location: [facility];  Service: Neurosurgery;  Laterality: N/A;    ROTATOR CUFF REPAIR Right     5/2014    TOTAL KNEE ARTHROPLASTY Right     12/2014        Current Nutrition Orders & Evaluation of Intake       Oral Nutrition      Food Allergies NKFA  "   Current PO Diet Diet: Regular/House Diet; Texture: Regular Texture (IDDSI 7); Fluid Consistency: Thin (IDDSI 0)    Supplement    PO Evaluation      PO Intake % 50     Factors Affecting Intake No factors at this time   --  Anthropometrics        Current Height  Current Weight (CBW)  BMI kg/m2 Height: 162.6 cm (64\")  Weight: 72.7 kg (160 lb 4.4 oz) (12/14/23 1538)  Body mass index is 27.51 kg/m².   BMI Category Overweight (25 - 29.9)   Ideal Weight (IBW) 120#   Usual Weight (UBW) 180#s   Weight Trend Loss   Weight History Wt Readings from Last 15 Encounters:   12/14/23 1538 72.7 kg (160 lb 4.4 oz)   12/11/23 1641 73.9 kg (163 lb)   12/04/23 1258 73.5 kg (162 lb)   12/04/23 0922 73.7 kg (162 lb 6.4 oz)   11/14/23 0949 75 kg (165 lb 5.5 oz)   10/19/23 0920 75.8 kg (167 lb)   10/17/23 1054 76.7 kg (169 lb)   09/15/23 1103 81.2 kg (179 lb)   08/25/23 1111 85.3 kg (188 lb)   08/16/23 1523 85.3 kg (188 lb)   08/09/23 1102 83.9 kg (185 lb)   07/24/23 0856 84 kg (185 lb 1.6 oz)   07/14/23 0857 83 kg (183 lb)   07/07/23 0835 81.6 kg (180 lb)   03/27/23 1602 85.3 kg (188 lb)      --  Physical Findings          General Appearance alert, not available at time of RD rounding   Oral/Mouth Cavity WDL   Edema  no edema   Gastrointestinal last bowel movement: 12/15   Skin  surgical incision: lumbar spine   Tubes/Drains/Lines none   NFPE Unable to perform due to: pt in therapy at time of rounding       Medications & Labs           Scheduled Medications acetaminophen, 650 mg, Oral, Q6H  amLODIPine, 5 mg, Oral, Daily  empagliflozin, 10 mg, Oral, Daily  enoxaparin, 40 mg, Subcutaneous, Nightly  FLUoxetine, 20 mg, Oral, Daily  hydroCHLOROthiazide, 25 mg, Oral, Daily  levothyroxine, 50 mcg, Oral, Daily  lisinopril, 10 mg, Oral, Daily  metFORMIN ER, 500 mg, Oral, BID  rosuvastatin, 10 mg, Oral, Nightly  senna-docusate sodium, 1 tablet, Oral, BID       Infusions     PRN Medications   benzonatate    magnesium hydroxide    methocarbamol    " [DISCONTINUED] HYDROmorphone **AND** naloxone    ondansetron **OR** ondansetron    oxyCODONE            Pertinent Labs   Results from last 7 days   Lab Units 12/13/23  0441 12/12/23  1619 12/12/23 0226 12/11/23  1754   SODIUM mmol/L 141  --  142 137   POTASSIUM mmol/L 4.6 3.5 3.4*  3.4* 3.3*   CHLORIDE mmol/L 108*  --  108* 104   CO2 mmol/L 24.3  --  24.8 23.0   BUN mg/dL 9  --  8 11   CREATININE mg/dL 0.62  --  0.54* 0.58   CALCIUM mg/dL 8.6  --  8.3* 8.1*   BILIRUBIN mg/dL  --   --  0.2  --    ALK PHOS U/L  --   --  39  --    ALT (SGPT) U/L  --   --  17  --    AST (SGOT) U/L  --   --  25  --    GLUCOSE mg/dL 115*  --  116* 128*     Results from last 7 days   Lab Units 12/15/23  0753 12/12/23 0226 12/11/23  1338   MAGNESIUM mg/dL  --   --  2.6*   HEMOGLOBIN g/dL 13.0 11.1* 11.2*   HEMATOCRIT % 39.5 33.6* 34.5   WBC 10*3/mm3 7.02 9.80 13.75*   ALBUMIN g/dL  --  3.7  --      Results from last 7 days   Lab Units 12/15/23  0753 12/12/23 0226 12/11/23  1338   PLATELETS 10*3/mm3 264 217 237     Lab Results   Component Value Date    HGBA1C 6.00 (H) 11/08/2023        Nutrition Diagnosis        Nutrition Dx Problem  Problem: Nutrition Appropriate for Condition at this Time  Etiology: MNT for Treatment/Condition   Signs/Symptoms: Report/Observation       NUTRITION INTERVENTION / PLAN OF CARE  Goals        Intervention Goal(s) Maintain nutrition status     Nutrition Intervention        RD Action Menu provided, Encourage intake, and Continue to monitor     Prescription        Diet Prescription    Supplement Prescription    Snack Prescription    EN Prescription    New Prescription Ordered? No changes at this time     Monitor/Evaluation        Monitor Per protocol   Discharge Needs Pending clinical course     RD to follow up per protocol.     Electronically signed by:  Yoana Ashby RD  12/15/23 14:18 EST

## 2023-12-15 NOTE — THERAPY EVALUATION
Inpatient Rehabilitation - Physical Therapy Initial Evaluation       [facility]     Patient Name: Summer Jefferson  : 1955  MRN: [id number]    Today's Date: 12/15/2023                    Admit Date: 2023      Visit Dx:   No diagnosis found.    Patient Active Problem List   Diagnosis    Diabetes mellitus type 2, controlled    Hypothyroidism, adult    Hypertension    Hypercholesterolemia    HSV (herpes simplex virus) infection    Anxiety and depression    Chronic bilateral low back pain with left-sided sciatica    Facet arthropathy, lumbar    Spondylolisthesis, lumbar region    Lumbar radiculopathy    SI (sacroiliac) joint inflammation    Spondylolisthesis of lumbar region    Overweight (BMI 25.0-29.9)    S/P lumbar fusion       Past Medical History:   Diagnosis Date    Anxiety and depression 03/15/2022    Backache     Diabetes mellitus type 2, controlled     HSV (herpes simplex virus) infection 03/15/2022    Hypercholesterolemia     Hypertension     Hypothyroidism     Osteoarthritis of multiple joints     Spinal stenosis     LUMBAR       Past Surgical History:   Procedure Laterality Date    ANKLE ARTHROSCOPY Right     2013 & 10/2013    COLONOSCOPY      2007    EPIDURAL BLOCK      X5    LAPAROSCOPIC GASTRIC BANDING  2007    LUMBAR FUSION Left 2023    Procedure: PRONE OBLIQUE LATERAL LUMBAR TWO TO THREE, FOUR TO FIVE INTERBODY FUSION WITH ANTERIOR PLATING AND NEUROMONITORING;  Surgeon: Calderon Whittaker MD;  Location: [facility] MAIN OR;  Service: Neurosurgery;  Laterality: Left;    LUMBAR FUSION N/A 2023    Procedure: LUMBAR FUSION MINIMALLY INVASIVE NAVIGATION;  Surgeon: Calderon Whittaker MD;  Location: [facility] OR;  Service: Neurosurgery;  Laterality: N/A;    ROTATOR CUFF REPAIR Right     2014    TOTAL KNEE ARTHROPLASTY Right     2014       PT ASSESSMENT (last 12 hours)       IRF PT Evaluation and Treatment       Row Name 12/15/23 0937          PT Time and Intention     Document Type initial evaluation  -LW     Mode of Treatment physical therapy  -LW     Patient/Family/Caregiver Comments/Observations pt supine in bed, agreeable to working with PT  -LW     Total Minutes, Physical Therapy 90  -       Row Name 12/15/23 0937          General Information    Patient Profile Reviewed yes  -LW     Existing Precautions/Restrictions spinal;fall  -       Row Name 12/15/23 0937          Previous Level of Function/Home Environm    Bed Mobility, Premorbid Functional Level independent  -LW     Transfers, Premorbid Functional Level independent  -LW     Household Ambulation, Premorbid Functional Level independent  -LW     Stairs, Premorbid Functional Level independent  -LW     Community Ambulation, Premorbid Functional Level independent  -LW     Previous Level of Function, Premorbid Independent  -LW       Row Name 12/15/23 0937          Living Environment    Current Living Arrangements home  -LW     Home Accessibility stairs to enter home  -LW     People in Home spouse  -LW     Primary Care Provided by self  -       Row Name 12/15/23 0937          Home Main Entrance    Number of Stairs, Main Entrance four  -LW     Stair Railings, Main Entrance railings on both sides of stairs  -       Row Name 12/15/23 0937          Stairs Within Home, Primary    Number of Stairs, Within Home, Primary none  -       Row Name 12/15/23 0937          Home Use of Assistive/Adaptive Equipment    Equipment Currently Used at Home none  -       Row Name 12/15/23 0937          Pain Assessment    Pretreatment Pain Rating 5/10  -LW     Posttreatment Pain Rating 5/10  -LW     Pain Location generalized  -     Pain Location - back  -       Row Name 12/15/23 0937          Pain Scale: Word Pre/Post-Treatment    Pain Intervention(s) Repositioned;Ambulation/increased activity;Rest  -       Row Name 12/15/23 0937          Cognition/Psychosocial    Affect/Mental Status (Cognition) WFL  -     Orientation Status  (Cognition) oriented x 3  -LW     Follows Commands (Cognition) WFL  -     Personal Safety Interventions fall prevention program maintained;gait belt;muscle strengthening facilitated;nonskid shoes/slippers when out of bed  -LW     Cognitive Function WFL  -LW       Row Name 12/15/23 0937          Range of Motion (ROM)    Range of Motion ROM is WFL  -       Row Name 12/15/23 0937          Strength Comprehensive (MMT)    General Manual Muscle Testing (MMT) Assessment lower extremity strength deficits identified  -       Row Name 12/15/23 0937          Lower Extremity (Manual Muscle Testing)    Lower Extremity: Manual Muscle Testing (MMT) left hip strength deficit;right hip strength deficit  -LW     Comment, MMT: Lower Extremity generalized LEs 4+/5, except for hip flexion  -LW       Row Name 12/15/23 09          Left Hip (Manual Muscle Testing)    Left Hip Manual Muscle Testing (MMT) flexion  -LW     MMT: Flexion, Left Hip flexion  -LW     MMT, Gross Movement: Left Hip Flexion (3-/5) fair minus  -       Row Name 12/15/23 09          Right Hip (Manual Muscle Testing)    Right Hip Manual Muscle Testing (MMT) flexion  -LW     MMT: Flexion, Right Hip flexion  -LW     MMT, Gross Movement: Right Hip Flexion (3-/5) fair minus  -LW       Row Name 12/15/23 0937          Sensory Assessment (Somatosensory)    Sensory Assessment (Somatosensory) LE sensation intact  -       Row Name 12/15/23 09          Bed Mobility    Bed Mobility rolling left;rolling right;supine-sit;sit-supine  -LW     Rolling Left [place] (Bed Mobility) standby assist  -LW     Rolling Right [place] (Bed Mobility) standby assist  -LW     Supine-Sit [place] (Bed Mobility) standby assist  -LW     Sit-Supine [place] (Bed Mobility) minimum assist (75% patient effort)  to assist LEs  -LW     Bed Mobility, Safety Issues decreased use of arms for pushing/pulling;decreased use of legs for bridging/pushing  -LW     Comment, (Bed  Mobility) Log roll  -LW       Row Name 12/15/23 0937          Transfer Assessment/Treatment    Transfers sit-stand transfer;stand-sit transfer;chair-bed transfer;car transfer  -LW       Row Name 12/15/23 0937          Chair-Bed Transfer    Chair-Bed [place] (Transfers) contact guard  -LW     Assistive Device (Chair-Bed Transfers) walker, front-wheeled  -LW     Comment, (Chair-Bed Transfer) stand pivot  -LW       Row Name 12/15/23 0937          Sit-Stand Transfer    Sit-Stand [place] (Transfers) standby assist;contact guard  -LW     Assistive Device (Sit-Stand Transfers) walker, front-wheeled  -LW       Row Name 12/15/23 0937          Stand-Sit Transfer    Stand-Sit [place] (Transfers) contact guard;standby assist  -LW     Assistive Device (Stand-Sit Transfers) walker, front-wheeled  -LW       Row Name 12/15/23 0937          Car Transfer    Type (Car Transfer) stand pivot/stand step  -LW     [place] Level (Car Transfer) contact guard  -LW     Assistive Device (Car Transfer) walker, front-wheeled  -LW       Row Name 12/15/23 0937          Gait/Stairs (Locomotion)    [place] Level (Gait) contact guard  -LW     Assistive Device (Gait) walker, front-wheeled  -LW     Distance in Feet (Gait) 160, 80, 50x2  -LW     Pattern (Gait) step-through  -LW     Deviations/Abnormal Patterns (Gait) gait speed decreased;weight shifting decreased  -LW     [place] Level (Stairs) contact guard  -LW     Handrail Location (Stairs) both sides  -LW     Number of Steps (Stairs) 4, 12  -LW     Ascending Technique (Stairs) step-to-step  -LW     Descending Technique (Stairs) step-over-step;step-to-step  -LW     Stairs, Impairments pain;strength decreased  -LW     Negotiation (Ramp) ramp independence;ramp assistive device  -LW     [place] Level (Ramp) contact guard  -LW     Assistive Device (Ramp) walker, front-wheeled  -LW     Comment, (Gait/Stairs) amb 10' over uneven surfaces, CGA, rwx  -LW       Row Name  12/15/23 0937          Safety Issues, Functional Mobility    Impairments Affecting Function (Mobility) pain;endurance/activity tolerance  -       Row Name 12/15/23 0937          Balance    Comment, Balance Picked up object from ground while standing & using reacher, CGA  -       Row Name 12/15/23 0937          Motor Skills    Therapeutic Exercise hip;knee;ankle  -       Row Name 12/15/23 09          Hip (Therapeutic Exercise)    Hip (Therapeutic Exercise) strengthening exercise  -     Hip Strengthening (Therapeutic Exercise) bilateral;aBduction;aDduction;marching while seated;10 repetitions;red;resistance band  -       Row Name 12/15/23 0937          Knee (Therapeutic Exercise)    Knee (Therapeutic Exercise) strengthening exercise  -LW     Knee Strengthening (Therapeutic Exercise) bilateral;LAQ (long arc quad);10 repetitions;red;resistance band  -       Row Name 12/15/23 09          Ankle (Therapeutic Exercise)    Ankle (Therapeutic Exercise) strengthening exercise  -     Ankle Strengthening (Therapeutic Exercise) bilateral;dorsiflexion;plantarflexion  -       Row Name 12/15/23 0937          Positioning and Restraints    Pre-Treatment Position in bed  -LW     Post Treatment Position bed  -LW     In Bed supine;call light within reach;encouraged to call for assist;exit alarm on  -       Row Name 12/15/23 0937          Therapy Assessment/Plan (PT)    Functional Level at Time of Evaluation (PT) CGA/[place]  -LW     PT Diagnosis (PT) impaired mobility  -LW     Rehab Potential/Prognosis (PT) good, to achieve stated therapy goals  -LW     Frequency of Treatment (PT) 5 times per week;90 minutes per session  -LW     Estimated Duration of Therapy (PT) 1 week  -LW     Problem List (PT) strength;pain;mobility  -LW     Comment, Therapy Assessment/Plan (PT) Pt is a 69 y/o female s/p oblique-lateral L2-3, L4-5 interbody fusion with anterior plating. Prior to admission she was indep with all mobility and lives  with hwe  in a 1 story home with 4 SHANTELLE. Today she presented with mobility below baseline, decreased tolerance to activity, and generalized LE weakness. Patient will benefit from continued skilled PT addressing limitations in functional mobility to maximize safety and independence. Current plan is to d/c home with HHPT.  -LW       Row Name 12/15/23 0937          IRF PT Goals    Bed Mobility Goal Selection (PT-IRF) bed mobility, PT goal 1  -LW     Transfer Goal Selection (PT-IRF) transfers, PT goal 1  -LW     Gait (Walking Locomotion) Goal Selection (PT-IRF) gait, PT goal 1  -LW     Stairs Goal Selection (PT-IRF) stairs, PT goal 1  -LW       Row Name 12/15/23 0937          Bed Mobility Goal 1 (PT-IRF)    Activity/Assistive Device (Bed Mobility Goal 1, PT-IRF) bed mobility activities, all  -LW     [place] Level (Bed Mobility Goal 1, PT-IRF) independent  -LW     Time Frame (Bed Mobility Goal 1, PT-IRF) 1 week  -LW       Row Name 12/15/23 0937          Transfer Goal 1 (PT-IRF)    Activity/Assistive Device (Transfer Goal 1, PT-IRF) sit-to-stand/stand-to-sit;bed-to-chair/chair-to-bed;car transfer  -LW     [place] Level (Transfer Goal 1, PT-IRF) modified independence  -LW     Time Frame (Transfer Goal 1, PT-IRF) 1 week  -LW       Row Name 12/15/23 0937          Gait/Walking Locomotion Goal 1 (PT-IRF)    Activity/Assistive Device (Gait/Walking Locomotion Goal 1, PT-IRF) gait (walking locomotion);walker, rolling  -LW     Gait/Walking Locomotion Distance Goal 1 (PT-IRF) 200  -LW     [place] Level (Gait/Walking Locomotion Goal 1, PT-IRF) modified independence  -LW     Time Frame (Gait/Walking Locomotion Goal 1, PT-IRF) 1 week  -LW       Row Name 12/15/23 0937          Stairs Goal 1 (PT-IRF)    Activity/Assistive Device (Stairs Goal 1, PT-IRF) ascending stairs;descending stairs  -LW     Number of Stairs (Stairs Goal 1, PT-IRF) 4  -LW     [place] Level (Stairs Goal 1, PT-IRF) independent  -LW     Time  Frame (Stairs Goal 1, PT-IRF) 1 week  -               User Key  (r) = Recorded By, (t) = Taken By, (c) = Cosigned By      Initials Name Provider Type    LW Layla Villeda, NADIA Physical Therapist                  Wound 12/11/23 0847 Left lateral lumbar spine Incision (Active)   Dressing Appearance dry;intact;no drainage 12/15/23 0917   Closure Glue 12/15/23 0917   Base clean;dry;pink 12/15/23 0917   Periwound blanchable;dry 12/15/23 0917   [place]iwound Temperature warm 12/15/23 0917   [place] Skin Turgor soft 12/15/23 0917   Drainage Amount none 12/15/23 0917   Dressing Care dressing changed 12/15/23 0917   Periwound Care dry periwound area maintained 12/15/23 0917     Physical Therapy Education       Title: PT OT SLP Therapies (In Progress)       Topic: Physical Therapy (In Progress)       Point: Mobility training (Done)       Learning Progress Summary             Patient Acceptance, E,D, VU by  at 12/15/2023 1551                         Point: Home exercise program (Not Started)       Learner Progress:  Not documented in this visit.              Point: Body mechanics (Done)       Learning Progress Summary             Patient Acceptance, E,D, VU by LW at 12/15/2023 1551                         Point: Precautions (Done)       Learning Progress Summary             Patient Acceptance, E,D, VU by  at 12/15/2023 1551                                         User Key       Initials Effective Dates Name Provider Type Discipline     05/08/23 -  Layla Villeda, NADIA Physical Therapist PT                    PT Recommendation and Plan    Frequency of Treatment (PT): 5 times per week, 90 minutes per session                     Time Calculation:      PT Charges       Row Name 12/15/23 1551 12/15/23 1550          Time Calculation    Start Time 1330  -LW 0930  -LW     Stop Time 1430  -LW 1000  -LW     Time Calculation (min) 60 min  -LW 30 min  -LW     PT Received On 12/15/23  -LW 12/15/23  -     PT - Next Appointment 12/16/23   -LW 12/16/23  -LW     PT Goal Re-Cert Due Date 12/22/23  -LW 12/22/23  -LW        Time Calculation- PT    Total Timed Code Minutes- PT 60 minute(s)  -LW 30 minute(s)  -LW               User Key  (r) = Recorded By, (t) = Taken By, (c) = Cosigned By      Initials Name Provider Type    Layla Thayer, PT Physical Therapist                    Therapy Charges for Today       Code Description Service Date Service Provider Modifiers Qty    41111744557  PT THER PROC EA 15 MIN 12/15/2023 Layla Villeda, PT GP 2    83325955729 HC PT THERAPEUTIC ACT EA 15 MIN 12/15/2023 Layla Villeda, PT GP 2    [id number]  PT EVAL MOD COMPLEXITY 3 12/15/2023 Layla Villeda, PT GP 1                     Layla Villeda, PT  12/15/2023

## 2023-12-15 NOTE — PROGRESS NOTES
Inpatient Rehabilitation Plan of Care Note    Plan of Care  Updated Problems/Interventions  Mobility    [PT] Bed Mobility(Active)  Current Status(12/15/2023): SBA  Weekly Goal(12/22/2023): Indep  Discharge Goal: Indep    [PT] Walk(Active)  Current Status(12/15/2023): 160' rwx, CGA  Weekly Goal(12/22/2023): 200' rwx, SBA  Discharge Goal: 200' rwx, Aga    [PT] Stairs(Active)  Current Status(12/15/2023): CGA Bilat handrail, 12 steps  Weekly Goal(12/22/2023): SBA bilat handrail, 4 steps  Discharge Goal: Indep bilat handrail, 4 steps    [PT] Bed/Chair/Wheelchair(Active)  Current Status(12/15/2023): CGA stand pivot w rwx  Weekly Goal(12/22/2023): SBA stand pivot w rwx  Discharge Goal: Aga stand pivot w rwx    Signed by: Layla Villeda, Therapist

## 2023-12-15 NOTE — PROGRESS NOTES
"Section B. Hearing, Speech, Vision: Expression of Ideas and Wants: Expresses  complex messages without difficulty and with speech that is clear and easy to  understand.  Understanding Verbal and Non-Verbal Content: Understands: Clear comprehension  without cues or repetitions.    Section C. Cognitive Patterns: Brief Interview for Mental Status (BIMS) was  conducted.  Repetition of Three Words: Three words  Able to report correct year: Correct  Able to report correct month: Accurate within 5 days  Able to report correct day of the week: Correct  Able to recall \"sock\": Yes, no cue required  Able to recall \"blue\": Yes, no cue required  Able to recall \"bed\": Yes, no cue required    BIMS SUMMARY SCORE: 15 Cognitively intact Patient was able to complete the Brief  Interview for Mental Status    Section C. Signs and Symptoms of Delirium (from CAM): Evidence of Acute Change  in Mental Status:   No  Inattention: Behavior not present  Thinking Disorganized or Incoherent:   Behavior not present  Altered Level of Consciousness:   Behavior not present    Signed by: Remberto Underwood OT    "

## 2023-12-15 NOTE — PROGRESS NOTES
SECTION GG    Mobility Performance:     Roll Left and Right: [place] provides verbal cues and/or touching/steadying  and/or contact guard assistance as patient completes activity. Assistance may be  provided throughout the activity or intermittently.   Sit to Lying: [place] does less than half the effort. [place] lifts, holds or  supports trunk or limbs but provides less than half the effort.   Lying to Sitting on Side of Bed: [place] provides verbal cues and/or  touching/steadying and/or contact guard assistance as patient completes  activity. Assistance may be provided throughout the activity or intermittently.   Sit to Stand: [place] provides verbal cues and/or touching/steadying and/or  contact guard assistance as patient completes activity. Assistance may be  provided throughout the activity or intermittently.   Chair/Bed to Chair Transfer: [place] provides verbal cues and/or  touching/steadying and/or contact guard assistance as patient completes  activity. Assistance may be provided throughout the activity or intermittently.   Car Transfer: [place] does less than half the effort. [place] lifts, holds or  supports trunk or limbs but provides less than half the effort.   Walk 10 Feet:   [place] provides verbal cues and/or touching/steadying and/or  contact guard assistance as patient completes activity. Assistance may be  provided throughout the activity or intermittently.  Walk 50 Feet with 2 Turns:   [place] provides verbal cues and/or  touching/steadying and/or contact guard assistance as patient completes  activity. Assistance may be provided throughout the activity or intermittently.  Walk 150 Feet:   [place] provides verbal cues and/or touching/steadying and/or  contact guard assistance as patient completes activity. Assistance may be  provided throughout the activity or intermittently.  Walking 10 Feet on Uneven Surfaces:   [place] provides verbal cues and/or  touching/steadying and/or contact guard assistance as  patient completes  activity. Assistance may be provided throughout the activity or intermittently.  1 Step Over Curb or Up/Down Stair:   [place] does less than half the effort.  [place] lifts, holds or supports trunk or limbs but provides less than half the  effort.  4 Steps Up and Down, With/Without Rail:   [place] provides verbal cues and/or  touching/steadying and/or contact guard assistance as patient completes  activity. Assistance may be provided throughout the activity or intermittently.  12 Steps Up and Down, With/Without Rail:   [place] provides verbal cues and/or  touching/steadying and/or contact guard assistance as patient completes  activity. Assistance may be provided throughout the activity or intermittently.  Picking up an Object:   [place] provides verbal cues and/or touching/steadying  and/or contact guard assistance as patient completes activity. Assistance may be  provided throughout the activity or intermittently.  Uses Wheelchair/Scooter: Yes  Wheel 50 Feet with Two Turns: [place] does all of the effort. Patient does none  of the effort to complete the activity. Or, the assistance of 2 or more helpers  is required for the patient to complete the activity.  Type of Wheelchair or Scooter Used: Manual  Wheel 150 Feet: [place] does all of the effort. Patient does none of the effort  to complete the activity. Or, the assistance of 2 or more helpers is required  for the patient to complete the activity.  Type of Wheelchair/Scooter Used: Manual    Mobility Discharge Goals:   Sit to Stand: Patient completed the activities by themself with no assistance  from a helper.    Section J. Health Conditions (Pain):  Pain Interference with Therapy Activities:   Rarely or not at all  Pain Interference with Day-to-Day Activities:   Rarely or not at all    Signed by: Layla Villeda, Therapist

## 2023-12-15 NOTE — PROGRESS NOTES
Discharge Planning Assessment  [facility]     Patient Name: Summer Jefferson  MRN: [id number]  Today's Date: 12/15/2023    Admit Date: 12/14/2023    Plan: Patient plans to return home with her . He can provide assistance when needed.   Discharge Needs Assessment    No documentation.                  Discharge Plan       Row Name 12/15/23 1131       Plan    Plan Patient plans to return home with her . He can provide assistance when needed.    Patient/Family in Agreement with Plan yes    Plan Comments Completed assessment with patient. Patient lives at home with her  of 38 years. They have a single story home with 4 steps to enter and a basement. They do not use the basement. 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 Patient's  is still working and does travel a few days per week. Patient anticipates not needing much assistance at home but her  and 3 adult children, that live close-by, can assist if needed. Patient was independent before her back surgery but had been in a lot of pain over the last few years. She has had 5 epidurals in the last two years. Patient has several pieces of assistive equipment at home; walkers, shower chair, BSC, etc. She had collected all her equipment from the nursing homes she owned before retiring. Patient denies any depression or any financial issues at this time. Patient has used [facility] with her other knee surgeries and would go back to [facility] for PT, if needed after d/c. Patient would like to be d/c on Tuesday and she feels she will be physically ready by then. SW explained her role in the unit and team/family conference. SW will follow up with patient on Tuesday after team conference.                  Continued Care and Services - Admitted Since 12/14/2023    Coordination has not been started for this encounter.          Demographic Summary    No documentation.                  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[facility] [place]                   Psychosocial       Row Name 12/15/23 1124       Values/Beliefs    Cultural/Religion Practices Patient Routinely Participates In ceremony;prayer       Behavior WDL    Behavior WDL WDL    Motor Movement gait unsteady       Emotion Mood WDL    Emotion/Mood/Affect WDL WDL       Mental Health    Little Interest or Pleasure in Doing Things 0-->not at all    Feeling Down, Depressed or Hopeless 0-->not at all    Do you feel stress - tense, restless, nervous, or anxious, or unable to sleep at night because your mind is troubled all the time - these days? Not at all       Speech WDL    Speech WDL WDL       Perceptual State WDL    Perceptual State WDL WDL       Thought Process WDL    Thought Process WDL judgment and insight;thought content    Judgment and Insight insight appropriate to situation;judgment appropriate to situation    Thought Content logical       Intellectual Performance WDL    Intellectual Performance WDL WDL    Level of Consciousness Alert       Coping/Stress    Major Change/Loss/Stressor loss of independence    Patient Personal Strengths motivated;positive attitude;strong support system    Sources of Support adult child(christina);spouse    Reaction to Health Status accepting;adjusting    Understanding of Condition and Treatment adequate understanding of medical condition;adequate  understanding of treatment       Developmental Stage (Bg's)    Developmental Stage Stage 8 (64 years-death/Late Adulthood) Integrity vs. Despair       C-SSRS (Recent)    Q1 Wished to be Dead (Past Month) no    Q2 Suicidal Thoughts (Past Month) no    Q6 Suicide Behavior (Lifetime) no       Violence Risk    Feels Like Hurting Others no    Previous Attempt to Harm Others no                   Abuse/Neglect       Row Name 12/15/23 1127       Personal Safety    Feels Unsafe at Home or Work/School no    Feels Threatened by Someone no    Does Anyone Try to Keep You From Having Contact with Others or Doing Things Outside Your Home? no    Physical Signs of Abuse Present no                   Legal    No documentation.                  Substance Abuse       Row Name 12/15/23 1127       AUDIT-C (Alcohol Use Disorders ID Test)    Q1: How often do you have a drink containing alcohol? Monthly or l    Q2: How many drinks containing alcohol do you have on a typical day when you are drinking? 1 or 2    Q3: How often do you have six or more drinks on one occasion? Never    Audit-C Score 1                   Patient Forms    No documentation.                     BHAKTI Mccarthy

## 2023-12-15 NOTE — PROGRESS NOTES
SECTION GG    Self Care Performance:   Oral Hygiene: [place] sets up or cleans up; patient completes activity. [place]  assists only prior to or following the activity.   Toileting Hygiene: [place] provides verbal cues and/or touching/steadying and/or  contact guard assistance as patient completes activity.   Shower/Bathe Self: [place] provides verbal cues and/or touching/steadying and/or  contact guard assistance as patient completes activity.   Upper Body Dressing: [place] sets up or cleans up; patient completes activity.  [place] assists only prior to or following the activity.   Lower Body Dressing: [place] provides verbal cues and/or touching/steadying  and/or contact guard assistance as patient completes activity.   Putting On/Taking Off Footwear: [place] does less than half the effort. [place]  lifts, holds or supports trunk or limbs but provides less than half the effort.    Self Care Discharge Goals:   Oral Hygiene: Patient completed the activities by themself with no assistance  from a helper.   Toileting Hygiene: Patient completed the activities by themself with no  assistance from a helper.   Shower/Bathe Self: Patient completed the activities by themself with no  assistance from a helper.   Upper Body Dressing: Patient completed the activities by themself with no  assistance from a helper.   Lower Body Dressing: Patient completed the activities by themself with no  assistance from a helper.   Putting On/Taking Off Footwear: Patient completed the activities by themself  with no assistance from a helper.    Mobility Toilet Transfer Performance: [place] provides verbal cues or  touching/steadying assistance as patient completes activity.    Mobility Toilet Transfer Discharge Goal: Patient completed the activities by  themself with no assistance from a helper.    Signed by: Remberto Underwood OT

## 2023-12-15 NOTE — THERAPY EVALUATION
Inpatient Rehabilitation - Occupational Therapy Initial Evaluation    [facility]     Patient Name: Summer Jefferson  : 1955  MRN: [id number]    Today's Date: 12/15/2023                 Admit Date: 2023       No diagnosis found.    Patient Active Problem List   Diagnosis    Diabetes mellitus type 2, controlled    Hypothyroidism, adult    Hypertension    Hypercholesterolemia    HSV (herpes simplex virus) infection    Anxiety and depression    Chronic bilateral low back pain with left-sided sciatica    Facet arthropathy, lumbar    Spondylolisthesis, lumbar region    Lumbar radiculopathy    SI (sacroiliac) joint inflammation    Spondylolisthesis of lumbar region    Overweight (BMI 25.0-29.9)    S/P lumbar fusion       Past Medical History:   Diagnosis Date    Anxiety and depression 03/15/2022    Backache     Diabetes mellitus type 2, controlled     HSV (herpes simplex virus) infection 03/15/2022    Hypercholesterolemia     Hypertension     Hypothyroidism     Osteoarthritis of multiple joints     Spinal stenosis     LUMBAR       Past Surgical History:   Procedure Laterality Date    ANKLE ARTHROSCOPY Right     2013 & 10/2013    COLONOSCOPY      2007    EPIDURAL BLOCK      X5    LAPAROSCOPIC GASTRIC BANDING  2007    LUMBAR FUSION Left 2023    Procedure: PRONE OBLIQUE LATERAL LUMBAR TWO TO THREE, FOUR TO FIVE INTERBODY FUSION WITH ANTERIOR PLATING AND NEUROMONITORING;  Surgeon: Calderon Whittaker MD;  Location: [facility] MAIN OR;  Service: Neurosurgery;  Laterality: Left;    LUMBAR FUSION N/A 2023    Procedure: LUMBAR FUSION MINIMALLY INVASIVE NAVIGATION;  Surgeon: Calderon Whittaker MD;  Location: [facility] MAIN OR;  Service: Neurosurgery;  Laterality: N/A;    ROTATOR CUFF REPAIR Right     2014    TOTAL KNEE ARTHROPLASTY Right     2014             IRF OT ASSESSMENT FLOWSHEET (last 12 hours)       IRF OT Evaluation and Treatment       Row Name 12/15/23 1300 12/15/23 1000       OT Time and  Intention    Document Type daily treatment  -KN initial evaluation  -KN    Mode of Treatment occupational therapy  -KN occupational therapy  -KN    Patient Effort excellent  -KN excellent  -KN    Symptoms Noted During/After Treatment fatigue  -KN fatigue  -KN      Row Name 12/15/23 1300 12/15/23 1000       General Information    Patient Profile Reviewed yes  -KN yes  -KN    General Observations of Patient seated in wc with spouse in room  -KN supine in bed willing to work with OT  -KN    Existing Precautions/Restrictions spinal;fall  -KN spinal;fall  -KN      Row Name 12/15/23 1000          Previous Level of Function/Home Environm    Bathing/Grooming, Premorbid Functional Level independent  -KN     Dressing, Premorbid Functional Level independent  -KN     Eating/Feeding, Premorbid Functional Level independent  -KN     Toileting, Premorbid Functional Level independent  -KN     BADLs, Premorbid Functional Level independent  -KN     IADLs, Premorbid Functional Level independent  -KN     Bed Mobility, Premorbid Functional Level independent  -KN     Transfers, Premorbid Functional Level independent  -KN       Row Name 12/15/23 1000          Living Environment    Current Living Arrangements home  -KN     People in Home spouse  -KN     Primary Care Provided by self  -KN       Row Name 12/15/23 1000          Home Main Entrance    Number of Stairs, Main Entrance four  -KN       Row Name 12/15/23 1000          Stairs Within Home, Primary    Number of Stairs, Within Home, Primary none  -KN       Row Name 12/15/23 1300 12/15/23 1000       Pain Assessment    Pretreatment Pain Rating 7/10  -KN 7/10  -KN    Posttreatment Pain Rating 7/10  -KN 5/10  -KN    Pain Location - back  -KN back  -KN      Row Name 12/15/23 1300 12/15/23 1000       Cognition/Psychosocial    Affect/Mental Status (Cognition) WFL  -KN WFL  -KN    Orientation Status (Cognition) oriented x 3  -KN oriented x 3  -KN    Follows Commands (Cognition) WFL  -KN WFL  -KN     Personal Safety Interventions safety round/check completed;gait belt;nonskid shoes/slippers when out of bed  -KN safety round/check completed;gait belt;nonskid shoes/slippers when out of bed  -KN    Cognitive Function -- WFL  -      Row Name 12/15/23 1000          Range of Motion (ROM)    Range of Motion ROM is WFL  -[facility] [place] Name 12/15/23 1000          Strength (Manual Muscle Testing)    Strength (Manual Muscle Testing) bilateral upper extremities  -KN     Left Hand, Setting 2 (Dynamometer Testing) 40  -KN     Right Hand, Setting 2 (Dynamometer Testing) 40  -KN     Left Hand: Tip (Pincer) Pinch Strength (Pinch Dynamometer Testing) 7  -KN     Right Hand: Tip (Pincer) Pinch Strength (Pinch Dynamometer Testing) 5  -KN     Additional Documentation Left Hand, Setting 2 (Dynamometer Testing) (Row);Right Hand, Setting 2 (Dynamometer Testing) (Row);Right Hand: Tip (Pincer) Pinch Strength (Pinch Dynamometer Testing) (Row);Left Hand: Tip (Pincer) Pinch Strength (Pinch Dynamometer Testing) (Row)  -       Row Name 12/15/23 1000          Strength Comprehensive (MMT)    General Manual Muscle Testing (MMT) Assessment upper extremity strength deficits identified  -[facility] [place] Name 12/15/23 1000          Upper Extremity (Manual Muscle Testing)    Comment, MMT: Upper Extremity 3+/5 B UE strength  -[facility] [place] Name 12/15/23 1000          Vision Assessment/Intervention    Visual Impairment/Limitations WNL  -KN       Row Name 12/15/23 1000          Sensory Assessment (Somatosensory)    Sensory Assessment (Somatosensory) UE sensation intact  -KN       Row Name 12/15/23 1000          Basic Activities of Daily Living (BADLs)    Basic Activities of Daily Living bathing;upper body dressing;lower body dressing;grooming;toileting  -[facility] [place] Name 12/15/23 1000          Bathing    [place] Level (Bathing) bathing skills;contact guard assist  -KN     Position (Bathing) supported sitting;supported standing  -     Set-up  Assistance (Bathing) adjust water temperature;obtain supplies  -KN       Row Name 12/15/23 1000          Upper Body Dressing    [place] Level (Upper Body Dressing) upper body dressing skills;doff;don;verbal cues;set up assistance  -KN     Position (Upper Body Dressing) supported sitting  -KN     Set-up Assistance (Upper Body Dressing) obtain clothing  -KN       Row Name 12/15/23 1000          Lower Body Dressing    [place] Level (Lower Body Dressing) doff;don;pants/bottoms;socks;minimum assist (75% patient effort);moderate assist (50% patient effort)  back precautions  -KN     Position (Lower Body Dressing) supported sitting;supported standing  -KN     Set-up Assistance (Lower Body Dressing) don;doff;obtain clothing  -KN       Row Name 12/15/23 1000          Grooming    [place] Level (Grooming) grooming skills;hair care, combing/brushing;deodorant application;wash face, hands;oral care regimen;contact guard assist  -KN     Position (Grooming) sink side;supported standing  -KN       Row Name 12/15/23 1000          Toileting    [place] Level (Toileting) toileting skills;contact guard assist  -KN     Position (Toileting) unsupported sitting;unsupported standing  -KN       Row Name 12/15/23 1000          Bed Mobility    Bed Mobility supine-sit;sit-supine  -KN     Comment, (Bed Mobility) --  log roll  -       Row Name 12/15/23 1000          Transfer Assessment/Treatment    Transfers sit-stand transfer;toilet transfer  -       Row Name 12/15/23 1000          Sit-Stand Transfer    Sit-Stand [place] (Transfers) contact guard  -KN     Assistive Device (Sit-Stand Transfers) walker, front-wheeled  -KN       Row Name 12/15/23 1000          Toilet Transfer    Type (Toilet Transfer) sit-stand;stand-sit  -KN     [place] Level (Toilet Transfer) contact guard  -KN     Assistive Device (Toilet Transfer) commode;grab bars/safety frame;walker, front-wheeled  -KN       Row Name 12/15/23 1300 12/15/23  1000       Motor Skills    Motor Skills -- coordination;functional endurance  -KN    Coordination -- WFL  -KN    Functional Endurance -- f+  -KN    Therapeutic Exercise shoulder;elbow/forearm;hand;wrist  -KN --      Row Name 12/15/23 1300          Shoulder (Therapeutic Exercise)    Shoulder (Therapeutic Exercise) strengthening exercise  -KN     Shoulder Strengthening (Therapeutic Exercise) flexion;bilateral;1 lb free weight;3 sets;10 repetitions  -KN       Row Name 12/15/23 1300          Elbow/Forearm (Therapeutic Exercise)    Elbow/Forearm (Therapeutic Exercise) strengthening exercise  -KN     Elbow/Forearm Strengthening (Therapeutic Exercise) bilateral;flexion;pronation;supination;1 lb free weight;3 sets;10 repetitions  -KN       Row Name 12/15/23 1300          Wrist (Therapeutic Exercise)    Wrist (Therapeutic Exercise) strengthening exercise  -KN     Wrist Strengthening (Therapeutic Exercise) bilateral;1 lb free weight;flexion;extension;3 sets;10 repetitions  -KN       Row Name 12/15/23 1300          Hand (Therapeutic Exercise)    Hand (Therapeutic Exercise) strengthening exercise  -KN     Hand Strengthening (Therapeutic Exercise) hand gripper;30 repititions;bilateral  -KN       Row Name 12/15/23 1300 12/15/23 1000       Positioning and Restraints    Pre-Treatment Position --  wc  -KN in bed  -KN    Post Treatment Position wheelchair  -KN bed  -KN    In Bed -- with nsg  -KN    In Wheelchair sitting;call light within reach;encouraged to call for assist;exit alarm on  -KN --      Row Name 12/15/23 1000          Therapy Assessment/Plan (OT)    Patient's Goals For Discharge --  to go shopping  -KN       Row Name 12/15/23 1000          Therapy Assessment/Plan (OT)    Functional Level at Time of Evaluation (OT) CGA-min A for ADLs  -KN     Rehab Potential/Prognosis (OT) good, to achieve stated therapy goals  -KN     Frequency of Treatment (OT) 5 times per week;30 minutes per session  -KN     Estimated Duration of  Therapy (OT) 1 week  -KN     Problem List (OT) balance;strength;pain  -     Planned Therapy Interventions (OT) activity tolerance training;BADL retraining;adaptive equipment training;functional balance retraining;occupation/activity based interventions;patient/caregiver education/training;strengthening exercise;transfer/mobility retraining;ROM/therapeutic exercise  -       Row Name 12/15/23 1000          Evaluation Complexity (OT)    Review Occupational Profile/Medical/Therapy History Complexity expanded/moderate complexity  -KN     Assessment, Occupational Performance/Identification of Deficit Complexity 3-5 performance deficits  -     Clinical Decision Making Complexity (OT) detailed assessment/moderate complexity  -KN     Overall Complexity of Evaluation (OT) moderate complexity  -       Row Name 12/15/23 1000          Therapy Plan Review/Discharge Plan (OT)    Therapy Plan Review (OT) evaluation/treatment results reviewed;participants voiced agreement with care plan;patient  -KN     Anticipated Equipment Needs At Discharge (OT) reacher  -KN     Anticipated Discharge Disposition (OT) home  -       Row Name 12/15/23 1000          IRF OT Goals    Transfer Goal Selection (OT-IRF) transfers, OT goal 1;transfers, OT goal 2  -KN     Bathing Goal Selection (OT-IRF) bathing, OT goal 1;bathing, OT goal 2  -KN     UB Dressing Goal Selection (OT-IRF) UB dressing, OT goal 1  -KN     LB Dressing Goal Selection (OT-IRF) LB dressing, OT goal 1;LB dressing, OT goal 2  -KN     Grooming Goal Selection (OT-IRF) grooming, OT goal 1;grooming, OT goal 2  -KN     Toileting Goal Selection (OT-IRF) toileting, OT goal 2;toileting, OT goal 1  -KN     Strength Goal Selection (OT-IRF) strength, OT goal 1 (free text)  -KN     Safety Awareness Goal Selection (OT-IRF) safety awareness, OT goal 1  -KN       Row Name 12/15/23 1000          Transfer Goal 1 (OT-IRF)    Activity/Assistive Device (Transfer Goal 1, OT-IRF) all transfers  -      [place] Level (Transfer Goal 1, OT-IRF) standby assist  -KN     Time Frame (Transfer Goal 1, OT-IRF) short-term goal (STG);3 - 5 days  -KN       Row Name 12/15/23 1000          Transfer Goal 2 (OT-IRF)    Activity/Assistive Device (Transfer Goal 2, OT-IRF) all transfers  -KN     [place] Level (Transfer Goal 2, OT-IRF) independent  -KN     Time Frame (Transfer Goal 2, OT-IRF) long-term goal (LTG);1 week  -KN       Row Name 12/15/23 1000          Bathing Goal 1 (OT-IRF)    Activity/Device (Bathing Goal 1, OT-IRF) bathing skills, all  -KN     [place] Level (Bathing Goal 1, OT-IRF) set-up required;standby assist  -KN     Time Frame (Bathing Goal 1, OT-IRF) 3 - 5 days;short-term goal (STG)  -KN       Row Name 12/15/23 1000          Bathing Goal 2 (OT-IRF)    Activity/Device (Bathing Goal 2, OT-IRF) bathing skills, all  -KN     [place] Level (Bathing Goal 2, OT-IRF) independent  -KN     Time Frame (Bathing Goal 2, OT-IRF) long-term goal (LTG);1 week  -KN       Row Name 12/15/23 1000          UB Dressing Goal 1 (OT-IRF)    Activity/Device (UB Dressing Goal 1, OT-IRF) upper body dressing  -KN     [place] (UB Dress Goal 1, OT-IRF) independent  -KN     Time Frame (UB Dressing Goal 1, OT-IRF) long-term goal (LTG);5 days  -KN       Row Name 12/15/23 1000          LB Dressing Goal 1 (OT-IRF)    Activity/Device (LB Dressing Goal 1, OT-IRF) lower body dressing  -KN     [place] (LB Dressing Goal 1, OT-IRF) contact guard required  -KN     Time Frame (LB Dressing Goal 1, OT-IRF) 3 - 5 days;short-term goal (STG)  -KN       Row Name 12/15/23 1000          LB Dressing Goal 2 (OT-IRF)    Activity/Device (LB Dressing Goal 2, OT-IRF) lower body dressing  -KN     [place] (LB Dressing Goal 2, OT-IRF) modified independence  -KN     Time Frame (LB Dressing Goal 2, OT-IRF) long-term goal (LTG);1 week  -KN       Row Name 12/15/23 1000          Grooming Goal 1 (OT-IRF)    Activity/Device (Grooming Goal 1,  OT-IRF) grooming skills, all  -KN     [place] (Grooming Goal 1, OT-IRF) set-up required;supervision required  -KN     Time Frame (Grooming Goal 1, OT-IRF) short-term goal (STG);3 days  -KN       Row Name 12/15/23 1000          Grooming Goal 2 (OT-IRF)    Activity/Device (Grooming Goal 2, OT-IRF) grooming skills, all  -KN     [place] (Grooming Goal 2, OT-IRF) independent  -KN     Time Frame (Grooming Goal 2, OT-IRF) long-term goal (LTG);1 week  -KN       Row Name 12/15/23 1000          Toileting Goal 1 (OT-IRF)    Activity/Device (Toileting Goal 1, OT-IRF) toileting skills, all  -KN     [place] Level (Toileting Goal 1, OT-IRF) standby assist;set-up required  -KN     Time Frame (Toileting Goal 1, OT-IRF) short-term goal (STG);3 - 5 days  -KN       Row Name 12/15/23 1000          Toileting Goal 2 (OT-IRF)    Activity/Device (Toileting Goal 2, OT-IRF) toileting skills, all  -KN     [place] Level (Toileting Goal 2, OT-IRF) independent  -KN     Time Frame (Toileting Goal 2, OT-IRF) long-term goal (LTG);1 week  -KN       Row Name 12/15/23 1000          Strength Goal 1 (OT-IRF)    Strength Goal 1 (OT-IRF) Pt will demo a MMT of 4/5 B UE shoulder strength by DC  -KN     Time Frame (Strength Goal 1, OT-IRF) long-term goal (LTG);1 week  -KN       Row Name 12/15/23 1000          Safety Awareness Goal 1 (OT-IRF)    Activity (Safety Awareness Goal 1, OT-IRF) awareness of need for assistance;safe use of assistive device/equipment;follow through of safety precautions  -KN     [place]/Cues/Accuracy (Safety Awareness Goal 1, OT-IRF) independent  -KN     Time Frame (Safety Awareness Goal 1, OT-IRF) long-term goal (LTG);1 week  -KN               User Key  (r) = Recorded By, (t) = Taken By, (c) = Cosigned By      Initials Name Effective Dates    Remberto Price, OT 08/02/22 -                              OT Recommendation and Plan    Planned Therapy Interventions (OT): activity tolerance training, BADL  retraining, adaptive equipment training, functional balance retraining, occupation/activity based interventions, patient/caregiver education/training, strengthening exercise, transfer/mobility retraining, ROM/therapeutic exercise                    Time Calculation:      Time Calculation- OT       Row Name 12/15/23 1327 12/15/23 1057          Time Calculation- OT    OT Start Time 1230  -KN 0800  -KN     OT Stop Time 1300  -KN 0900  -KN     OT Time Calculation (min) 30 min  -KN 60 min  -KN     OT Received On 12/15/23  -KN 12/15/23  -KN        Timed Charges    86375 - OT Therapeutic Exercise Minutes 30  -KN --     71279 - OT Self Care/Mgmt Minutes -- 35  -KN        Untimed Charges    OT Eval/Re-eval Minutes -- 25  -KN        Total Minutes    Timed Charges Total Minutes 30  -KN 35  -KN     Untimed Charges Total Minutes -- 25  -KN      Total Minutes 30  -KN 60  -KN               User Key  (r) = Recorded By, (t) = Taken By, (c) = Cosigned By      Initials Name Provider Type     Remberto Underwood OT Occupational Therapist                  Therapy Charges for Today       Code Description Service Date Service Provider Modifiers Qty    85547916065 HC OT SELF CARE/MGMT/TRAIN EA 15 MIN 12/15/2023 Remberto Underwood OT GO 2    48402771013 HC OT EVAL MOD COMPLEXITY 2 12/15/2023 Remberto Underwood OT GO 1    55897253887 HC OT THER PROC EA 15 MIN 12/15/2023 Remberto Underwood OT GO 2                     Remberto Underwood OT  12/15/2023

## 2023-12-15 NOTE — PROGRESS NOTES
Case Management  Inpatient Rehabilitation Plan of Care and Discharge Plan Note    Rehabilitation Diagnosis:  Branch  Date of Onset:  Branch    Medical Summary:  Branch  Past Medical History: [place]    Plan of Care  Updated Problems/Interventions  Field    Expected Intensity:  Branch  Interdisciplinary Team:  Branch  Estimated Length of Stay/Anticipated Discharge Date: [place]  Anticipated Discharge Destination:  Anticipated discharge destination from inpatient rehabilitation is community  discharge with assistance. Return home with 24/7 assist of spouse and adult  children, if needed.      Based on the patient's medical and functional status, their prognosis and  expected level of functional improvement is:  Branch    Signed by: Darby Mann, Social Work

## 2023-12-15 NOTE — THERAPY EVALUATION
Health Maintenance Due   Topic Date Due   • COVID-19 Vaccine (1) Never done   • Varicella Vaccine (2 of 2 - 2-dose childhood series) 11/06/2015   • Influenza Vaccine (1) 09/01/2023       Patient is due for topics as listed above but is not proceeding with Immunization(s) COVID-19 and Influenza at this time. Education provided for Immunization(s) COVID-19 and Influenza.   Inpatient Rehabilitation - Occupational Therapy Initial Evaluation    [facility]     Patient Name: Summer Jefferson  : 1955  MRN: [id number]    Today's Date: 12/15/2023                 Admit Date: 2023       No diagnosis found.    Patient Active Problem List   Diagnosis    Diabetes mellitus type 2, controlled    Hypothyroidism, adult    Hypertension    Hypercholesterolemia    HSV (herpes simplex virus) infection    Anxiety and depression    Chronic bilateral low back pain with left-sided sciatica    Facet arthropathy, lumbar    Spondylolisthesis, lumbar region    Lumbar radiculopathy    SI (sacroiliac) joint inflammation    Spondylolisthesis of lumbar region    Overweight (BMI 25.0-29.9)    S/P lumbar fusion       Past Medical History:   Diagnosis Date    Anxiety and depression 03/15/2022    Backache     Diabetes mellitus type 2, controlled     HSV (herpes simplex virus) infection 03/15/2022    Hypercholesterolemia     Hypertension     Hypothyroidism     Osteoarthritis of multiple joints     Spinal stenosis     LUMBAR       Past Surgical History:   Procedure Laterality Date    ANKLE ARTHROSCOPY Right     2013 & 10/2013    COLONOSCOPY      2007    EPIDURAL BLOCK      X5    LAPAROSCOPIC GASTRIC BANDING  2007    LUMBAR FUSION Left 2023    Procedure: PRONE OBLIQUE LATERAL LUMBAR TWO TO THREE, FOUR TO FIVE INTERBODY FUSION WITH ANTERIOR PLATING AND NEUROMONITORING;  Surgeon: Calderon Whittaker MD;  Location: [facility] MAIN OR;  Service: Neurosurgery;  Laterality: Left;    LUMBAR FUSION N/A 2023    Procedure: LUMBAR FUSION MINIMALLY INVASIVE NAVIGATION;  Surgeon: Calderon Whittaker MD;  Location: [facility] MAIN OR;  Service: Neurosurgery;  Laterality: N/A;    ROTATOR CUFF REPAIR Right     2014    TOTAL KNEE ARTHROPLASTY Right     2014             IRF OT ASSESSMENT FLOWSHEET (last 12 hours)       IRF OT Evaluation and Treatment       Row Name 12/15/23 1000          OT Time and Intention     Document Type initial evaluation  -KN     Mode of Treatment occupational therapy  -KN     Patient Effort excellent  -KN     Symptoms Noted During/After Treatment fatigue  -KN       Row Name 12/15/23 1000          General Information    Patient Profile Reviewed yes  -KN     General Observations of Patient supine in bed willing to work with OT  -KN     Existing Precautions/Restrictions spinal;fall  -KN       Row Name 12/15/23 1000          Previous Level of Function/Home Environm    Bathing/Grooming, Premorbid Functional Level independent  -KN     Dressing, Premorbid Functional Level independent  -KN     Eating/Feeding, Premorbid Functional Level independent  -KN     Toileting, Premorbid Functional Level independent  -KN     BADLs, Premorbid Functional Level independent  -KN     IADLs, Premorbid Functional Level independent  -KN     Bed Mobility, Premorbid Functional Level independent  -KN     Transfers, Premorbid Functional Level independent  -KN       Row Name 12/15/23 1000          Living Environment    Current Living Arrangements home  -KN     People in Home spouse  -KN     Primary Care Provided by self  -KN       Row Name 12/15/23 1000          Home Main Entrance    Number of Stairs, Main Entrance four  -KN       Row Name 12/15/23 1000          Stairs Within Home, Primary    Number of Stairs, Within Home, Primary none  -KN       Row Name 12/15/23 1000          Pain Assessment    Pretreatment Pain Rating 7/10  -KN     Posttreatment Pain Rating 5/10  -KN     Pain Location - back  -KN       Row Name 12/15/23 1000          Cognition/Psychosocial    Affect/Mental Status (Cognition) WFL  -KN     Orientation Status (Cognition) oriented x 3  -KN     Follows Commands (Cognition) WFL  -KN     Personal Safety Interventions safety round/check completed;gait belt;nonskid shoes/slippers when out of bed  -KN     Cognitive Function WFL  -KN       Row Name 12/15/23 1000          Range of Motion (ROM)    Range of Motion ROM is  WFL  -       Row Name 12/15/23 1000          Strength (Manual Muscle Testing)    Strength (Manual Muscle Testing) bilateral upper extremities  -KN     Left Hand, Setting 2 (Dynamometer Testing) 40  -KN     Right Hand, Setting 2 (Dynamometer Testing) 40  -KN     Left Hand: Tip (Pincer) Pinch Strength (Pinch Dynamometer Testing) 7  -KN     Right Hand: Tip (Pincer) Pinch Strength (Pinch Dynamometer Testing) 5  -KN     Additional Documentation Left Hand, Setting 2 (Dynamometer Testing) (Row);Right Hand, Setting 2 (Dynamometer Testing) (Row);Right Hand: Tip (Pincer) Pinch Strength (Pinch Dynamometer Testing) (Row);Left Hand: Tip (Pincer) Pinch Strength (Pinch Dynamometer Testing) (Row)  -       Row Name 12/15/23 1000          Strength Comprehensive (MMT)    General Manual Muscle Testing (MMT) Assessment upper extremity strength deficits identified  -KN       Row Name 12/15/23 1000          Upper Extremity (Manual Muscle Testing)    Comment, MMT: Upper Extremity 3+/5 B UE strength  -[facility] Name 12/15/23 1000          Vision Assessment/Intervention    Visual Impairment/Limitations WNL  -KN       Row Name 12/15/23 1000          Sensory Assessment (Somatosensory)    Sensory Assessment (Somatosensory) UE sensation intact  -KN       Row Name 12/15/23 1000          Basic Activities of Daily Living (BADLs)    Basic Activities of Daily Living bathing;upper body dressing;lower body dressing;grooming;toileting  -KN       Row Name 12/15/23 1000          Bathing    [place] Level (Bathing) bathing skills;contact guard assist  -KN     Position (Bathing) supported sitting;supported standing  -KN     Set-up Assistance (Bathing) adjust water temperature;obtain supplies  -KN       Row Name 12/15/23 1000          Upper Body Dressing    [place] Level (Upper Body Dressing) upper body dressing skills;doff;don;verbal cues;set up assistance  -KN     Position (Upper Body Dressing) supported sitting  -KN     Set-up Assistance  (Upper Body Dressing) obtain clothing  -KN       Row Name 12/15/23 1000          Lower Body Dressing    [place] Level (Lower Body Dressing) doff;don;pants/bottoms;socks;minimum assist (75% patient effort);moderate assist (50% patient effort)  back precautions  -KN     Position (Lower Body Dressing) supported sitting;supported standing  -KN     Set-up Assistance (Lower Body Dressing) don;doff;obtain clothing  -KN       Row Name 12/15/23 1000          Grooming    [place] Level (Grooming) grooming skills;hair care, combing/brushing;deodorant application;wash face, hands;oral care regimen;contact guard assist  -KN     Position (Grooming) sink side;supported standing  -KN       Row Name 12/15/23 1000          Toileting    [place] Level (Toileting) toileting skills;contact guard assist  -KN     Position (Toileting) unsupported sitting;unsupported standing  -KN       Row Name 12/15/23 1000          Bed Mobility    Bed Mobility supine-sit;sit-supine  -KN     Comment, (Bed Mobility) --  log roll  -KN       Row Name 12/15/23 1000          Transfer Assessment/Treatment    Transfers sit-stand transfer;toilet transfer  -KN       Row Name 12/15/23 1000          Sit-Stand Transfer    Sit-Stand [place] (Transfers) contact guard  -KN     Assistive Device (Sit-Stand Transfers) walker, front-wheeled  -KN       Row Name 12/15/23 1000          Toilet Transfer    Type (Toilet Transfer) sit-stand;stand-sit  -KN     [place] Level (Toilet Transfer) contact guard  -KN     Assistive Device (Toilet Transfer) commode;grab bars/safety frame;walker, front-wheeled  -KN       Row Name 12/15/23 1000          Motor Skills    Motor Skills coordination;functional endurance  -KN     Coordination WFL  -KN     Functional Endurance f+  -KN       Row Name 12/15/23 1000          Positioning and Restraints    Pre-Treatment Position in bed  -KN     Post Treatment Position bed  -KN     In Bed with nsg  -KN       Row Name 12/15/23 1000           Therapy Assessment/Plan (OT)    Patient's Goals For Discharge --  to go shopping  -       Row Name 12/15/23 1000          Therapy Assessment/Plan (OT)    Functional Level at Time of Evaluation (OT) CGA-min A for ADLs  -KN     Rehab Potential/Prognosis (OT) good, to achieve stated therapy goals  -KN     Frequency of Treatment (OT) 5 times per week;30 minutes per session  -     Estimated Duration of Therapy (OT) 1 week  -     Problem List (OT) balance;strength;pain  -KN     Planned Therapy Interventions (OT) activity tolerance training;BADL retraining;adaptive equipment training;functional balance retraining;occupation/activity based interventions;patient/caregiver education/training;strengthening exercise;transfer/mobility retraining;ROM/therapeutic exercise  -       Row Name 12/15/23 1000          Evaluation Complexity (OT)    Review Occupational Profile/Medical/Therapy History Complexity expanded/moderate complexity  -     Assessment, Occupational Performance/Identification of Deficit Complexity 3-5 performance deficits  -     Clinical Decision Making Complexity (OT) detailed assessment/moderate complexity  -     Overall Complexity of Evaluation (OT) moderate complexity  -       Row Name 12/15/23 1000          Therapy Plan Review/Discharge Plan (OT)    Therapy Plan Review (OT) evaluation/treatment results reviewed;participants voiced agreement with care plan;patient  -KN     Anticipated Equipment Needs At Discharge (OT) reacher  -KN     Anticipated Discharge Disposition (OT) home  -       Row Name 12/15/23 1000          IRF OT Goals    Transfer Goal Selection (OT-IRF) transfers, OT goal 1;transfers, OT goal 2  -KN     Bathing Goal Selection (OT-IRF) bathing, OT goal 1;bathing, OT goal 2  -KN     UB Dressing Goal Selection (OT-IRF) UB dressing, OT goal 1  -KN     LB Dressing Goal Selection (OT-IRF) LB dressing, OT goal 1;LB dressing, OT goal 2  -KN     Grooming Goal Selection (OT-IRF)  grooming, OT goal 1;grooming, OT goal 2  -KN     Toileting Goal Selection (OT-IRF) toileting, OT goal 2;toileting, OT goal 1  -KN     Strength Goal Selection (OT-IRF) strength, OT goal 1 (free text)  -KN     Safety Awareness Goal Selection (OT-IRF) safety awareness, OT goal 1  -KN       Row Name 12/15/23 1000          Transfer Goal 1 (OT-IRF)    Activity/Assistive Device (Transfer Goal 1, OT-IRF) all transfers  -KN     [place] Level (Transfer Goal 1, OT-IRF) standby assist  -KN     Time Frame (Transfer Goal 1, OT-IRF) short-term goal (STG);3 - 5 days  -KN       Row Name 12/15/23 1000          Transfer Goal 2 (OT-IRF)    Activity/Assistive Device (Transfer Goal 2, OT-IRF) all transfers  -KN     [place] Level (Transfer Goal 2, OT-IRF) independent  -KN     Time Frame (Transfer Goal 2, OT-IRF) long-term goal (LTG);1 week  -KN       Row Name 12/15/23 1000          Bathing Goal 1 (OT-IRF)    Activity/Device (Bathing Goal 1, OT-IRF) bathing skills, all  -KN     [place] Level (Bathing Goal 1, OT-IRF) set-up required;standby assist  -KN     Time Frame (Bathing Goal 1, OT-IRF) 3 - 5 days;short-term goal (STG)  -KN       Row Name 12/15/23 1000          Bathing Goal 2 (OT-IRF)    Activity/Device (Bathing Goal 2, OT-IRF) bathing skills, all  -KN     [place] Level (Bathing Goal 2, OT-IRF) independent  -KN     Time Frame (Bathing Goal 2, OT-IRF) long-term goal (LTG);1 week  -KN       Row Name 12/15/23 1000          UB Dressing Goal 1 (OT-IRF)    Activity/Device (UB Dressing Goal 1, OT-IRF) upper body dressing  -KN     [place] (UB Dress Goal 1, OT-IRF) independent  -KN     Time Frame (UB Dressing Goal 1, OT-IRF) long-term goal (LTG);5 days  -KN       Row Name 12/15/23 1000          LB Dressing Goal 1 (OT-IRF)    Activity/Device (LB Dressing Goal 1, OT-IRF) lower body dressing  -KN     [place] (LB Dressing Goal 1, OT-IRF) contact guard required  -KN     Time Frame (LB Dressing Goal 1, OT-IRF) 3 - 5  days;short-term goal (STG)  -KN       Row Name 12/15/23 1000          LB Dressing Goal 2 (OT-IRF)    Activity/Device (LB Dressing Goal 2, OT-IRF) lower body dressing  -KN     [place] (LB Dressing Goal 2, OT-IRF) modified independence  -KN     Time Frame (LB Dressing Goal 2, OT-IRF) long-term goal (LTG);1 week  -KN       Row Name 12/15/23 1000          Grooming Goal 1 (OT-IRF)    Activity/Device (Grooming Goal 1, OT-IRF) grooming skills, all  -KN     [place] (Grooming Goal 1, OT-IRF) set-up required;supervision required  -KN     Time Frame (Grooming Goal 1, OT-IRF) short-term goal (STG);3 days  -KN       Row Name 12/15/23 1000          Grooming Goal 2 (OT-IRF)    Activity/Device (Grooming Goal 2, OT-IRF) grooming skills, all  -KN     [place] (Grooming Goal 2, OT-IRF) independent  -KN     Time Frame (Grooming Goal 2, OT-IRF) long-term goal (LTG);1 week  -KN       Row Name 12/15/23 1000          Toileting Goal 1 (OT-IRF)    Activity/Device (Toileting Goal 1, OT-IRF) toileting skills, all  -KN     [place] Level (Toileting Goal 1, OT-IRF) standby assist;set-up required  -KN     Time Frame (Toileting Goal 1, OT-IRF) short-term goal (STG);3 - 5 days  -KN       Row Name 12/15/23 1000          Toileting Goal 2 (OT-IRF)    Activity/Device (Toileting Goal 2, OT-IRF) toileting skills, all  -KN     [place] Level (Toileting Goal 2, OT-IRF) independent  -KN     Time Frame (Toileting Goal 2, OT-IRF) long-term goal (LTG);1 week  -KN       Row Name 12/15/23 1000          Strength Goal 1 (OT-IRF)    Strength Goal 1 (OT-IRF) Pt will demo a MMT of 4/5 B UE shoulder strength by DC  -KN     Time Frame (Strength Goal 1, OT-IRF) long-term goal (LTG);1 week  -KN       Row Name 12/15/23 1000          Safety Awareness Goal 1 (OT-IRF)    Activity (Safety Awareness Goal 1, OT-IRF) awareness of need for assistance;safe use of assistive device/equipment;follow through of safety precautions  -KN      [place]/Cues/Accuracy (Safety Awareness Goal 1, OT-IRF) independent  -KN     Time Frame (Safety Awareness Goal 1, OT-IRF) long-term goal (LTG);1 week  -KN               User Key  (r) = Recorded By, (t) = Taken By, (c) = Cosigned By      Initials Name Effective Dates    Remberto Price OT 08/02/22 -                              OT Recommendation and Plan    Planned Therapy Interventions (OT): activity tolerance training, BADL retraining, adaptive equipment training, functional balance retraining, occupation/activity based interventions, patient/caregiver education/training, strengthening exercise, transfer/mobility retraining, ROM/therapeutic exercise                    Time Calculation:      Time Calculation- OT       Row Name 12/15/23 1057             Time Calculation- OT    OT Start Time 0800  -KN      OT Stop Time 0900  -KN      OT Time Calculation (min) 60 min  -KN      OT Received On 12/15/23  -KN         Timed Charges    58260 - OT Self Care/Mgmt Minutes 35  -KN         Untimed Charges    OT Eval/Re-eval Minutes 25  -KN         Total Minutes    Timed Charges Total Minutes 35  -KN      Untimed Charges Total Minutes 25  -KN       Total Minutes 60  -KN                User Key  (r) = Recorded By, (t) = Taken By, (c) = Cosigned By      Initials Name Provider Type    Remberto Price OT Occupational Therapist                  Therapy Charges for Today       Code Description Service Date Service Provider Modifiers Qty    69113559353 HC OT SELF CARE/MGMT/TRAIN EA 15 MIN 12/15/2023 Remberto Underwood OT GO 2    10235548768 HC OT EVAL MOD COMPLEXITY 2 12/15/2023 Remberto Underwood OT GO 1                     Remberto Underwood OT  12/15/2023

## 2023-12-15 NOTE — PROGRESS NOTES
Inpatient Rehabilitation Plan of Care Note    Plan of Care  Care Plan Reviewed - Updates as Follows    Safety    [RN] Potential for Injury(Active)  Current Status(12/15/2023): Patient is at increased risk for falls/injury r/t  recent surgery and hospitalization. Pt is A/Ox4 and able to call appropriately.    Weekly Goal(12/22/2023): Patient will call for assistance OOB as needed.  Discharge Goal: Patient will remain free from falls/injury.        Body Systems    [RN] Integumentary(Active)  Current Status(12/15/2023): Pt has L lateral incisions x3 with dry dressings in  place.  Weekly Goal(12/22/2023): Skin will remain free from s/s infection.  Discharge Goal: Incisions will exhibit signs of healing and will be free of s/s  infection. Skin will be free from breakdown.    Signed by: Yumi Larson RN

## 2023-12-15 NOTE — PROGRESS NOTES
Inpatient Rehabilitation Admission  Section A. Transportation  Issues Due to Lack of Transportation:   No    Signed by: Darby Mann, Social Work

## 2023-12-16 LAB
GLUCOSE BLDC GLUCOMTR-MCNC: 121 MG/DL (ref 70–130)
GLUCOSE BLDC GLUCOMTR-MCNC: 133 MG/DL (ref 70–130)
GLUCOSE BLDC GLUCOMTR-MCNC: 89 MG/DL (ref 70–130)

## 2023-12-16 PROCEDURE — 82948 REAGENT STRIP/BLOOD GLUCOSE: CPT

## 2023-12-16 PROCEDURE — 25010000002 ENOXAPARIN PER 10 MG: Performed by: NURSE PRACTITIONER

## 2023-12-16 PROCEDURE — 97535 SELF CARE MNGMENT TRAINING: CPT

## 2023-12-16 PROCEDURE — 97110 THERAPEUTIC EXERCISES: CPT

## 2023-12-16 PROCEDURE — 97110 THERAPEUTIC EXERCISES: CPT | Performed by: PHYSICAL THERAPIST

## 2023-12-16 PROCEDURE — 97116 GAIT TRAINING THERAPY: CPT | Performed by: PHYSICAL THERAPIST

## 2023-12-16 RX ORDER — IBUPROFEN 600 MG/1
1 TABLET ORAL
Status: DISCONTINUED | OUTPATIENT
Start: 2023-12-16 | End: 2023-12-19

## 2023-12-16 RX ORDER — DEXTROSE MONOHYDRATE 25 G/50ML
25 INJECTION, SOLUTION INTRAVENOUS
Status: DISCONTINUED | OUTPATIENT
Start: 2023-12-16 | End: 2023-12-19

## 2023-12-16 RX ORDER — NICOTINE POLACRILEX 4 MG
15 LOZENGE BUCCAL
Status: DISCONTINUED | OUTPATIENT
Start: 2023-12-16 | End: 2023-12-19

## 2023-12-16 RX ORDER — INSULIN LISPRO 100 [IU]/ML
2-7 INJECTION, SOLUTION INTRAVENOUS; SUBCUTANEOUS
Status: DISCONTINUED | OUTPATIENT
Start: 2023-12-16 | End: 2023-12-17

## 2023-12-16 RX ORDER — METFORMIN HYDROCHLORIDE 500 MG/1
500 TABLET, EXTENDED RELEASE ORAL
Status: DISCONTINUED | OUTPATIENT
Start: 2023-12-17 | End: 2023-12-19 | Stop reason: HOSPADM

## 2023-12-16 RX ADMIN — MAGNESIUM HYDROXIDE 10 ML: 2400 SUSPENSION ORAL at 21:59

## 2023-12-16 RX ADMIN — ROSUVASTATIN CALCIUM 10 MG: 10 TABLET, FILM COATED ORAL at 19:17

## 2023-12-16 RX ADMIN — METHOCARBAMOL TABLETS 750 MG: 500 TABLET, COATED ORAL at 19:11

## 2023-12-16 RX ADMIN — ACETAMINOPHEN 650 MG: 325 TABLET, FILM COATED ORAL at 02:19

## 2023-12-16 RX ADMIN — ACETAMINOPHEN 650 MG: 325 TABLET, FILM COATED ORAL at 15:57

## 2023-12-16 RX ADMIN — SENNOSIDES AND DOCUSATE SODIUM 1 TABLET: 50; 8.6 TABLET ORAL at 07:26

## 2023-12-16 RX ADMIN — EMPAGLIFLOZIN 10 MG: 10 TABLET, FILM COATED ORAL at 07:28

## 2023-12-16 RX ADMIN — OXYCODONE HYDROCHLORIDE 5 MG: 5 TABLET ORAL at 20:10

## 2023-12-16 RX ADMIN — OXYCODONE HYDROCHLORIDE 5 MG: 5 TABLET ORAL at 15:59

## 2023-12-16 RX ADMIN — METFORMIN HYDROCHLORIDE 500 MG: 500 TABLET, EXTENDED RELEASE ORAL at 07:26

## 2023-12-16 RX ADMIN — METHOCARBAMOL TABLETS 750 MG: 500 TABLET, COATED ORAL at 11:42

## 2023-12-16 RX ADMIN — HYDROCHLOROTHIAZIDE 25 MG: 25 TABLET ORAL at 07:27

## 2023-12-16 RX ADMIN — ACETAMINOPHEN 650 MG: 325 TABLET, FILM COATED ORAL at 11:56

## 2023-12-16 RX ADMIN — FLUOXETINE HYDROCHLORIDE 20 MG: 20 CAPSULE ORAL at 07:27

## 2023-12-16 RX ADMIN — ENOXAPARIN SODIUM 40 MG: 100 INJECTION SUBCUTANEOUS at 19:13

## 2023-12-16 RX ADMIN — ACETAMINOPHEN 650 MG: 325 TABLET, FILM COATED ORAL at 07:25

## 2023-12-16 RX ADMIN — ACETAMINOPHEN 650 MG: 325 TABLET, FILM COATED ORAL at 19:11

## 2023-12-16 RX ADMIN — SENNOSIDES AND DOCUSATE SODIUM 1 TABLET: 50; 8.6 TABLET ORAL at 19:11

## 2023-12-16 RX ADMIN — LEVOTHYROXINE SODIUM 50 MCG: 50 TABLET ORAL at 07:27

## 2023-12-16 RX ADMIN — LISINOPRIL 10 MG: 10 TABLET ORAL at 07:26

## 2023-12-16 NOTE — PROGRESS NOTES
Inpatient Rehabilitation Plan of Care Note    Plan of Care  Care Plan Reviewed - No updates at this time.    Safety    Performed Intervention(s)  Fall precautions  Hourly rounding      Psychosocial    Performed Intervention(s)  Allow extra time  for pt to verbalize feelings, needs, concerns.  Provide distraction and/or relaxation as needed.      Sphincter Control    Performed Intervention(s)  Monitor I/O.  Bowel meds prn.      Body Systems    Performed Intervention(s)  Skin assessment q shift and prn.  Wound care, as ordered.    Signed by: Rossy Hargrove RN

## 2023-12-16 NOTE — PROGRESS NOTES
Physical Medicine and Rehabilitation  Inpatient Rehabilitation Interdisciplinary Plan of Care    Demographics            Age: 68Y            Gender: Female    Admission Date: 12/14/2023 3:25:00 PM  Rehabilitation Diagnosis:  LUMBAR TWO TO THREE, FOUR TO FIVE INTERBODY FUSION  Dec 11, 2023 for spondylolisthesis       LUMBAR TWO TO THREE, FOUR TO FIVE INTERBODY FUSION Dec 11, 2023 for  spondylolisthesis    Impaired mobility  Impaired self care    DVT prophylaxis - SCDs/ Lovenox    Pain management - scheduled Tylenol/ methocarbamol/ oxycodone - [facility] report  reviewed  December 16-took methocarbamol 750 mg only once yesterday and oxycodone 5 mg  twice yesterday    HTN - amlodipine/ hydrochlorothiazide / lisinopril/    DM - empagliflozine-Jardiance, Metformin XR. Was also on semaglutide at home for  weight loss but no longer continuing  December 16-decrease metformin XR to 500 mg once daily as well as most recent  home dose after weight loss    Depression - fluoxetine    Hypothyroid - on replacement    Plan of Care  Anticipated Discharge Date/Estimated Length of Stay: One week  Anticipated Discharge Destination: Community discharge with assistance  Discharge Plan : Return home with 24/7 assist of spouse and adult children, if  needed.  Medical Necessity Expected Level Rationale: Goals are to achieve a level of  supervision/modified independent with  mobility and self-care and improved  activity tolerance.   Rehabilitation prognosis fair.  Medical prognosis fair.  Intensity and Duration: an average of 3 hours/5 days per week  Medical Supervision and 24 Hour Rehab Nursing: x  Physical Therapy: x  PT Intensity/Duration: 1.5 hours / day, 5 days / week, for approximately one  week.  Occupational Therapy: x  OT Intensity/Duration: 1.5 hours / day, 5 days / week, for approximately one  week.  Social Work: x  Therapeutic Recreation: x  Registered Dietician: x  Updated (if changes indicated)  No changes to plan.    Based on the  patient's medical and functional status, their prognosis and  expected level of functional improvement is: Goals are to achieve a level of  supervision/modified independent with  mobility and self-care and improved  activity tolerance.   Rehabilitation prognosis fair.  Medical prognosis fair.    Interdisciplinary Problem/Goals/Status  Body Function Structure    [RN] Balance(Active)  Current Status(01/01/1753):  Weekly Goal(01/01/1753):  Discharge Goal:        Safety    [RN] Potential for Injury(Active)  Current Status(12/15/2023): Patient is at increased risk for falls/injury r/t  recent surgery and hospitalization. Pt is A/Ox4 and able to call appropriately.    Weekly Goal(12/22/2023): Patient will call for assistance OOB as needed.  Discharge Goal: Patient will remain free from falls/injury.        Psychosocial    [RN] Coping/Adjustment(Active)  Current Status(12/15/2023): Patient is at risk for impaired coping r/t recent  surgery and hospitalization.  Weekly Goal(12/22/2023): Patient will verbalize needs, concerns, feelings to  staff as needed.  Discharge Goal: Patient will verbalize adequate coping strategies that she can  use at home.        Sphincter Control    [RN] Bowel and bladder (Active)  Current Status(12/15/2023): Patient is reportedly continent.  Weekly Goal(12/22/2023): Patient will remain continent. Pt will have BM q 3 days  or within normal pattern.  Discharge Goal: Pt will remain continent.        Body Systems    [RN] Integumentary(Active)  Current Status(12/15/2023): Pt has L lateral incisions x3 with dry dressings in  place.  Weekly Goal(12/22/2023): Skin will remain free from s/s infection.  Discharge Goal: Incisions will exhibit signs of healing and will be free of s/s  infection. Skin will be free from breakdown.        Self Care    [OT] Bathing(Active)  Current Status(12/15/2023): CGA  Weekly Goal(12/22/2023): SBA  Discharge Goal: Ind    [OT] Dressing (Lower)(Active)  Current  Status(12/15/2023): Min A  Weekly Goal(12/22/2023): NELSON  Discharge Goal: ind    [OT] Dressing (Upper)(Active)  Current Status(12/15/2023): NELSON  Weekly Goal(12/22/2023): mod i  Discharge Goal: ind    [OT] Grooming(Active)  Current Status(12/15/2023): CGA  Weekly Goal(12/22/2023): NELSON  Discharge Goal: ind    [OT] Toileting(Active)  Current Status(12/15/2023): CGA  Weekly Goal(12/22/2023): NELSON  Discharge Goal: ind        Mobility    [OT] Toilet Transfers(Active)  Current Status(12/15/2023): CGA  Weekly Goal(12/22/2023): SBA  Discharge Goal: ind    [OT] Tub/Shower Transfers(Active)  Current Status(12/15/2023): CGA  Weekly Goal(12/22/2023): SBA  Discharge Goal: ind    [OT] Bed/Chair/Wheelchair(Active)  Current Status(01/01/1753):  Weekly Goal(01/01/1753):  Discharge Goal:    [PT] Bed Mobility(Active)  Current Status(12/15/2023): SBA  Weekly Goal(12/22/2023): Indep  Discharge Goal: Indep    [PT] Walk(Active)  Current Status(12/15/2023): 160' rwx, CGA  Weekly Goal(12/22/2023): 200' rwx, SBA  Discharge Goal: 200' rwx, Aga    [PT] Stairs(Active)  Current Status(12/15/2023): CGA Bilat handrail, 12 steps  Weekly Goal(12/22/2023): SBA bilat handrail, 4 steps  Discharge Goal: Indep bilat handrail, 4 steps    [PT] Bed/Chair/Wheelchair(Active)  Current Status(12/15/2023): CGA stand pivot w rwx  Weekly Goal(12/22/2023): SBA stand pivot w rwx  Discharge Goal: Aga stand pivot w rwx      Comments:    Signed by: Cedrick Roberts MD

## 2023-12-16 NOTE — PROGRESS NOTES
Occupational Therapy: Individual: 90 minutes.    Physical Therapy: Branch    Speech Language Pathology:  Branch    Signed by: MARGIE Paul/ALEKS

## 2023-12-16 NOTE — PROGRESS NOTES
Inpatient Rehabilitation Plan of Care Note    Plan of Care  Care Plan Reviewed - No updates at this time.    Safety    Performed Intervention(s)  Fall precautions  Hourly rounding      Psychosocial    Performed Intervention(s)  Allow extra time  for pt to verbalize feelings, needs, concerns.  Provide distraction and/or relaxation as needed.      Sphincter Control    Performed Intervention(s)  Monitor I/O.  Bowel meds prn.      Body Systems    Performed Intervention(s)  Skin assessment q shift and prn.  Wound care, as ordered.    Signed by: Yumi Larson RN

## 2023-12-16 NOTE — PLAN OF CARE
Goal Outcome Evaluation:      Pt is A/Ox4, calm/cooperative, OOB x 1 w CGA w rwx. Meds taken whole w thin liquids. Pt c/o pain to lower back; prn Oxycodone given x1, prn Robaxin given x1. Pt c/o nausea; prn Zofran given x1. Pt is continent.

## 2023-12-16 NOTE — THERAPY TREATMENT NOTE
Inpatient Rehabilitation - Occupational Therapy Treatment Note    [facility]     Patient Name: Summer Jefferson  : 1955  MRN: [id number]    Today's Date: 2023                 Admit Date: 2023       No diagnosis found.    Patient Active Problem List   Diagnosis    Diabetes mellitus type 2, controlled    Hypothyroidism, adult    Hypertension    Hypercholesterolemia    HSV (herpes simplex virus) infection    Anxiety and depression    Chronic bilateral low back pain with left-sided sciatica    Facet arthropathy, lumbar    Spondylolisthesis, lumbar region    Lumbar radiculopathy    SI (sacroiliac) joint inflammation    Spondylolisthesis of lumbar region    Overweight (BMI 25.0-29.9)    S/P lumbar fusion       Past Medical History:   Diagnosis Date    Anxiety and depression 03/15/2022    Backache     Diabetes mellitus type 2, controlled     HSV (herpes simplex virus) infection 03/15/2022    Hypercholesterolemia     Hypertension     Hypothyroidism     Osteoarthritis of multiple joints     Spinal stenosis     LUMBAR       Past Surgical History:   Procedure Laterality Date    ANKLE ARTHROSCOPY Right     2013 & 10/2013    COLONOSCOPY      2007    EPIDURAL BLOCK      X5    LAPAROSCOPIC GASTRIC BANDING  2007    LUMBAR FUSION Left 2023    Procedure: PRONE OBLIQUE LATERAL LUMBAR TWO TO THREE, FOUR TO FIVE INTERBODY FUSION WITH ANTERIOR PLATING AND NEUROMONITORING;  Surgeon: Calderon Whittaker MD;  Location: [facility] OR;  Service: Neurosurgery;  Laterality: Left;    LUMBAR FUSION N/A 2023    Procedure: LUMBAR FUSION MINIMALLY INVASIVE NAVIGATION;  Surgeon: Calderon Whittaker MD;  Location: [facility] MAIN OR;  Service: Neurosurgery;  Laterality: N/A;    ROTATOR CUFF REPAIR Right     2014    TOTAL KNEE ARTHROPLASTY Right     2014             IRF OT ASSESSMENT FLOWSHEET (last 12 hours)       IRF OT Evaluation and Treatment       Row Name 23 1300          OT Time and Intention     Document Type daily treatment  -RE     Mode of Treatment occupational therapy  -RE     Patient Effort excellent  -RE       Row Name 12/16/23 1300          General Information    Existing Precautions/Restrictions spinal;fall  -RE       Row Name 12/16/23 1300          Pain Assessment    Pretreatment Pain Rating 7/10  -RE     Posttreatment Pain Rating 7/10  -RE     Pain Location - back  -RE       Row Name 12/16/23 1300          Pain Scale: Word Pre/Post-Treatment    Pain Intervention(s) Rest;Repositioned  -RE       Row Name 12/16/23 1300          Cognition/Psychosocial    Affect/Mental Status (Cognition) WFL  -RE     Personal Safety Interventions fall prevention program maintained;gait belt;nonskid shoes/slippers when out of bed  -RE       Row Name 12/16/23 1300          Bathing    Comment (Bathing) Patient declined a shower or bath today.  -RE       Row Name 12/16/23 1300          Upper Body Dressing    [place] Level (Upper Body Dressing) upper body dressing skills;supervision  -RE     Position (Upper Body Dressing) supported sitting  -RE       Row Name 12/16/23 1300          Lower Body Dressing    [place] Level (Lower Body Dressing) doff;don;pants/bottoms;socks;contact guard assist  -RE     Assistive Device Use (Lower Body Dressing) sock-aid  -RE     Position (Lower Body Dressing) supported sitting;supported standing  -RE       Row Name 12/16/23 1300          Grooming    [place] Level (Grooming) grooming skills;supervision;contact guard assist  -RE     Position (Grooming) sink side;supported standing  -RE     Comment (Grooming) walker  -RE       Row Name 12/16/23 1300          Toileting    [place] Level (Toileting) toileting skills;supervision  -RE     Position (Toileting) unsupported sitting;unsupported standing  -RE       Row Name 12/16/23 1300          Sit-Stand Transfer    Sit-Stand [place] (Transfers) verbal cues;contact guard  -RE     Assistive Device (Sit-Stand Transfers)  wheelchair;walker, front-wheeled  -RE       Row Name 12/16/23 1300          Stand-Sit Transfer    Stand-Sit [place] (Transfers) standby assist  -RE     Assistive Device (Stand-Sit Transfers) walker, front-wheeled  -RE       Row Name 12/16/23 1300          Toilet Transfer    Type (Toilet Transfer) sit-stand;stand-sit  -RE     [place] Level (Toilet Transfer) standby assist  -RE     Assistive Device (Toilet Transfer) grab bars/safety frame  -RE       Row Name 12/16/23 1300          Shoulder (Therapeutic Exercise)    Shoulder (Therapeutic Exercise) strengthening exercise  -RE     Shoulder Strengthening (Therapeutic Exercise) bilateral;scapular stabilization;external rotation;sitting;10 repetitions;2 sets  -RE       Row Name 12/16/23 1300          Elbow/Forearm (Therapeutic Exercise)    Elbow/Forearm (Therapeutic Exercise) strengthening exercise  -RE     Elbow/Forearm Strengthening (Therapeutic Exercise) flexion;extension;1 lb free weight;10 repetitions;2 sets  -RE       Row Name 12/16/23 1300          Wrist (Therapeutic Exercise)    Wrist (Therapeutic Exercise) strengthening exercise  -RE     Wrist Strengthening (Therapeutic Exercise) flexion;extension;1 lb free weight;10 repetitions;2 sets  -RE       Row Name 12/16/23 1300          Positioning and Restraints    Pre-Treatment Position sitting in chair/recliner  -RE     Post Treatment Position bathroom  -RE     Bathroom sitting;call light within reach;encouraged to call for assist;notified nsg  -RE               User Key  (r) = Recorded By, (t) = Taken By, (c) = Cosigned By      Initials Name Effective Dates    RE Mariangel Bell, OTR 06/16/21 -                              OT Recommendation and Plan                         Time Calculation:      Time Calculation- OT       Row Name 12/16/23 1358 12/16/23 1357          Time Calculation- OT    OT Start Time 1230  -RE 0800  -RE     OT Stop Time 1300  -RE 0900  -RE     OT Time Calculation (min) 30 min  -RE 60 min   -RE     Total Timed Code Minutes- OT -- 60 minute(s)  -RE        Timed Charges    05042 - OT Therapeutic Exercise Minutes 20  -RE 30  -RE     67254 - OT Self Care/Mgmt Minutes 10  -RE 30  -RE        Total Minutes    Timed Charges Total Minutes 30  -RE 60  -RE      Total Minutes 30  -RE 60  -RE               User Key  (r) = Recorded By, (t) = Taken By, (c) = Cosigned By      Initials Name Provider Type    RE Mariangel Bell OTR Occupational Therapist                  Therapy Charges for Today       Code Description Service Date Service Provider Modifiers Qty    [id number]  OT THER PROC EA 15 MIN 12/16/2023 Mariangel Bell OTR GO 3    [id number]  OT SELF CARE/MGMT/TRAIN EA 15 MIN 12/16/2023 Mariangel Bell OTR GO 3                     MARGIE Paul  12/16/2023

## 2023-12-16 NOTE — PLAN OF CARE
Goal Outcome Evaluation:  Plan of Care Reviewed With: patient           Outcome Evaluation: pt alert and oriented X4, RA, up with assist X1, meds taken whole with water, continent, tylenol scheduled. will cont to monitor         Problem: Rehabilitation (IRF) Plan of Care  Goal: Plan of Care Review  Outcome: Ongoing, Progressing  Flowsheets (Taken 12/16/2023 1404)  Plan of Care Reviewed With: patient  Outcome Evaluation: pt alert and oriented X4, RA, up with assist X1, meds taken whole with water, continent, tylenol scheduled. will cont to monitor  Goal: Patient-Specific Goal (Individualized)  Outcome: Ongoing, Progressing  Goal: Absence of New-Onset Illness or Injury  Outcome: Ongoing, Progressing  Intervention: Prevent Fall and Fall Injury  Recent Flowsheet Documentation  Taken 12/16/2023 1403 by Fozia Subramanian, RN  Safety Promotion/Fall Prevention:   activity supervised   assistive device/personal items within reach   clutter free environment maintained   nonskid shoes/slippers when out of bed   room organization consistent   safety round/check completed  Taken 12/16/2023 1213 by Fozia Subramanian, RN  Safety Promotion/Fall Prevention:   activity supervised   assistive device/personal items within reach   clutter free environment maintained   fall prevention program maintained   nonskid shoes/slippers when out of bed   room organization consistent   safety round/check completed  Taken 12/16/2023 1030 by Fozia Subramanian, RN  Safety Promotion/Fall Prevention:   activity supervised   assistive device/personal items within reach   clutter free environment maintained   fall prevention program maintained   nonskid shoes/slippers when out of bed   room organization consistent   safety round/check completed  Taken 12/16/2023 0854 by Fozia Subramanian, RN  Safety Promotion/Fall Prevention:   activity supervised   assistive device/personal items within reach   clutter free environment maintained   fall prevention program  maintained   nonskid shoes/slippers when out of bed   room organization consistent   safety round/check completed  Intervention: Prevent Infection  Recent Flowsheet Documentation  Taken 12/16/2023 1403 by Fozia Subramanian RN  Infection Prevention: single patient room provided  Taken 12/16/2023 1213 by Fozia Subramanian RN  Infection Prevention: single patient room provided  Taken 12/16/2023 1030 by Fozia Subramanian RN  Infection Prevention: single patient room provided  Taken 12/16/2023 0854 by Fozia Subramanian RN  Infection Prevention: single patient room provided  Intervention: Prevent VTE (Venous Thromboembolism)  Recent Flowsheet Documentation  Taken 12/16/2023 0854 by Fozia Subramanian RN  VTE Prevention/Management:   bilateral   sequential compression devices off   patient refused intervention  Goal: Optimal Comfort and Wellbeing  Outcome: Ongoing, Progressing  Goal: Home and Community Transition Plan Established  Outcome: Ongoing, Progressing

## 2023-12-16 NOTE — PROGRESS NOTES
LOS: 2 days   Patient Care Team:  Ruth Benitez APRN as PCP - General (Family Medicine)      CLAUDIO GARBER MARIANO  1955      ADMITTING DIAGNOSIS:    LUMBAR TWO TO THREE, FOUR TO FIVE INTERBODY FUSION Dec 11, 2023 for spondylolisthesis     Subjective   Complains of back pain  No radicular pain  Participates well in therapies.  Still weak at the right > left hip flexors which she describes as related to anterior pelvic pain.       Objective     Vitals:    12/16/23 0537   BP: 123/68   Pulse: 78   Resp: 18   Temp: 97.2 °F (36.2 °C)   SpO2: 95%       Intake/Output Summary (Last 24 hours) at 12/16/2023 1026  Last data filed at 12/15/2023 1720  Gross per 24 hour   Intake 720 ml   Output --   Net 720 ml     PHYSICAL EXAM:   MENTAL STATUS -  AWAKE / ALERT  HEENT-  SCLERAE ANICTERIC, CONJUNCTIVAE PINK, OP MOIST, NO JVD,    LUNGS - NORMAL RESPIRATIONS.  Clear to auscultation without wheezes rales or rhonchi  HEART- RRR, no rub murmur or gallop  ABD -  SOFT, NONDISTENDED, NABS soft nontender  Left flank incision  and left lumbar incision-dressed   EXT - NO EDEMA OR CYANOSIS  NEURO - AWAKE, ALERT  MOTOR EXAM -she has pain inhibition proximally in the bilateral lower extremities .She takes resistance with bilateral knee extension, ankle dorsiflexion , and ankle plantarflexion and with inversion eversion.      MEDICATIONS  Scheduled Meds:acetaminophen, 650 mg, Oral, Q4H  amLODIPine, 5 mg, Oral, Daily  empagliflozin, 10 mg, Oral, Daily  enoxaparin, 40 mg, Subcutaneous, Nightly  FLUoxetine, 20 mg, Oral, Daily  hydroCHLOROthiazide, 25 mg, Oral, Daily  insulin lispro, 2-7 Units, Subcutaneous, TID With Meals  levothyroxine, 50 mcg, Oral, Daily  lisinopril, 10 mg, Oral, Daily  [START ON 12/17/2023] metFORMIN ER, 500 mg, Oral, Daily With Breakfast  rosuvastatin, 10 mg, Oral, Nightly  senna-docusate sodium, 1 tablet, Oral, BID      Continuous Infusions:   PRN Meds:.  benzonatate    dextrose    dextrose    glucagon (human  "recombinant)    magnesium hydroxide    methocarbamol    [DISCONTINUED] HYDROmorphone **AND** naloxone    ondansetron **OR** ondansetron    oxyCODONE      RESULTS  No results found for: \"POCGLU\"  Results from last 7 days   Lab Units 12/15/23  0753 12/12/23  0226 12/11/23  1338   WBC 10*3/mm3 7.02 9.80 13.75*   HEMOGLOBIN g/dL 13.0 11.1* 11.2*   HEMATOCRIT % 39.5 33.6* 34.5   PLATELETS 10*3/mm3 264 217 237     Results from last 7 days   Lab Units 12/13/23  0441 12/12/23  1619 12/12/23  0226 12/11/23  1754   SODIUM mmol/L 141  --  142 137   POTASSIUM mmol/L 4.6 3.5 3.4*  3.4* 3.3*   CHLORIDE mmol/L 108*  --  108* 104   CO2 mmol/L 24.3  --  24.8 23.0   BUN mg/dL 9  --  8 11   CREATININE mg/dL 0.62  --  0.54* 0.58   CALCIUM mg/dL 8.6  --  8.3* 8.1*   BILIRUBIN mg/dL  --   --  0.2  --    ALK PHOS U/L  --   --  39  --    ALT (SGPT) U/L  --   --  17  --    AST (SGOT) U/L  --   --  25  --    GLUCOSE mg/dL 115*  --  116* 128*           ASSESSMENT and PLAN    S/P lumbar fusion     LUMBAR TWO TO THREE, FOUR TO FIVE INTERBODY FUSION Dec 11, 2023 for spondylolisthesis    Impaired mobility  Impaired self care    DVT prophylaxis - SCDs/ Lovenox    Pain management - scheduled Tylenol/ methocarbamol/ oxycodone - [facility] report reviewed  December 16-took methocarbamol 750 mg only once yesterday and oxycodone 5 mg twice yesterday    HTN - amlodipine/ hydrochlorothiazide / lisinopril/    DM - empagliflozine-Jardiance, Metformin XR. Was also on semaglutide at home for weight loss but no longer continuing  December 16-decrease metformin XR to 500 mg once daily as well as most recent home dose after weight loss    Depression - fluoxetine    Hypothyroid - on replacement      Functional status-December 15-bed mobility standby assist.  Gait 160 feet contact-guard rolling walker.  Did 12 stairs contact-guard bilateral railings.  Transfers contact-guard.    Goal is for home with  outpatient   therapies.  Barrier to discharge:  impaired mobility " and self care  - work on  ADLS with adaptive equipment and proper back mechanics, functional mobility, transfer training and progressive ambulation  to overcome.        Cedrick Roberts MD      Appropriate PPE was worn during the entire visit.  Hand hygiene was completed before and after.

## 2023-12-17 LAB
GLUCOSE BLDC GLUCOMTR-MCNC: 105 MG/DL (ref 70–130)
GLUCOSE BLDC GLUCOMTR-MCNC: 121 MG/DL (ref 70–130)
GLUCOSE BLDC GLUCOMTR-MCNC: 99 MG/DL (ref 70–130)

## 2023-12-17 PROCEDURE — 82948 REAGENT STRIP/BLOOD GLUCOSE: CPT

## 2023-12-17 PROCEDURE — 25010000002 ENOXAPARIN PER 10 MG: Performed by: NURSE PRACTITIONER

## 2023-12-17 PROCEDURE — 97110 THERAPEUTIC EXERCISES: CPT

## 2023-12-17 PROCEDURE — 97530 THERAPEUTIC ACTIVITIES: CPT

## 2023-12-17 RX ORDER — ACETAMINOPHEN 325 MG/1
650 TABLET ORAL
Status: DISCONTINUED | OUTPATIENT
Start: 2023-12-17 | End: 2023-12-18

## 2023-12-17 RX ADMIN — METHOCARBAMOL TABLETS 750 MG: 500 TABLET, COATED ORAL at 21:05

## 2023-12-17 RX ADMIN — ROSUVASTATIN CALCIUM 10 MG: 10 TABLET, FILM COATED ORAL at 21:05

## 2023-12-17 RX ADMIN — EMPAGLIFLOZIN 10 MG: 10 TABLET, FILM COATED ORAL at 07:49

## 2023-12-17 RX ADMIN — ENOXAPARIN SODIUM 40 MG: 100 INJECTION SUBCUTANEOUS at 21:05

## 2023-12-17 RX ADMIN — LEVOTHYROXINE SODIUM 50 MCG: 50 TABLET ORAL at 07:45

## 2023-12-17 RX ADMIN — ACETAMINOPHEN 650 MG: 325 TABLET, FILM COATED ORAL at 11:27

## 2023-12-17 RX ADMIN — OXYCODONE HYDROCHLORIDE 5 MG: 5 TABLET ORAL at 21:05

## 2023-12-17 RX ADMIN — LISINOPRIL 10 MG: 10 TABLET ORAL at 07:45

## 2023-12-17 RX ADMIN — SENNOSIDES AND DOCUSATE SODIUM 1 TABLET: 50; 8.6 TABLET ORAL at 21:05

## 2023-12-17 RX ADMIN — ACETAMINOPHEN 650 MG: 325 TABLET, FILM COATED ORAL at 06:18

## 2023-12-17 RX ADMIN — METHOCARBAMOL TABLETS 750 MG: 500 TABLET, COATED ORAL at 07:42

## 2023-12-17 RX ADMIN — METFORMIN HYDROCHLORIDE 500 MG: 500 TABLET, EXTENDED RELEASE ORAL at 07:43

## 2023-12-17 RX ADMIN — OXYCODONE HYDROCHLORIDE 5 MG: 5 TABLET ORAL at 11:27

## 2023-12-17 RX ADMIN — SENNOSIDES AND DOCUSATE SODIUM 1 TABLET: 50; 8.6 TABLET ORAL at 07:50

## 2023-12-17 RX ADMIN — ACETAMINOPHEN 650 MG: 325 TABLET, FILM COATED ORAL at 01:13

## 2023-12-17 RX ADMIN — HYDROCHLOROTHIAZIDE 25 MG: 25 TABLET ORAL at 07:43

## 2023-12-17 RX ADMIN — METHOCARBAMOL TABLETS 750 MG: 500 TABLET, COATED ORAL at 14:31

## 2023-12-17 RX ADMIN — ACETAMINOPHEN 650 MG: 325 TABLET, FILM COATED ORAL at 23:41

## 2023-12-17 RX ADMIN — FLUOXETINE HYDROCHLORIDE 20 MG: 20 CAPSULE ORAL at 07:45

## 2023-12-17 RX ADMIN — METHOCARBAMOL TABLETS 750 MG: 500 TABLET, COATED ORAL at 01:14

## 2023-12-17 RX ADMIN — ACETAMINOPHEN 650 MG: 325 TABLET, FILM COATED ORAL at 16:04

## 2023-12-17 NOTE — PROGRESS NOTES
Inpatient Rehabilitation Plan of Care Note    Plan of Care  Care Plan Reviewed - No updates at this time.    Safety    Performed Intervention(s)  Fall precautions  Hourly rounding      Psychosocial    Performed Intervention(s)  Allow extra time  for pt to verbalize feelings, needs, concerns.  Provide distraction and/or relaxation as needed.      Sphincter Control    Performed Intervention(s)  Monitor I/O.  Bowel meds prn.      Body Systems    Performed Intervention(s)  Skin assessment q shift and prn.  Wound care, as ordered.    Signed by: Jesenia Mcmullen RN

## 2023-12-17 NOTE — PLAN OF CARE
Goal Outcome Evaluation:  Plan of Care Reviewed With: patient        Progress: improving  Outcome Evaluation: Mr Jefferson rested well this shift.  C/o pain, requested robaxin and pain medicine.  Up to bathroom, assist x1 w/walker.  Bowl meds administered, but no bm this shift.

## 2023-12-17 NOTE — PROGRESS NOTES
LOS: 3 days   Patient Care Team:  Ruth Benitez APRN as PCP - General (Family Medicine)      CLAUDIO QUINTANA  1955      ADMITTING DIAGNOSIS:    LUMBAR TWO TO THREE, FOUR TO FIVE INTERBODY FUSION Dec 11, 2023 for spondylolisthesis     Subjective     Complains of back pain that comes around the hips.  Tolerating activities.  Still weak in her hips.  No weakness at the knees or ankles.  She is taking the muscle relaxer and Tylenol during the day and then the oxycodone in the evening time      Objective     Vitals:    12/17/23 0544   BP: 111/72   Pulse: 81   Resp: 19   Temp: 98.3 °F (36.8 °C)   SpO2: 94%       Intake/Output Summary (Last 24 hours) at 12/17/2023 1226  Last data filed at 12/17/2023 0900  Gross per 24 hour   Intake 360 ml   Output --   Net 360 ml     PHYSICAL EXAM:   MENTAL STATUS -  AWAKE / ALERT  HEENT-  SCLERAE ANICTERIC, CONJUNCTIVAE PINK, OP MOIST, NO JVD,    LUNGS - NORMAL RESPIRATIONS.  Clear to auscultation without wheezes rales or rhonchi  HEART- RRR, no rub murmur or gallop  ABD -  SOFT, NONDISTENDED, NABS soft nontender  Left flank incision  and left lumbar incision-dressed   EXT - NO EDEMA OR CYANOSIS  NEURO - AWAKE, ALERT  MOTOR EXAM -she has pain inhibition proximally in the bilateral lower extremities, right more so than left.She takes resistance with bilateral knee extension, ankle dorsiflexion , and ankle plantarflexion and with inversion eversion.      MEDICATIONS  Scheduled Meds:acetaminophen, 650 mg, Oral, Q4H  amLODIPine, 5 mg, Oral, Daily  empagliflozin, 10 mg, Oral, Daily  enoxaparin, 40 mg, Subcutaneous, Nightly  FLUoxetine, 20 mg, Oral, Daily  hydroCHLOROthiazide, 25 mg, Oral, Daily  levothyroxine, 50 mcg, Oral, Daily  lisinopril, 10 mg, Oral, Daily  metFORMIN ER, 500 mg, Oral, Daily With Breakfast  rosuvastatin, 10 mg, Oral, Nightly  senna-docusate sodium, 1 tablet, Oral, BID      Continuous Infusions:   PRN Meds:.  benzonatate    dextrose    dextrose    glucagon  (human recombinant)    magnesium hydroxide    methocarbamol    [DISCONTINUED] HYDROmorphone **AND** naloxone    ondansetron **OR** ondansetron    oxyCODONE      RESULTS  Glucose   Date/Time Value Ref Range Status   12/17/2023 1140 121 70 - 130 mg/dL Final   12/17/2023 0727 99 70 - 130 mg/dL Final   12/16/2023 2004 121 70 - 130 mg/dL Final   12/16/2023 1632 133 (H) 70 - 130 mg/dL Final   12/16/2023 1149 89 70 - 130 mg/dL Final     Results from last 7 days   Lab Units 12/15/23  0753 12/12/23  0226 12/11/23  1338   WBC 10*3/mm3 7.02 9.80 13.75*   HEMOGLOBIN g/dL 13.0 11.1* 11.2*   HEMATOCRIT % 39.5 33.6* 34.5   PLATELETS 10*3/mm3 264 217 237     Results from last 7 days   Lab Units 12/13/23  0441 12/12/23  1619 12/12/23  0226 12/11/23  1754   SODIUM mmol/L 141  --  142 137   POTASSIUM mmol/L 4.6 3.5 3.4*  3.4* 3.3*   CHLORIDE mmol/L 108*  --  108* 104   CO2 mmol/L 24.3  --  24.8 23.0   BUN mg/dL 9  --  8 11   CREATININE mg/dL 0.62  --  0.54* 0.58   CALCIUM mg/dL 8.6  --  8.3* 8.1*   BILIRUBIN mg/dL  --   --  0.2  --    ALK PHOS U/L  --   --  39  --    ALT (SGPT) U/L  --   --  17  --    AST (SGOT) U/L  --   --  25  --    GLUCOSE mg/dL 115*  --  116* 128*           ASSESSMENT and PLAN    S/P lumbar fusion     LUMBAR TWO TO THREE, FOUR TO FIVE INTERBODY FUSION Dec 11, 2023 for spondylolisthesis  December 17-progressing with her activities.  Good strength distally.  Still weak at the hips and is more weak on the right hip flexion compared to the left hip flexor.  Her surgical approach was on the left side    Impaired mobility  Impaired self care    DVT prophylaxis - SCDs/ Lovenox    Pain management - scheduled Tylenol/ methocarbamol/ oxycodone - [facility] report reviewed  December 16-took methocarbamol 750 mg only once yesterday and oxycodone 5 mg twice yesterday      HTN - amlodipine/ hydrochlorothiazide / lisinopril/    DM - empagliflozine-Jardiance, Metformin XR. Was also on semaglutide at home for weight loss but no  longer continuing  December 16-decrease metformin XR to 500 mg once daily as well as most recent home dose after weight loss    Depression - fluoxetine    Hypothyroid - on replacement      Functional status-December 15-bed mobility standby assist.  Gait 160 feet contact-guard rolling walker.  Did 12 stairs contact-guard bilateral railings.  Transfers contact-guard.    Goal is for home with  outpatient   therapies.  Barrier to discharge:  impaired mobility and self care  - work on  ADLS with adaptive equipment and proper back mechanics, functional mobility, transfer training and progressive ambulation  to overcome.        Cedrick Roberts MD      Appropriate PPE was worn during the entire visit.  Hand hygiene was completed before and after.

## 2023-12-17 NOTE — PLAN OF CARE
Goal Outcome Evaluation:  Plan of Care Reviewed With: patient           Outcome Evaluation: Summer is AOX4, C/o pain keveks 5 and 7, requested robaxin and PRN pain medicine. Up to bathroom, assist x1 w/walker. Bowel meds administered, but no bm this shift. Drsg in place, C/D/I.

## 2023-12-17 NOTE — THERAPY TREATMENT NOTE
Inpatient Rehabilitation - Physical Therapy Treatment Note       [facility]     Patient Name: Summer Jefferson  : 1955  MRN: [id number]    Today's Date: 2023                    Admit Date: 2023      Visit Dx:   No diagnosis found.    Patient Active Problem List   Diagnosis    Diabetes mellitus type 2, controlled    Hypothyroidism, adult    Hypertension    Hypercholesterolemia    HSV (herpes simplex virus) infection    Anxiety and depression    Chronic bilateral low back pain with left-sided sciatica    Facet arthropathy, lumbar    Spondylolisthesis, lumbar region    Lumbar radiculopathy    SI (sacroiliac) joint inflammation    Spondylolisthesis of lumbar region    Overweight (BMI 25.0-29.9)    S/P lumbar fusion       Past Medical History:   Diagnosis Date    Anxiety and depression 03/15/2022    Backache     Diabetes mellitus type 2, controlled     HSV (herpes simplex virus) infection 03/15/2022    Hypercholesterolemia     Hypertension     Hypothyroidism     Osteoarthritis of multiple joints     Spinal stenosis     LUMBAR       Past Surgical History:   Procedure Laterality Date    ANKLE ARTHROSCOPY Right     2013 & 10/2013    COLONOSCOPY      2007    EPIDURAL BLOCK      X5    LAPAROSCOPIC GASTRIC BANDING  2007    LUMBAR FUSION Left 2023    Procedure: PRONE OBLIQUE LATERAL LUMBAR TWO TO THREE, FOUR TO FIVE INTERBODY FUSION WITH ANTERIOR PLATING AND NEUROMONITORING;  Surgeon: Calderon Whittaker MD;  Location: [facility] OR;  Service: Neurosurgery;  Laterality: Left;    LUMBAR FUSION N/A 2023    Procedure: LUMBAR FUSION MINIMALLY INVASIVE NAVIGATION;  Surgeon: Calderon Whittaker MD;  Location: [facility] OR;  Service: Neurosurgery;  Laterality: N/A;    ROTATOR CUFF REPAIR Right     2014    TOTAL KNEE ARTHROPLASTY Right     2014       PT ASSESSMENT (last 12 hours)       IRF PT Evaluation and Treatment       Row Name 23 0909          PT Time and Intention    Document  Type daily treatment  -     Mode of Treatment physical therapy  -     Patient/Family/Caregiver Comments/Observations Pt agreeable to PT  -       Row Name 12/17/23 0909          General Information    Patient Profile Reviewed yes  -     General Observations of Patient Up in w/c upon arrival  -     Existing Precautions/Restrictions spinal;fall  -       Row Name 12/17/23 0909          Pain Assessment    Pretreatment Pain Rating 3/10  -     Posttreatment Pain Rating 5/10  -     Pain Location lower  -     Pain Location - back  -       Row Name 12/17/23 0909          Cognition/Psychosocial    Affect/Mental Status (Cognition) WFL  -     Orientation Status (Cognition) oriented x 4  -     Follows Commands (Cognition) WFL  -     Personal Safety Interventions fall prevention program maintained;gait belt;nonskid shoes/slippers when out of bed;supervised activity  -     Cognitive Function WFL  -       Row Name 12/17/23 0909          Sit-Stand Transfer    Sit-Stand [place] (Transfers) contact guard;verbal cues  -     Assistive Device (Sit-Stand Transfers) walker, front-wheeled  -       Row Name 12/17/23 0909          Stand-Sit Transfer    Stand-Sit [place] (Transfers) standby assist;verbal cues  -     Assistive Device (Stand-Sit Transfers) walker, front-wheeled  -       Row Name 12/17/23 0909          Gait/Stairs (Locomotion)    [place] Level (Gait) contact guard  -     Assistive Device (Gait) walker, front-wheeled  -     Distance in Feet (Gait) 180 x2  -     Pattern (Gait) step-through  -     Deviations/Abnormal Patterns (Gait) gait speed decreased;weight shifting decreased  -     Bilateral Gait Deviations heel strike decreased  -       Row Name 12/17/23 0909          Hip (Therapeutic Exercise)    Hip (Therapeutic Exercise) isometric exercises  -     Hip Isometrics (Therapeutic Exercise) sitting;bilateral;gluteal sets;aDduction;2 sets;10 repetitions  -     Hip  Strengthening (Therapeutic Exercise) sitting;bilateral;marching while seated;20 repititions  -       Row Name 12/17/23 0909          Knee (Therapeutic Exercise)    Knee Strengthening (Therapeutic Exercise) sitting;bilateral;LAQ (long arc quad);20 repititions;flexion  -       Row Name 12/17/23 0909          Ankle (Therapeutic Exercise)    Ankle Strengthening (Therapeutic Exercise) sitting;bilateral;dorsiflexion;plantarflexion;20 repititions  -       Row Name 12/17/23 0909          Positioning and Restraints    Pre-Treatment Position sitting in chair/recliner  -     Post Treatment Position chair  -     In Chair sitting;call light within reach;encouraged to call for assist;exit alarm on  -               User Key  (r) = Recorded By, (t) = Taken By, (c) = Cosigned By      Initials Name Provider Type     Brook Little, PT Physical Therapist                  Wound 12/11/23 0847 Left lateral lumbar spine Incision (Active)   Dressing Appearance dry;intact 12/17/23 0753   Closure Glue 12/17/23 0753   Base clean;dry;pink 12/17/23 0753   Periwound ecchymotic 12/17/23 0753   Periwound Temperature warm 12/17/23 0753   Periwound Skin Turgor soft 12/17/23 0753   Drainage Amount none 12/17/23 0753   Care, Wound cleansed with;sterile normal saline 12/17/23 0753   Dressing Care other (see comments) 12/17/23 0753     Physical Therapy Education       Title: PT OT SLP Therapies (Done)       Topic: Physical Therapy (Done)       Point: Mobility training (Done)       Learning Progress Summary             Patient Acceptance, E,D, DU,VU by GIOVANNA at 12/17/2023 0914    Acceptance, E,TB,D, VU by SARITA at 12/16/2023 1203    Acceptance, E,D, VU by EMILIE at 12/15/2023 1551                         Point: Home exercise program (Done)       Learning Progress Summary             Patient Acceptance, E,D, DU,VU by GIOVANNA at 12/17/2023 0914                         Point: Body mechanics (Done)       Learning Progress Summary             Patient  Acceptance, E,D, VU by LW at 12/15/2023 1551                         Point: Precautions (Done)       Learning Progress Summary             Patient Acceptance, E,D, VU by LW at 12/15/2023 1551                                         User Key       Initials Effective Dates Name Provider Type Discipline     07/11/23 -  Pooja Stephens, PT Physical Therapist PT     06/16/21 -  Brook Little, PT Physical Therapist PT    LW 05/08/23 -  Layla Villeda, PT Physical Therapist PT                    PT Recommendation and Plan                          Time Calculation:      PT Charges       Row Name 12/17/23 0914             Time Calculation    Start Time 0830  -      Stop Time 0900  -      Time Calculation (min) 30 min  -      PT Received On 12/17/23  -      PT - Next Appointment 12/18/23  -                User Key  (r) = Recorded By, (t) = Taken By, (c) = Cosigned By      Initials Name Provider Type     Brook Little, PT Physical Therapist                    Therapy Charges for Today       Code Description Service Date Service Provider Modifiers Qty    48369737995 HC PT THERAPEUTIC ACT EA 15 MIN 12/17/2023 Brook Little, PT GP 1    01314418562 HC PT THER PROC EA 15 MIN 12/17/2023 Brook Little, PT GP 1    41596031443 HC PT THERAPEUTIC ACT EA 15 MIN 12/17/2023 Brook Little, PT GP 1    46187204134 HC PT THER PROC EA 15 MIN 12/17/2023 Brook Little, PT GP 1              PT G-Codes  AM-PAC 6 Clicks Score (PT): 20      Brook Little PT  12/17/2023

## 2023-12-18 VITALS
OXYGEN SATURATION: 98 % | DIASTOLIC BLOOD PRESSURE: 67 MMHG | RESPIRATION RATE: 18 BRPM | HEIGHT: 64 IN | SYSTOLIC BLOOD PRESSURE: 114 MMHG | BODY MASS INDEX: 27.36 KG/M2 | WEIGHT: 160.27 LBS | HEART RATE: 73 BPM | TEMPERATURE: 97.7 F

## 2023-12-18 LAB
ALBUMIN SERPL-MCNC: 3.6 G/DL (ref 3.5–5.2)
ALBUMIN/GLOB SERPL: 1.2 G/DL
ALP SERPL-CCNC: 84 U/L (ref 39–117)
ALT SERPL W P-5'-P-CCNC: 53 U/L (ref 1–33)
ANION GAP SERPL CALCULATED.3IONS-SCNC: 9.7 MMOL/L (ref 5–15)
AST SERPL-CCNC: 54 U/L (ref 1–32)
BASOPHILS # BLD AUTO: 0.03 10*3/MM3 (ref 0–0.2)
BASOPHILS NFR BLD AUTO: 0.6 % (ref 0–1.5)
BILIRUB SERPL-MCNC: 0.3 MG/DL (ref 0–1.2)
BUN SERPL-MCNC: 11 MG/DL (ref 8–23)
BUN/CREAT SERPL: 18.3 (ref 7–25)
CALCIUM SPEC-SCNC: 9.2 MG/DL (ref 8.6–10.5)
CHLORIDE SERPL-SCNC: 103 MMOL/L (ref 98–107)
CO2 SERPL-SCNC: 27.3 MMOL/L (ref 22–29)
CREAT SERPL-MCNC: 0.6 MG/DL (ref 0.57–1)
DEPRECATED RDW RBC AUTO: 40.4 FL (ref 37–54)
EGFRCR SERPLBLD CKD-EPI 2021: 97.3 ML/MIN/1.73
EOSINOPHIL # BLD AUTO: 0.22 10*3/MM3 (ref 0–0.4)
EOSINOPHIL NFR BLD AUTO: 4.1 % (ref 0.3–6.2)
ERYTHROCYTE [DISTWIDTH] IN BLOOD BY AUTOMATED COUNT: 13 % (ref 12.3–15.4)
GLOBULIN UR ELPH-MCNC: 3 GM/DL
GLUCOSE BLDC GLUCOMTR-MCNC: 108 MG/DL (ref 70–130)
GLUCOSE BLDC GLUCOMTR-MCNC: 136 MG/DL (ref 70–130)
GLUCOSE BLDC GLUCOMTR-MCNC: 98 MG/DL (ref 70–130)
GLUCOSE SERPL-MCNC: 95 MG/DL (ref 65–99)
HBA1C MFR BLD: 5.4 % (ref 4.8–5.6)
HCT VFR BLD AUTO: 37.3 % (ref 34–46.6)
HGB BLD-MCNC: 11.8 G/DL (ref 12–15.9)
IMM GRANULOCYTES # BLD AUTO: 0.03 10*3/MM3 (ref 0–0.05)
IMM GRANULOCYTES NFR BLD AUTO: 0.6 % (ref 0–0.5)
LYMPHOCYTES # BLD AUTO: 1.72 10*3/MM3 (ref 0.7–3.1)
LYMPHOCYTES NFR BLD AUTO: 32.4 % (ref 19.6–45.3)
MCH RBC QN AUTO: 27.1 PG (ref 26.6–33)
MCHC RBC AUTO-ENTMCNC: 31.6 G/DL (ref 31.5–35.7)
MCV RBC AUTO: 85.7 FL (ref 79–97)
MONOCYTES # BLD AUTO: 0.56 10*3/MM3 (ref 0.1–0.9)
MONOCYTES NFR BLD AUTO: 10.5 % (ref 5–12)
NEUTROPHILS NFR BLD AUTO: 2.75 10*3/MM3 (ref 1.7–7)
NEUTROPHILS NFR BLD AUTO: 51.8 % (ref 42.7–76)
NRBC BLD AUTO-RTO: 0 /100 WBC (ref 0–0.2)
PLATELET # BLD AUTO: 347 10*3/MM3 (ref 140–450)
PMV BLD AUTO: 9.4 FL (ref 6–12)
POTASSIUM SERPL-SCNC: 4.2 MMOL/L (ref 3.5–5.2)
PROT SERPL-MCNC: 6.6 G/DL (ref 6–8.5)
RBC # BLD AUTO: 4.35 10*6/MM3 (ref 3.77–5.28)
SODIUM SERPL-SCNC: 140 MMOL/L (ref 136–145)
T4 FREE SERPL-MCNC: 1.18 NG/DL (ref 0.93–1.7)
TSH SERPL DL<=0.05 MIU/L-ACNC: 4.46 UIU/ML (ref 0.27–4.2)
WBC NRBC COR # BLD AUTO: 5.31 10*3/MM3 (ref 3.4–10.8)

## 2023-12-18 PROCEDURE — 97530 THERAPEUTIC ACTIVITIES: CPT

## 2023-12-18 PROCEDURE — 85025 COMPLETE CBC W/AUTO DIFF WBC: CPT | Performed by: PHYSICAL MEDICINE & REHABILITATION

## 2023-12-18 PROCEDURE — 84439 ASSAY OF FREE THYROXINE: CPT | Performed by: PHYSICAL MEDICINE & REHABILITATION

## 2023-12-18 PROCEDURE — 82948 REAGENT STRIP/BLOOD GLUCOSE: CPT

## 2023-12-18 PROCEDURE — 25010000002 ENOXAPARIN PER 10 MG: Performed by: NURSE PRACTITIONER

## 2023-12-18 PROCEDURE — 80053 COMPREHEN METABOLIC PANEL: CPT | Performed by: PHYSICAL MEDICINE & REHABILITATION

## 2023-12-18 PROCEDURE — 83036 HEMOGLOBIN GLYCOSYLATED A1C: CPT | Performed by: PHYSICAL MEDICINE & REHABILITATION

## 2023-12-18 PROCEDURE — 97535 SELF CARE MNGMENT TRAINING: CPT

## 2023-12-18 PROCEDURE — 97110 THERAPEUTIC EXERCISES: CPT

## 2023-12-18 PROCEDURE — 97116 GAIT TRAINING THERAPY: CPT

## 2023-12-18 PROCEDURE — 84443 ASSAY THYROID STIM HORMONE: CPT | Performed by: PHYSICAL MEDICINE & REHABILITATION

## 2023-12-18 RX ORDER — ACETAMINOPHEN 500 MG
500 TABLET ORAL 4 TIMES DAILY
Status: DISCONTINUED | OUTPATIENT
Start: 2023-12-18 | End: 2023-12-19 | Stop reason: HOSPADM

## 2023-12-18 RX ADMIN — SENNOSIDES AND DOCUSATE SODIUM 1 TABLET: 50; 8.6 TABLET ORAL at 20:30

## 2023-12-18 RX ADMIN — ACETAMINOPHEN 650 MG: 325 TABLET, FILM COATED ORAL at 08:43

## 2023-12-18 RX ADMIN — LISINOPRIL 10 MG: 10 TABLET ORAL at 08:43

## 2023-12-18 RX ADMIN — METHOCARBAMOL TABLETS 750 MG: 500 TABLET, COATED ORAL at 20:26

## 2023-12-18 RX ADMIN — ACETAMINOPHEN 650 MG: 325 TABLET, FILM COATED ORAL at 04:53

## 2023-12-18 RX ADMIN — LEVOTHYROXINE SODIUM 50 MCG: 50 TABLET ORAL at 08:43

## 2023-12-18 RX ADMIN — OXYCODONE HYDROCHLORIDE 5 MG: 5 TABLET ORAL at 02:20

## 2023-12-18 RX ADMIN — HYDROCHLOROTHIAZIDE 25 MG: 25 TABLET ORAL at 08:43

## 2023-12-18 RX ADMIN — OXYCODONE HYDROCHLORIDE 5 MG: 5 TABLET ORAL at 20:26

## 2023-12-18 RX ADMIN — FLUOXETINE HYDROCHLORIDE 20 MG: 20 CAPSULE ORAL at 08:43

## 2023-12-18 RX ADMIN — EMPAGLIFLOZIN 10 MG: 10 TABLET, FILM COATED ORAL at 08:43

## 2023-12-18 RX ADMIN — METHOCARBAMOL TABLETS 750 MG: 500 TABLET, COATED ORAL at 04:53

## 2023-12-18 RX ADMIN — ENOXAPARIN SODIUM 40 MG: 100 INJECTION SUBCUTANEOUS at 20:27

## 2023-12-18 RX ADMIN — METHOCARBAMOL TABLETS 750 MG: 500 TABLET, COATED ORAL at 14:03

## 2023-12-18 RX ADMIN — SENNOSIDES AND DOCUSATE SODIUM 1 TABLET: 50; 8.6 TABLET ORAL at 08:43

## 2023-12-18 RX ADMIN — ACETAMINOPHEN 500 MG: 500 TABLET ORAL at 17:31

## 2023-12-18 RX ADMIN — METFORMIN HYDROCHLORIDE 500 MG: 500 TABLET, EXTENDED RELEASE ORAL at 08:43

## 2023-12-18 RX ADMIN — ROSUVASTATIN CALCIUM 10 MG: 10 TABLET, FILM COATED ORAL at 20:31

## 2023-12-18 NOTE — PROGRESS NOTES
SECTION GG      Mobility Performance Discharge:     Roll Left and Right: Patient completed the activities by themself with no  assistance from a helper.   Sit to Lying: [place] provides verbal cues and/or touching/steadying and/or  contact guard assistance as patient completes activity. Assistance may be  provided throughout the activity or intermittently.   Lying to Sitting on Side of Bed: [place] provides verbal cues and/or  touching/steadying and/or contact guard assistance as patient completes  activity. Assistance may be provided throughout the activity or intermittently.   Sit to Stand: Patient completed the activities by themself with no assistance  from a helper.   Chair/Bed to Chair Transfer: Patient completed the activities by themself with  no assistance from a helper.   Car Transfer: [place] provides verbal cues and/or touching/steadying and/or  contact guard assistance as patient completes activity. Assistance may be  provided throughout the activity or intermittently.   Walk 10 Feet:   Patient completed the activities by themself with no assistance  from a helper.  Walk 50 Feet with 2 Turns:   Patient completed the activities by themself with  no assistance from a helper.  Walk 150 Feet:   [place] provides verbal cues and/or touching/steadying and/or  contact guard assistance as patient completes activity. Assistance may be  provided throughout the activity or intermittently.  Walking 10 Feet on Uneven Surfaces:   Patient completed the activities by  themself with no assistance from a helper.  1 Step Over Curb or Up/Down Stair:   [place] provides verbal cues and/or  touching/steadying and/or contact guard assistance as patient completes  activity. Assistance may be provided throughout the activity or intermittently.  4 Steps Up and Down, With/Without Rail:   [place] provides verbal cues and/or  touching/steadying and/or contact guard assistance as patient completes  activity. Assistance may be provided  throughout the activity or intermittently.  12 Steps Up and Down, With/Without Rail:   [place] provides verbal cues and/or  touching/steadying and/or contact guard assistance as patient completes  activity. Assistance may be provided throughout the activity or intermittently.  Picking up an Object:   [place] provides verbal cues and/or touching/steadying  and/or contact guard assistance as patient completes activity. Assistance may be  provided throughout the activity or intermittently. Uses Wheelchair and/or  Scooter: No    Section J. Health Conditions (Pain):  Pain Interference with Therapy Activities:   Rarely or not at all  Pain Interference with Day-to-Day Activities:   Rarely or not at all    Signed by: Naomie Jennings, PT

## 2023-12-18 NOTE — PROGRESS NOTES
Inpatient Rehabilitation Plan of Care Note    Plan of Care  Care Plan Reviewed - No updates at this time.    Safety    Performed Intervention(s)  Fall precautions  Hourly rounding      Psychosocial    Performed Intervention(s)  Allow extra time  for pt to verbalize feelings, needs, concerns.  Provide distraction and/or relaxation as needed.      Sphincter Control    Performed Intervention(s)  Monitor I/O.  Bowel meds prn.      Body Systems    Performed Intervention(s)  Skin assessment q shift and prn.  Wound care, as ordered.    Signed by: Keila Rodriguez RN

## 2023-12-18 NOTE — PLAN OF CARE
Goal Outcome Evaluation:  Plan of Care Reviewed With: patient        Progress: improving  Outcome Evaluation: A&Ox4. Pleasant and cooperative. Meds whole with water. See MAR for prn pain meds. Cont B&B. Ambulates to BR with CGA x1 and rolling wx. Pt c/o uncomfortable bed and difficulty sleeping d/t back pain and bed. Pt got up to sit in recliner at 0500. No unsafe behavior. Sleeping poorly this shift.

## 2023-12-18 NOTE — THERAPY DISCHARGE NOTE
Inpatient Rehabilitation - IRF Occupational Therapy Treatment Note/Discharge  [facility]     Patient Name: Summer Jefferson  : 1955  MRN: [id number]  Today's Date: 2023               Admit Date: 2023     No diagnosis found.  Patient Active Problem List   Diagnosis    Diabetes mellitus type 2, controlled    Hypothyroidism, adult    Hypertension    Hypercholesterolemia    HSV (herpes simplex virus) infection    Anxiety and depression    Chronic bilateral low back pain with left-sided sciatica    Facet arthropathy, lumbar    Spondylolisthesis, lumbar region    Lumbar radiculopathy    SI (sacroiliac) joint inflammation    Spondylolisthesis of lumbar region    Overweight (BMI 25.0-29.9)    S/P lumbar fusion     Past Medical History:   Diagnosis Date    Anxiety and depression 03/15/2022    Backache     Diabetes mellitus type 2, controlled     HSV (herpes simplex virus) infection 03/15/2022    Hypercholesterolemia     Hypertension     Hypothyroidism     Osteoarthritis of multiple joints     Spinal stenosis     LUMBAR     Past Surgical History:   Procedure Laterality Date    ANKLE ARTHROSCOPY Right     2013 & 10/2013    COLONOSCOPY      2007    EPIDURAL BLOCK      X5    LAPAROSCOPIC GASTRIC BANDING  2007    LUMBAR FUSION Left 2023    Procedure: PRONE OBLIQUE LATERAL LUMBAR TWO TO THREE, FOUR TO FIVE INTERBODY FUSION WITH ANTERIOR PLATING AND NEUROMONITORING;  Surgeon: Calderon Whittaker MD;  Location: [facility] MAIN OR;  Service: Neurosurgery;  Laterality: Left;    LUMBAR FUSION N/A 2023    Procedure: LUMBAR FUSION MINIMALLY INVASIVE NAVIGATION;  Surgeon: Calderon Whittaker MD;  Location: [facility] MAIN OR;  Service: Neurosurgery;  Laterality: N/A;    ROTATOR CUFF REPAIR Right     2014    TOTAL KNEE ARTHROPLASTY Right     2014       IRF OT ASSESSMENT FLOWSHEET (last 12 hours)       IRF OT Evaluation and Treatment       Row Name 23 1556          OT Time and Intention     Document Type discharge evaluation;daily treatment  -AF     Mode of Treatment occupational therapy  -AF     Patient Effort excellent  -AF       Row Name 12/18/23 1556          General Information    Patient/Family/Caregiver Comments/Observations pt sitting up in recliner chair in AM and PM sessions  -AF     Existing Precautions/Restrictions spinal;fall  -AF       Row Name 12/18/23 1556          Pain Assessment    Pretreatment Pain Rating 4/10  -AF     Posttreatment Pain Rating 4/10  -AF     Pain Location incisional  -AF     Pain Location - back  -AF       Row Name 12/18/23 1556          Cognition/Psychosocial    Affect/Mental Status (Cognition) WFL  -AF     Orientation Status (Cognition) oriented x 4  -AF     Follows Commands (Cognition) WFL  -AF     Personal Safety Interventions fall prevention program maintained;gait belt;nonskid shoes/slippers when out of bed  -AF     Cognitive Function WFL  -AF       Row Name 12/18/23 1556          Bathing    [place] Level (Bathing) bathing skills;standby assist  -AF     Assistive Device (Bathing) grab bar/tub rail;hand held shower spray hose;long-handled sponge;tub bench  -AF     Position (Bathing) supported sitting;supported standing  -AF       Row Name 12/18/23 1556          Upper Body Dressing    [place] Level (Upper Body Dressing) upper body dressing skills;independent  -AF     Position (Upper Body Dressing) supported sitting  -AF     Set-up Assistance (Upper Body Dressing) obtain clothing  -AF       Row Name 12/18/23 1556          Lower Body Dressing    [place] Level (Lower Body Dressing) doff;don;pants/bottoms;shoes/slippers;underwear;standby assist  -AF     Assistive Device Use (Lower Body Dressing) sock-aid;reacher  -AF     Position (Lower Body Dressing) supported sitting;supported standing  -AF       Row Name 12/18/23 1556          Grooming    [place] Level (Grooming) grooming skills;standby assist;modified independence  -AF     Position (Grooming)  supported sitting;supported standing  -AF     Comment (Grooming) RWX level  -AF       Row Name 12/18/23 1556          Toileting    [place] Level (Toileting) toileting skills;supervision;modified independence  -AF     Assistive Device Use (Toileting) grab bar/safety frame;raised toilet seat  -AF     Position (Toileting) supported sitting;supported standing  -AF     Comment (Toileting) RWX level  -AF       Row Name 12/18/23 1556          Functional Mobility    Functional Mobility- Comment RWX level in room SBA/MOD I  -AF       Row Name 12/18/23 1556          Transfer Assessment/Treatment    Transfers shower transfer  -AF       Row Name 12/18/23 1556          Sit-Stand Transfer    Sit-Stand [place] (Transfers) standby assist;modified independence  -AF     Assistive Device (Sit-Stand Transfers) walker, front-wheeled  -AF       Row Name 12/18/23 1556          Stand-Sit Transfer    Stand-Sit [place] (Transfers) standby assist;modified independence  -AF     Assistive Device (Stand-Sit Transfers) walker, front-wheeled  -AF       Row Name 12/18/23 1556          Toilet Transfer    Type (Toilet Transfer) sit-stand;stand-sit  -AF     [place] Level (Toilet Transfer) modified independence;standby assist  -AF     Assistive Device (Toilet Transfer) commode;grab bars/safety frame;walker, front-wheeled  -AF       Row Name 12/18/23 1556          Shower Transfer    Type (Shower Transfer) sit-stand;stand-sit  -AF     [place] Level (Shower Transfer) stand by assist  -AF     Assistive Device (Shower Transfer) grab bar, tub/shower;tub bench;walker, front-wheeled  -AF       Row Name 12/18/23 1556          Motor Skills    Functional Endurance good wtih ADLs  -AF       Row Name 12/18/23 1556          Shoulder (Therapeutic Exercise)    Shoulder Strengthening (Therapeutic Exercise) bilateral;external rotation;internal rotation;sitting;scapular stabilization;2 lb free weight;10 repetitions;3 sets  -AF       Row Name  12/18/23 1556          Elbow/Forearm (Therapeutic Exercise)    Elbow/Forearm Strengthening (Therapeutic Exercise) bilateral;flexion;supination;extension;pronation;sitting;2 lb free weight;10 repetitions;3 sets  -AF       Row Name 12/18/23 1556          Wrist (Therapeutic Exercise)    Wrist Strengthening (Therapeutic Exercise) bilateral;flexion;extension;2 lb free weight;10 repetitions;3 sets  -AF       Row Name 12/18/23 1556          Hand (Therapeutic Exercise)    Hand Strengthening (Therapeutic Exercise) bilateral; strengthening;hand gripper;10 repetitions;3 sets  -AF       Row Name 12/18/23 1556          Balance    Static Sitting Balance independent  -AF     Static Standing Balance modified independence;standby assist  -AF     Dynamic Standing Balance standby assist;modified independence  -AF       Row Name 12/18/23 1556          Positioning and Restraints    Pre-Treatment Position sitting in chair/recliner  -AF     Post Treatment Position wheelchair  -AF     In Chair sitting;call light within reach;encouraged to call for assist;exit alarm on  in AM  -AF     In Wheelchair sitting;exit alarm on;with PT  in PM  -AF               User Key  (r) = Recorded By, (t) = Taken By, (c) = Cosigned By      Initials Name Effective Dates    AF Shantelle Roberts, OTR 06/16/21 -                   Wound 12/11/23 0847 Left lateral lumbar spine Incision (Active)   Dressing Appearance open to air 12/18/23 0843   Closure Glue 12/18/23 0843   Base clean;dry;pink 12/18/23 0843   Periwound ecchymotic 12/18/23 0843   [place]iwound Temperature warm 12/18/23 0843   [place] Skin Turgor soft 12/18/23 0843   Drainage Amount none 12/18/23 0843   Dressing Care dressing changed;border dressing 12/18/23 0843       Occupational Therapy Education       Title: PT OT SLP Therapies (Done)       Topic: Occupational Therapy (Done)       Point: ADL training (Done)       Description:   Instruct learner(s) on proper safety adaptation and remediation  techniques during self care or transfers.   Instruct in proper use of assistive devices.                  Learning Progress Summary             Patient Acceptance, E,TB,D, VU,DU by AF at 12/18/2023 0211    Comment: AE with ADL tasks and shower chair                         Point: Home exercise program (Done)       Description:   Instruct learner(s) on appropriate technique for monitoring, assisting and/or progressing therapeutic exercises/activities.                  Learning Progress Summary             Patient Acceptance, E,TB,D, VU,DU by AF at 12/18/2023 5529    Comment: AE with ADL tasks and shower chair                         Point: Precautions (Done)       Description:   Instruct learner(s) on prescribed precautions during self-care and functional transfers.                  Learning Progress Summary             Patient Acceptance, E,TB,D, VU,DU by AF at 12/18/2023 9229    Comment: AE with ADL tasks and shower chair                         Point: Body mechanics (Done)       Description:   Instruct learner(s) on proper positioning and spine alignment during self-care, functional mobility activities and/or exercises.                  Learning Progress Summary             Patient Acceptance, E,TB,D, VU,DU by AF at 12/18/2023 1062    Comment: AE with ADL tasks and shower chair                                         User Key       Initials Effective Dates Name Provider Type Discipline     06/16/21 -  Shantelle Roberts, OTR Occupational Therapist OT                    OT Recommendation and Plan  Planned Therapy Interventions (OT): activity tolerance training, BADL retraining, adaptive equipment training, functional balance retraining, occupation/activity based interventions, patient/caregiver education/training, strengthening exercise, transfer/mobility retraining, ROM/therapeutic exercise           OT IRF GOALS       Row Name 12/18/23 1559 12/15/23 1000          Transfer Goal 1 (OT-IRF)    Activity/Assistive  Device (Transfer Goal 1, OT-IRF) -- all transfers  -KN     [place] Level (Transfer Goal 1, OT-IRF) -- standby assist  -KN     Time Frame (Transfer Goal 1, OT-IRF) -- short-term goal (STG);3 - 5 days  -KN     Progress/Outcomes (Transfer Goal 1, OT-IRF) goal met  -AF --        Transfer Goal 2 (OT-IRF)    Activity/Assistive Device (Transfer Goal 2, OT-IRF) -- all transfers  -KN     [place] Level (Transfer Goal 2, OT-IRF) -- independent  -KN     Time Frame (Transfer Goal 2, OT-IRF) -- long-term goal (LTG);1 week  -KN     Progress/Outcomes (Transfer Goal 2, OT-IRF) goal partially met  -AF --        Bathing Goal 1 (OT-IRF)    Activity/Device (Bathing Goal 1, OT-IRF) -- bathing skills, all  -KN     [place] Level (Bathing Goal 1, OT-IRF) -- set-up required;standby assist  -KN     Time Frame (Bathing Goal 1, OT-IRF) -- 3 - 5 days;short-term goal (STG)  -KN     Progress/Outcomes (Bathing Goal 1, OT-IRF) goal met  -AF --        Bathing Goal 2 (OT-IRF)    Activity/Device (Bathing Goal 2, OT-IRF) -- bathing skills, all  -KN     [place] Level (Bathing Goal 2, OT-IRF) -- independent  -KN     Time Frame (Bathing Goal 2, OT-IRF) -- long-term goal (LTG);1 week  -KN     Progress/Outcomes (Bathing Goal 2, OT-IRF) goal partially met  -AF --        UB Dressing Goal 1 (OT-IRF)    Activity/Device (UB Dressing Goal 1, OT-IRF) -- upper body dressing  -KN     [place] (UB Dress Goal 1, OT-IRF) -- independent  -KN     Time Frame (UB Dressing Goal 1, OT-IRF) -- long-term goal (LTG);5 days  -KN     Progress/Outcomes (UB Dressing Goal 1, OT-IRF) goal met  -AF --        LB Dressing Goal 1 (OT-IRF)    Activity/Device (LB Dressing Goal 1, OT-IRF) -- lower body dressing  -KN     [place] (LB Dressing Goal 1, OT-IRF) -- contact guard required  -KN     Time Frame (LB Dressing Goal 1, OT-IRF) -- 3 - 5 days;short-term goal (STG)  -KN     Progress/Outcomes (LB Dressing Goal 1, OT-IRF) goal met  -AF --        LB Dressing Goal 2  (OT-IRF)    Activity/Device (LB Dressing Goal 2, OT-IRF) -- lower body dressing  -KN     [place] (LB Dressing Goal 2, OT-IRF) -- modified independence  -KN     Time Frame (LB Dressing Goal 2, OT-IRF) -- long-term goal (LTG);1 week  -KN     Progress/Outcomes (LB Dressing Goal 2, OT-IRF) goal met  -AF --        Grooming Goal 1 (OT-IRF)    Activity/Device (Grooming Goal 1, OT-IRF) -- grooming skills, all  -KN     [place] (Grooming Goal 1, OT-IRF) -- set-up required;supervision required  -KN     Time Frame (Grooming Goal 1, OT-IRF) -- short-term goal (STG);3 days  -KN     Progress/Outcomes (Grooming Goal 1, OT-IRF) goal met  -AF --        Grooming Goal 2 (OT-IRF)    Activity/Device (Grooming Goal 2, OT-IRF) -- grooming skills, all  -KN     [place] (Grooming Goal 2, OT-IRF) -- independent  -KN     Time Frame (Grooming Goal 2, OT-IRF) -- long-term goal (LTG);1 week  -KN     Progress/Outcomes (Grooming Goal 2, OT-IRF) goal met  -AF --        Toileting Goal 1 (OT-IRF)    Activity/Device (Toileting Goal 1, OT-IRF) -- toileting skills, all  -KN     [place] Level (Toileting Goal 1, OT-IRF) -- standby assist;set-up required  -KN     Progress/Outcomes (Toileting Goal 1, OT-IRF) goal met  -AF --     Time Frame (Toileting Goal 1, OT-IRF) -- short-term goal (STG);3 - 5 days  -KN        Toileting Goal 2 (OT-IRF)    Activity/Device (Toileting Goal 2, OT-IRF) -- toileting skills, all  -KN     [place] Level (Toileting Goal 2, OT-IRF) -- independent  -KN     Progress/Outcomes (Toileting Goal 2, OT-IRF) goal partially met  -AF --     Time Frame (Toileting Goal 2, OT-IRF) -- long-term goal (LTG);1 week  -KN        Strength Goal 1 (OT-IRF)    Strength Goal 1 (OT-IRF) -- Pt will demo a MMT of 4/5 B UE shoulder strength by DC  -KN     Time Frame (Strength Goal 1, OT-IRF) -- long-term goal (LTG);1 week  -KN     Progress/Outcomes (Strength Goal 1, OT-IRF) goal not met  -AF --        Safety Awareness Goal 1 (OT-IRF)     Activity (Safety Awareness Goal 1, OT-IRF) -- awareness of need for assistance;safe use of assistive device/equipment;follow through of safety precautions  -KN     [place]/Cues/Accuracy (Safety Awareness Goal 1, OT-IRF) -- independent  -KN     Time Frame (Safety Awareness Goal 1, OT-IRF) -- long-term goal (LTG);1 week  -KN     Progress/Outcomes (Safety Awareness Goal 1, OT-IRF) goal met  -AF --               User Key  (r) = Recorded By, (t) = Taken By, (c) = Cosigned By      Initials Name Provider Type    Shantelle Siddiqui OTCOTY Occupational Therapist    Remberto Price OT Occupational Therapist                        Time Calculation:    Time Calculation- OT       Row Name 12/18/23 1601 12/18/23 1559          Time Calculation- OT    OT Start Time 1230  -AF 0800  -AF     OT Stop Time 1300  -AF 0900  -AF     OT Time Calculation (min) 30 min  -AF 60 min  -AF               User Key  (r) = Recorded By, (t) = Taken By, (c) = Cosigned By      Initials Name Provider Type    Shantelle Siddiqui OTCOTY Occupational Therapist                    Therapy Charges for Today       Code Description Service Date Service Provider Modifiers Qty    23939948434 HC OT SELF CARE/MGMT/TRAIN EA 15 MIN 12/18/2023 Shantelle Roberts OTR GO 4    05537205301 HC OT THER PROC EA 15 MIN 12/18/2023 Shantelle Roberts OTR GO 2                 OT Discharge Summary  Reason for Discharge: Maximum functional potential achieved, Discharge from facility, All goals achieved  Outcomes Achieved: Able to achieve all goals within established timeline  Discharge Destination: Home, Home with assist    MARGIE Waite  12/18/2023

## 2023-12-18 NOTE — PROGRESS NOTES
Inpatient Rehabilitation Functional Measures Assessment and Plan of Care    Plan of Care  Updated Problems/Interventions  Self Care    [OT] Bathing(Active)  Current Status(12/18/2023): SBA/MOD I  Weekly Goal(12/19/2023): MOD I  Discharge Goal: MOD I    [OT] Dressing (Lower)(Active)  Current Status(12/18/2023): Set up with AE  Weekly Goal(12/19/2023): MOD I with AE  Discharge Goal: MOD I with AE    [OT] Dressing (Upper)(Active)  Current Status(12/18/2023): Indep  Weekly Goal(12/19/2023): Indep  Discharge Goal: Indep    [OT] Grooming(Active)  Current Status(12/18/2023): Indep  Weekly Goal(12/19/2023): Indep  Discharge Goal: Indep    [OT] Toileting(Active)  Current Status(12/18/2023): SBA/BERRY  Weekly Goal(12/19/2023): MOD I  Discharge Goal: MOD I        Mobility    [OT] Toilet Transfers(Active)  Current Status(12/18/2023): SBA/BERRY with RWX  Weekly Goal(12/19/2023): MOD I  Discharge Goal: MOD I    [OT] Tub/Shower Transfers(Active)  Current Status(12/18/2023): SBA  Weekly Goal(12/22/2023): SBA  Discharge Goal: SBA    Functional Measures  BRAYDON Eating:  Branch  BRAYDON Grooming: Branch  BRAYDON Bathing:  Branch  BRAY[place] Upper Body Dressing:  Branch  BRAYDON Lower Body Dressing:  Branch  B[place] Toileting:  Branch    BRAYDON Bladder Management  Level of Assistance:  Branch  Frequency/Number of Accidents this Shift:  Branch    BRAYDON Bowel Management  Level of Assistance: Branch  Frequency/Number of Accidents this Shift: Branch    BRAYDON Bed/Chair/Wheelchair Transfer:  Branch  BRAYDON Toilet Transfer:  Branch  BRAYDON Tub/Shower Transfer:  Branch    Previously Documented Mode of Locomotion at Discharge: Field  BRAYDON Expected Mode of Locomotion at Discharge: Branch  BRAYDON Walk/Wheelchair:  Branch  BRAYDON Stairs:  Branch    BRAY[place] Comprehension:  Branch  B[place] Expression:  Branch  BRAY[place] Social Interaction:  Branch  BRAYDON Problem Solving:  Branch  BRAYDON Memory:  Branch    Therapy Mode Minutes  Occupational Therapy: Branch  Physical Therapy: Branch  Speech Language Pathology:   Trish    Signed by: Shantelle Roberts OT

## 2023-12-18 NOTE — PROGRESS NOTES
LOS: 4 days   Patient Care Team:  Ruth Benitez APRN as PCP - General (Family Medicine)      JAREDGUME QUINTANA  1955      ADMITTING DIAGNOSIS:    LUMBAR TWO TO THREE, FOUR TO FIVE INTERBODY FUSION Dec 11, 2023 for spondylolisthesis     Subjective     Overall mobilizing better.  Doing well with ambulation in the gym with the rolling walker.  Less pain inhibition with hip flexion.  Has been on scheduled Tylenol 650 mg every 4 hours.  Transaminases increased to the 50s today and will decrease to 500 mg 4 times a day, next dose this evening.  Took methocarbamol 4 times yesterday and oxycodone twice      Objective     Vitals:    12/18/23 0455   BP: 109/65   Pulse: 86   Resp: 19   Temp: 97.8 °F (36.6 °C)   SpO2: 96%       Intake/Output Summary (Last 24 hours) at 12/18/2023 0955  Last data filed at 12/17/2023 1830  Gross per 24 hour   Intake 840 ml   Output --   Net 840 ml     PHYSICAL EXAM:   MENTAL STATUS -  AWAKE / ALERT  HEENT-  SCLERAE ANICTERIC, CONJUNCTIVAE PINK, OP MOIST, NO JVD,    LUNGS - NORMAL RESPIRATIONS.  Clear to auscultation without wheezes rales or rhonchi  HEART- RRR, no rub murmur or gallop  ABD -  SOFT, NONDISTENDED, NABS soft nontender  Left flank incision  and left lumbar incision-dressed   EXT - NO EDEMA OR CYANOSIS  NEURO - AWAKE, ALERT  MOTOR EXAM -she has pain inhibition proximally in the bilateral lower extremities, right more so than left.She takes resistance with bilateral knee extension, ankle dorsiflexion , and ankle plantarflexion and with inversion eversion.      MEDICATIONS  Scheduled Meds:acetaminophen, 500 mg, Oral, 4x Daily  amLODIPine, 5 mg, Oral, Daily  empagliflozin, 10 mg, Oral, Daily  enoxaparin, 40 mg, Subcutaneous, Nightly  FLUoxetine, 20 mg, Oral, Daily  hydroCHLOROthiazide, 25 mg, Oral, Daily  levothyroxine, 50 mcg, Oral, Daily  lisinopril, 10 mg, Oral, Daily  metFORMIN ER, 500 mg, Oral, Daily With Breakfast  rosuvastatin, 10 mg, Oral, Nightly  senna-docusate  sodium, 1 tablet, Oral, BID      Continuous Infusions:   PRN Meds:.  benzonatate    dextrose    dextrose    glucagon (human recombinant)    magnesium hydroxide    methocarbamol    [DISCONTINUED] HYDROmorphone **AND** naloxone    ondansetron **OR** ondansetron    oxyCODONE      RESULTS  Glucose   Date/Time Value Ref Range Status   12/18/2023 0722 98 70 - 130 mg/dL Final   12/17/2023 1648 105 70 - 130 mg/dL Final   12/17/2023 1140 121 70 - 130 mg/dL Final   12/17/2023 0727 99 70 - 130 mg/dL Final   12/16/2023 2004 121 70 - 130 mg/dL Final   12/16/2023 1632 133 (H) 70 - 130 mg/dL Final   12/16/2023 1149 89 70 - 130 mg/dL Final     Results from last 7 days   Lab Units 12/18/23  0526 12/15/23  0753 12/12/23  0226   WBC 10*3/mm3 5.31 7.02 9.80   HEMOGLOBIN g/dL 11.8* 13.0 11.1*   HEMATOCRIT % 37.3 39.5 33.6*   PLATELETS 10*3/mm3 347 264 217     Results from last 7 days   Lab Units 12/18/23  0526 12/13/23  0441 12/12/23  1619 12/12/23  0226   SODIUM mmol/L 140 141  --  142   POTASSIUM mmol/L 4.2 4.6 3.5 3.4*  3.4*   CHLORIDE mmol/L 103 108*  --  108*   CO2 mmol/L 27.3 24.3  --  24.8   BUN mg/dL 11 9  --  8   CREATININE mg/dL 0.60 0.62  --  0.54*   CALCIUM mg/dL 9.2 8.6  --  8.3*   BILIRUBIN mg/dL 0.3  --   --  0.2   ALK PHOS U/L 84  --   --  39   ALT (SGPT) U/L 53*  --   --  17   AST (SGOT) U/L 54*  --   --  25   GLUCOSE mg/dL 95 115*  --  116*           ASSESSMENT and PLAN    S/P lumbar fusion     LUMBAR TWO TO THREE, FOUR TO FIVE INTERBODY FUSION Dec 11, 2023 for spondylolisthesis  December 17-progressing with her activities.  Good strength distally.  Still weak at the hips and is more weak on the right hip flexion compared to the left hip flexor.  Her surgical approach was on the left side    Impaired mobility  Impaired self care    DVT prophylaxis - SCDs/ Lovenox    Pain management - scheduled Tylenol/ methocarbamol/ oxycodone - [facility] report reviewed  December 16-took methocarbamol 750 mg only once yesterday and  oxycodone 5 mg twice yesterday  December 18-Has been on scheduled Tylenol 650 mg every 4 hours.  Transaminases increased to the 50s today and will decrease to 500 mg 4 times a day, next dose this evening.  Took methocarbamol 4 times yesterday and oxycodone twice      HTN - amlodipine/ hydrochlorothiazide / lisinopril/    DM - empagliflozine-Jardiance, Metformin XR. Was also on semaglutide at home for weight loss but no longer continuing  December 16-decrease metformin XR to 500 mg once daily as well as most recent home dose after weight loss    Depression - fluoxetine    Hypothyroid - on replacement      Functional status-December 15-bed mobility standby assist.  Gait 160 feet contact-guard rolling walker.  Did 12 stairs contact-guard bilateral railings.  Transfers contact-guard.    Disposition-December 18-team conference in a.m.-possibly home tomorrow    Goal is for home with  outpatient   therapies.  Barrier to discharge:  impaired mobility and self care  - work on  ADLS with adaptive equipment and proper back mechanics, functional mobility, transfer training and progressive ambulation  to overcome.        Cedrick Roberts MD      Appropriate PPE was worn during the entire visit.  Hand hygiene was completed before and after.

## 2023-12-18 NOTE — THERAPY DISCHARGE NOTE
Inpatient Rehabilitation - Physical Therapy Treatment Note/Discharge  [facility]     Patient Name: Summer Jefferson  : 1955  MRN: [id number]  Today's Date: 2023                Admit Date: 2023    Visit Dx:  No diagnosis found.  Patient Active Problem List   Diagnosis    Diabetes mellitus type 2, controlled    Hypothyroidism, adult    Hypertension    Hypercholesterolemia    HSV (herpes simplex virus) infection    Anxiety and depression    Chronic bilateral low back pain with left-sided sciatica    Facet arthropathy, lumbar    Spondylolisthesis, lumbar region    Lumbar radiculopathy    SI (sacroiliac) joint inflammation    Spondylolisthesis of lumbar region    Overweight (BMI 25.0-29.9)    S/P lumbar fusion     Past Medical History:   Diagnosis Date    Anxiety and depression 03/15/2022    Backache     Diabetes mellitus type 2, controlled     HSV (herpes simplex virus) infection 03/15/2022    Hypercholesterolemia     Hypertension     Hypothyroidism     Osteoarthritis of multiple joints     Spinal stenosis     LUMBAR     Past Surgical History:   Procedure Laterality Date    ANKLE ARTHROSCOPY Right     2013 & 10/2013    COLONOSCOPY      2007    EPIDURAL BLOCK      X5    LAPAROSCOPIC GASTRIC BANDING  2007    LUMBAR FUSION Left 2023    Procedure: PRONE OBLIQUE LATERAL LUMBAR TWO TO THREE, FOUR TO FIVE INTERBODY FUSION WITH ANTERIOR PLATING AND NEUROMONITORING;  Surgeon: Calderon Whittaker MD;  Location: [facility];  Service: Neurosurgery;  Laterality: Left;    LUMBAR FUSION N/A 2023    Procedure: LUMBAR FUSION MINIMALLY INVASIVE NAVIGATION;  Surgeon: Calderon Whittaker MD;  Location: [facility] OR;  Service: Neurosurgery;  Laterality: N/A;    ROTATOR CUFF REPAIR Right     2014    TOTAL KNEE ARTHROPLASTY Right     2014       PT ASSESSMENT (last 12 hours)       IRF PT Evaluation and Treatment       Row Name 23 1030          PT Time and Intention    Document Type  "discharge evaluation;daily treatment  -MG     Mode of Treatment individual therapy;physical therapy  -MG     Patient/Family/Caregiver Comments/Observations Pt seated in recliner, pleasant and ready for PT. Pt hopeful to DC tomorrow.  -MG     Total Minutes, Physical Therapy 90  -MG       Row Name 12/18/23 1030          General Information    Patient Profile Reviewed yes  -MG     Existing Precautions/Restrictions spinal;fall  -MG       Row Name 12/18/23 1030          Pain Assessment    Pretreatment Pain Rating 4/10  -MG     Posttreatment Pain Rating 4/10  -MG     Pain Location - Side/Orientation Bilateral;Left  -MG     Pain Location incisional  -MG     Pain Location - back  -MG       Row Name 12/18/23 1030          Pain Scale: Word Pre/Post-Treatment    Pain Intervention(s) Medication (See MAR);Repositioned;Ambulation/increased activity;Rest  -MG       Row Name 12/18/23 1030          Cognition/Psychosocial    Affect/Mental Status (Cognition) WFL  -MG     Orientation Status (Cognition) oriented x 4  -MG     Follows Commands (Cognition) WFL  -MG     Personal Safety Interventions fall prevention program maintained;gait belt;muscle strengthening facilitated;nonskid shoes/slippers when out of bed;supervised activity  -MG     Cognitive Function WFL  -MG       Row Name 12/18/23 1030          Mobility    Advanced Gait Activity rough/uneven surfaces  -MG     Additional Documentation Advanced Gait Activity (Row)  -MG       Row Name 12/18/23 1030          Bed Mobility    Rolling Left [place] (Bed Mobility) independent  -MG     Rolling Right [place] (Bed Mobility) independent  -MG     Sidelying-Sit [place] (Bed Mobility) standby assist  -MG     Sit-Sidelying [place] (Bed Mobility) standby assist  -MG     Bed Mobility, Safety Issues decreased use of legs for bridging/pushing  -MG     Comment, (Bed Mobility) Practiced R log roll in/out of bed at 30\" which pt states is the height of her bed at home. Pt wanting to " use RW handle for assist. Trialed with and without w/ pt having same ability and amount of assist. Feels comfortable performing at home. Increased time required to bring RLE into bed. Discussed using gait belt as leg .  -MG       Row Name 12/18/23 1030          Transfer Assessment/Treatment    Transfers stand pivot/stand step transfer  -MG     Comment, (Transfers) STS x2 from couch and soft chair w/ SBA and use of RW. No difficulty coming to stand from softer surfaces.  -MG       Row Name 12/18/23 1030          Bed-Chair Transfer    Bed-Chair [place] (Transfers) standby assist;modified independence  -MG     Assistive Device (Bed-Chair Transfers) walker, front-wheeled  -MG       Row Name 12/18/23 1030          Chair-Bed Transfer    Chair-Bed [place] (Transfers) standby assist;modified independence  -MG     Assistive Device (Chair-Bed Transfers) walker, front-wheeled  -MG       Row Name 12/18/23 1030          Sit-Stand Transfer    Sit-Stand [place] (Transfers) standby assist;modified independence  -MG     Assistive Device (Sit-Stand Transfers) wheelchair;walker, front-wheeled  -MG       Row Name 12/18/23 1030          Stand-Sit Transfer    Stand-Sit [place] (Transfers) standby assist;modified independence  -MG     Assistive Device (Stand-Sit Transfers) walker, front-wheeled  -MG       Row Name 12/18/23 1030          Stand Pivot/Stand Step Transfer    Stand Pivot/Stand Step [place] (Transfers) standby assist;modified independence  -MG     Assistive Device (Stand Pivot Stand Step Transfer) wheelchair;walker, front-wheeled  -MG     Comment, (Stand Pivot Transfer) Chair<>WC.  -MG       Row Name 12/18/23 1030          Car Transfer    Type (Car Transfer) stand pivot/stand step  -MG     [place] Level (Car Transfer) standby assist;verbal cues  -MG     Assistive Device (Car Transfer) walker, front-wheeled  -MG     Comment, (Car Transfer) Performed x2. Second time performed with use of gait  belt as leg . Good sequencing throughout to maintain spinal precautions.  -MG       Row Name 12/18/23 1030          Gait/Stairs (Locomotion)    [place] Level (Gait) standby assist;modified independence  -MG     Assistive Device (Gait) walker, front-wheeled  80' without AD  -MG     Distance in Feet (Gait) 160, 80' x2  -MG     Pattern (Gait) step-through  -MG     Deviations/Abnormal Patterns (Gait) gait speed decreased;weight shifting decreased  -MG     Bilateral Gait Deviations heel strike decreased  -MG     Gait Assessment/Intervention Pt amb with and without RW. 80' w/o RW, pt noting feeling more unsteady and noted that she relies on her BUEs on the RW more than she thought. Improved heel-toe mechanics and balance w/ RW. Primarily SBA, but moments of Mod-I.  -MG     [place] Level (Stairs) contact guard;stand by assist  -MG     Handrail Location (Stairs) both sides  -MG     Number of Steps (Stairs) 12  -MG     Ascending Technique (Stairs) step-to-step  -MG     Descending Technique (Stairs) step-to-step  -MG     Stairs, Impairments strength decreased  -MG     Stairs Assessment/Intervention Progressed from CGA to close SBA. Educated on how to perform ascending/descending laterally for BUE support on handrail; pt declined performing.  -MG       Row Name 12/18/23 1030          Safety Issues, Functional Mobility    Impairments Affecting Function (Mobility) pain;endurance/activity tolerance  -MG       Row Name 12/18/23 1030          Rough/Uneven Surface Gait Skills (Mobility)    [place], Gait on Rough/Uneven Surface (Mobility) standby assist;modified independence  -MG     Assistive Device (Rough/Uneven Surface Gait) walker, front-wheeled  -MG     Distance in Feet (Rough/Uneven Surface Gait) 10  x2  -MG     Comment, Gait Rough/Uneven Surface (Mobility) Over red mat. No LOB or knee buckling. SBA for first bout, Mod-I for second bout.  -MG       Row Name 12/18/23 1030          Balance    Static Standing  Balance standby assist  -MG     Dynamic Standing Balance standby assist  -MG     Position/Device Used, Standing Balance supported;unsupported;walker, front-wheeled  RW placed in front of pt. Intermittent use without foam pad.  -MG     Balance Interventions standing;static;dynamic;moderate challenge;foam;weight shifting activity;UE activity with balance activity  -MG     Comment, Balance Standing bean bag toss w/ incorporated reaching to facilitate weight shifting and balance reactions; RW placed in front with minimal to no use. Reaching w/ RUE. Once finished pt ambulating to bucket and picking up missed bags w/ reacher and placing in bucket to simulate use of reacher in home environment. Performed again w/ pt standing on foam pad; unsteady and increased BLE shaking from weakness, intermittent holding of RW with LUE. No overt LOB. PM: Weaving in/out of cones 10' x4 to simulate obstacle avoidance and short/small turns for home environment w/ RW and SBA. No LOB or hitting/knocking over cones.  -MG       Row Name 12/18/23 1030          Hip (Therapeutic Exercise)    Hip Strengthening (Therapeutic Exercise) bilateral;marching while seated;10 repetitions  LLE w/ red TB.  -MG       Row Name 12/18/23 1030          Knee (Therapeutic Exercise)    Knee Strengthening (Therapeutic Exercise) bilateral;LAQ (long arc quad);resistance band;red;15 repititions  Decreased motor control on RLE.  -MG       Row Name 12/18/23 1030          Ankle (Therapeutic Exercise)    Ankle Strengthening (Therapeutic Exercise) bilateral;dorsiflexion;plantarflexion;15 repititions  -MG       Row Name 12/18/23 1030          Positioning and Restraints    Pre-Treatment Position sitting in chair/recliner  -MG     Post Treatment Position chair  -MG     In Chair sitting;call light within reach;encouraged to call for assist;exit alarm on  -MG       Row Name 12/18/23 1030          Bed Mobility Goal 1 (PT-IRF)    Activity/Assistive Device (Bed Mobility Goal 1,  PT-IRF) bed mobility activities, all  -MG     [place] Level (Bed Mobility Goal 1, PT-IRF) independent  -MG     Time Frame (Bed Mobility Goal 1, PT-IRF) 1 week  -MG     Progress/Outcomes (Bed Mobility Goal 1, PT-IRF) goal partially met  Independent for rolling. SBA/sup for side<>sit to maintain precautions from a high bed height.  -MG       Row Name 12/18/23 1030          Transfer Goal 1 (PT-IRF)    Activity/Assistive Device (Transfer Goal 1, PT-IRF) sit-to-stand/stand-to-sit;bed-to-chair/chair-to-bed;car transfer  -MG     [place] Level (Transfer Goal 1, PT-IRF) modified independence  -MG     Time Frame (Transfer Goal 1, PT-IRF) 1 week  -MG     Progress/Outcomes (Transfer Goal 1, PT-IRF) goal met  -MG       Row Name 12/18/23 1030          Gait/Walking Locomotion Goal 1 (PT-IRF)    Activity/Assistive Device (Gait/Walking Locomotion Goal 1, PT-IRF) gait (walking locomotion);walker, rolling  -MG     Gait/Walking Locomotion Distance Goal 1 (PT-IRF) 200  -MG     [place] Level (Gait/Walking Locomotion Goal 1, PT-IRF) modified independence  -MG     Time Frame (Gait/Walking Locomotion Goal 1, PT-IRF) 1 week  -MG     Progress/Outcomes (Gait/Walking Locomotion Goal 1, PT-IRF) goal partially met  Able to ambulate up to 160'. 80' consistently.  -MG       Row Name 12/18/23 1030          Stairs Goal 1 (PT-IRF)    Activity/Assistive Device (Stairs Goal 1, PT-IRF) ascending stairs;descending stairs  -MG     Number of Stairs (Stairs Goal 1, PT-IRF) 4  -MG     [place] Level (Stairs Goal 1, PT-IRF) independent  -MG     Time Frame (Stairs Goal 1, PT-IRF) 1 week  -MG     Progress/Outcomes (Stairs Goal 1, PT-IRF) goal partially met  Able to perform with SBA/supervision for safety.  -MG       Row Name 12/18/23 1030          Discharge Summary (PT)    Outcomes Achieved/Progress Made Upon Discharge (PT) goals partially achieved prior to discharge;progress was made toward goals  -MG     Transfer to Another Level of Care or  Facility (PT) home with outpatient therapy services recommended  family assist  -MG     Discharge Summary Statement (PT) Pt reports feeling good, comfortable and ready to DC home. Has no further questions or concerns.  -MG               User Key  (r) = Recorded By, (t) = Taken By, (c) = Cosigned By      Initials Name Provider Type    MG Naomie Jennings, PT Physical Therapist                    Physical Therapy Education       Title: PT OT SLP Therapies (Done)       Topic: Physical Therapy (Done)       Point: Mobility training (Done)       Learning Progress Summary             Patient Acceptance, E,D, VU,DU by MG at 12/18/2023 1144    Acceptance, E,D, DU,VU by  at 12/17/2023 0914    Acceptance, E,TB,D, VU by  at 12/16/2023 1203    Acceptance, E,D, VU by LW at 12/15/2023 1551                         Point: Home exercise program (Done)       Learning Progress Summary             Patient Acceptance, E,D, VU,DU by MG at 12/18/2023 1144    Acceptance, E,D, DU,VU by  at 12/17/2023 0914                         Point: Body mechanics (Done)       Learning Progress Summary             Patient Acceptance, E,D, VU,DU by MG at 12/18/2023 1144    Acceptance, E,D, VU by LW at 12/15/2023 1551                         Point: Precautions (Done)       Learning Progress Summary             Patient Acceptance, E,D, VU,DU by MG at 12/18/2023 1144    Acceptance, E,D, VU by LW at 12/15/2023 1551                                         User Key       Initials Effective Dates Name Provider Type Discipline     07/11/23 -  Pooja Stephens, PT Physical Therapist PT     06/16/21 -  Brook Little PT Physical Therapist PT    MG 05/24/22 -  Naomie Jennings, PT Physical Therapist PT    LW 05/08/23 -  Layla Villeda, PT Physical Therapist PT                    PT Recommendation and Plan                  Time Calculation:    PT Charges       Row Name 12/18/23 1545 12/18/23 1145          Time Calculation    Start Time 1300  -MG 1030  -MG     Stop  Time 1330  -MG 1130  -MG     Time Calculation (min) 30 min  -MG 60 min  -MG     PT Received On -- 12/18/23  -MG     PT - Next Appointment -- 12/19/23  -MG               User Key  (r) = Recorded By, (t) = Taken By, (c) = Cosigned By      Initials Name Provider Type    MG Naomie Jennings, PT Physical Therapist                    Therapy Charges for Today       Code Description Service Date Service Provider Modifiers Qty    58097444968 HC GAIT TRAINING EA 15 MIN 12/18/2023 Naomie Jennings, PT GP 1    13465442590 HC PT THERAPEUTIC ACT EA 15 MIN 12/18/2023 Naomie Jennings, PT GP 2    [id number]  PT THER PROC EA 15 MIN 12/18/2023 Naomie Jennings, PT GP 1    [id number]  PT THERAPEUTIC ACT EA 15 MIN 12/18/2023 Naomie Jennings, PT GP 2            PT G-Codes  AM-PAC 6 Clicks Score (PT): 20         Naomie Jennings PT  12/18/2023

## 2023-12-18 NOTE — PLAN OF CARE
Goal Outcome Evaluation: Pt up in chair with no signs of distress noted at this time. Chair alarm set. Call light within reach. RN will continue to monitor.

## 2023-12-18 NOTE — PROGRESS NOTES
PPS CMG Coordinator  Inpatient Rehabilitation Admission    Ethnic Group: White.  Marital Status:  Marital Status: .    IRF Admission Date:  12/14/2023  Admission Class: Initial Rehab.  Admit From:  [facility]    Pre-Hospital Living: Home. Pre-Hospital Living  With: (2) Family/Relatives.    Payment Sources: Primary: Medicare Fee for Service  Secondary: Not Listed.  Impairment Group: 08.9 Other Orthopedic  Date of Onset of Impairment: 12/11/2023    Etiologic Diagnosis Code(s):  Rank Code      Description  1    M43.16    Spondylolisthesis, lumbar region    Comorbidities:  ICD    Are there any arthritis conditions recorded for Impairment Group, Etiologic  Diagnosis, or Comorbid Conditions that meet all of the regulatory requirements  for IRF classification (in 42 .29(b)(2)(x), (xi), and xii))? No    Presence of Pressure Ulcer:  No observed/documented pressure ulcers.    MEDICAL NEEDS  Height on Admission:  64 inches.  Weight on Admission:  160 pounds.    QUALITY INDICATORS  Prior Functioning:  Self Care: Patient completed all the activities by themself, with or without an  assistive device, with no assistance from a helper.  Indoor Mobility: Patient completed the activities by themself, with or without  an assistive device, with no assistance from a helper.  Stairs: Patient completed the activities by themself, with or without an  assistive device, with no assistance from a helper.  Functional Cognition: Patient completed the activities by themself, with or  without an assistive device, with no assistance from a helper.  Prior Device Use: Patient does not use manual or motorized wheelchair or  scooter, mechanical lift, walker, or an orthotic/prosthesis.    Bladder and Bowel: Bladder Continence: Always continent (no documented  incontinence).  Bowel Continence: Always continent (no documented incontinence).  Swallowing/Nutritional Status: Regular food (solids and liquids swallowed  safely  without supervision or modified food or liquid consistency).  Special Conditions: Patient did not receive total parenteral nutrition treatment  at the time of admission.  Section A. Ethnicity/ Race/Language  Ethnicity: Not of , /a, [place] Origin  Race: White  Preferred Language: English  Requests  to Communicate:   No    Section I. Active Diagnosis: Comorbidities and Co-existing Conditions:  Diabetes Mellitus (DM) - e.g., diabetic retinopathy, nephropathy, and  neuropathy).  Section J. Health Conditions: Patient has not had any falls in the past year.  Patient has had major surgery during the 100 days prior to admission.  Section K. Swallowing/Nutritional Status  Nutritional Approaches on Admission:  Nutritional Approaches on Admission:  None  Section M. Skin Conditions  Unhealed Pressure Ulcer/Injuries at Stage 1 or  Higher on Admission:  No.  Section N. Medication:  Potential Clinically Significant Medication Issues: No issues found during  review  Section . High-Risk Drug Classes: Use and Indication                       Is Taking                    Indication noted  High-RiskDrug Class  E. Anticoagulant     Yes                          Yes  H. Opioid            Yes                          Yes  J. Hypoglycemic      Yes                          Yes    Section O. Special Treatments, Procedures, and Programs  IV Access: Peripheral    Signed by: Iza Corrales RN

## 2023-12-19 VITALS
SYSTOLIC BLOOD PRESSURE: 111 MMHG | WEIGHT: 160.27 LBS | RESPIRATION RATE: 16 BRPM | HEART RATE: 79 BPM | OXYGEN SATURATION: 98 % | TEMPERATURE: 98.1 F | HEIGHT: 64 IN | DIASTOLIC BLOOD PRESSURE: 56 MMHG | BODY MASS INDEX: 27.36 KG/M2

## 2023-12-19 LAB
GLUCOSE BLDC GLUCOMTR-MCNC: 92 MG/DL (ref 70–130)
GLUCOSE BLDC GLUCOMTR-MCNC: 97 MG/DL (ref 70–130)

## 2023-12-19 PROCEDURE — 82948 REAGENT STRIP/BLOOD GLUCOSE: CPT

## 2023-12-19 RX ORDER — ACETAMINOPHEN 500 MG
500 TABLET ORAL EVERY 6 HOURS PRN
Start: 2023-12-19

## 2023-12-19 RX ORDER — AMOXICILLIN 250 MG
1 CAPSULE ORAL 2 TIMES DAILY
Qty: 60 TABLET | Refills: 0 | Status: SHIPPED | OUTPATIENT
Start: 2023-12-19

## 2023-12-19 RX ORDER — OXYCODONE HYDROCHLORIDE 5 MG/1
5 TABLET ORAL EVERY 8 HOURS PRN
Qty: 15 TABLET | Refills: 0 | Status: SHIPPED | OUTPATIENT
Start: 2023-12-19 | End: 2023-12-27

## 2023-12-19 RX ORDER — FAMCICLOVIR 500 MG/1
500 TABLET ORAL EVERY 12 HOURS PRN
Start: 2023-12-19

## 2023-12-19 RX ORDER — METFORMIN HYDROCHLORIDE 500 MG/1
500 TABLET, EXTENDED RELEASE ORAL
Start: 2023-12-19

## 2023-12-19 RX ORDER — METHOCARBAMOL 750 MG/1
750 TABLET, FILM COATED ORAL EVERY 6 HOURS PRN
Qty: 25 TABLET | Refills: 0 | Status: SHIPPED | OUTPATIENT
Start: 2023-12-19

## 2023-12-19 RX ADMIN — SENNOSIDES AND DOCUSATE SODIUM 1 TABLET: 50; 8.6 TABLET ORAL at 09:01

## 2023-12-19 RX ADMIN — LISINOPRIL 10 MG: 10 TABLET ORAL at 09:01

## 2023-12-19 RX ADMIN — ACETAMINOPHEN 500 MG: 500 TABLET ORAL at 09:01

## 2023-12-19 RX ADMIN — ACETAMINOPHEN 500 MG: 500 TABLET ORAL at 12:18

## 2023-12-19 RX ADMIN — METHOCARBAMOL TABLETS 750 MG: 500 TABLET, COATED ORAL at 09:00

## 2023-12-19 RX ADMIN — EMPAGLIFLOZIN 10 MG: 10 TABLET, FILM COATED ORAL at 09:02

## 2023-12-19 RX ADMIN — METFORMIN HYDROCHLORIDE 500 MG: 500 TABLET, EXTENDED RELEASE ORAL at 09:02

## 2023-12-19 RX ADMIN — LEVOTHYROXINE SODIUM 50 MCG: 50 TABLET ORAL at 09:02

## 2023-12-19 RX ADMIN — METHOCARBAMOL TABLETS 750 MG: 500 TABLET, COATED ORAL at 02:48

## 2023-12-19 RX ADMIN — AMLODIPINE BESYLATE 5 MG: 5 TABLET ORAL at 09:01

## 2023-12-19 RX ADMIN — FLUOXETINE HYDROCHLORIDE 20 MG: 20 CAPSULE ORAL at 09:01

## 2023-12-19 RX ADMIN — HYDROCHLOROTHIAZIDE 25 MG: 25 TABLET ORAL at 09:02

## 2023-12-19 NOTE — PROGRESS NOTES
Case Management  Inpatient Rehabilitation Team Conference    Conference Date/Time: 12/19/2023 8:11:39 AM    Team Conference Attendees:  MD Darby Moran SW Megan Gardner, NADIA Roberts OT  Joann Wade, Rec Therapy  ASHWIN Gomez RN    Demographics            Age: 68Y            Gender: Female    Admission Date: 12/14/2023 3:25:00 PM  Rehabilitation Diagnosis:  LUMBAR TWO TO THREE, FOUR TO FIVE INTERBODY FUSION  Dec 11, 2023 for spondylolisthesis  Past Medical History: Past Medical History:  Diagnosis Date  ? Anxiety and depression 03/15/2022  ? Backache  ? Diabetes mellitus type 2, controlled  ? HSV (herpes simplex virus) infection 03/15/2022  ? Hypercholesterolemia  ? Hypertension  ? Hypothyroidism  ? Osteoarthritis of multiple joints  ? Spinal stenosis    LUMBAR        Surgical History  Past Surgical History:  Procedure Laterality Date  ? ANKLE ARTHROSCOPY Right    1/2013 & 10/2013  ? COLONOSCOPY    7/27/2007  ? EPIDURAL BLOCK    X5  ? LAPAROSCOPIC GASTRIC BANDING   08/01/2007  ? LUMBAR FUSION Left 12/11/2023    Procedure: PRONE OBLIQUE LATERAL LUMBAR TWO TO THREE, FOUR TO FIVE INTERBODY  FUSION WITH ANTERIOR PLATING AND NEUROMONITORING;  Surgeon: Calderon Whittaker MD;  Location: [facility];  Service: Neurosurgery;  Laterality: Left;  ? LUMBAR FUSION N/A 12/11/2023    Procedure: LUMBAR FUSION MINIMALLY INVASIVE NAVIGATION;  Surgeon: Calderon Whittaker MD;  Location: [facility];  Service: Neurosurgery;  Laterality: N/A;  ? ROTATOR CUFF REPAIR Right    5/2014  ? TOTAL KNEE ARTHROPLASTY Right    12/2014      Plan of Care  Anticipated Discharge Date/Estimated Length of Stay: One week  Anticipated Discharge Destination: Community discharge with assistance  Discharge Plan : Return home with 24/7 assist of spouse and adult children, if  needed.  Medical Necessity Expected Level Rationale: Goals are to achieve a level of  supervision/modified independent with  mobility  and self-care and improved  activity tolerance.   Rehabilitation prognosis fair.  Medical prognosis fair.  Intensity and Duration: an average of 3 hours/5 days per week  Medical Supervision and 24 Hour Rehab Nursing: x  Physical Therapy: x  PT Intensity/Duration: 1.5 hours / day, 5 days / week, for approximately one  week.  Occupational Therapy: x  OT Intensity/Duration: 1.5 hours / day, 5 days / week, for approximately one  week.  Social Work: x  Therapeutic Recreation: x  Registered Dietician: x  Updated (if changes indicated)    Anticipated Discharge Date/Estimated Length of Stay:   ELOS: 12/19 Home with  spouse and OP at [facility]    Based on the patient's medical and functional status, their prognosis and  expected level of functional improvement is: Goals are to achieve a level of  supervision/modified independent with  mobility and self-care and improved  activity tolerance.   Rehabilitation prognosis fair.  Medical prognosis fair.      Interdisciplinary Problem/Goals/Status    All Rehab Problems:  Body Function Structure    [RN] Balance(Active)  Current Status(01/01/1753):  Weekly Goal(01/01/1753):  Discharge Goal:        Safety    [RN] Potential for Injury(Active)  Current Status(12/19/2023): Patient is at increased risk for falls/injury r/t  recent surgery and hospitalization. Pt is A/Ox4 and able to call appropriately.    Weekly Goal(12/19/2023): Patient will call for assistance OOB as needed.  Discharge Goal: Patient will remain free from falls/injury.        Psychosocial    [RN] Coping/Adjustment(Active)  Current Status(12/19/2023): Patient is at risk for impaired coping r/t recent  surgery and hospitalization.  Weekly Goal(12/19/2023): Patient will verbalize needs, concerns, feelings to  staff as needed.  Discharge Goal: Patient will verbalize adequate coping strategies that she can  use at home.        Sphincter Control    [RN] Bowel and bladder (Active)  Current Status(12/19/2023): Patient is reportedly  continent.  Weekly Goal(12/19/2023): Patient will remain continent. Pt will have BM q 3 days  or within normal pattern.  Discharge Goal: Pt will remain continent.        Body Systems    [RN] Integumentary(Active)  Current Status(12/19/2023): Pt has L lateral incisions x3 with dry dressings in  place.  Weekly Goal(12/19/2023): Skin will remain free from s/s infection.  Discharge Goal: Incisions will exhibit signs of healing and will be free of s/s  infection. Skin will be free from breakdown.        Self Care    [OT] Bathing(Active)  Current Status(12/18/2023): SBA/MOD I  Weekly Goal(12/19/2023): MOD I  Discharge Goal: MOD I    [OT] Dressing (Lower)(Active)  Current Status(12/18/2023): Set up with AE  Weekly Goal(12/19/2023): MOD I with AE  Discharge Goal: MOD I with AE    [OT] Dressing (Upper)(Active)  Current Status(12/18/2023): Indep  Weekly Goal(12/19/2023): Indep  Discharge Goal: Indep    [OT] Grooming(Active)  Current Status(12/18/2023): Indep  Weekly Goal(12/19/2023): Indep  Discharge Goal: Indep    [OT] Toileting(Active)  Current Status(12/18/2023): SBA/BERRY  Weekly Goal(12/19/2023): MOD I  Discharge Goal: MOD I        Mobility    [OT] Toilet Transfers(Active)  Current Status(12/18/2023): SBA/BERRY with RWX  Weekly Goal(12/19/2023): MOD I  Discharge Goal: MOD I    [OT] Tub/Shower Transfers(Active)  Current Status(12/18/2023): SBA  Weekly Goal(12/22/2023): SBA  Discharge Goal: SBA    [OT] Bed/Chair/Wheelchair(Active)  Current Status(01/01/1753):  Weekly Goal(01/01/1753):  Discharge Goal:    [PT] Bed Mobility(Active)  Current Status(12/15/2023): SBA  Weekly Goal(12/22/2023): Indep  Discharge Goal: Indep    [PT] Walk(Active)  Current Status(12/15/2023): 160' rwx, CGA  Weekly Goal(12/22/2023): 200' rwx, SBA  Discharge Goal: 200' rwx, Berry    [PT] Stairs(Active)  Current Status(12/15/2023): CGA Bilat handrail, 12 steps  Weekly Goal(12/22/2023): SBA bilat handrail, 4 steps  Discharge Goal: Indep bilat handrail, 4  steps    [PT] Bed/Chair/Wheelchair(Active)  Current Status(12/15/2023): CGA stand pivot w rwx  Weekly Goal(12/22/2023): SBA stand pivot w rwx  Discharge Goal: Aga stand pivot w rwx        Comments: 12/19 SBA bed mob, CG stand pivot rwx for transfer, CG bilat handrail  for 12 stairs, amb 160' rwx CG, SB / Mod I toilet transfer w/ rwx.  SB for  shower transfer.  LBD set up with AE.  SBA / Mod I toileting.  Cont bowel and  bladder.    Signed by: Iza Corrales RN    Physician CoSigned By: Cedrick Roberts 12/19/2023 09:15:40

## 2023-12-19 NOTE — PROGRESS NOTES
"Section C. BIMS  Brief Interview for Mental Status (BIMS) was conducted.  Repetition of Three Words: Three words  Able to report correct year: Correct  Able to report correct month: Accurate within 5 days  Able to report correct day of the week: Correct  Able to recall \"sock\": Yes, no cue required  Able to recall \"blue\": Yes, no cue required  Able to recall \"bed\": Yes, no cue required    BIMS SUMMARY SCORE: 15 Cognitively intact    Section C. Signs and Symptoms of Delirium (from CAM)  Acute Change in Mental Status:   No  Inattention:   Behavior not present  Disorganized Thinking:   Behavior not present  Altered Level of Consciousness:   Behavior not present    Signed by: Shantelle Roberts OT    "

## 2023-12-19 NOTE — PROGRESS NOTES
SECTION GG    Eating Performance Discharge: Patient completed the activities by themself with  no assistance from a helper.    Section B. Health Literacy  Frequency of Needing Assistance Reading:  Never    Section D. Mood  Presence of little interest or pleasure in doing things:   No  Frequency of having little interest or pleasure in doing things:   Never or 1  day  Presence of feeling down, depressed, or hopeless:   No  Frequency of feeling down, depressed, or hopeless:   Never or 1 day   Interview Ended. Above responses do not meet criteria to continue  Total Severity Score:   0    Section D. Social Isolation  Frequency of Feeling Lonely or Isolated:  Never    Section J. Health Conditions (Pain Effect on Sleep)  Pain Effect on Sleep:   Frequently    Signed by: Nirali Rodriguez RN

## 2023-12-19 NOTE — CONSULTS
Nutrition Services    Patient Name:  Summer Jefferson  YOB: 1955  MRN: [id number]  Admit Date:  12/14/2023    Assessment Date:  12/19/23    Summary: follow up    S/p lumbar surgery. Current diet is Regular. Appetite/intake is good, 100%. Labs and skin status reviewed, lumbar spine incision CDI. Will continue to monitor intake, labs, weight and skin status.     CLINICAL NUTRITION ASSESSMENT      Reason for Assessment Follow-up Protocol     Admitting Diagnosis   S/P lumbar fusion       Medical/Surgical History Past Medical History:   Diagnosis Date    Anxiety and depression 03/15/2022    Backache     Diabetes mellitus type 2, controlled     HSV (herpes simplex virus) infection 03/15/2022    Hypercholesterolemia     Hypertension     Hypothyroidism     Osteoarthritis of multiple joints     Spinal stenosis     LUMBAR       Past Surgical History:   Procedure Laterality Date    ANKLE ARTHROSCOPY Right     1/2013 & 10/2013    COLONOSCOPY      7/27/2007    EPIDURAL BLOCK      X5    LAPAROSCOPIC GASTRIC BANDING  08/01/2007    LUMBAR FUSION Left 12/11/2023    Procedure: PRONE OBLIQUE LATERAL LUMBAR TWO TO THREE, FOUR TO FIVE INTERBODY FUSION WITH ANTERIOR PLATING AND NEUROMONITORING;  Surgeon: Calderon Whittaker MD;  Location: [facility];  Service: Neurosurgery;  Laterality: Left;    LUMBAR FUSION N/A 12/11/2023    Procedure: LUMBAR FUSION MINIMALLY INVASIVE NAVIGATION;  Surgeon: Calderon Whittaker MD;  Location: [facility];  Service: Neurosurgery;  Laterality: N/A;    ROTATOR CUFF REPAIR Right     5/2014    TOTAL KNEE ARTHROPLASTY Right     12/2014        Current Nutrition Orders & Evaluation of Intake       Oral Nutrition      Food Allergies NKFA    Current PO Diet Diet: Regular/House Diet; Texture: Regular Texture (IDDSI 7); Fluid Consistency: Thin (IDDSI 0)    Supplement    PO Evaluation      PO Intake % 100     Factors Affecting Intake No factors at this time   --  Anthropometrics        Current  "Height  Current Weight (CBW)  BMI kg/m2 Height: 162.6 cm (64\")  Weight: 72.7 kg (160 lb 4.4 oz) (12/14/23 1538)  Body mass index is 27.51 kg/m².   BMI Category Overweight (25 - 29.9)   Ideal Weight (IBW) 120#   Usual Weight (UBW) 180#s   Weight Trend No new weight available       --  Physical Findings          General Appearance alert, not available at time of RD rounding   Oral/Mouth Cavity WDL   Edema  no edema   Gastrointestinal last bowel movement: 12/18   Skin  surgical incision: lumbar spine   Tubes/Drains/Lines none           Medications & Labs           Scheduled Medications acetaminophen, 500 mg, Oral, 4x Daily  amLODIPine, 5 mg, Oral, Daily  empagliflozin, 10 mg, Oral, Daily  enoxaparin, 40 mg, Subcutaneous, Nightly  FLUoxetine, 20 mg, Oral, Daily  hydroCHLOROthiazide, 25 mg, Oral, Daily  levothyroxine, 50 mcg, Oral, Daily  lisinopril, 10 mg, Oral, Daily  metFORMIN ER, 500 mg, Oral, Daily With Breakfast  rosuvastatin, 10 mg, Oral, Nightly  senna-docusate sodium, 1 tablet, Oral, BID       Infusions     PRN Medications   benzonatate    dextrose    dextrose    glucagon (human recombinant)    magnesium hydroxide    methocarbamol    [DISCONTINUED] HYDROmorphone **AND** naloxone    ondansetron **OR** ondansetron    oxyCODONE            Pertinent Labs   Results from last 7 days   Lab Units 12/18/23  0526 12/13/23  0441 12/12/23  1619   SODIUM mmol/L 140 141  --    POTASSIUM mmol/L 4.2 4.6 3.5   CHLORIDE mmol/L 103 108*  --    CO2 mmol/L 27.3 24.3  --    BUN mg/dL 11 9  --    CREATININE mg/dL 0.60 0.62  --    CALCIUM mg/dL 9.2 8.6  --    BILIRUBIN mg/dL 0.3  --   --    ALK PHOS U/L 84  --   --    ALT (SGPT) U/L 53*  --   --    AST (SGOT) U/L 54*  --   --    GLUCOSE mg/dL 95 115*  --      Results from last 7 days   Lab Units 12/18/23  0526   HEMOGLOBIN g/dL 11.8*   HEMATOCRIT % 37.3   WBC 10*3/mm3 5.31   ALBUMIN g/dL 3.6     Results from last 7 days   Lab Units 12/18/23  0526 12/15/23  0753   PLATELETS 10*3/mm3 347 " 264     Lab Results   Component Value Date    HGBA1C 5.40 12/18/2023        Nutrition Diagnosis        Nutrition Dx Problem  Problem: Nutrition Appropriate for Condition at this Time  Etiology: MNT for Treatment/Condition   Signs/Symptoms: Report/Observation       NUTRITION INTERVENTION / PLAN OF CARE  Goals        Intervention Goal(s) Maintain nutrition status     Nutrition Intervention        RD Action Menu provided, Encourage intake, and Continue to monitor     Prescription        Diet Prescription    Supplement Prescription    Snack Prescription    EN Prescription    New Prescription Ordered? No changes at this time     Monitor/Evaluation        Monitor Per protocol   Discharge Needs Pending clinical course     RD to follow up per protocol.     Electronically signed by:  Yoana Ashby RD  12/19/23 09:12 EST

## 2023-12-19 NOTE — PROGRESS NOTES
TEAM CONF - DEC 19 - BED SBA. TRASNFAGUILAR CTG STAND PIVOT. 12 STAIRS CTG. GAIT 160 FEEET MIN CTG. TOIELT JACQUELINSMODESTA SBA MOD I WITH RW. BATH SBA MOD I. LBD SET UP. UBD INDEPENDENT. TOILETING SBA/MOD I. TOILET TRANSFERS SBA/ MOD I. STILL SOME PROXIMAL WEAKNESS RLE. CONTINENT BOWEL AND BLADDER. BACK INCISIONS HEALING.  ELOS - HOME TODAY. OUTPT PT AT [facility].

## 2023-12-19 NOTE — PLAN OF CARE
Goal Outcome Evaluation:  Plan of Care Reviewed With: patient           Outcome Evaluation: Pt. AOx4, on RA, calm and cooperative, Cont. to B/B LBM 12/18, on asssit x1, with incissions @ L side of back dry and intact, Complaining of lower back pain and requesting only Robaxin and Rosi PRN and tylenol scheduled refused noted.

## 2023-12-19 NOTE — DISCHARGE INSTRUCTIONS
DX:  LUMBAR TWO TO THREE, FOUR TO FIVE INTERBODY FUSION Dec 11, 2023 for spondylolisthesis - OUTPT PT AT [facility].    NO DRIVING UNTIL CLEARED BY NEUROSURGERY    Prescriptions written for methocarbamol 750 mg 1 p.o. 3 times a day as needed for muscle spasm dispense #25 and oxycodone 5 mg p.o. every 8 hours as needed for pain, dispense #15.  Taper off over the next week to 10 days

## 2023-12-19 NOTE — PROGRESS NOTES
SECTION GG      Self Care Performance Discharge:   Oral Hygiene: Patient completed the activities by themself with no assistance  from a helper.   Toileting Hygiene: : Patient completed the activities by themself with no  assistance from a helper.   Shower/Bathe Self: [place] provides verbal cues and/or touching/steadying and/or  contact guard assistance as patient completes activity.   Upper Body Dressing: Patient completed the activities by themself with no  assistance from a helper.   Lower Body Dressing: [place] sets up or cleans up; patient completes activity.  [place] assists only prior to or following the activity.   Putting On/Taking Off Footwear: Patient completed the activities by themself  with no assistance from a helper.    Mobility Toilet Transfer Discharge: Patient completed the activities by themself  with no assistance from a helper.    Signed by: Shantelle Roberts OT

## 2023-12-19 NOTE — PROGRESS NOTES
Case Management Discharge Note      Final Note: Patient d/c home with her . Patient will continue outpatient PT at [facility], where she has done PT in the past. Patient did not need any assistive equipment. Patient's  will transport patient home.         Selected Continued Care - Discharged on 12/19/2023 Admission date: 12/14/2023 - Discharge disposition: Home or Self Care      Destination    No services have been selected for the patient.                Durable Medical Equipment    No services have been selected for the patient.                Dialysis/Infusion    No services have been selected for the patient.                Home Medical Care    No services have been selected for the patient.                Therapy    No services have been selected for the patient.                Community Resources    No services have been selected for the patient.                Community & DME    No services have been selected for the patient.                         Final Discharge Disposition Code: 01 - home or self-care

## 2023-12-19 NOTE — PROGRESS NOTES
Inpatient Rehabilitation Discharge  Section A. Transportation  Issues Due to Lack of Transportation:  No    Section A. Medication List  Medication List to Subsequent Provider:  Not applicable.  Patient was not  discharged to a subsequent provider.  Discharge Location:  01 - Home  Medication List to Patient at Discharge:  Yes - Current reconciled medication  list provided to the patient, family and/or caregiver  Route(s) of Medication List Transmission to Patient:  Verbal (e.g., in-person,  telephone, video conferencing), Paper-based (e.g., fax, copies, printouts)    Signed by: Darby Mann, Social Work

## 2023-12-19 NOTE — PROGRESS NOTES
Inpatient Rehabilitation Plan of Care Note    Plan of Care  Care Plan Reviewed - No updates at this time.    Safety    Performed Intervention(s)  Fall precautions  Hourly rounding      Psychosocial    Performed Intervention(s)  Allow extra time  for pt to verbalize feelings, needs, concerns.  Provide distraction and/or relaxation as needed.      Sphincter Control    Performed Intervention(s)  Monitor I/O.  Bowel meds prn.      Body Systems    Performed Intervention(s)  Skin assessment q shift and prn.  Wound care, as ordered.    Signed by: Mp Goodman RN

## 2023-12-19 NOTE — DISCHARGE SUMMARY
[facility] - REHABILITATION UNIT    CLAUDIO QUINTANA  1955    ADMIT DATE:  12/14/2023  3:25 PM  DISCHARGE DATE:  12/19/23      CHIEF COMPLAINT:      LUMBAR TWO TO THREE, FOUR TO FIVE INTERBODY FUSION Dec 11, 2023 for spondylolisthesis  Impaired mobility  Impaired self care  DVT prophylaxis - SCDs/ Lovenox  Pain management - scheduled Tylenol/ methocarbamol/ oxycodone - MAGED report reviewed  HTN - amlodipine/ hydrochlorothiazide / lisinopril/  DM - empagliflozine-Jardiance, Metformin XR  HISTORY OF PRESENT ILLNESS:      68 year old female admitted to [facility] on Dec 11 with history of spondylolisthesis - now s/p LUMBAR TWO TO THREE, FOUR TO FIVE INTERBODY FUSION . Expected post op pain. Voiding without difficulty.   With OT on Dec 13 - [SBA to sit EOB using log roll, total A to don socks d/t dec fxl reach and unable to attain fig-4 position. Fxl amb into bathroom using RW with SBA-CGA with slow mobility noted. Completes toilet TF CGA and toileting tasks sba-spv. Grooming standing at the sink x3 mins with pain limiting ]  With PT on Dec 13 - [Pt stood with CGA and ambulated 30ft with CGA and rwx. Pt cued throughout to keep her eyes open, lightheadedness worsening with progressed gait distance, prompting return to bed. Pt briefly sat for rest but no improvement in symptoms and pt losing her balance. Pt assisted min A x1 back to supine via log roll. ]     Other issues include:  DVT prophylaxis - SCDs/ Lovenox  Pain management - scheduled Tylenol/ methocarbamol/oxycodone  HTN - amlodipine/ hydrochlorothiazide / lisinopril/  DM - empagliflozine-Jardiance, Metformin XR. Was also on semaglutide at home  Depression - fluoxetine  Hypothyroid - on replacement     Given her functional impairments and co-morbidities, now admit to acute inpatient rehab.     She complains of expected back pain.  She had some limp with the left lower extremity prior to surgery.  Denies any radicular pain.  Has not had a bowel  movement in the hospital.  Voiding okay.  She took oxy codon 10 mg 4 times yesterday but did not tolerate it well and oxycodone's been decreased to 5 mg dose  HOSPITAL COURSE:    Patient participated in a comprehensive acute inpatient rehabilitation program.     During the hospital stay the following areas were addressed:      LUMBAR TWO TO THREE, FOUR TO FIVE INTERBODY FUSION Dec 11, 2023 for spondylolisthesis  December 17-progressing with her activities.  Good strength distally.  Still weak at the hips and is more weak on the right hip flexion compared to the left hip flexor.  Her surgical approach was on the left side     Impaired mobility  Impaired self care     DVT prophylaxis - SCDs/ Lovenox     Pain management - scheduled Tylenol/ methocarbamol/ oxycodone - [facility]ED report reviewed  December 16-took methocarbamol 750 mg only once yesterday and oxycodone 5 mg twice yesterday  December 18-Has been on scheduled Tylenol 650 mg every 4 hours.  Transaminases increased to the 50s today and will decrease to 500 mg 4 times a day, next dose this evening.  Took methocarbamol 4 times yesterday and oxycodone twice        HTN - amlodipine/ hydrochlorothiazide / lisinopril/     DM - empagliflozine-Jardiance, Metformin XR. Was also on semaglutide at home for weight loss but no longer continuing  December 16-decrease metformin XR to 500 mg once daily as well as most recent home dose after weight loss     Depression - fluoxetine     Hypothyroid - on replacement        Functional status-December 15-bed mobility standby assist.  Gait 160 feet contact-guard rolling walker.  Did 12 stairs contact-guard bilateral railings.  Transfers contact-guard.      TEAM CONF - DEC 19 - BED SBA. TRANSFERS CTG STAND PIVOT. 12 STAIRS CTG. GAIT 160 FEEET MIN CTG. TOIELT VERONICA SBA MOD I WITH RW. BATH SBA MOD I. LBD SET UP. UBD INDEPENDENT. TOILETING SBA/MOD I. TOILET TRANSFERS SBA/ MOD I. STILL SOME PROXIMAL WEAKNESS RLE. CONTINENT BOWEL AND  BLADDER. BACK INCISIONS HEALING.  ELOS [facility] HOME TODAY[facility]    Disposition December 19-achieved inpatient rehabilitation goals.  Medically stable.  Medications and follow-up reviewed.  Discharge home with outpatient physical therapy    Goal is for home with  outpatient   therapies.  Barrier to discharge:  impaired mobility and self care  - work on  ADLS with adaptive equipment and proper back mechanics, functional mobility, transfer training and progressive ambulation  to overcome.           Physical Exam near the time of discharge:      MENTAL STATUS -  AWAKE / ALERT  HEENT-  SCLERAE ANICTERIC, CONJUNCTIVAE PINK, OP MOIST, NO JVD,    LUNGS - NORMAL RESPIRATIONS.  Clear to auscultation without wheezes rales or rhonchi  HEART- RRR, no rub murmur or gallop  ABD -  SOFT, NONDISTENDED, NABS soft nontender  Left flank incision  and left lumbar incision-healing  EXT - NO EDEMA OR CYANOSIS  NEURO - AWAKE, ALERT  MOTOR EXAM -she has pain inhibition proximally in the bilateral lower extremities, right more so than left.She takes resistance with bilateral knee extension, ankle dorsiflexion , and ankle plantarflexion and with inversion eversion.     RESULTS:  Glucose   Date/Time Value Ref Range Status   12/19/2023 1118 97 70 - 130 mg/dL Final   12/19/2023 0719 92 70 - 130 mg/dL Final   12/18/2023 1614 136 (H) 70 - 130 mg/dL Final   12/18/2023 1132 108 70 - 130 mg/dL Final   12/18/2023 0722 98 70 - 130 mg/dL Final   12/17/2023 1648 105 70 - 130 mg/dL Final   12/17/2023 1140 121 70 - 130 mg/dL Final   12/17/2023 0727 99 70 - 130 mg/dL Final     Results from last 7 days   Lab Units 12/18/23  0526 12/15/23  0753   WBC 10*3/mm3 5.31 7.02   HEMOGLOBIN g/dL 11.8* 13.0   HEMATOCRIT % 37.3 39.5   PLATELETS 10*3/mm3 347 264     Results from last 7 days   Lab Units 12/18/23  0526 12/13/23  0441 12/12/23  1619   SODIUM mmol/L 140 141  --    POTASSIUM mmol/L 4.2 4.6 3.5   CHLORIDE mmol/L 103 108*  --    CO2 mmol/L 27.3 24.3  --     BUN mg/dL 11 9  --    CREATININE mg/dL 0.60 0.62  --    CALCIUM mg/dL 9.2 8.6  --    BILIRUBIN mg/dL 0.3  --   --    ALK PHOS U/L 84  --   --    ALT (SGPT) U/L 53*  --   --    AST (SGOT) U/L 54*  --   --    GLUCOSE mg/dL 95 115*  --          Latest Reference Range & Units 12/18/23 05:26   Hemoglobin A1C 4.80 - 5.60 % 5.40   TSH Baseline 0.270 - 4.200 uIU/mL 4.460 (H)   Free T4 0.93 - 1.70 ng/dL 1.18   (H): Data is abnormally high            Discharge Medications        New Medications        Instructions Start Date   acetaminophen 500 MG tablet  Commonly known as: TYLENOL   500 mg, Oral, Every 6 Hours PRN      methocarbamol 750 MG tablet  Commonly known as: ROBAXIN   750 mg, Oral, Every 6 Hours PRN      oxyCODONE 5 MG immediate release tablet  Commonly known as: ROXICODONE   5 mg, Oral, Every 8 Hours PRN      sennosides-docusate 8.6-50 MG per tablet  Commonly known as: PERICOLACE   1 tablet, Oral, 2 Times Daily             Changes to Medications        Instructions Start Date   famciclovir 500 MG tablet  Commonly known as: FAMVIR  What changed:   when to take this  reasons to take this   500 mg, Oral, Every 12 Hours PRN      lidocaine 5 %  Commonly known as: LIDODERM  What changed:   how much to take  how to take this  when to take this   Remove & Discard patch within 12 hours; use as needed for back pain      metFORMIN  MG 24 hr tablet  Commonly known as: GLUCOPHAGE-XR  What changed:   how much to take  how to take this  when to take this   500 mg, Oral, Daily With Breakfast, One tab daily             Continue These Medications        Instructions Start Date   amLODIPine 5 MG tablet  Commonly known as: NORVASC   5 mg, Oral, Daily      benzonatate 100 MG capsule  Commonly known as: Tessalon Perles   100 mg, Oral, 3 Times Daily PRN      FLUoxetine 20 MG capsule  Commonly known as: PROzac   20 mg, Oral, Daily      hydroCHLOROthiazide 25 MG tablet  Commonly known as: HYDRODIURIL   25 mg, Oral, Daily       Jardiance 10 MG tablet tablet  Generic drug: empagliflozin   TAKE 1 TABLET EVERY DAY (NEW DOSE)      levothyroxine 50 MCG tablet  Commonly known as: SYNTHROID, LEVOTHROID   50 mcg, Oral, Daily      lisinopril 10 MG tablet  Commonly known as: PRINIVIL,ZESTRIL   10 mg, Oral, Daily      rosuvastatin 10 MG tablet  Commonly known as: CRESTOR   10 mg, Oral, Daily             Stop These Medications      azithromycin 250 MG tablet  Commonly known as: Zithromax     celecoxib 200 MG capsule  Commonly known as: CeleBREX     gabapentin 100 MG capsule  Commonly known as: NEURONTIN     Semaglutide 14 MG tablet               Follow-up Information       Ruth Benitez APRN Follow up on 12/28/2023.    Specialties: Family Medicine, Nurse Practitioner  Why: 12/28/2023 11:30 AM Ruth Benitez APRN [facility] [phone] [facility]  Contact information:  [address] [facility] [id number] [phone]               Calderon Whittaker MD Follow up on 12/27/2023.    Specialty: Neurosurgery  Why: 12/27/2023 10:15 AM Calderon Whittaker MD [facility] NEUROSURGERY  Contact information:  [address]  [facility] [address]  [phone]               Cedrick Roberts MD Follow up.    Specialty: Physical Medicine and Rehabilitation  Why: No follow up needed with Dr Roberts  Contact information:  [address] [facility] [address] [facility] [facility] [address]  [facility] [place] [address]  [phone]               Ruth Benitez APRN .    Specialties: Family Medicine, Nurse Practitioner  Contact information:  [address] [facility] [id number] [phone]                             DX:  LUMBAR TWO TO THREE, FOUR TO FIVE INTERBODY FUSION Dec 11, 2023 for spondylolisthesis - OUTPT PT AT [facility].    NO DRIVING UNTIL CLEARED BY NEUROSURGERY    Prescriptions written for methocarbamol 750 mg 1 p.o. 3 times a day as needed for muscle spasm dispense #25 and oxycodone 5 mg p.o. every 8 hours as needed for pain, dispense #15.  Taper off over the next  week to 10 days      >30 on discharge    Cedrick Roberts MD

## 2023-12-19 NOTE — PLAN OF CARE
Goal Outcome Evaluation:  Plan of Care Reviewed With: patient        Progress: improving  Outcome Evaluation: Pt will be going home today with outpt therapies. No pain or problems. L flank incision is clean and intact and glued.

## 2023-12-20 ENCOUNTER — TRANSITIONAL CARE MANAGEMENT TELEPHONE ENCOUNTER (OUTPATIENT)
Dept: CALL CENTER | Facility: HOSPITAL | Age: 68
End: 2023-12-20
Payer: MEDICARE

## 2023-12-20 ENCOUNTER — READMISSION MANAGEMENT (OUTPATIENT)
Dept: CALL CENTER | Facility: HOSPITAL | Age: 68
End: 2023-12-20
Payer: MEDICARE

## 2023-12-20 NOTE — OUTREACH NOTE
Prep Survey      Flowsheet Row Responses   [facility] patient discharged from? [place]   Is LACE score < 7 ? Yes   Eligibility [facility] - REHABILITATION UNIT   Date of Admission 12/14/23   Date of Discharge 12/19/23   Discharge Disposition Home or Self Care   Discharge diagnosis LUMBAR FUSION   Does the patient have one of the following disease processes/diagnoses(primary or secondary)? General Surgery   Does the patient have Home health ordered? No   Is there a DME ordered? No   Comments regarding appointments plan outpatient PT at [facility]   Prep survey completed? Yes            Hilary GREY - Registered Nurse

## 2023-12-20 NOTE — OUTREACH NOTE
Call Center TCM Note      Flowsheet Row Responses   [facility] patient discharged from? [place]   Does the patient have one of the following disease processes/diagnoses(primary or secondary)? General Surgery   TCM attempt successful? Yes   Call end time 1331   Discharge diagnosis LUMBAR FUSION   Person spoke with today (if not patient) and relationship patient   Meds reviewed with patient/caregiver? Yes   Does the patient have all medications related to this admission filled (includes all antibiotics, pain medications, etc.) Yes   Prescription comments no concerns or questions noted.   Is the patient taking all medications as directed (includes completed medication regime)? Yes   Comments Patient wants to keep appt- please advise- 12/28/2023 11:30 AM  OFFICE VISIT 15 min [facility] PRIMARY CARE Ruth Benitez APRN   Has home health visited the patient within 72 hours of discharge? N/A   Psychosocial issues? No   Did the patient receive a copy of their discharge instructions? Yes   Nursing interventions Reviewed instructions with patient   What is the patient's perception of their health status since discharge? Improving   Nursing interventions Nurse provided patient education   Is the patient /caregiver able to teach back basic post-op care? Keep incision areas clean,dry and protected   Is the patient/caregiver able to teach back signs and symptoms of incisional infection? Increased redness, swelling or pain at the incisonal site, Increased drainage or bleeding, Incisional warmth, Pus or odor from incision, Fever   Is the patient/caregiver able to teach back steps to recovery at home? Set small, achievable goals for return to baseline health, Rest and rebuild strength, gradually increase activity   Is the patient/caregiver able to teach back the hierarchy of who to call/visit for symptoms/problems? PCP, Specialist, Home health nurse, Urgent Care, ED, 911 Yes   TCM call completed? Yes    Wrap up additional comments Patient reports doing well. Has fu appt as advised. no concerns or questions noted.   Call end time 1331   Would this patient benefit from a Referral to [facility] Social Work? No   Is the patient interested in additional calls from an ambulatory ? No            Lilia Cox RN    12/20/2023, 13:31 EST

## 2023-12-21 ENCOUNTER — TELEPHONE (OUTPATIENT)
Dept: NEUROSURGERY | Facility: CLINIC | Age: 68
End: 2023-12-21
Payer: MEDICARE

## 2023-12-21 NOTE — TELEPHONE ENCOUNTER
Patient called in stated she had surgery 12/11 and stated she had surgery left side so no open wounds on back. Patient is asking if it would be okay to place a Lidoderm patch 5% on back. She states she would like to try this to get away from muscle relaxers.

## 2023-12-21 NOTE — PROGRESS NOTES
PPS CMG Coordinator  Inpatient Rehabilitation Discharge    Mode of Locomotion: Walking.    Discharge Against Medical Advice:  No.  Discharge Information  Patient Discharged Alive:  Yes  Discharge Destination/Living Setting: Home.  At discharge, the patient was discharged to live (with) (02)  Family / Relatives    Diagnosis for Interruption/Death: ICD    Impairment Group: 08.9 Other Orthopedic    Comorbidities: ICD    Complications: ICD    QUALITY INDICATORS  Section A. Medication List  Medication List to Subsequent Provider:  Not applicable.  Patient was not  discharged to a subsequent provider.  Discharge Location:  01 - Home  Medication List to Patient at Discharge:  Yes - Current reconciled medication  list provided to the patient, family and/or caregiver  Route(s) of Medication List Transmission to Patient:  Electronic Health Record,  Verbal (e.g., in-person, telephone, video conferencing), Paper-based (e.g., fax,  copies, printouts)    Section J Health Conditions: Fall(s) Since Admission:  No    Section K. Swallowing/Nutritional Status  Nutritional Approaches Past 7 Days:   None  Nutritional Approaches at Discharge:  None    Section M. Skin Conditions Discharge:  Unhealed Pressure Ulcer(s) at Stage 1 or  Higher:  No    . Current Number of Unhealed Pressure Ulcers  Branch    Section N. Medication:  Medication Intervention: Not applicable - There were no potential clinically  significant medication issues identified since admission or patient is not  taking any medications.  Section . High-Risk Drug Classes: Use and Indication                       Is Taking                    Indication noted  High-RiskDrug Class  H. Opioid            Yes                          Yes  J. Hypoglycemic      Yes                          Yes    Section O. Special Treatments, Procedures, and Programs  None    Signed by: Iza Corrales RN

## 2023-12-27 ENCOUNTER — OFFICE VISIT (OUTPATIENT)
Dept: NEUROSURGERY | Facility: CLINIC | Age: 68
End: 2023-12-27
Payer: MEDICARE

## 2023-12-27 VITALS
DIASTOLIC BLOOD PRESSURE: 64 MMHG | SYSTOLIC BLOOD PRESSURE: 118 MMHG | RESPIRATION RATE: 16 BRPM | WEIGHT: 160 LBS | HEART RATE: 83 BPM | BODY MASS INDEX: 27.31 KG/M2 | OXYGEN SATURATION: 98 % | HEIGHT: 64 IN

## 2023-12-27 DIAGNOSIS — G89.29 CHRONIC BILATERAL LOW BACK PAIN WITH LEFT-SIDED SCIATICA: ICD-10-CM

## 2023-12-27 DIAGNOSIS — M43.16 SPONDYLOLISTHESIS OF LUMBAR REGION: Primary | ICD-10-CM

## 2023-12-27 DIAGNOSIS — M54.42 CHRONIC BILATERAL LOW BACK PAIN WITH LEFT-SIDED SCIATICA: ICD-10-CM

## 2023-12-27 PROCEDURE — 1160F RVW MEDS BY RX/DR IN RCRD: CPT | Performed by: NEUROLOGICAL SURGERY

## 2023-12-27 PROCEDURE — 3078F DIAST BP <80 MM HG: CPT | Performed by: NEUROLOGICAL SURGERY

## 2023-12-27 PROCEDURE — 99024 POSTOP FOLLOW-UP VISIT: CPT | Performed by: NEUROLOGICAL SURGERY

## 2023-12-27 PROCEDURE — 3074F SYST BP LT 130 MM HG: CPT | Performed by: NEUROLOGICAL SURGERY

## 2023-12-27 PROCEDURE — 1159F MED LIST DOCD IN RCRD: CPT | Performed by: NEUROLOGICAL SURGERY

## 2023-12-27 RX ORDER — GABAPENTIN 300 MG/1
300 CAPSULE ORAL 3 TIMES DAILY
Qty: 180 CAPSULE | Refills: 2 | Status: SHIPPED | OUTPATIENT
Start: 2023-12-27

## 2023-12-27 RX ORDER — LIDOCAINE 50 MG/G
1 PATCH TOPICAL EVERY 24 HOURS
Qty: 12 EACH | Refills: 2 | Status: SHIPPED | OUTPATIENT
Start: 2023-12-27

## 2023-12-27 RX ORDER — HYDROCODONE BITARTRATE AND ACETAMINOPHEN 5; 325 MG/1; MG/1
1 TABLET ORAL EVERY 6 HOURS PRN
Qty: 45 TABLET | Refills: 0 | Status: SHIPPED | OUTPATIENT
Start: 2023-12-27

## 2023-12-27 NOTE — PROGRESS NOTES
Patient ID: Summer Jefferson is a 68 y.o. female is an established patient with lumbar radiculopathy and spondylolisthesis who has previously undergone oblique lateral L2 to L3, L4 to L5 interbody fusion with anterior plating on 12/11/2023.      Subjective:     History of Present Illness  Summer has been gradually improving after surgery.  She still has significant back achiness in her low back.  She has right iliopsoas weakness that is been stable to slightly improved since surgery.  She been walking with a walker.  Her radiculopathy has resolved.  Her incisions look good overall.  She has been controlling her pain with oxycodone but no longer requires it and has been switched to Tylenol.  She has been off her Celebrex for arthritis.      Review of Systems   Constitutional:  Negative for fever.   HENT:  Negative for tinnitus.    Eyes:  Negative for visual disturbance.   Musculoskeletal:  Positive for back pain. Negative for gait problem, neck pain and neck stiffness.   Neurological:  Positive for weakness (right leg) and numbness (right leg and in groin). Negative for dizziness, tremors, light-headedness and headaches.        While in the room and during my examination of the patient I wore a mask and eye protection.  I washed my hands before and after this patient encounter.  The patient was also wearing a mask.    The following portions of the patient's history were reviewed and updated as appropriate: allergies, current medications, past family history, past medical history, past social history, past surgical history and problem list.     Objective:    Vitals:    12/27/23 1009   BP: 118/64   Pulse: 83   Resp: 16   SpO2: 98%     Body mass index is 27.46 kg/m².    Physical Exam  Constitutional:       Appearance: Normal appearance.   HENT:      Head: Normocephalic and atraumatic.   Eyes:      Extraocular Movements: Extraocular movements intact.      Conjunctiva/sclera: Conjunctivae normal.      Pupils: Pupils  are equal, round, and reactive to light.   Cardiovascular:      Rate and Rhythm: Normal rate and regular rhythm.      Pulses: Normal pulses.   Pulmonary:      Breath sounds: Normal breath sounds.   Abdominal:      Palpations: Abdomen is soft.   Musculoskeletal:         General: Normal range of motion.      Cervical back: Normal range of motion and neck supple.   Skin:     General: Skin is warm and dry.   Neurological:      Mental Status: She is alert and oriented to person, place, and time.      Cranial Nerves: Cranial nerves 2-12 are intact.      Motor: Motor function is intact. No weakness or atrophy.      Coordination: Coordination is intact. Romberg sign negative. Romberg Test normal.      Gait: Gait is intact. Gait normal.      Deep Tendon Reflexes: Reflexes are normal and symmetric.      Reflex Scores:       Tricep reflexes are 2+ on the right side and 2+ on the left side.       Bicep reflexes are 2+ on the right side and 2+ on the left side.       Brachioradialis reflexes are 2+ on the right side and 2+ on the left side.       Patellar reflexes are 2+ on the right side and 2+ on the left side.       Achilles reflexes are 2+ on the right side and 2+ on the left side.  Psychiatric:         Speech: Speech normal.       Neurologic Exam     Mental Status   Oriented to person, place, and time.   Attention: normal. Concentration: normal.   Speech: speech is normal   Level of consciousness: alert    Cranial Nerves   Cranial nerves II through XII intact.     CN III, IV, VI   Pupils are equal, round, and reactive to light.    Motor Exam   Muscle bulk: normal  Overall muscle tone: normal    Strength   Strength 5/5 except as noted.   Right iliopsoas: 3/5    Sensory Exam   Light touch normal.     Gait, Coordination, and Reflexes     Gait  Gait: normal    Coordination   Romberg: negative    Reflexes   Reflexes 2+ except as noted.   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  We are able to retract the psoas muscle for the [place] ipsilaterally but occasionally can cause some bruising of the contralateral iliopsoas muscle.  I think that she will gradually recover this over time and with some physical therapy.  Overall her postoperative x-ray look good and her radiculopathy has resolved.  I do think that it may just take some time for her L4-5 facet arthropathy to gradually improve with fixation.    Will represcribe gabapentin and hydrocodone as well as lidocaine patch and Voltaren gel for local back control.  She is cleared to resume her Celebrex and she is cleared to lift up to 15 pounds and participate in physical therapy without restriction.    Would like to see her back in 4 weeks with another x-ray.       Diagnoses and all orders for this visit:    1. Spondylolisthesis of lumbar region (Primary)  -     XR Spine Lumbar 2 or 3 View; Future  -     HYDROcodone-acetaminophen (NORCO) 5-325 MG per tablet; Take 1 tablet by mouth Every 6 (Six) Hours As Needed for Moderate Pain.  Dispense: 45 tablet; Refill: 0  -     gabapentin (NEURONTIN) 300 MG capsule; Take 1 capsule by mouth 3 (Three) Times a Day.  Dispense: 180 capsule; Refill: 2  -     Ambulatory Referral to Physical Therapy    2. Chronic bilateral low back pain with left-sided sciatica  -     XR Spine Lumbar 2 or 3 View; Future  -     HYDROcodone-acetaminophen (NORCO) 5-325 MG per tablet; Take 1 tablet by mouth Every 6 (Six) Hours As Needed for Moderate Pain.  Dispense: 45 tablet; Refill: 0  -     lidocaine (LIDODERM) 5 %; Place 1 patch on the skin as directed by provider Daily. Remove & Discard patch within 12 hours; use as needed for back pain  Dispense: 12 each; Refill: 2  -      gabapentin (NEURONTIN) 300 MG capsule; Take 1 capsule by mouth 3 (Three) Times a Day.  Dispense: 180 capsule; Refill: 2  -     Ambulatory Referral to Physical Therapy    Other orders  -     Diclofenac Sodium (Voltaren Arthritis Pain) 1 % gel gel; Apply 4 g topically to the appropriate area as directed 4 (Four) Times a Day As Needed (Rub over low back for achy pain).  Dispense: 350 g; Refill: 1                Calderon Whittaker MD  12/27/23  10:59 EST

## 2023-12-28 ENCOUNTER — OFFICE VISIT (OUTPATIENT)
Dept: FAMILY MEDICINE CLINIC | Facility: CLINIC | Age: 68
End: 2023-12-28
Payer: MEDICARE

## 2023-12-28 VITALS
HEART RATE: 76 BPM | DIASTOLIC BLOOD PRESSURE: 70 MMHG | HEIGHT: 64 IN | BODY MASS INDEX: 28.51 KG/M2 | WEIGHT: 167 LBS | OXYGEN SATURATION: 97 % | RESPIRATION RATE: 16 BRPM | TEMPERATURE: 98.1 F | SYSTOLIC BLOOD PRESSURE: 118 MMHG

## 2023-12-28 DIAGNOSIS — Z98.1 S/P LUMBAR FUSION: ICD-10-CM

## 2023-12-28 DIAGNOSIS — E11.9 CONTROLLED TYPE 2 DIABETES MELLITUS WITHOUT COMPLICATION, WITHOUT LONG-TERM CURRENT USE OF INSULIN: Primary | Chronic | ICD-10-CM

## 2023-12-28 DIAGNOSIS — I10 PRIMARY HYPERTENSION: Chronic | ICD-10-CM

## 2023-12-28 DIAGNOSIS — E03.9 HYPOTHYROIDISM, ADULT: Chronic | ICD-10-CM

## 2023-12-28 RX ORDER — ORAL SEMAGLUTIDE 7 MG/1
7 TABLET ORAL DAILY
Qty: 30 TABLET | Refills: 1 | Status: SHIPPED | OUTPATIENT
Start: 2023-12-28

## 2023-12-28 RX ORDER — HYDROCHLOROTHIAZIDE 12.5 MG/1
12.5 TABLET ORAL DAILY
Qty: 30 TABLET | Refills: 1 | Status: SHIPPED | OUTPATIENT
Start: 2023-12-28

## 2023-12-28 NOTE — PROGRESS NOTES
Subjective   Summer Jefferson is a 68 y.o. female. Patient is here today for   Chief Complaint   Patient presents with    Medication Problem     F/u on medications to change.     Hospital Follow Up Visit     S/p lumbar fusion       (Not on file)-  Risk for Readmission (LACE) Score: 8 (2023  6:00 AM)           Vitals:    23 1112   BP: 118/70   Pulse: 76   Resp: 16   Temp: 98.1 °F (36.7 °C)   SpO2: 97%     The following portions of the patient's history were reviewed and updated as appropriate: allergies, current medications, past family history, past medical history, past social history, past surgical history and problem list.    Past Medical History:   Diagnosis Date    Anxiety and depression 03/15/2022    Backache     Diabetes mellitus type 2, controlled     HSV (herpes simplex virus) infection 03/15/2022    Hypercholesterolemia     Hypertension     Hypothyroidism     Osteoarthritis of multiple joints     Spinal stenosis     LUMBAR      No Known Allergies   Social History     Socioeconomic History    Marital status:    Tobacco Use    Smoking status: Former     Packs/day: 1.00     Years: 10.00     Additional pack years: 0.00     Total pack years: 10.00     Types: Cigarettes     Quit date: 1993     Years since quittin.1     Passive exposure: Never    Smokeless tobacco: Never    Tobacco comments:     quit 15 yrs ago   Vaping Use    Vaping Use: Never used   Substance and Sexual Activity    Alcohol use: Yes     Comment: 1-2 a month    Drug use: Never    Sexual activity: Defer        Current Outpatient Medications:     amLODIPine (NORVASC) 5 MG tablet, Take 1 tablet by mouth Daily., Disp: 90 tablet, Rfl: 3    Diclofenac Sodium (Voltaren Arthritis Pain) 1 % gel gel, Apply 4 g topically to the appropriate area as directed 4 (Four) Times a Day As Needed (Rub over low back for achy pain)., Disp: 350 g, Rfl: 1    empagliflozin (Jardiance) 10 MG tablet tablet, TAKE 1 TABLET EVERY DAY (NEW  DOSE), Disp: 30 tablet, Rfl: 1    famciclovir (FAMVIR) 500 MG tablet, Take 1 tablet by mouth Every 12 (Twelve) Hours As Needed (as directed)., Disp: , Rfl:     FLUoxetine (PROzac) 20 MG capsule, Take 1 capsule by mouth Daily., Disp: 90 capsule, Rfl: 3    gabapentin (NEURONTIN) 300 MG capsule, Take 1 capsule by mouth 3 (Three) Times a Day., Disp: 180 capsule, Rfl: 2    HYDROcodone-acetaminophen (NORCO) 5-325 MG per tablet, Take 1 tablet by mouth Every 6 (Six) Hours As Needed for Moderate Pain., Disp: 45 tablet, Rfl: 0    levothyroxine (SYNTHROID, LEVOTHROID) 50 MCG tablet, Take 1 tablet by mouth Daily., Disp: 90 tablet, Rfl: 3    lidocaine (LIDODERM) 5 %, Place 1 patch on the skin as directed by provider Daily. Remove & Discard patch within 12 hours; use as needed for back pain, Disp: 12 each, Rfl: 2    lisinopril (PRINIVIL,ZESTRIL) 10 MG tablet, Take 1 tablet by mouth Daily., Disp: 90 tablet, Rfl: 3    methocarbamol (ROBAXIN) 750 MG tablet, Take 1 tablet by mouth Every 6 (Six) Hours As Needed for Muscle Spasms., Disp: 25 tablet, Rfl: 0    rosuvastatin (CRESTOR) 10 MG tablet, Take 1 tablet by mouth Daily., Disp: 90 tablet, Rfl: 3    sennosides-docusate (PERICOLACE) 8.6-50 MG per tablet, Take 1 tablet by mouth 2 (Two) Times a Day., Disp: 60 tablet, Rfl: 0    acetaminophen (TYLENOL) 500 MG tablet, Take 1 tablet by mouth Every 6 (Six) Hours As Needed for Mild Pain. (Patient not taking: Reported on 12/28/2023), Disp: , Rfl:     benzonatate (Tessalon Perles) 100 MG capsule, Take 1 capsule by mouth 3 (Three) Times a Day As Needed for Cough. (Patient not taking: Reported on 12/28/2023), Disp: 30 capsule, Rfl: 0    hydroCHLOROthiazide (HYDRODIURIL) 12.5 MG tablet, Take 1 tablet by mouth Daily., Disp: 30 tablet, Rfl: 1    Semaglutide (Rybelsus) 7 MG tablet, Take 7 mg by mouth Daily., Disp: 30 tablet, Rfl: 1     Objective     History of Present Illness  Patient here today for s/p Lumbar fusion on 12/11/23 by Dr. Whittaker with  hospitalization at [facility] from 12/11-12/14 and in rehab from 12/14-12/19. Patient having history of lumbar spondylolisthesis. Patient was started on robaxin and oxycodone post op and d/c'd celebrex and gabapentin.     Patient stating she is eating and drinking well. Patient stating she is not having any issues with urination and/or bm's. Patient stating she is moving around ok, slow and having some discomforts but coming along ok.    Patient has next appt with Dr. Whittaker on 1/26/24.       Review of Systems   Constitutional: Negative.    Respiratory: Negative.     Cardiovascular: Negative.    Gastrointestinal: Negative.    Genitourinary: Negative.    Neurological: Negative.        Physical Exam  Vitals reviewed.   HENT:      Head: Normocephalic.   Eyes:      Pupils: Pupils are equal, round, and reactive to light.   Cardiovascular:      Rate and Rhythm: Normal rate and regular rhythm.   Pulmonary:      Effort: Pulmonary effort is normal.      Breath sounds: Normal breath sounds.   Musculoskeletal:      Comments: No assistive devices, ROM wnl but slow   Skin:     General: Skin is warm and dry.      Comments: All surgical incisions healing well   Neurological:      Mental Status: She is alert and oriented to person, place, and time.   Psychiatric:         Behavior: Behavior normal.       Recent Results (from the past 420 hour(s))   POC Glucose Once    Collection Time: 12/17/23  7:27 AM    Specimen: Blood   Result Value Ref Range    Glucose 99 70 - 130 mg/dL   POC Glucose Once    Collection Time: 12/17/23 11:40 AM    Specimen: Blood   Result Value Ref Range    Glucose 121 70 - 130 mg/dL   POC Glucose Once    Collection Time: 12/17/23  4:48 PM    Specimen: Blood   Result Value Ref Range    Glucose 105 70 - 130 mg/dL   Hemoglobin A1c    Collection Time: 12/18/23  5:26 AM    Specimen: Blood   Result Value Ref Range    Hemoglobin A1C 5.40 4.80 - 5.60 %   TSH Rfx On Abnormal To Free T4    Collection Time: 12/18/23   5:26 AM    Specimen: Blood   Result Value Ref Range    TSH 4.460 (H) 0.270 - 4.200 uIU/mL   Comprehensive Metabolic Panel    Collection Time: 12/18/23  5:26 AM    Specimen: Blood   Result Value Ref Range    Glucose 95 65 - 99 mg/dL    BUN 11 8 - 23 mg/dL    Creatinine 0.60 0.57 - 1.00 mg/dL    Sodium 140 136 - 145 mmol/L    Potassium 4.2 3.5 - 5.2 mmol/L    Chloride 103 98 - 107 mmol/L    CO2 27.3 22.0 - 29.0 mmol/L    Calcium 9.2 8.6 - 10.5 mg/dL    Total Protein 6.6 6.0 - 8.5 g/dL    Albumin 3.6 3.5 - 5.2 g/dL    ALT (SGPT) 53 (H) 1 - 33 U/L    AST (SGOT) 54 (H) 1 - 32 U/L    Alkaline Phosphatase 84 39 - 117 U/L    Total Bilirubin 0.3 0.0 - 1.2 mg/dL    Globulin 3.0 gm/dL    A/G Ratio 1.2 g/dL    BUN/Creatinine Ratio 18.3 7.0 - 25.0    Anion Gap 9.7 5.0 - 15.0 mmol/L    eGFR 97.3 >60.0 mL/min/1.73   CBC Auto Differential    Collection Time: 12/18/23  5:26 AM    Specimen: Blood   Result Value Ref Range    WBC 5.31 3.40 - 10.80 10*3/mm3    RBC 4.35 3.77 - 5.28 10*6/mm3    Hemoglobin 11.8 (L) 12.0 - 15.9 g/dL    Hematocrit 37.3 34.0 - 46.6 %    MCV 85.7 79.0 - 97.0 fL    MCH 27.1 26.6 - 33.0 pg    MCHC 31.6 31.5 - 35.7 g/dL    RDW 13.0 12.3 - 15.4 %    RDW-SD 40.4 37.0 - 54.0 fl    MPV 9.4 6.0 - 12.0 fL    Platelets 347 140 - 450 10*3/mm3    Neutrophil % 51.8 42.7 - 76.0 %    Lymphocyte % 32.4 19.6 - 45.3 %    Monocyte % 10.5 5.0 - 12.0 %    Eosinophil % 4.1 0.3 - 6.2 %    Basophil % 0.6 0.0 - 1.5 %    Immature Grans % 0.6 (H) 0.0 - 0.5 %    Neutrophils, Absolute 2.75 1.70 - 7.00 10*3/mm3    Lymphocytes, Absolute 1.72 0.70 - 3.10 10*3/mm3    Monocytes, Absolute 0.56 0.10 - 0.90 10*3/mm3    Eosinophils, Absolute 0.22 0.00 - 0.40 10*3/mm3    Basophils, Absolute 0.03 0.00 - 0.20 10*3/mm3    Immature Grans, Absolute 0.03 0.00 - 0.05 10*3/mm3    nRBC 0.0 0.0 - 0.2 /100 WBC   T4, Free    Collection Time: 12/18/23  5:26 AM    Specimen: Blood   Result Value Ref Range    Free T4 1.18 0.93 - 1.70 ng/dL   POC Glucose Once     Collection Time: 12/18/23  7:22 AM    Specimen: Blood   Result Value Ref Range    Glucose 98 70 - 130 mg/dL   POC Glucose Once    Collection Time: 12/18/23 11:32 AM    Specimen: Blood   Result Value Ref Range    Glucose 108 70 - 130 mg/dL   POC Glucose Once    Collection Time: 12/18/23  4:14 PM    Specimen: Blood   Result Value Ref Range    Glucose 136 (H) 70 - 130 mg/dL   POC Glucose Once    Collection Time: 12/19/23  7:19 AM    Specimen: Blood   Result Value Ref Range    Glucose 92 70 - 130 mg/dL   POC Glucose Once    Collection Time: 12/19/23 11:18 AM    Specimen: Blood   Result Value Ref Range    Glucose 97 70 - 130 mg/dL         ASSESSMENT     Problems Addressed this Visit          Cardiac and Vasculature    Hypertension (Chronic)    Relevant Medications    hydroCHLOROthiazide (HYDRODIURIL) 12.5 MG tablet       Endocrine and Metabolic    Diabetes mellitus type 2, controlled - Primary (Chronic)    Relevant Medications    Semaglutide (Rybelsus) 7 MG tablet    Hypothyroidism, adult (Chronic)       Musculoskeletal and Injuries    S/P lumbar fusion     Diagnoses         Codes Comments    Controlled type 2 diabetes mellitus without complication, without long-term current use of insulin    -  Primary ICD-10-CM: E11.9  ICD-9-CM: 250.00     Primary hypertension     ICD-10-CM: I10  ICD-9-CM: 401.9     Hypothyroidism, adult     ICD-10-CM: E03.9  ICD-9-CM: 244.9     S/P lumbar fusion     ICD-10-CM: Z98.1  ICD-9-CM: V45.4             Current outpatient and discharge medications have been reconciled for the patient.  Reviewed by: GILBERT Bryan      PLAN    Patient Instructions   Diabetes: last HgA1c in rehab on 12/18 was 5.4. Patient to stop metformin, continue Rybelsus 7 mg (at this time, will re eval in one month), and continue Jardiance 10 mg (at this time, will re eval in one month). Continue to eat a heart healthy low fat low sugar diet. Follow up in one month for labs and follow up.    Hypertension: stable,  continue current medications of amlodipine 5 mg daily, lisinopril 10 mg daily, and HCTZ 12.5 mg daily. Continue to drink plenty of water through out the day.    Hypothyroidism: TSH 4.46 while in rehab on 12/18. Will continue to monitor, no changes at this time in levothyroxine, continue levothyroxine 50 mcg daily.     S/p lumbar fusion: follow up with Dr. Whittaker as directed/as recommended.   Return if symptoms worsen or fail to improve, for keep end of January lab appt and follow up appt. .

## 2024-01-03 NOTE — PATIENT INSTRUCTIONS
Diabetes: last HgA1c in rehab on 12/18 was 5.4. Patient to stop metformin, continue Rybelsus 7 mg (at this time, will re eval in one month), and continue Jardiance 10 mg (at this time, will re eval in one month). Continue to eat a heart healthy low fat low sugar diet. Follow up in one month for labs and follow up.    Hypertension: stable, continue current medications of amlodipine 5 mg daily, lisinopril 10 mg daily, and HCTZ 12.5 mg daily. Continue to drink plenty of water through out the day.    Hypothyroidism: TSH 4.46 while in rehab on 12/18. Will continue to monitor, no changes at this time in levothyroxine, continue levothyroxine 50 mcg daily.     S/p lumbar fusion: follow up with Dr. Whittaker as directed/as recommended.

## 2024-01-10 NOTE — THERAPY TREATMENT NOTE
Inpatient Rehabilitation - Physical Therapy Treatment Note       [facility]     Patient Name: Summer Jefferson  : 1955  MRN: [id number]    Today's Date: 2023                    Admit Date: 2023      Visit Dx:   No diagnosis found.    Patient Active Problem List   Diagnosis    Diabetes mellitus type 2, controlled    Hypothyroidism, adult    Hypertension    Hypercholesterolemia    HSV (herpes simplex virus) infection    Anxiety and depression    Chronic bilateral low back pain with left-sided sciatica    Facet arthropathy, lumbar    Spondylolisthesis, lumbar region    Lumbar radiculopathy    SI (sacroiliac) joint inflammation    Spondylolisthesis of lumbar region    Overweight (BMI 25.0-29.9)    S/P lumbar fusion       Past Medical History:   Diagnosis Date    Anxiety and depression 03/15/2022    Backache     Diabetes mellitus type 2, controlled     HSV (herpes simplex virus) infection 03/15/2022    Hypercholesterolemia     Hypertension     Hypothyroidism     Osteoarthritis of multiple joints     Spinal stenosis     LUMBAR       Past Surgical History:   Procedure Laterality Date    ANKLE ARTHROSCOPY Right     2013 & 10/2013    COLONOSCOPY      2007    EPIDURAL BLOCK      X5    LAPAROSCOPIC GASTRIC BANDING  2007    LUMBAR FUSION Left 2023    Procedure: PRONE OBLIQUE LATERAL LUMBAR TWO TO THREE, FOUR TO FIVE INTERBODY FUSION WITH ANTERIOR PLATING AND NEUROMONITORING;  Surgeon: Calderon Whittaker MD;  Location: [facility] OR;  Service: Neurosurgery;  Laterality: Left;    LUMBAR FUSION N/A 2023    Procedure: LUMBAR FUSION MINIMALLY INVASIVE NAVIGATION;  Surgeon: Calderon Whittaker MD;  Location: [facility] OR;  Service: Neurosurgery;  Laterality: N/A;    ROTATOR CUFF REPAIR Right     2014    TOTAL KNEE ARTHROPLASTY Right     2014       PT ASSESSMENT (last 12 hours)       IRF PT Evaluation and Treatment       Row Name 23 0900          PT Time and Intention    Document  "Type daily treatment  -     Mode of Treatment physical therapy  -     Patient/Family/Caregiver Comments/Observations Pt agreeable to PT. Reports having a \"rough night\" with increased pain and not able to sleep.  -       Row Name 12/16/23 0900          General Information    Patient Profile Reviewed yes  -JS     General Observations of Patient Seated in w/c from OT upon arrival  -     Existing Precautions/Restrictions spinal;fall  -       Row Name 12/16/23 0900          Pain Assessment    Pretreatment Pain Rating 7/10  -JS     Posttreatment Pain Rating 7/10  -JS     Pain Location lower  -     Pain Location - back  -       Row Name 12/16/23 0900          Pain Scale: Word Pre/Post-Treatment    Pain Intervention(s) Repositioned;Ambulation/increased activity;Rest  -[facility] Name 12/16/23 0900          Cognition/Psychosocial    Affect/Mental Status (Cognition) WFL  -     Orientation Status (Cognition) oriented x 4  -JS     Follows Commands (Cognition) [facility]  -     Personal Safety Interventions fall prevention program maintained;gait belt;muscle strengthening facilitated;nonskid shoes/slippers when out of bed  -[facility] Name 12/16/23 0900          Bed Mobility    Comment, (Bed Mobility) Up in chair  -[facility] Name 12/16/23 0900          Sit-Stand Transfer    Sit-Stand [place] (Transfers) verbal cues;contact guard  -     Assistive Device (Sit-Stand Transfers) wheelchair;walker, front-wheeled  -     Comment, (Sit-Stand Transfer) cues for hand placement  -       Row Name 12/16/23 0900          Stand-Sit Transfer    Stand-Sit [place] (Transfers) contact guard;standby assist;verbal cues  -     Assistive Device (Stand-Sit Transfers) walker, front-wheeled  -       Row Name 12/16/23 0900          Gait/Stairs (Locomotion)    [place] Level (Gait) contact guard  -     Assistive Device (Gait) walker, front-wheeled  -     Distance in Feet (Gait) 160x3  -     Deviations/Abnormal " Patterns (Gait) gait speed decreased;weight shifting decreased  -JS     Bilateral Gait Deviations heel strike decreased  -JS     Handrail Location (Stairs) both sides  -JS     Number of Steps (Stairs) 4  -JS     Ascending Technique (Stairs) step-to-step  -JS     Descending Technique (Stairs) step-to-step  -JS       Row Name 12/16/23 0900          Hip (Therapeutic Exercise)    Hip Strengthening (Therapeutic Exercise) bilateral;marching while seated;aBduction;sitting;resistance band;red;marching while standing;10 repetitions  Limited ROM R standing marching due to c/o pain  -JS       Row Name 12/16/23 0900          Knee (Therapeutic Exercise)    Knee Strengthening (Therapeutic Exercise) bilateral;LAQ (long arc quad);hamstring curls;sitting;resistance band;red;10 repetitions  Sit<->Stand 5x2 reps with CGA/SBA with w/c & walker in front  -JS       Row Name 12/16/23 0900          Ankle (Therapeutic Exercise)    Ankle Strengthening (Therapeutic Exercise) bilateral;dorsiflexion;plantarflexion;sitting;10 repetitions  -       Row Name 12/16/23 0900          Positioning and Restraints    Pre-Treatment Position sitting in chair/recliner  -JS     Post Treatment Position wheelchair  -JS     In Wheelchair sitting;call light within reach;encouraged to call for assist;exit alarm on  -JS               User Key  (r) = Recorded By, (t) = Taken By, (c) = Cosigned By      Initials Name Provider Type    Pooja Francis PT Physical Therapist                  Wound 12/11/23 0847 Left lateral lumbar spine Incision (Active)   Dressing Appearance dry;intact 12/16/23 0854   Closure Glue 12/16/23 0854   Periwound ecchymotic 12/16/23 0854   Drainage Amount none 12/16/23 0854     Physical Therapy Education       Title: PT OT SLP Therapies (In Progress)       Topic: Physical Therapy (In Progress)       Point: Mobility training (Done)       Learning Progress Summary             Patient Acceptance, E,TB,D, VU by SARITA at 12/16/2023 120     Acceptance, E,D, VU by LW at 12/15/2023 1551                         Point: Home exercise program (Not Started)       Learner Progress:  Not documented in this visit.              Point: Body mechanics (Done)       Learning Progress Summary             Patient Acceptance, E,D, VU by LW at 12/15/2023 1551                         Point: Precautions (Done)       Learning Progress Summary             Patient Acceptance, E,D, VU by LW at 12/15/2023 1551                                         User Key       Initials Effective Dates Name Provider Type Discipline     07/11/23 -  Pooja Stephens, PT Physical Therapist PT    LW 05/08/23 -  Layla Villeda PT Physical Therapist PT                    PT Recommendation and Plan                          Time Calculation:      PT Charges       Row Name 12/16/23 1100 12/16/23 0900          Time Calculation    Start Time 1100  -JS 0900  -JS     Stop Time 1130  -JS 1000  -JS     Time Calculation (min) 30 min  -JS 60 min  -JS     PT Received On 12/16/23  -JS 12/16/23  -JS     PT - Next Appointment 12/17/23  -JS 12/16/23  -JS               User Key  (r) = Recorded By, (t) = Taken By, (c) = Cosigned By      Initials Name Provider Type    JS Pooja Stephens, PT Physical Therapist                    Therapy Charges for Today       Code Description Service Date Service Provider Modifiers Qty    51649451764 HC GAIT TRAINING EA 15 MIN 12/16/2023 Pooja Stephens, PT GP 3    72815415087 HC PT THER PROC EA 15 MIN 12/16/2023 Pooja Stephens, PT GP 3              PT G-Codes  AM-PAC 6 Clicks Score (PT): 20      Pooja Stephens PT  12/16/2023     normal

## 2024-01-17 DIAGNOSIS — Z00.00 MEDICARE ANNUAL WELLNESS VISIT, SUBSEQUENT: Primary | ICD-10-CM

## 2024-01-17 DIAGNOSIS — Z13.29 SCREENING FOR THYROID DISORDER: ICD-10-CM

## 2024-01-17 DIAGNOSIS — E55.9 VITAMIN D DEFICIENCY: ICD-10-CM

## 2024-01-17 DIAGNOSIS — E11.9 CONTROLLED TYPE 2 DIABETES MELLITUS WITHOUT COMPLICATION, WITHOUT LONG-TERM CURRENT USE OF INSULIN: Chronic | ICD-10-CM

## 2024-01-17 LAB
25(OH)D3+25(OH)D2 SERPL-MCNC: 42.7 NG/ML (ref 30–100)
ALBUMIN SERPL-MCNC: 4.8 G/DL (ref 3.5–5.2)
ALBUMIN/GLOB SERPL: 2.1 G/DL
ALP SERPL-CCNC: 88 U/L (ref 39–117)
ALT SERPL-CCNC: 23 U/L (ref 1–33)
AST SERPL-CCNC: 24 U/L (ref 1–32)
BASOPHILS # BLD AUTO: 0.03 10*3/MM3 (ref 0–0.2)
BASOPHILS NFR BLD AUTO: 0.6 % (ref 0–1.5)
BILIRUB SERPL-MCNC: 0.4 MG/DL (ref 0–1.2)
BUN SERPL-MCNC: 20 MG/DL (ref 8–23)
BUN/CREAT SERPL: 25.6 (ref 7–25)
CALCIUM SERPL-MCNC: 10 MG/DL (ref 8.6–10.5)
CHLORIDE SERPL-SCNC: 103 MMOL/L (ref 98–107)
CHOLEST SERPL-MCNC: 179 MG/DL (ref 0–200)
CO2 SERPL-SCNC: 25.4 MMOL/L (ref 22–29)
CREAT SERPL-MCNC: 0.78 MG/DL (ref 0.57–1)
EGFRCR SERPLBLD CKD-EPI 2021: 82.3 ML/MIN/1.73
EOSINOPHIL # BLD AUTO: 0.16 10*3/MM3 (ref 0–0.4)
EOSINOPHIL NFR BLD AUTO: 3 % (ref 0.3–6.2)
ERYTHROCYTE [DISTWIDTH] IN BLOOD BY AUTOMATED COUNT: 14 % (ref 12.3–15.4)
GLOBULIN SER CALC-MCNC: 2.3 GM/DL
GLUCOSE SERPL-MCNC: 133 MG/DL (ref 65–99)
HBA1C MFR BLD: 5.7 % (ref 4.8–5.6)
HCT VFR BLD AUTO: 42.5 % (ref 34–46.6)
HDLC SERPL-MCNC: 64 MG/DL (ref 40–60)
HGB BLD-MCNC: 13.9 G/DL (ref 12–15.9)
IMM GRANULOCYTES # BLD AUTO: 0.01 10*3/MM3 (ref 0–0.05)
IMM GRANULOCYTES NFR BLD AUTO: 0.2 % (ref 0–0.5)
LDLC SERPL CALC-MCNC: 96 MG/DL (ref 0–100)
LDLC/HDLC SERPL: 1.46 {RATIO}
LYMPHOCYTES # BLD AUTO: 1.2 10*3/MM3 (ref 0.7–3.1)
LYMPHOCYTES NFR BLD AUTO: 22.4 % (ref 19.6–45.3)
MCH RBC QN AUTO: 28.2 PG (ref 26.6–33)
MCHC RBC AUTO-ENTMCNC: 32.7 G/DL (ref 31.5–35.7)
MCV RBC AUTO: 86.2 FL (ref 79–97)
MONOCYTES # BLD AUTO: 0.48 10*3/MM3 (ref 0.1–0.9)
MONOCYTES NFR BLD AUTO: 9 % (ref 5–12)
NEUTROPHILS # BLD AUTO: 3.48 10*3/MM3 (ref 1.7–7)
NEUTROPHILS NFR BLD AUTO: 64.8 % (ref 42.7–76)
NRBC BLD AUTO-RTO: 0 /100 WBC (ref 0–0.2)
PLATELET # BLD AUTO: 262 10*3/MM3 (ref 140–450)
POTASSIUM SERPL-SCNC: 4.7 MMOL/L (ref 3.5–5.2)
PROT SERPL-MCNC: 7.1 G/DL (ref 6–8.5)
RBC # BLD AUTO: 4.93 10*6/MM3 (ref 3.77–5.28)
SODIUM SERPL-SCNC: 141 MMOL/L (ref 136–145)
TRIGL SERPL-MCNC: 107 MG/DL (ref 0–150)
TSH SERPL DL<=0.005 MIU/L-ACNC: 1.17 UIU/ML (ref 0.27–4.2)
VLDLC SERPL CALC-MCNC: 19 MG/DL (ref 5–40)
WBC # BLD AUTO: 5.36 10*3/MM3 (ref 3.4–10.8)

## 2024-01-24 ENCOUNTER — OFFICE VISIT (OUTPATIENT)
Dept: FAMILY MEDICINE CLINIC | Facility: CLINIC | Age: 69
End: 2024-01-24
Payer: MEDICARE

## 2024-01-24 VITALS
HEART RATE: 68 BPM | TEMPERATURE: 98.1 F | RESPIRATION RATE: 16 BRPM | DIASTOLIC BLOOD PRESSURE: 68 MMHG | HEIGHT: 64 IN | OXYGEN SATURATION: 98 % | WEIGHT: 170 LBS | SYSTOLIC BLOOD PRESSURE: 110 MMHG | BODY MASS INDEX: 29.02 KG/M2

## 2024-01-24 DIAGNOSIS — E11.9 CONTROLLED TYPE 2 DIABETES MELLITUS WITHOUT COMPLICATION, WITHOUT LONG-TERM CURRENT USE OF INSULIN: Chronic | ICD-10-CM

## 2024-01-24 DIAGNOSIS — Z98.1 S/P LUMBAR FUSION: ICD-10-CM

## 2024-01-24 DIAGNOSIS — F41.9 ANXIETY AND DEPRESSION: ICD-10-CM

## 2024-01-24 DIAGNOSIS — M19.09 PRIMARY OSTEOARTHRITIS OF OTHER SITE: ICD-10-CM

## 2024-01-24 DIAGNOSIS — E03.9 HYPOTHYROIDISM, ADULT: Chronic | ICD-10-CM

## 2024-01-24 DIAGNOSIS — Z00.00 MEDICARE ANNUAL WELLNESS VISIT, SUBSEQUENT: Primary | ICD-10-CM

## 2024-01-24 DIAGNOSIS — E78.00 HYPERCHOLESTEROLEMIA: Chronic | ICD-10-CM

## 2024-01-24 DIAGNOSIS — I10 PRIMARY HYPERTENSION: Chronic | ICD-10-CM

## 2024-01-24 DIAGNOSIS — F32.A ANXIETY AND DEPRESSION: ICD-10-CM

## 2024-01-24 DIAGNOSIS — E66.3 OVERWEIGHT (BMI 25.0-29.9): ICD-10-CM

## 2024-01-24 PROBLEM — M19.90 DJD (DEGENERATIVE JOINT DISEASE): Status: ACTIVE | Noted: 2024-01-24

## 2024-01-24 RX ORDER — FLUOXETINE HYDROCHLORIDE 20 MG/1
20 CAPSULE ORAL DAILY
Qty: 90 CAPSULE | Refills: 3 | Status: SHIPPED | OUTPATIENT
Start: 2024-01-24

## 2024-01-24 RX ORDER — CELECOXIB 200 MG/1
200 CAPSULE ORAL 2 TIMES DAILY
Qty: 60 CAPSULE | Refills: 2 | Status: SHIPPED | OUTPATIENT
Start: 2024-01-24

## 2024-01-24 RX ORDER — AMLODIPINE BESYLATE 5 MG/1
5 TABLET ORAL DAILY
Qty: 90 TABLET | Refills: 0 | Status: SHIPPED | OUTPATIENT
Start: 2024-01-24

## 2024-01-24 RX ORDER — ORAL SEMAGLUTIDE 7 MG/1
7 TABLET ORAL DAILY
Qty: 30 TABLET | Refills: 0 | Status: SHIPPED | OUTPATIENT
Start: 2024-01-24

## 2024-01-24 RX ORDER — HYDROCHLOROTHIAZIDE 12.5 MG/1
12.5 TABLET ORAL DAILY
Qty: 90 TABLET | Refills: 0 | Status: SHIPPED | OUTPATIENT
Start: 2024-01-24

## 2024-01-24 RX ORDER — LISINOPRIL 10 MG/1
10 TABLET ORAL DAILY
Qty: 90 TABLET | Refills: 0 | Status: SHIPPED | OUTPATIENT
Start: 2024-01-24

## 2024-01-24 RX ORDER — ROSUVASTATIN CALCIUM 10 MG/1
10 TABLET, COATED ORAL DAILY
Qty: 90 TABLET | Refills: 3 | Status: SHIPPED | OUTPATIENT
Start: 2024-01-24

## 2024-01-24 RX ORDER — LEVOTHYROXINE SODIUM 0.05 MG/1
50 TABLET ORAL DAILY
Qty: 90 TABLET | Refills: 3 | Status: SHIPPED | OUTPATIENT
Start: 2024-01-24

## 2024-01-24 NOTE — PROGRESS NOTES
The ABCs of the Annual Wellness Visit  Subsequent Medicare Wellness Visit    Subjective    Summer Jefferson is a 68 y.o. female who presents for a Subsequent Medicare Wellness Visit.    The following portions of the patient's history were reviewed and   updated as appropriate: allergies, current medications, past family history, past medical history, past social history, past surgical history, and problem list.    Compared to one year ago, the patient feels her physical   health is better.    Compared to one year ago, the patient feels her mental   health is the same.    Recent Hospitalizations:  This patient has had a [facility] admission record on file within the last 365 days.    Current Medical Providers:  Patient Care Team:  Ruth Benitez APRN as PCP - General (Family Medicine)    Outpatient Medications Prior to Visit   Medication Sig Dispense Refill    Diclofenac Sodium (Voltaren Arthritis Pain) 1 % gel gel Apply 4 g topically to the appropriate area as directed 4 (Four) Times a Day As Needed (Rub over low back for achy pain). 350 g 1    famciclovir (FAMVIR) 500 MG tablet Take 1 tablet by mouth Every 12 (Twelve) Hours As Needed (as directed).      gabapentin (NEURONTIN) 300 MG capsule Take 1 capsule by mouth 3 (Three) Times a Day. 180 capsule 2    HYDROcodone-acetaminophen (NORCO) 5-325 MG per tablet Take 1 tablet by mouth Every 6 (Six) Hours As Needed for Moderate Pain. 45 tablet 0    lidocaine (LIDODERM) 5 % Place 1 patch on the skin as directed by provider Daily. Remove & Discard patch within 12 hours; use as needed for back pain 12 each 2    methocarbamol (ROBAXIN) 750 MG tablet Take 1 tablet by mouth Every 6 (Six) Hours As Needed for Muscle Spasms. 25 tablet 0    sennosides-docusate (PERICOLACE) 8.6-50 MG per tablet Take 1 tablet by mouth 2 (Two) Times a Day. 60 tablet 0    amLODIPine (NORVASC) 5 MG tablet Take 1 tablet by mouth Daily. 90 tablet 3    empagliflozin (Jardiance) 10 MG tablet  tablet TAKE 1 TABLET EVERY DAY (NEW DOSE) 30 tablet 1    FLUoxetine (PROzac) 20 MG capsule Take 1 capsule by mouth Daily. 90 capsule 3    hydroCHLOROthiazide (HYDRODIURIL) 12.5 MG tablet Take 1 tablet by mouth Daily. 30 tablet 1    levothyroxine (SYNTHROID, LEVOTHROID) 50 MCG tablet Take 1 tablet by mouth Daily. 90 tablet 3    lisinopril (PRINIVIL,ZESTRIL) 10 MG tablet Take 1 tablet by mouth Daily. 90 tablet 3    rosuvastatin (CRESTOR) 10 MG tablet Take 1 tablet by mouth Daily. 90 tablet 3    Semaglutide (Rybelsus) 7 MG tablet Take 7 mg by mouth Daily. 30 tablet 1    acetaminophen (TYLENOL) 500 MG tablet Take 1 tablet by mouth Every 6 (Six) Hours As Needed for Mild Pain. (Patient not taking: Reported on 12/28/2023)      benzonatate (Tessalon Perles) 100 MG capsule Take 1 capsule by mouth 3 (Three) Times a Day As Needed for Cough. (Patient not taking: Reported on 12/28/2023) 30 capsule 0     No facility-administered medications prior to visit.       Opioid medication/s are on active medication list.  and I have evaluated her active treatment plan and pain score trends (see table).  There were no vitals filed for this visit.  I have reviewed the chart for potential of high risk medication and harmful drug interactions in the elderly.          Aspirin is not on active medication list.  Aspirin use is not indicated based on review of current medical condition/s. Risk of harm outweighs potential benefits.  .    Patient Active Problem List   Diagnosis    Diabetes mellitus type 2, controlled    Hypothyroidism, adult    Hypertension    Hypercholesterolemia    HSV (herpes simplex virus) infection    Anxiety and depression    Chronic bilateral low back pain with left-sided sciatica    Facet arthropathy, lumbar    Spondylolisthesis, lumbar region    Lumbar radiculopathy    SI (sacroiliac) joint inflammation    Spondylolisthesis of lumbar region    Overweight (BMI 25.0-29.9)    S/P lumbar fusion    DJD (degenerative joint  "disease)     Advance Care Planning   Advance Care Planning     Advance Directive is on file.  ACP discussion was held with the patient during this visit. Patient has an advance directive in EMR which is still valid.      Objective    Vitals:    01/24/24 0854   BP: 110/68   Pulse: 68   Resp: 16   Temp: 98.1 °F (36.7 °C)   SpO2: 98%   Weight: 77.1 kg (170 lb)   Height: 162.6 cm (64.02\")     Estimated body mass index is 29.17 kg/m² as calculated from the following:    Height as of this encounter: 162.6 cm (64.02\").    Weight as of this encounter: 77.1 kg (170 lb).           Does the patient have evidence of cognitive impairment? No    Recent Results (from the past 336 hour(s))   CBC & Differential    Collection Time: 01/17/24  9:33 AM    Specimen: Blood   Result Value Ref Range    WBC 5.36 3.40 - 10.80 10*3/mm3    RBC 4.93 3.77 - 5.28 10*6/mm3    Hemoglobin 13.9 12.0 - 15.9 g/dL    Hematocrit 42.5 34.0 - 46.6 %    MCV 86.2 79.0 - 97.0 fL    MCH 28.2 26.6 - 33.0 pg    MCHC 32.7 31.5 - 35.7 g/dL    RDW 14.0 12.3 - 15.4 %    Platelets 262 140 - 450 10*3/mm3    Neutrophil Rel % 64.8 42.7 - 76.0 %    Lymphocyte Rel % 22.4 19.6 - 45.3 %    Monocyte Rel % 9.0 5.0 - 12.0 %    Eosinophil Rel % 3.0 0.3 - 6.2 %    Basophil Rel % 0.6 0.0 - 1.5 %    Neutrophils Absolute 3.48 1.70 - 7.00 10*3/mm3    Lymphocytes Absolute 1.20 0.70 - 3.10 10*3/mm3    Monocytes Absolute 0.48 0.10 - 0.90 10*3/mm3    Eosinophils Absolute 0.16 0.00 - 0.40 10*3/mm3    Basophils Absolute 0.03 0.00 - 0.20 10*3/mm3    Immature Granulocyte Rel % 0.2 0.0 - 0.5 %    Immature Grans Absolute 0.01 0.00 - 0.05 10*3/mm3    nRBC 0.0 0.0 - 0.2 /100 WBC   Comprehensive Metabolic Panel    Collection Time: 01/17/24  9:33 AM    Specimen: Blood   Result Value Ref Range    Glucose 133 (H) 65 - 99 mg/dL    BUN 20 8 - 23 mg/dL    Creatinine 0.78 0.57 - 1.00 mg/dL    EGFR Result 82.3 >60.0 mL/min/1.73    BUN/Creatinine Ratio 25.6 (H) 7.0 - 25.0    Sodium 141 136 - 145 mmol/L    " Potassium 4.7 3.5 - 5.2 mmol/L    Chloride 103 98 - 107 mmol/L    Total CO2 25.4 22.0 - 29.0 mmol/L    Calcium 10.0 8.6 - 10.5 mg/dL    Total Protein 7.1 6.0 - 8.5 g/dL    Albumin 4.8 3.5 - 5.2 g/dL    Globulin 2.3 gm/dL    A/G Ratio 2.1 g/dL    Total Bilirubin 0.4 0.0 - 1.2 mg/dL    Alkaline Phosphatase 88 39 - 117 U/L    AST (SGOT) 24 1 - 32 U/L    ALT (SGPT) 23 1 - 33 U/L   Lipid Panel With LDL / HDL Ratio    Collection Time: 24  9:33 AM    Specimen: Blood   Result Value Ref Range    Total Cholesterol 179 0 - 200 mg/dL    Triglycerides 107 0 - 150 mg/dL    HDL Cholesterol 64 (H) 40 - 60 mg/dL    VLDL Cholesterol Gee 19 5 - 40 mg/dL    LDL Chol Calc (NIH) 96 0 - 100 mg/dL    LDL/HDL RATIO 1.46    TSH Rfx On Abnormal To Free T4    Collection Time: 24  9:33 AM    Specimen: Blood   Result Value Ref Range    TSH 1.170 0.270 - 4.200 uIU/mL   Hemoglobin A1c    Collection Time: 24  9:33 AM    Specimen: Blood   Result Value Ref Range    Hemoglobin A1C 5.70 (H) 4.80 - 5.60 %   Vitamin D,25-Hydroxy    Collection Time: 24  9:33 AM    Specimen: Blood   Result Value Ref Range    25 Hydroxy, Vitamin D 42.7 30.0 - 100.0 ng/ml         HEALTH RISK ASSESSMENT    Smoking Status:  Social History     Tobacco Use   Smoking Status Former    Packs/day: 1.00    Years: 10.00    Additional pack years: 0.00    Total pack years: 10.00    Types: Cigarettes    Quit date: 1993    Years since quittin.1    Passive exposure: Never   Smokeless Tobacco Never   Tobacco Comments    quit 15 yrs ago     Alcohol Consumption:  Social History     Substance and Sexual Activity   Alcohol Use Yes    Comment: 1-2 a month     Fall Risk Screen:    HARI Fall Risk Assessment was completed, and patient is at LOW risk for falls.Assessment completed on:2024    Depression Screenin/24/2024     8:55 AM   PHQ-2/PHQ-9 Depression Screening   Little Interest or Pleasure in Doing Things 0-->not at all   Feeling Down, Depressed  or Hopeless 0-->not at all   PHQ-9: Brief Depression Severity Measure Score 0       Health Habits and Functional and Cognitive Screenin/24/2024     8:55 AM   Functional & Cognitive Status   Do you have difficulty preparing food and eating? No   Do you have difficulty bathing yourself, getting dressed or grooming yourself? No   Do you have difficulty using the toilet? No   Do you have difficulty moving around from place to place? No   Do you have trouble with steps or getting out of a bed or a chair? No   Current Diet Unhealthy Diet   Dental Exam Up to date   Eye Exam Up to date   Exercise (times per week) 0 times per week   Current Exercises Include No Regular Exercise   Do you need help using the phone?  No   Are you deaf or do you have serious difficulty hearing?  No   Do you need help to go to places out of walking distance? No   Do you need help shopping? No   Do you need help preparing meals?  No   Do you need help with housework?  Yes   Do you need help with laundry? Yes   Do you need help taking your medications? No   Do you need help managing money? No   Do you ever drive or ride in a car without wearing a seat belt? No   Have you felt unusual stress, anger or loneliness in the last month? No   Who do you live with? Spouse   If you need help, do you have trouble finding someone available to you? No   Have you been bothered in the last four weeks by sexual problems? No   Do you have difficulty concentrating, remembering or making decisions? No       Age-appropriate Screening Schedule:  Refer to the list below for future screening recommendations based on patient's age, sex and/or medical conditions. Orders for these recommended tests are listed in the plan section. The patient has been provided with a written plan.    Health Maintenance   Topic Date Due    HEPATITIS C SCREENING  Never done    PAP SMEAR  Never done    URINE MICROALBUMIN  2023    COLORECTAL CANCER SCREENING  2025  (Originally 1955)    MAMMOGRAM  01/24/2025 (Originally 8/15/2016)    TDAP/TD VACCINES (1 - Tdap) 01/24/2025 (Originally 7/31/1974)    DXA SCAN  07/12/2024    HEMOGLOBIN A1C  07/17/2024    DIABETIC EYE EXAM  08/03/2024    DIABETIC FOOT EXAM  09/15/2024    BMI FOLLOWUP  12/28/2024    LIPID PANEL  01/17/2025    ANNUAL WELLNESS VISIT  01/24/2025    COVID-19 Vaccine  Completed    INFLUENZA VACCINE  Completed    ZOSTER VACCINE  Completed    Pneumococcal Vaccine 65+  Discontinued                  CMS Preventative Services Quick Reference  Risk Factors Identified During Encounter  Depression/Dysphoria:  current medication working well, continue to monitor  Immunizations Discussed/Encouraged: Tdap  Dental Screening Recommended  Vision Screening Recommended  The above risks/problems have been discussed with the patient.  Pertinent information has been shared with the patient in the After Visit Summary.  An After Visit Summary and PPPS were made available to the patient.    Follow Up:   Next Medicare Wellness visit to be scheduled in 1 year.       Additional E&M Note during same encounter follows:  Patient has multiple medical problems which are significant and separately identifiable that require additional work above and beyond the Medicare Wellness Visit.      Chief Complaint  Medicare Wellness-subsequent    Subjective        HPI  Summer Jefferson is also being seen today for hypertension, hyperlipidemia, diabetes, overweight, hypothyroidism and anxiety. Patient's HgA1c while in rehab on 12/18 was 5.4 and now 5.7. Patient stopped metformin on last visit  but continued Rybelsus 7 mg and Jardiance 10 mg. Patient admits to not eating healthy and eating while bored. Patient s/p lumbar fusion and going through physical therapy that is still low impact. Patient sees neurosurgeon on Friday.      Review of Systems   Constitutional: Negative.    Respiratory: Negative.     Cardiovascular: Negative.   "  Psychiatric/Behavioral:  Negative for dysphoric mood, self-injury, sleep disturbance and suicidal ideas. The patient is not nervous/anxious.        Objective   Vital Signs:  /68   Pulse 68   Temp 98.1 °F (36.7 °C)   Resp 16   Ht 162.6 cm (64.02\")   Wt 77.1 kg (170 lb)   SpO2 98%   BMI 29.17 kg/m²     Physical Exam  Vitals reviewed.   HENT:      Head: Normocephalic.   Eyes:      Pupils: Pupils are equal, round, and reactive to light.   Cardiovascular:      Rate and Rhythm: Normal rate and regular rhythm.   Pulmonary:      Effort: Pulmonary effort is normal.      Breath sounds: Normal breath sounds.   Musculoskeletal:         General: Normal range of motion.   Skin:     General: Skin is warm and dry.   Neurological:      Mental Status: She is alert and oriented to person, place, and time.   Psychiatric:         Behavior: Behavior normal.                         Assessment and Plan   Diagnoses and all orders for this visit:    1. Medicare annual wellness visit, subsequent (Primary)    2. Controlled type 2 diabetes mellitus without complication, without long-term current use of insulin  -     Comprehensive Metabolic Panel; Future  -     Hemoglobin A1c; Future  -     MicroAlbumin, Urine, Random - Urine, Clean Catch; Future  -     Semaglutide (Rybelsus) 7 MG tablet; Take 7 mg by mouth Daily.  Dispense: 30 tablet; Refill: 0    3. Primary hypertension  -     Vascular Screening (Bundle) CAR; Future  -     lisinopril (PRINIVIL,ZESTRIL) 10 MG tablet; Take 1 tablet by mouth Daily.  Dispense: 90 tablet; Refill: 0  -     hydroCHLOROthiazide (HYDRODIURIL) 12.5 MG tablet; Take 1 tablet by mouth Daily.  Dispense: 90 tablet; Refill: 0  -     amLODIPine (NORVASC) 5 MG tablet; Take 1 tablet by mouth Daily.  Dispense: 90 tablet; Refill: 0    4. Hypercholesterolemia  -     Vascular Screening (Bundle) CAR; Future  -     rosuvastatin (CRESTOR) 10 MG tablet; Take 1 tablet by mouth Daily.  Dispense: 90 tablet; Refill: 3    5. " Primary osteoarthritis of other site  Comments:  carlos. thumbs and hands  Orders:  -     celecoxib (CeleBREX) 200 MG capsule; Take 1 capsule by mouth 2 (Two) Times a Day.  Dispense: 60 capsule; Refill: 2    6. Hypothyroidism, adult  -     levothyroxine (SYNTHROID, LEVOTHROID) 50 MCG tablet; Take 1 tablet by mouth Daily.  Dispense: 90 tablet; Refill: 3    7. Anxiety and depression  -     FLUoxetine (PROzac) 20 MG capsule; Take 1 capsule by mouth Daily.  Dispense: 90 capsule; Refill: 3    8. Overweight (BMI 25.0-29.9)    9. S/P lumbar fusion      Diabetes/overweight: stopping Jardiance at this time. Patient to continue Rybelsus 7 mg daily, Patient to work on eating a more heart healthy low sugar low carb diet. Patient to work on not eating when bored; recommend using hands to draw,.... and chewing sugar free gum when bored. Continue physical therapy as current per neurosurgery. Will recheck labs in one month to see  how Patient is doing off Jardiance.    Hypertension: stable, will wait till next office visit but considering backing off lisinopril due to how well b/p doing with weight loss. Continue amlodipine 5 mg daily, lisinopril 10 mg daily, and HCTZ 12.5 mg daily. Continue to drink plenty of water through out the day. Recheck in one month    Hypercholesterolemia:stable, continue rosuvastatin 10 mg daily; recommending vascular screening for further eval. Recheck in 6 months.     OA of hands/thumbs: restarting celebrex, encouraging voltaren gel to locations of pain and wearing braces at night to help with discomfort as well.    Hypothyroidism: stable, continue levothyroxine 50 mcg daily. Recheck in 6 months.     Anxiety and depression: stable, continue fluoxetine 20 mg daily. Recheck in 6 months.             Follow Up   Return for recheck in 1 month, DM/overweight/HTN .  Patient was given instructions and counseling regarding her condition or for health maintenance advice. Please see specific information pulled into  the AVS if appropriate.

## 2024-01-26 ENCOUNTER — OFFICE VISIT (OUTPATIENT)
Dept: NEUROSURGERY | Facility: CLINIC | Age: 69
End: 2024-01-26
Payer: MEDICARE

## 2024-01-26 ENCOUNTER — HOSPITAL ENCOUNTER (OUTPATIENT)
Dept: GENERAL RADIOLOGY | Facility: HOSPITAL | Age: 69
Discharge: HOME | End: 2024-01-26
Admitting: NEUROLOGICAL SURGERY
Payer: MEDICARE

## 2024-01-26 VITALS
DIASTOLIC BLOOD PRESSURE: 72 MMHG | WEIGHT: 170 LBS | RESPIRATION RATE: 16 BRPM | HEIGHT: 64 IN | SYSTOLIC BLOOD PRESSURE: 112 MMHG | HEART RATE: 81 BPM | OXYGEN SATURATION: 95 % | BODY MASS INDEX: 29.02 KG/M2

## 2024-01-26 DIAGNOSIS — M43.16 SPONDYLOLISTHESIS OF LUMBAR REGION: ICD-10-CM

## 2024-01-26 DIAGNOSIS — T84.216A HARDWARE FAILURE OF ANTERIOR COLUMN OF SPINE: Primary | ICD-10-CM

## 2024-01-26 DIAGNOSIS — G89.29 CHRONIC BILATERAL LOW BACK PAIN WITH LEFT-SIDED SCIATICA: ICD-10-CM

## 2024-01-26 DIAGNOSIS — M54.42 CHRONIC BILATERAL LOW BACK PAIN WITH LEFT-SIDED SCIATICA: ICD-10-CM

## 2024-01-26 DIAGNOSIS — R79.1 ABNORMAL COAGULATION PROFILE: ICD-10-CM

## 2024-01-26 PROCEDURE — 3074F SYST BP LT 130 MM HG: CPT | Performed by: NEUROLOGICAL SURGERY

## 2024-01-26 PROCEDURE — 99024 POSTOP FOLLOW-UP VISIT: CPT | Performed by: NEUROLOGICAL SURGERY

## 2024-01-26 PROCEDURE — 3078F DIAST BP <80 MM HG: CPT | Performed by: NEUROLOGICAL SURGERY

## 2024-01-26 PROCEDURE — 72100 X-RAY EXAM L-S SPINE 2/3 VWS: CPT

## 2024-02-06 ENCOUNTER — TELEPHONE (OUTPATIENT)
Dept: NEUROSURGERY | Facility: CLINIC | Age: 69
End: 2024-02-06
Payer: MEDICARE

## 2024-02-06 NOTE — TELEPHONE ENCOUNTER
Called patient. Scheduled patient for f/u visit to ask  more questions about the surgery on 02/07/2024 at 10:45 am.

## 2024-02-06 NOTE — TELEPHONE ENCOUNTER
PATIENT CALLED IN TO SPEAK TO SOMEONE BEFORE SURGERY - STATES SHE HAS ADDITIONAL QUESTIONS.  ATTEMPTED WT AND PER MAHESH SENDING T/E     PLEASE CALL PATIENT     THANK YOU!

## 2024-02-08 ENCOUNTER — OFFICE VISIT (OUTPATIENT)
Dept: NEUROSURGERY | Facility: CLINIC | Age: 69
End: 2024-02-08
Payer: MEDICARE

## 2024-02-08 ENCOUNTER — HOSPITAL ENCOUNTER (OUTPATIENT)
Dept: GENERAL RADIOLOGY | Facility: HOSPITAL | Age: 69
Discharge: HOME | End: 2024-02-08
Admitting: NEUROLOGICAL SURGERY
Payer: MEDICARE

## 2024-02-08 VITALS
HEIGHT: 64 IN | BODY MASS INDEX: 29.02 KG/M2 | DIASTOLIC BLOOD PRESSURE: 68 MMHG | OXYGEN SATURATION: 93 % | RESPIRATION RATE: 16 BRPM | SYSTOLIC BLOOD PRESSURE: 124 MMHG | WEIGHT: 170 LBS | HEART RATE: 83 BPM

## 2024-02-08 DIAGNOSIS — T84.216A HARDWARE FAILURE OF ANTERIOR COLUMN OF SPINE: ICD-10-CM

## 2024-02-08 DIAGNOSIS — T84.216A HARDWARE FAILURE OF ANTERIOR COLUMN OF SPINE: Primary | ICD-10-CM

## 2024-02-08 DIAGNOSIS — R79.1 ABNORMAL COAGULATION PROFILE: ICD-10-CM

## 2024-02-08 PROCEDURE — 99024 POSTOP FOLLOW-UP VISIT: CPT | Performed by: NEUROLOGICAL SURGERY

## 2024-02-08 PROCEDURE — 72100 X-RAY EXAM L-S SPINE 2/3 VWS: CPT

## 2024-02-08 PROCEDURE — 3078F DIAST BP <80 MM HG: CPT | Performed by: NEUROLOGICAL SURGERY

## 2024-02-08 PROCEDURE — 3074F SYST BP LT 130 MM HG: CPT | Performed by: NEUROLOGICAL SURGERY

## 2024-02-08 NOTE — PROGRESS NOTES
Patient ID: Summer Jefferson is a 68 y.o. female is here today for follow-up to discuss the upcoming surgery scheduled on 02/26/2024.    Subjective     The patient is here in regards to   Chief Complaint   Patient presents with    Back Pain    Post-op Follow-up       History of Present Illness  Summer continues to have back pain and it has traveled upwards to the small of her back now.  This has been gradually progressive since surgery.      While in the room and during my examination of the patient I wore a mask and eye protection.  I washed my hands before and after this patient encounter.  The patient was also wearing a mask.    The following portions of the patient's history were reviewed and updated as appropriate: allergies, current medications, past family history, past medical history, past social history, past surgical history and problem list.    Review of Systems   Constitutional:  Negative for fever.   Musculoskeletal:  Positive for back pain. Negative for gait problem, neck pain and neck stiffness.   Neurological:  Negative for dizziness, weakness, light-headedness, numbness and headaches.        Past Medical History:   Diagnosis Date    Anxiety and depression 03/15/2022    Backache     Diabetes mellitus type 2, controlled     HSV (herpes simplex virus) infection 03/15/2022    Hypercholesterolemia     Hypertension     Hypothyroidism     Osteoarthritis of multiple joints     Spinal stenosis     LUMBAR       No Known Allergies    Family History   Problem Relation Age of Onset    Thyroid disease Mother     Diabetes Mother     Hypertension Mother     Heart disease Mother     Vision loss Father     Malig Hyperthermia Neg Hx        Social History     Socioeconomic History    Marital status:    Tobacco Use    Smoking status: Former     Packs/day: 1.00     Years: 10.00     Additional pack years: 0.00     Total pack years: 10.00     Types: Cigarettes     Quit date: 12/1/1993     Years since  quittin.2     Passive exposure: Never    Smokeless tobacco: Never    Tobacco comments:     quit 15 yrs ago   Vaping Use    Vaping Use: Never used   Substance and Sexual Activity    Alcohol use: Yes     Comment: 1-2 a month    Drug use: Never    Sexual activity: Defer       Past Surgical History:   Procedure Laterality Date    ANKLE ARTHROSCOPY Right     2013 & 10/2013    COLONOSCOPY      2007    EPIDURAL BLOCK      X5    LAPAROSCOPIC GASTRIC BANDING  2007    LUMBAR FUSION Left 2023    Procedure: PRONE OBLIQUE LATERAL LUMBAR TWO TO THREE, FOUR TO FIVE INTERBODY FUSION WITH ANTERIOR PLATING AND NEUROMONITORING;  Surgeon: Calderon Whittaker MD;  Location: [facility];  Service: Neurosurgery;  Laterality: Left;    LUMBAR FUSION N/A 2023    Procedure: LUMBAR FUSION MINIMALLY INVASIVE NAVIGATION;  Surgeon: Calderon Whittaker MD;  Location: [facility];  Service: Neurosurgery;  Laterality: N/A;    ROTATOR CUFF REPAIR Right     2014    TOTAL KNEE ARTHROPLASTY Right     2014         Objective     Vitals:    24 1127   BP: 124/68   Pulse: 83   Resp: 16   SpO2: 93%     Body mass index is 29.18 kg/m².    Physical Exam  Constitutional:       Appearance: Normal appearance.   HENT:      Head: Normocephalic and atraumatic.   Eyes:      Extraocular Movements: Extraocular movements intact.      Conjunctiva/sclera: Conjunctivae normal.      Pupils: Pupils are equal, round, and reactive to light.   Cardiovascular:      Rate and Rhythm: Normal rate and regular rhythm.      Pulses: Normal pulses.   Pulmonary:      Breath sounds: Normal breath sounds.   Abdominal:      Palpations: Abdomen is soft.   Musculoskeletal:         General: Normal range of motion.      Cervical back: Normal range of motion and neck supple.   Skin:     General: Skin is warm and dry.   Neurological:      Mental Status: She is alert and oriented to person, place, and time.      Cranial Nerves: Cranial nerves 2-12 are intact.       Motor: Motor function is intact. No weakness or atrophy.      Coordination: Coordination is intact. Romberg sign negative. Romberg Test normal.      Gait: Gait is intact. Gait normal.      Deep Tendon Reflexes: Reflexes are normal and symmetric.      Reflex Scores:       Tricep reflexes are 2+ on the right side and 2+ on the left side.       Bicep reflexes are 2+ on the right side and 2+ on the left side.       Brachioradialis reflexes are 2+ on the right side and 2+ on the left side.       Patellar reflexes are 2+ on the right side and 2+ on the left side.       Achilles reflexes are 2+ on the right side and 2+ on the left side.  Psychiatric:         Speech: Speech normal.         Neurologic Exam     Mental Status   Oriented to person, place, and time.   Attention: normal. Concentration: normal.   Speech: speech is normal   Level of consciousness: alert    Cranial Nerves   Cranial nerves II through XII intact.     CN III, IV, VI   Pupils are equal, round, and reactive to light.    Motor Exam   Muscle bulk: normal  Overall muscle tone: normal    Strength   Strength 5/5 except as noted.     Sensory Exam   Light touch normal.     Gait, Coordination, and Reflexes     Gait  Gait: normal    Coordination   Romberg: negative    Reflexes   Reflexes 2+ except as noted.   Right brachioradialis: 2+  Left brachioradialis: 2+  Right biceps: 2+  Left biceps: 2+  Right triceps: 2+  Left triceps: 2+  Right patellar: 2+  Left patellar: 2+  Right achilles: 2+  Left achilles: 2+      Assessment & Plan   Independent Review of Radiographic Studies:      I personally reviewed the images from the following studies.    XR: XR of the lumbar spine was reviewed and shows some additional settling of her L2-3 and L4-5 anterior interbody cages.  There is evidence of the inferior endplate of L3 giving way somewhat to the endplate.    Assessment/Plan: She has some evidence of anterior hardware malfunction and has had increased spondylolisthesis.   I think she will need posterior instrumentation to back up her previous fusion to prevent her from having further issues.  We previously talked about L4-5 posterior instrumentation alone but she may benefit from having L2-5 posterior instrumentation to eliminate the risk of L2-3 spondylolisthesis in the future.    Medical Decision Making:      We discussed the risks and benefits and alternatives of surgery including posterior instrumentation.  She understands and is willing to proceed with surgery         Diagnoses and all orders for this visit:    1. 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 Abnormal coagulation profile  -     aPTT; Future  -     Protime-INR; Future    Other orders  -     Inpatient Admission; Standing  -     Follow Anesthesia Guidelines / Protocol; Future  -     Follow Anesthesia Guidelines / Protocol; Standing  -     Verify / Perform Chlorhexidine Skin Prep; Standing  -     Obtain Informed Consent; Future  -     Provide NPO Instructions to Patient; Future  -     Chlorhexidine Skin Prep; Future             Patient Instructions/Recommendations:    Call with any questions or concerns      Calderon Whittaker MD  02/08/24  13:31 EST

## 2024-02-08 NOTE — H&P (VIEW-ONLY)
Patient ID: Summer Jefferson is a 68 y.o. female is here today for follow-up to discuss the upcoming surgery scheduled on 02/26/2024.    Subjective     The patient is here in regards to   Chief Complaint   Patient presents with    Back Pain    Post-op Follow-up       History of Present Illness  Summer continues to have back pain and it has traveled upwards to the small of her back now.  This has been gradually progressive since surgery.      While in the room and during my examination of the patient I wore a mask and eye protection.  I washed my hands before and after this patient encounter.  The patient was also wearing a mask.    The following portions of the patient's history were reviewed and updated as appropriate: allergies, current medications, past family history, past medical history, past social history, past surgical history and problem list.    Review of Systems   Constitutional:  Negative for fever.   Musculoskeletal:  Positive for back pain. Negative for gait problem, neck pain and neck stiffness.   Neurological:  Negative for dizziness, weakness, light-headedness, numbness and headaches.        Past Medical History:   Diagnosis Date    Anxiety and depression 03/15/2022    Backache     Diabetes mellitus type 2, controlled     HSV (herpes simplex virus) infection 03/15/2022    Hypercholesterolemia     Hypertension     Hypothyroidism     Osteoarthritis of multiple joints     Spinal stenosis     LUMBAR       No Known Allergies    Family History   Problem Relation Age of Onset    Thyroid disease Mother     Diabetes Mother     Hypertension Mother     Heart disease Mother     Vision loss Father     Malig Hyperthermia Neg Hx        Social History     Socioeconomic History    Marital status:    Tobacco Use    Smoking status: Former     Packs/day: 1.00     Years: 10.00     Additional pack years: 0.00     Total pack years: 10.00     Types: Cigarettes     Quit date: 12/1/1993     Years since  quittin.2     Passive exposure: Never    Smokeless tobacco: Never    Tobacco comments:     quit 15 yrs ago   Vaping Use    Vaping Use: Never used   Substance and Sexual Activity    Alcohol use: Yes     Comment: 1-2 a month    Drug use: Never    Sexual activity: Defer       Past Surgical History:   Procedure Laterality Date    ANKLE ARTHROSCOPY Right     2013 & 10/2013    COLONOSCOPY      2007    EPIDURAL BLOCK      X5    LAPAROSCOPIC GASTRIC BANDING  2007    LUMBAR FUSION Left 2023    Procedure: PRONE OBLIQUE LATERAL LUMBAR TWO TO THREE, FOUR TO FIVE INTERBODY FUSION WITH ANTERIOR PLATING AND NEUROMONITORING;  Surgeon: Calderon Whittaker MD;  Location: [facility];  Service: Neurosurgery;  Laterality: Left;    LUMBAR FUSION N/A 2023    Procedure: LUMBAR FUSION MINIMALLY INVASIVE NAVIGATION;  Surgeon: Calderon Whittaker MD;  Location: [facility];  Service: Neurosurgery;  Laterality: N/A;    ROTATOR CUFF REPAIR Right     2014    TOTAL KNEE ARTHROPLASTY Right     2014         Objective     Vitals:    24 1127   BP: 124/68   Pulse: 83   Resp: 16   SpO2: 93%     Body mass index is 29.18 kg/m².    Physical Exam  Constitutional:       Appearance: Normal appearance.   HENT:      Head: Normocephalic and atraumatic.   Eyes:      Extraocular Movements: Extraocular movements intact.      Conjunctiva/sclera: Conjunctivae normal.      Pupils: Pupils are equal, round, and reactive to light.   Cardiovascular:      Rate and Rhythm: Normal rate and regular rhythm.      Pulses: Normal pulses.   Pulmonary:      Breath sounds: Normal breath sounds.   Abdominal:      Palpations: Abdomen is soft.   Musculoskeletal:         General: Normal range of motion.      Cervical back: Normal range of motion and neck supple.   Skin:     General: Skin is warm and dry.   Neurological:      Mental Status: She is alert and oriented to person, place, and time.      Cranial Nerves: Cranial nerves 2-12 are intact.       Motor: Motor function is intact. No weakness or atrophy.      Coordination: Coordination is intact. Romberg sign negative. Romberg Test normal.      Gait: Gait is intact. Gait normal.      Deep Tendon Reflexes: Reflexes are normal and symmetric.      Reflex Scores:       Tricep reflexes are 2+ on the right side and 2+ on the left side.       Bicep reflexes are 2+ on the right side and 2+ on the left side.       Brachioradialis reflexes are 2+ on the right side and 2+ on the left side.       Patellar reflexes are 2+ on the right side and 2+ on the left side.       Achilles reflexes are 2+ on the right side and 2+ on the left side.  Psychiatric:         Speech: Speech normal.         Neurologic Exam     Mental Status   Oriented to person, place, and time.   Attention: normal. Concentration: normal.   Speech: speech is normal   Level of consciousness: alert    Cranial Nerves   Cranial nerves II through XII intact.     CN III, IV, VI   Pupils are equal, round, and reactive to light.    Motor Exam   Muscle bulk: normal  Overall muscle tone: normal    Strength   Strength 5/5 except as noted.     Sensory Exam   Light touch normal.     Gait, Coordination, and Reflexes     Gait  Gait: normal    Coordination   Romberg: negative    Reflexes   Reflexes 2+ except as noted.   Right brachioradialis: 2+  Left brachioradialis: 2+  Right biceps: 2+  Left biceps: 2+  Right triceps: 2+  Left triceps: 2+  Right patellar: 2+  Left patellar: 2+  Right achilles: 2+  Left achilles: 2+      Assessment & Plan   Independent Review of Radiographic Studies:      I personally reviewed the images from the following studies.    XR: XR of the lumbar spine was reviewed and shows some additional settling of her L2-3 and L4-5 anterior interbody cages.  There is evidence of the inferior endplate of L3 giving way somewhat to the endplate.    Assessment/Plan: She has some evidence of anterior hardware malfunction and has had increased spondylolisthesis.   I think she will need posterior instrumentation to back up her previous fusion to prevent her from having further issues.  We previously talked about L4-5 posterior instrumentation alone but she may benefit from having L2-5 posterior instrumentation to eliminate the risk of L2-3 spondylolisthesis in the future.    Medical Decision Making:      We discussed the risks and benefits and alternatives of surgery including posterior instrumentation.  She understands and is willing to proceed with surgery         Diagnoses and all orders for this visit:    1. 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 Abnormal coagulation profile  -     aPTT; Future  -     Protime-INR; Future    Other orders  -     Inpatient Admission; Standing  -     Follow Anesthesia Guidelines / Protocol; Future  -     Follow Anesthesia Guidelines / Protocol; Standing  -     Verify / Perform Chlorhexidine Skin Prep; Standing  -     Obtain Informed Consent; Future  -     Provide NPO Instructions to Patient; Future  -     Chlorhexidine Skin Prep; Future             Patient Instructions/Recommendations:    Call with any questions or concerns      Calderon Whittaker MD  02/08/24  13:31 EST

## 2024-02-19 ENCOUNTER — PRE-ADMISSION TESTING (OUTPATIENT)
Dept: PREADMISSION TESTING | Facility: HOSPITAL | Age: 69
End: 2024-02-19
Payer: MEDICARE

## 2024-02-19 VITALS
OXYGEN SATURATION: 98 % | RESPIRATION RATE: 18 BRPM | HEART RATE: 79 BPM | HEIGHT: 64 IN | BODY MASS INDEX: 30.9 KG/M2 | DIASTOLIC BLOOD PRESSURE: 78 MMHG | WEIGHT: 181 LBS | TEMPERATURE: 98.3 F | SYSTOLIC BLOOD PRESSURE: 126 MMHG

## 2024-02-19 DIAGNOSIS — R79.1 ABNORMAL COAGULATION PROFILE: ICD-10-CM

## 2024-02-19 DIAGNOSIS — T84.216A HARDWARE FAILURE OF ANTERIOR COLUMN OF SPINE: ICD-10-CM

## 2024-02-19 LAB
ABO GROUP BLD: NORMAL
ALBUMIN SERPL-MCNC: 4.5 G/DL (ref 3.5–5.2)
ALBUMIN/GLOB SERPL: 1.9 G/DL
ALP SERPL-CCNC: 84 U/L (ref 39–117)
ALT SERPL W P-5'-P-CCNC: 21 U/L (ref 1–33)
ANION GAP SERPL CALCULATED.3IONS-SCNC: 12.5 MMOL/L (ref 5–15)
APTT PPP: 26.5 SECONDS (ref 22.7–35.4)
AST SERPL-CCNC: 28 U/L (ref 1–32)
BASOPHILS # BLD AUTO: 0.04 10*3/MM3 (ref 0–0.2)
BASOPHILS NFR BLD AUTO: 0.6 % (ref 0–1.5)
BILIRUB SERPL-MCNC: 0.3 MG/DL (ref 0–1.2)
BLD GP AB SCN SERPL QL: NEGATIVE
BUN SERPL-MCNC: 14 MG/DL (ref 8–23)
BUN/CREAT SERPL: 20.9 (ref 7–25)
CALCIUM SPEC-SCNC: 9.5 MG/DL (ref 8.6–10.5)
CHLORIDE SERPL-SCNC: 103 MMOL/L (ref 98–107)
CO2 SERPL-SCNC: 24.5 MMOL/L (ref 22–29)
CREAT SERPL-MCNC: 0.67 MG/DL (ref 0.57–1)
DEPRECATED RDW RBC AUTO: 39.6 FL (ref 37–54)
EGFRCR SERPLBLD CKD-EPI 2021: 94.7 ML/MIN/1.73
EOSINOPHIL # BLD AUTO: 0.18 10*3/MM3 (ref 0–0.4)
EOSINOPHIL NFR BLD AUTO: 2.6 % (ref 0.3–6.2)
ERYTHROCYTE [DISTWIDTH] IN BLOOD BY AUTOMATED COUNT: 13.1 % (ref 12.3–15.4)
GLOBULIN UR ELPH-MCNC: 2.4 GM/DL
GLUCOSE SERPL-MCNC: 120 MG/DL (ref 65–99)
HCT VFR BLD AUTO: 45.4 % (ref 34–46.6)
HGB BLD-MCNC: 14.6 G/DL (ref 12–15.9)
IMM GRANULOCYTES # BLD AUTO: 0.03 10*3/MM3 (ref 0–0.05)
IMM GRANULOCYTES NFR BLD AUTO: 0.4 % (ref 0–0.5)
INR PPP: 0.92 (ref 0.9–1.1)
LYMPHOCYTES # BLD AUTO: 1.7 10*3/MM3 (ref 0.7–3.1)
LYMPHOCYTES NFR BLD AUTO: 24.3 % (ref 19.6–45.3)
MCH RBC QN AUTO: 27 PG (ref 26.6–33)
MCHC RBC AUTO-ENTMCNC: 32.2 G/DL (ref 31.5–35.7)
MCV RBC AUTO: 83.9 FL (ref 79–97)
MONOCYTES # BLD AUTO: 0.55 10*3/MM3 (ref 0.1–0.9)
MONOCYTES NFR BLD AUTO: 7.8 % (ref 5–12)
NEUTROPHILS NFR BLD AUTO: 4.51 10*3/MM3 (ref 1.7–7)
NEUTROPHILS NFR BLD AUTO: 64.3 % (ref 42.7–76)
NRBC BLD AUTO-RTO: 0 /100 WBC (ref 0–0.2)
PLATELET # BLD AUTO: 293 10*3/MM3 (ref 140–450)
PMV BLD AUTO: 9.8 FL (ref 6–12)
POTASSIUM SERPL-SCNC: 4 MMOL/L (ref 3.5–5.2)
PROT SERPL-MCNC: 6.9 G/DL (ref 6–8.5)
PROTHROMBIN TIME: 12.4 SECONDS (ref 11.7–14.2)
RBC # BLD AUTO: 5.41 10*6/MM3 (ref 3.77–5.28)
RH BLD: NEGATIVE
SODIUM SERPL-SCNC: 140 MMOL/L (ref 136–145)
T&S EXPIRATION DATE: NORMAL
WBC NRBC COR # BLD AUTO: 7.01 10*3/MM3 (ref 3.4–10.8)

## 2024-02-19 PROCEDURE — 85025 COMPLETE CBC W/AUTO DIFF WBC: CPT

## 2024-02-19 PROCEDURE — 86901 BLOOD TYPING SEROLOGIC RH(D): CPT

## 2024-02-19 PROCEDURE — 86900 BLOOD TYPING SEROLOGIC ABO: CPT

## 2024-02-19 PROCEDURE — 86850 RBC ANTIBODY SCREEN: CPT

## 2024-02-19 PROCEDURE — 85730 THROMBOPLASTIN TIME PARTIAL: CPT

## 2024-02-19 PROCEDURE — 36415 COLL VENOUS BLD VENIPUNCTURE: CPT

## 2024-02-19 PROCEDURE — 80053 COMPREHEN METABOLIC PANEL: CPT

## 2024-02-19 PROCEDURE — 85610 PROTHROMBIN TIME: CPT

## 2024-02-19 NOTE — DISCHARGE INSTRUCTIONS
ARRIVE DAY OF SURGERY AT  2:00 PM          Take the following medications the morning of surgery with a small sip of water: AMLODIPINE, FLUOXETINE, GABAPENTIN, LEVOTHYROXINE       If you are on prescription narcotic pain medication to control your pain you may also take that medication the morning of surgery.    General Instructions:  Do not eat or drink anything after midnight the night before surgery.  Infants may have breast milk up to four hours before surgery.  Infants drinking formula may drink formula up to six hours before surgery.   Patients who avoid smoking, chewing tobacco and alcohol for 4 weeks prior to surgery have a reduced risk of post-operative complications.  Quit smoking as many days before surgery as you can.  Do not smoke, use chewing tobacco or drink alcohol the day of surgery.   If applicable bring your C-PAP/ BI-PAP machine in with you to preop day of surgery.  Bring any papers given to you in the doctor’s office.  Wear clean comfortable clothes.  Do not wear contact lenses, false eyelashes or make-up.  Bring a case for your glasses.   Bring crutches or walker if applicable.  Remove all piercings.  Leave jewelry and any other valuables at home.  Hair extensions with metal clips must be removed prior to surgery.  The Pre-Admission Testing nurse will instruct you to bring medications if unable to obtain an accurate list in Pre-Admission Testing.        If you were given a blood bank ID arm band remember to bring it with you the day of surgery.    Preventing a Surgical Site Infection:  For 2 to 3 days before surgery, avoid shaving with a razor because the razor can irritate skin and make it easier to develop an infection.    Any areas of open skin can increase the risk of a post-operative wound infection by allowing bacteria to enter and travel throughout the body.  Notify your surgeon if you have any skin wounds / rashes even if it is not near the expected surgical site.  The area will need  assessed to determine if surgery should be delayed until it is healed.  The night prior to surgery shower using a fresh bar of anti-bacterial soap (such as Dial) and clean washcloth.  Sleep in a clean bed with clean clothing.  Do not allow pets to sleep with you.  Shower on the morning of surgery using a fresh bar of anti-bacterial soap (such as Dial) and clean washcloth.  Dry with a clean towel and dress in clean clothing.  Ask your surgeon if you will be receiving antibiotics prior to surgery.  Make sure you, your family, and all healthcare providers clean their hands with soap and water or an alcohol based hand  before caring for you or your wound.    Day of surgery:  Your arrival time is approximately two hours before your scheduled surgery time.  Upon arrival, a Pre-op nurse and Anesthesiologist will review your health history, obtain vital signs, and answer questions you may have.  The only belongings needed at this time will be your home medications and if applicable your C-PAP/BI-PAP machine.  A Pre-op nurse will start an IV and you may receive medication in preparation for surgery, including something to help you relax.      Please be aware that surgery does come with discomfort.  We want to make every effort to control your discomfort so please discuss any uncontrolled symptoms with your nurse.   Your doctor will most likely have prescribed pain medications.      If you are going home after surgery you will receive individualized written care instructions before being discharged.  A responsible adult must drive you to and from the hospital on the day of your surgery and ideally stay with you through the night.  Discharge prescriptions can be filled by the hospital pharmacy during regular pharmacy hours.  If you are having surgery late in the day/evening your prescription may be e-prescribed to your pharmacy.  Please verify your pharmacy hours or chose a 24 hour pharmacy to avoid not having access  to your prescription because your pharmacy has closed for the day.    If you are staying overnight following surgery, you will be transported to your hospital room following the recovery period.  [facility] has all private rooms.    If you have any questions please call Pre-Admission Testing at [phone].  Deductibles and co-payments are collected on the day of service. Please be prepared to pay the required co-pay, deductible or deposit on the day of service as defined by your plan.    Call your surgeon immediately if you experience any of the following symptoms:  Sore Throat  Shortness of Breath or difficulty breathing  Cough  Chills  Body soreness or muscle pain  Headache  Fever  New loss of taste or smell  Do not arrive for your surgery ill.  Your procedure will need to be rescheduled to another time.  You will need to call your physician before the day of surgery to avoid any unnecessary exposure to hospital staff as well as other patients.    CHLORHEXIDINE CLOTH INSTRUCTIONS  The morning of surgery follow these instructions using the Chlorhexidine cloths you've been given.  These steps reduce bacteria on the body.  Do not use the cloths near your eyes, ears mouth, genitalia or on open wounds.  Throw the cloths away after use but do not try to flush them down a toilet.      Open and remove one cloth at a time from the package.    Leave the cloth unfolded and begin the bathing.  Massage the skin with the cloths using gentle pressure to remove bacteria.  Do not scrub harshly.   Follow the steps below with one 2% CHG cloth per area (6 total cloths).  One cloth for neck, shoulders and chest.  One cloth for both arms, hands, fingers and underarms (do underarms last).  One cloth for the abdomen followed by groin.  One cloth for right leg and foot including between the toes.  One cloth for left leg and foot including between the toes.  The last cloth is to be used for the back of the neck, back and  buttocks.    Allow the CHG to air dry 3 minutes on the skin which will give it time to work and decrease the chance of irritation.  The skin may feel sticky until it is dry.  Do not rinse with water or any other liquid or you will lose the beneficial effects of the CHG.  If mild skin irritation occurs, do rinse the skin to remove the CHG.  Report this to the nurse at time of admission.  Do not apply lotions, creams, ointments, deodorants or perfumes after using the clothes. Dress in clean clothes before coming to the hospital.

## 2024-02-21 ENCOUNTER — OFFICE VISIT (OUTPATIENT)
Dept: FAMILY MEDICINE CLINIC | Facility: CLINIC | Age: 69
End: 2024-02-21
Payer: MEDICARE

## 2024-02-21 VITALS
HEIGHT: 64 IN | OXYGEN SATURATION: 97 % | RESPIRATION RATE: 16 BRPM | TEMPERATURE: 98.1 F | SYSTOLIC BLOOD PRESSURE: 118 MMHG | HEART RATE: 79 BPM | WEIGHT: 179 LBS | DIASTOLIC BLOOD PRESSURE: 68 MMHG | BODY MASS INDEX: 30.56 KG/M2

## 2024-02-21 DIAGNOSIS — E11.9 CONTROLLED TYPE 2 DIABETES MELLITUS WITHOUT COMPLICATION, WITHOUT LONG-TERM CURRENT USE OF INSULIN: Primary | Chronic | ICD-10-CM

## 2024-02-21 RX ORDER — BLOOD SUGAR DIAGNOSTIC
STRIP MISCELLANEOUS
Qty: 50 EACH | Refills: 1 | Status: ON HOLD | OUTPATIENT
Start: 2024-02-21

## 2024-02-21 NOTE — PROGRESS NOTES
Subjective     Summer Jefferson is a 68 y.o.. female.     Patient here today for follow up on diabetes. Patient is on Rybelsus 7 mg daily. Patient stating she stopped Rybelsus last week due to having to redo back surgery and will be this upcoming Monday. Patient stating she has been upset about upcoming surgery and has been emotionally eating recently.  Patient stating she has been in pain recently due to her back.      The following portions of the patient's history were reviewed and updated as appropriate: allergies, current medications, past family history, past medical history, past social history, past surgical history and problem list.    Past Medical History:   Diagnosis Date    Anxiety and depression 03/15/2022    Backache     Diabetes mellitus type 2, controlled     HSV (herpes simplex virus) infection 03/15/2022    Hypercholesterolemia     Hypertension     Hypothyroidism     Osteoarthritis of multiple joints     Spinal stenosis     LUMBAR       Past Surgical History:   Procedure Laterality Date    ANKLE ARTHROSCOPY Right     1/2013 & 10/2013    COLONOSCOPY      7/27/2007    EPIDURAL BLOCK      X5    LAPAROSCOPIC GASTRIC BANDING  08/01/2007    LUMBAR FUSION Left 12/11/2023    Procedure: PRONE OBLIQUE LATERAL LUMBAR TWO TO THREE, FOUR TO FIVE INTERBODY FUSION WITH ANTERIOR PLATING AND NEUROMONITORING;  Surgeon: Calderon Whittaker MD;  Location: [facility];  Service: Neurosurgery;  Laterality: Left;    LUMBAR FUSION N/A 12/11/2023    Procedure: LUMBAR FUSION MINIMALLY INVASIVE NAVIGATION;  Surgeon: Calderon Whittaker MD;  Location: [facility];  Service: Neurosurgery;  Laterality: N/A;    ROTATOR CUFF REPAIR Right     5/2014    TOTAL KNEE ARTHROPLASTY Right     12/2014       Review of Systems   Constitutional: Negative.    Respiratory: Negative.     Cardiovascular: Negative.        No Known Allergies    Objective     Vitals:    02/21/24 0945   BP: 118/68   Pulse: 79   Resp: 16   Temp: 98.1 °F (36.7 °C)  "  SpO2: 97%   Weight: 81.2 kg (179 lb)   Height: 161.3 cm (63.5\")     Body mass index is 31.21 kg/m².    Physical Exam  Vitals reviewed.   HENT:      Head: Normocephalic.   Eyes:      Pupils: Pupils are equal, round, and reactive to light.   Cardiovascular:      Rate and Rhythm: Normal rate and regular rhythm.   Pulmonary:      Effort: Pulmonary effort is normal.      Breath sounds: Normal breath sounds.   Musculoskeletal:         General: Normal range of motion.   Skin:     General: Skin is warm and dry.   Neurological:      Mental Status: She is alert and oriented to person, place, and time.   Psychiatric:         Behavior: Behavior normal.           Current Outpatient Medications:     amLODIPine (NORVASC) 5 MG tablet, Take 1 tablet by mouth Daily., Disp: 90 tablet, Rfl: 0    celecoxib (CeleBREX) 200 MG capsule, Take 1 capsule by mouth 2 (Two) Times a Day., Disp: 60 capsule, Rfl: 2    Diclofenac Sodium (Voltaren Arthritis Pain) 1 % gel gel, Apply 4 g topically to the appropriate area as directed 4 (Four) Times a Day As Needed (Rub over low back for achy pain)., Disp: 350 g, Rfl: 1    famciclovir (FAMVIR) 500 MG tablet, Take 1 tablet by mouth Every 12 (Twelve) Hours As Needed (as directed)., Disp: , Rfl:     FLUoxetine (PROzac) 20 MG capsule, Take 1 capsule by mouth Daily., Disp: 90 capsule, Rfl: 3    gabapentin (NEURONTIN) 300 MG capsule, Take 1 capsule by mouth 3 (Three) Times a Day., Disp: 180 capsule, Rfl: 2    hydroCHLOROthiazide (HYDRODIURIL) 12.5 MG tablet, Take 1 tablet by mouth Daily., Disp: 90 tablet, Rfl: 0    levothyroxine (SYNTHROID, LEVOTHROID) 50 MCG tablet, Take 1 tablet by mouth Daily., Disp: 90 tablet, Rfl: 3    lidocaine (LIDODERM) 5 %, Place 1 patch on the skin as directed by provider Daily. Remove & Discard patch within 12 hours; use as needed for back pain, Disp: 12 each, Rfl: 2    lisinopril (PRINIVIL,ZESTRIL) 10 MG tablet, Take 1 tablet by mouth Daily., Disp: 90 tablet, Rfl: 0    " rosuvastatin (CRESTOR) 10 MG tablet, Take 1 tablet by mouth Daily., Disp: 90 tablet, Rfl: 3    Semaglutide (Rybelsus) 7 MG tablet, Take 7 mg by mouth Daily., Disp: 30 tablet, Rfl: 0    glucose blood (FREESTYLE TEST STRIPS) test strip, One touch ultra strips; check blood sugar once a day as needed, Disp: 50 each, Rfl: 1    Recent Results (from the past 168 hour(s))   Comprehensive Metabolic Panel    Collection Time: 02/15/24  9:45 AM    Specimen: Blood   Result Value Ref Range    Glucose 119 (H) 65 - 99 mg/dL    BUN 12 8 - 23 mg/dL    Creatinine 0.64 0.57 - 1.00 mg/dL    EGFR Result 95.8 >60.0 mL/min/1.73    BUN/Creatinine Ratio 18.8 7.0 - 25.0    Sodium 142 136 - 145 mmol/L    Potassium 4.9 3.5 - 5.2 mmol/L    Chloride 106 98 - 107 mmol/L    Total CO2 26.5 22.0 - 29.0 mmol/L    Calcium 9.6 8.6 - 10.5 mg/dL    Total Protein 6.7 6.0 - 8.5 g/dL    Albumin 4.4 3.5 - 5.2 g/dL    Globulin 2.3 gm/dL    A/G Ratio 1.9 g/dL    Total Bilirubin 0.3 0.0 - 1.2 mg/dL    Alkaline Phosphatase 78 39 - 117 U/L    AST (SGOT) 19 1 - 32 U/L    ALT (SGPT) 19 1 - 33 U/L   Hemoglobin A1c    Collection Time: 02/15/24  9:45 AM    Specimen: Blood   Result Value Ref Range    Hemoglobin A1C 6.00 (H) 4.80 - 5.60 %   MicroAlbumin, Urine, Random - Urine, Clean Catch    Collection Time: 02/15/24  9:45 AM    Specimen: Urine, Clean Catch   Result Value Ref Range    Microalbumin, Urine 30.6 Not Estab. ug/mL   Comprehensive Metabolic Panel    Collection Time: 02/19/24  1:27 PM    Specimen: Blood   Result Value Ref Range    Glucose 120 (H) 65 - 99 mg/dL    BUN 14 8 - 23 mg/dL    Creatinine 0.67 0.57 - 1.00 mg/dL    Sodium 140 136 - 145 mmol/L    Potassium 4.0 3.5 - 5.2 mmol/L    Chloride 103 98 - 107 mmol/L    CO2 24.5 22.0 - 29.0 mmol/L    Calcium 9.5 8.6 - 10.5 mg/dL    Total Protein 6.9 6.0 - 8.5 g/dL    Albumin 4.5 3.5 - 5.2 g/dL    ALT (SGPT) 21 1 - 33 U/L    AST (SGOT) 28 1 - 32 U/L    Alkaline Phosphatase 84 39 - 117 U/L    Total Bilirubin 0.3 0.0  - 1.2 mg/dL    Globulin 2.4 gm/dL    A/G Ratio 1.9 g/dL    BUN/Creatinine Ratio 20.9 7.0 - 25.0    Anion Gap 12.5 5.0 - 15.0 mmol/L    eGFR 94.7 >60.0 mL/min/1.73   aPTT    Collection Time: 02/19/24  1:27 PM    Specimen: Blood   Result Value Ref Range    PTT 26.5 22.7 - 35.4 seconds   Protime-INR    Collection Time: 02/19/24  1:27 PM    Specimen: Blood   Result Value Ref Range    Protime 12.4 11.7 - 14.2 Seconds    INR 0.92 0.90 - 1.10   Type & Screen    Collection Time: 02/19/24  1:27 PM    Specimen: Blood   Result Value Ref Range    ABO Type AB     RH type Negative     Antibody Screen Negative     T&S Expiration Date 3/4/2024 11:59:00 PM    CBC Auto Differential    Collection Time: 02/19/24  1:27 PM    Specimen: Blood   Result Value Ref Range    WBC 7.01 3.40 - 10.80 10*3/mm3    RBC 5.41 (H) 3.77 - 5.28 10*6/mm3    Hemoglobin 14.6 12.0 - 15.9 g/dL    Hematocrit 45.4 34.0 - 46.6 %    MCV 83.9 79.0 - 97.0 fL    MCH 27.0 26.6 - 33.0 pg    MCHC 32.2 31.5 - 35.7 g/dL    RDW 13.1 12.3 - 15.4 %    RDW-SD 39.6 37.0 - 54.0 fl    MPV 9.8 6.0 - 12.0 fL    Platelets 293 140 - 450 10*3/mm3    Neutrophil % 64.3 42.7 - 76.0 %    Lymphocyte % 24.3 19.6 - 45.3 %    Monocyte % 7.8 5.0 - 12.0 %    Eosinophil % 2.6 0.3 - 6.2 %    Basophil % 0.6 0.0 - 1.5 %    Immature Grans % 0.4 0.0 - 0.5 %    Neutrophils, Absolute 4.51 1.70 - 7.00 10*3/mm3    Lymphocytes, Absolute 1.70 0.70 - 3.10 10*3/mm3    Monocytes, Absolute 0.55 0.10 - 0.90 10*3/mm3    Eosinophils, Absolute 0.18 0.00 - 0.40 10*3/mm3    Basophils, Absolute 0.04 0.00 - 0.20 10*3/mm3    Immature Grans, Absolute 0.03 0.00 - 0.05 10*3/mm3    nRBC 0.0 0.0 - 0.2 /100 WBC       XR Spine Lumbar 2 or 3 View  Narrative: LUMBAR SPINE:  AP, LATERAL, SPOT LUMBOSACRAL     HISTORY: Postoperative fusion.     COMPARISON: Lumbar spine 01/26/2024     FINDINGS: There is a mild scoliotic curvature convex to the right at  L2-3. There has been oblique left lateral interbody fusion at L2-3 and  L4-5  and there is retrolisthesis of L2 with respect to L3 and L3 with  respect to L4 measuring approximately 4 mm as well as 9 mm  anterolisthesis of L4 with respect to L5. Multilevel facet arthritis is  present. Gastric band is present     Impression: No interval change in patient post interbody fusion L2-3 and  L4-5. Degenerative malalignment. Multilevel facet arthritis.     This report was finalized on 2/8/2024 4:53 PM by Dr. Lasha Currie M.D on Workstation: [facility]           Diagnoses and all orders for this visit:    1. Controlled type 2 diabetes mellitus without complication, without long-term current use of insulin (Primary)  -     glucose blood (FREESTYLE TEST STRIPS) test strip; One touch ultra strips; check blood sugar once a day as needed  Dispense: 50 each; Refill: 1  -     Comprehensive metabolic panel; Future  -     Hemoglobin A1c; Future        Patient Instructions   Diabetes: HgA1c went from 5.7 last visit to 6.0. Patient to start Metformin 1000 mg daily while off Rybelsus. Patient stating she has plenty of this medication at home and does not need any scripts at this time. Patient to restart Rybelsus after surgery. Patient to follow up in 3 months for fasting labs and recheck of diabetes. If HgA1c stable will reduce Rybelsus to 3 mg daily. Patient verb. Understanding of above.     Return for fasting labs in 3 months, recheck in 3 months.

## 2024-02-21 NOTE — PATIENT INSTRUCTIONS
Diabetes: HgA1c went from 5.7 last visit to 6.0. Patient to start Metformin 1000 mg daily while off Rybelsus. Patient stating she has plenty of this medication at home and does not need any scripts at this time. Patient to restart Rybelsus after surgery. Patient to follow up in 3 months for fasting labs and recheck of diabetes. If HgA1c stable will reduce Rybelsus to 3 mg daily. Patient verb. Understanding of above.

## 2024-02-23 ENCOUNTER — TELEPHONE (OUTPATIENT)
Dept: NEUROSURGERY | Facility: CLINIC | Age: 69
End: 2024-02-23
Payer: MEDICARE

## 2024-02-26 ENCOUNTER — HOSPITAL ENCOUNTER (INPATIENT)
Facility: HOSPITAL | Age: 69
LOS: 1 days | Discharge: HOME | DRG: 460 | End: 2024-02-27
Attending: NEUROLOGICAL SURGERY | Admitting: NEUROLOGICAL SURGERY
Payer: MEDICARE

## 2024-02-26 ENCOUNTER — ANESTHESIA EVENT (OUTPATIENT)
Dept: PERIOP | Facility: HOSPITAL | Age: 69
DRG: 460 | End: 2024-02-26
Payer: MEDICARE

## 2024-02-26 ENCOUNTER — ANESTHESIA (OUTPATIENT)
Dept: PERIOP | Facility: HOSPITAL | Age: 69
DRG: 460 | End: 2024-02-26
Payer: MEDICARE

## 2024-02-26 ENCOUNTER — APPOINTMENT (OUTPATIENT)
Dept: GENERAL RADIOLOGY | Facility: HOSPITAL | Age: 69
DRG: 460 | End: 2024-02-26
Payer: MEDICARE

## 2024-02-26 DIAGNOSIS — M43.16 SPONDYLOLISTHESIS OF LUMBAR REGION: Primary | ICD-10-CM

## 2024-02-26 DIAGNOSIS — T84.216A HARDWARE FAILURE OF ANTERIOR COLUMN OF SPINE: ICD-10-CM

## 2024-02-26 LAB
GLUCOSE BLDC GLUCOMTR-MCNC: 124 MG/DL (ref 70–130)
GLUCOSE BLDC GLUCOMTR-MCNC: 139 MG/DL (ref 70–130)
GLUCOSE BLDC GLUCOMTR-MCNC: 179 MG/DL (ref 70–130)

## 2024-02-26 PROCEDURE — 25010000002 HYDROMORPHONE PER 4 MG: Performed by: NEUROLOGICAL SURGERY

## 2024-02-26 PROCEDURE — 25010000002 GLYCOPYRROLATE 0.2 MG/ML SOLUTION: Performed by: NURSE ANESTHETIST, CERTIFIED REGISTERED

## 2024-02-26 PROCEDURE — C1713 ANCHOR/SCREW BN/BN,TIS/BN: HCPCS | Performed by: NEUROLOGICAL SURGERY

## 2024-02-26 PROCEDURE — 25010000002 PHENYLEPHRINE 10 MG/ML SOLUTION 5 ML VIAL: Performed by: NURSE ANESTHETIST, CERTIFIED REGISTERED

## 2024-02-26 PROCEDURE — 25010000002 HYDROMORPHONE PER 4 MG: Performed by: NURSE ANESTHETIST, CERTIFIED REGISTERED

## 2024-02-26 PROCEDURE — 25010000002 MIDAZOLAM PER 1 MG: Performed by: ANESTHESIOLOGY

## 2024-02-26 PROCEDURE — 25010000002 ONDANSETRON PER 1 MG: Performed by: NURSE ANESTHETIST, CERTIFIED REGISTERED

## 2024-02-26 PROCEDURE — 82948 REAGENT STRIP/BLOOD GLUCOSE: CPT

## 2024-02-26 PROCEDURE — 22612 ARTHRD PST TQ 1NTRSPC LUMBAR: CPT | Performed by: NEUROLOGICAL SURGERY

## 2024-02-26 PROCEDURE — 25010000002 CEFAZOLIN IN DEXTROSE 2-4 GM/100ML-% SOLUTION: Performed by: NEUROLOGICAL SURGERY

## 2024-02-26 PROCEDURE — 22842 INSERT SPINE FIXATION DEVICE: CPT | Performed by: NEUROLOGICAL SURGERY

## 2024-02-26 PROCEDURE — 25010000002 LIDOCAINE PER 10 MG: Performed by: NURSE ANESTHETIST, CERTIFIED REGISTERED

## 2024-02-26 PROCEDURE — 25010000002 PROPOFOL 10 MG/ML EMULSION: Performed by: NURSE ANESTHETIST, CERTIFIED REGISTERED

## 2024-02-26 PROCEDURE — 0SG10K1 FUSION OF 2 OR MORE LUMBAR VERTEBRAL JOINTS WITH NONAUTOLOGOUS TISSUE SUBSTITUTE, POSTERIOR APPROACH, POSTERIOR COLUMN, OPEN APPROACH: ICD-10-PCS | Performed by: NEUROLOGICAL SURGERY

## 2024-02-26 PROCEDURE — 25010000002 SUGAMMADEX 200 MG/2ML SOLUTION: Performed by: NURSE ANESTHETIST, CERTIFIED REGISTERED

## 2024-02-26 PROCEDURE — 25810000003 LACTATED RINGERS PER 1000 ML: Performed by: ANESTHESIOLOGY

## 2024-02-26 PROCEDURE — 25010000002 MAGNESIUM SULFATE PER 500 MG OF MAGNESIUM: Performed by: NURSE ANESTHETIST, CERTIFIED REGISTERED

## 2024-02-26 PROCEDURE — 25010000002 FENTANYL CITRATE (PF) 50 MCG/ML SOLUTION: Performed by: NURSE ANESTHETIST, CERTIFIED REGISTERED

## 2024-02-26 PROCEDURE — 22614 ARTHRD PST TQ 1NTRSPC EA ADD: CPT | Performed by: NEUROLOGICAL SURGERY

## 2024-02-26 PROCEDURE — 76000 FLUOROSCOPY <1 HR PHYS/QHP: CPT

## 2024-02-26 PROCEDURE — 25810000003 LACTATED RINGERS PER 1000 ML: Performed by: NURSE ANESTHETIST, CERTIFIED REGISTERED

## 2024-02-26 PROCEDURE — 25810000003 SODIUM CHLORIDE 0.9 % SOLUTION 250 ML FLEX CONT: Performed by: NURSE ANESTHETIST, CERTIFIED REGISTERED

## 2024-02-26 PROCEDURE — 25010000002 DEXAMETHASONE PER 1 MG: Performed by: NURSE ANESTHETIST, CERTIFIED REGISTERED

## 2024-02-26 PROCEDURE — C1769 GUIDE WIRE: HCPCS | Performed by: NEUROLOGICAL SURGERY

## 2024-02-26 PROCEDURE — 61783 SCAN PROC SPINAL: CPT | Performed by: NEUROLOGICAL SURGERY

## 2024-02-26 DEVICE — MAS 54850015545 4.75 EXT TAB CANN 5.5X45
Type: IMPLANTABLE DEVICE | Site: SPINE LUMBAR | Status: FUNCTIONAL
Brand: CD HORIZON® SOLERA® SPINAL SYSTEM

## 2024-02-26 DEVICE — SSC BONE WAX
Type: IMPLANTABLE DEVICE | Site: SPINE LUMBAR | Status: FUNCTIONAL
Brand: SSC BONE WAX

## 2024-02-26 DEVICE — IMPLANTABLE DEVICE
Type: IMPLANTABLE DEVICE | Site: SPINE LUMBAR | Status: FUNCTIONAL
Brand: SURGIFOAM® ABSORBABLE GELATIN SPONGE, U.S.P.

## 2024-02-26 DEVICE — MAGNETOS FLEX MATRIX  5.04CC 0.25-1MM MEDIUM
Type: IMPLANTABLE DEVICE | Site: SPINE LUMBAR | Status: FUNCTIONAL
Brand: MAGNETOS

## 2024-02-26 DEVICE — SCRW ST SOLERA VOYAGER BRKOFF TI 4.75MM: Type: IMPLANTABLE DEVICE | Site: SPINE LUMBAR | Status: FUNCTIONAL

## 2024-02-26 RX ORDER — MIDAZOLAM HYDROCHLORIDE 1 MG/ML
1 INJECTION INTRAMUSCULAR; INTRAVENOUS
Status: COMPLETED | OUTPATIENT
Start: 2024-02-26 | End: 2024-02-26

## 2024-02-26 RX ORDER — MAGNESIUM SULFATE HEPTAHYDRATE 500 MG/ML
INJECTION, SOLUTION INTRAMUSCULAR; INTRAVENOUS AS NEEDED
Status: DISCONTINUED | OUTPATIENT
Start: 2024-02-26 | End: 2024-02-26 | Stop reason: SURG

## 2024-02-26 RX ORDER — GABAPENTIN 300 MG/1
300 CAPSULE ORAL ONCE
Status: COMPLETED | OUTPATIENT
Start: 2024-02-26 | End: 2024-02-26

## 2024-02-26 RX ORDER — NALOXONE HCL 0.4 MG/ML
0.2 VIAL (ML) INJECTION AS NEEDED
Status: DISCONTINUED | OUTPATIENT
Start: 2024-02-26 | End: 2024-02-26 | Stop reason: HOSPADM

## 2024-02-26 RX ORDER — HYDROMORPHONE HYDROCHLORIDE 1 MG/ML
0.25 INJECTION, SOLUTION INTRAMUSCULAR; INTRAVENOUS; SUBCUTANEOUS
Status: DISCONTINUED | OUTPATIENT
Start: 2024-02-26 | End: 2024-02-27 | Stop reason: HOSPADM

## 2024-02-26 RX ORDER — ROCURONIUM BROMIDE 10 MG/ML
INJECTION, SOLUTION INTRAVENOUS AS NEEDED
Status: DISCONTINUED | OUTPATIENT
Start: 2024-02-26 | End: 2024-02-26

## 2024-02-26 RX ORDER — ROSUVASTATIN CALCIUM 10 MG/1
10 TABLET, COATED ORAL DAILY
Status: DISCONTINUED | OUTPATIENT
Start: 2024-02-26 | End: 2024-02-27 | Stop reason: HOSPADM

## 2024-02-26 RX ORDER — PROPOFOL 10 MG/ML
VIAL (ML) INTRAVENOUS AS NEEDED
Status: DISCONTINUED | OUTPATIENT
Start: 2024-02-26 | End: 2024-02-26 | Stop reason: SURG

## 2024-02-26 RX ORDER — SODIUM CHLORIDE 0.9 % (FLUSH) 0.9 %
3 SYRINGE (ML) INJECTION EVERY 12 HOURS SCHEDULED
Status: DISCONTINUED | OUTPATIENT
Start: 2024-02-26 | End: 2024-02-26 | Stop reason: HOSPADM

## 2024-02-26 RX ORDER — SODIUM CHLORIDE 0.9 % (FLUSH) 0.9 %
10 SYRINGE (ML) INJECTION AS NEEDED
Status: DISCONTINUED | OUTPATIENT
Start: 2024-02-26 | End: 2024-02-27 | Stop reason: HOSPADM

## 2024-02-26 RX ORDER — SODIUM CHLORIDE 0.9 % (FLUSH) 0.9 %
10 SYRINGE (ML) INJECTION EVERY 12 HOURS SCHEDULED
Status: DISCONTINUED | OUTPATIENT
Start: 2024-02-26 | End: 2024-02-27 | Stop reason: HOSPADM

## 2024-02-26 RX ORDER — LABETALOL HYDROCHLORIDE 5 MG/ML
5 INJECTION, SOLUTION INTRAVENOUS
Status: DISCONTINUED | OUTPATIENT
Start: 2024-02-26 | End: 2024-02-26 | Stop reason: HOSPADM

## 2024-02-26 RX ORDER — DROPERIDOL 2.5 MG/ML
0.62 INJECTION, SOLUTION INTRAMUSCULAR; INTRAVENOUS
Status: DISCONTINUED | OUTPATIENT
Start: 2024-02-26 | End: 2024-02-26 | Stop reason: HOSPADM

## 2024-02-26 RX ORDER — ONDANSETRON 4 MG/1
4 TABLET, ORALLY DISINTEGRATING ORAL EVERY 6 HOURS PRN
Status: DISCONTINUED | OUTPATIENT
Start: 2024-02-26 | End: 2024-02-27 | Stop reason: HOSPADM

## 2024-02-26 RX ORDER — HYDROCODONE BITARTRATE AND ACETAMINOPHEN 5; 325 MG/1; MG/1
1 TABLET ORAL ONCE AS NEEDED
Status: DISCONTINUED | OUTPATIENT
Start: 2024-02-26 | End: 2024-02-26 | Stop reason: HOSPADM

## 2024-02-26 RX ORDER — METHOCARBAMOL 500 MG/1
500 TABLET, FILM COATED ORAL EVERY 6 HOURS PRN
Status: DISCONTINUED | OUTPATIENT
Start: 2024-02-26 | End: 2024-02-27 | Stop reason: HOSPADM

## 2024-02-26 RX ORDER — SODIUM CHLORIDE 0.9 % (FLUSH) 0.9 %
3-10 SYRINGE (ML) INJECTION AS NEEDED
Status: DISCONTINUED | OUTPATIENT
Start: 2024-02-26 | End: 2024-02-26 | Stop reason: HOSPADM

## 2024-02-26 RX ORDER — FLUOXETINE HYDROCHLORIDE 20 MG/1
20 CAPSULE ORAL DAILY
Status: DISCONTINUED | OUTPATIENT
Start: 2024-02-26 | End: 2024-02-27 | Stop reason: HOSPADM

## 2024-02-26 RX ORDER — ENOXAPARIN SODIUM 100 MG/ML
40 INJECTION SUBCUTANEOUS NIGHTLY
Status: DISCONTINUED | OUTPATIENT
Start: 2024-02-27 | End: 2024-02-27 | Stop reason: HOSPADM

## 2024-02-26 RX ORDER — LIDOCAINE HYDROCHLORIDE 20 MG/ML
INJECTION, SOLUTION INFILTRATION; PERINEURAL AS NEEDED
Status: DISCONTINUED | OUTPATIENT
Start: 2024-02-26 | End: 2024-02-26

## 2024-02-26 RX ORDER — PROMETHAZINE HYDROCHLORIDE 25 MG/1
25 SUPPOSITORY RECTAL ONCE AS NEEDED
Status: DISCONTINUED | OUTPATIENT
Start: 2024-02-26 | End: 2024-02-26 | Stop reason: HOSPADM

## 2024-02-26 RX ORDER — FLUMAZENIL 0.1 MG/ML
0.2 INJECTION INTRAVENOUS AS NEEDED
Status: DISCONTINUED | OUTPATIENT
Start: 2024-02-26 | End: 2024-02-26 | Stop reason: HOSPADM

## 2024-02-26 RX ORDER — DIPHENHYDRAMINE HYDROCHLORIDE 50 MG/ML
12.5 INJECTION INTRAMUSCULAR; INTRAVENOUS
Status: DISCONTINUED | OUTPATIENT
Start: 2024-02-26 | End: 2024-02-26 | Stop reason: HOSPADM

## 2024-02-26 RX ORDER — AMOXICILLIN 250 MG
1 CAPSULE ORAL 2 TIMES DAILY
Status: DISCONTINUED | OUTPATIENT
Start: 2024-02-26 | End: 2024-02-27 | Stop reason: HOSPADM

## 2024-02-26 RX ORDER — FENTANYL CITRATE 50 UG/ML
INJECTION, SOLUTION INTRAMUSCULAR; INTRAVENOUS AS NEEDED
Status: DISCONTINUED | OUTPATIENT
Start: 2024-02-26 | End: 2024-02-26 | Stop reason: SURG

## 2024-02-26 RX ORDER — AMLODIPINE BESYLATE 5 MG/1
5 TABLET ORAL DAILY
Status: DISCONTINUED | OUTPATIENT
Start: 2024-02-27 | End: 2024-02-27 | Stop reason: HOSPADM

## 2024-02-26 RX ORDER — SODIUM CHLORIDE, SODIUM LACTATE, POTASSIUM CHLORIDE, CALCIUM CHLORIDE 600; 310; 30; 20 MG/100ML; MG/100ML; MG/100ML; MG/100ML
9 INJECTION, SOLUTION INTRAVENOUS CONTINUOUS
Status: DISCONTINUED | OUTPATIENT
Start: 2024-02-26 | End: 2024-02-27 | Stop reason: HOSPADM

## 2024-02-26 RX ORDER — HYDROCODONE BITARTRATE AND ACETAMINOPHEN 7.5; 325 MG/1; MG/1
1 TABLET ORAL EVERY 4 HOURS PRN
Status: DISCONTINUED | OUTPATIENT
Start: 2024-02-26 | End: 2024-02-26 | Stop reason: HOSPADM

## 2024-02-26 RX ORDER — HYDRALAZINE HYDROCHLORIDE 20 MG/ML
5 INJECTION INTRAMUSCULAR; INTRAVENOUS
Status: DISCONTINUED | OUTPATIENT
Start: 2024-02-26 | End: 2024-02-26 | Stop reason: HOSPADM

## 2024-02-26 RX ORDER — HYDROCODONE BITARTRATE AND ACETAMINOPHEN 5; 325 MG/1; MG/1
1 TABLET ORAL EVERY 4 HOURS PRN
Status: DISCONTINUED | OUTPATIENT
Start: 2024-02-26 | End: 2024-02-27 | Stop reason: HOSPADM

## 2024-02-26 RX ORDER — NALOXONE HCL 0.4 MG/ML
0.4 VIAL (ML) INJECTION
Status: DISCONTINUED | OUTPATIENT
Start: 2024-02-26 | End: 2024-02-27 | Stop reason: HOSPADM

## 2024-02-26 RX ORDER — HYDROCHLOROTHIAZIDE 12.5 MG/1
12.5 TABLET ORAL DAILY
Status: DISCONTINUED | OUTPATIENT
Start: 2024-02-26 | End: 2024-02-27 | Stop reason: HOSPADM

## 2024-02-26 RX ORDER — PHENYLEPHRINE HCL IN 0.9% NACL 1 MG/10 ML
SYRINGE (ML) INTRAVENOUS AS NEEDED
Status: DISCONTINUED | OUTPATIENT
Start: 2024-02-26 | End: 2024-02-26 | Stop reason: SURG

## 2024-02-26 RX ORDER — PROMETHAZINE HYDROCHLORIDE 25 MG/1
25 TABLET ORAL ONCE AS NEEDED
Status: DISCONTINUED | OUTPATIENT
Start: 2024-02-26 | End: 2024-02-26 | Stop reason: HOSPADM

## 2024-02-26 RX ORDER — HYDROMORPHONE HYDROCHLORIDE 1 MG/ML
0.25 INJECTION, SOLUTION INTRAMUSCULAR; INTRAVENOUS; SUBCUTANEOUS
Status: DISCONTINUED | OUTPATIENT
Start: 2024-02-26 | End: 2024-02-26 | Stop reason: HOSPADM

## 2024-02-26 RX ORDER — KETAMINE HCL IN NACL, ISO-OSM 100MG/10ML
SYRINGE (ML) INJECTION AS NEEDED
Status: DISCONTINUED | OUTPATIENT
Start: 2024-02-26 | End: 2024-02-26 | Stop reason: SURG

## 2024-02-26 RX ORDER — GLYCOPYRROLATE 0.2 MG/ML
INJECTION INTRAMUSCULAR; INTRAVENOUS AS NEEDED
Status: DISCONTINUED | OUTPATIENT
Start: 2024-02-26 | End: 2024-02-26 | Stop reason: SURG

## 2024-02-26 RX ORDER — BUPIVACAINE HYDROCHLORIDE AND EPINEPHRINE 5; 5 MG/ML; UG/ML
INJECTION, SOLUTION EPIDURAL; INTRACAUDAL; PERINEURAL AS NEEDED
Status: DISCONTINUED | OUTPATIENT
Start: 2024-02-26 | End: 2024-02-26 | Stop reason: HOSPADM

## 2024-02-26 RX ORDER — SODIUM CHLORIDE 9 MG/ML
40 INJECTION, SOLUTION INTRAVENOUS AS NEEDED
Status: DISCONTINUED | OUTPATIENT
Start: 2024-02-26 | End: 2024-02-27 | Stop reason: HOSPADM

## 2024-02-26 RX ORDER — LIDOCAINE HYDROCHLORIDE 20 MG/ML
INJECTION, SOLUTION INFILTRATION; PERINEURAL AS NEEDED
Status: DISCONTINUED | OUTPATIENT
Start: 2024-02-26 | End: 2024-02-26 | Stop reason: SURG

## 2024-02-26 RX ORDER — SODIUM CHLORIDE, SODIUM LACTATE, POTASSIUM CHLORIDE, CALCIUM CHLORIDE 600; 310; 30; 20 MG/100ML; MG/100ML; MG/100ML; MG/100ML
INJECTION, SOLUTION INTRAVENOUS CONTINUOUS PRN
Status: DISCONTINUED | OUTPATIENT
Start: 2024-02-26 | End: 2024-02-26 | Stop reason: SURG

## 2024-02-26 RX ORDER — IPRATROPIUM BROMIDE AND ALBUTEROL SULFATE 2.5; .5 MG/3ML; MG/3ML
3 SOLUTION RESPIRATORY (INHALATION) ONCE AS NEEDED
Status: DISCONTINUED | OUTPATIENT
Start: 2024-02-26 | End: 2024-02-26 | Stop reason: HOSPADM

## 2024-02-26 RX ORDER — CEFAZOLIN SODIUM 2 G/100ML
2000 INJECTION, SOLUTION INTRAVENOUS ONCE
Status: COMPLETED | OUTPATIENT
Start: 2024-02-26 | End: 2024-02-26

## 2024-02-26 RX ORDER — CEFAZOLIN SODIUM 2 G/100ML
2000 INJECTION, SOLUTION INTRAVENOUS EVERY 8 HOURS
Qty: 600 ML | Refills: 0 | Status: DISCONTINUED | OUTPATIENT
Start: 2024-02-26 | End: 2024-02-27

## 2024-02-26 RX ORDER — GABAPENTIN 300 MG/1
300 CAPSULE ORAL 3 TIMES DAILY
Status: DISCONTINUED | OUTPATIENT
Start: 2024-02-26 | End: 2024-02-27 | Stop reason: HOSPADM

## 2024-02-26 RX ORDER — ONDANSETRON 2 MG/ML
4 INJECTION INTRAMUSCULAR; INTRAVENOUS EVERY 6 HOURS PRN
Status: DISCONTINUED | OUTPATIENT
Start: 2024-02-26 | End: 2024-02-27 | Stop reason: HOSPADM

## 2024-02-26 RX ORDER — FENTANYL CITRATE 50 UG/ML
25 INJECTION, SOLUTION INTRAMUSCULAR; INTRAVENOUS
Status: DISCONTINUED | OUTPATIENT
Start: 2024-02-26 | End: 2024-02-26 | Stop reason: HOSPADM

## 2024-02-26 RX ORDER — EPHEDRINE SULFATE 50 MG/ML
5 INJECTION, SOLUTION INTRAVENOUS ONCE AS NEEDED
Status: DISCONTINUED | OUTPATIENT
Start: 2024-02-26 | End: 2024-02-26 | Stop reason: HOSPADM

## 2024-02-26 RX ORDER — LEVOTHYROXINE SODIUM 0.05 MG/1
50 TABLET ORAL
Status: DISCONTINUED | OUTPATIENT
Start: 2024-02-27 | End: 2024-02-27 | Stop reason: HOSPADM

## 2024-02-26 RX ORDER — MAGNESIUM HYDROXIDE 1200 MG/15ML
LIQUID ORAL AS NEEDED
Status: DISCONTINUED | OUTPATIENT
Start: 2024-02-26 | End: 2024-02-26 | Stop reason: HOSPADM

## 2024-02-26 RX ORDER — PROPOFOL 10 MG/ML
VIAL (ML) INTRAVENOUS AS NEEDED
Status: DISCONTINUED | OUTPATIENT
Start: 2024-02-26 | End: 2024-02-26

## 2024-02-26 RX ORDER — FENTANYL CITRATE 50 UG/ML
INJECTION, SOLUTION INTRAMUSCULAR; INTRAVENOUS AS NEEDED
Status: DISCONTINUED | OUTPATIENT
Start: 2024-02-26 | End: 2024-02-26

## 2024-02-26 RX ORDER — ACETAMINOPHEN 325 MG/1
650 TABLET ORAL EVERY 4 HOURS PRN
Status: DISCONTINUED | OUTPATIENT
Start: 2024-02-26 | End: 2024-02-27 | Stop reason: HOSPADM

## 2024-02-26 RX ORDER — LIDOCAINE HYDROCHLORIDE ANHYDROUS AND DEXTROSE MONOHYDRATE 5; 400 G/100ML; MG/100ML
INJECTION, SOLUTION INTRAVENOUS CONTINUOUS PRN
Status: DISCONTINUED | OUTPATIENT
Start: 2024-02-26 | End: 2024-02-26 | Stop reason: SURG

## 2024-02-26 RX ORDER — ACETAMINOPHEN 500 MG
1000 TABLET ORAL ONCE
Status: COMPLETED | OUTPATIENT
Start: 2024-02-26 | End: 2024-02-26

## 2024-02-26 RX ORDER — ROCURONIUM BROMIDE 10 MG/ML
INJECTION, SOLUTION INTRAVENOUS AS NEEDED
Status: DISCONTINUED | OUTPATIENT
Start: 2024-02-26 | End: 2024-02-26 | Stop reason: SURG

## 2024-02-26 RX ORDER — DEXAMETHASONE SODIUM PHOSPHATE 4 MG/ML
INJECTION, SOLUTION INTRA-ARTICULAR; INTRALESIONAL; INTRAMUSCULAR; INTRAVENOUS; SOFT TISSUE AS NEEDED
Status: DISCONTINUED | OUTPATIENT
Start: 2024-02-26 | End: 2024-02-26 | Stop reason: SURG

## 2024-02-26 RX ORDER — ONDANSETRON 2 MG/ML
4 INJECTION INTRAMUSCULAR; INTRAVENOUS ONCE AS NEEDED
Status: DISCONTINUED | OUTPATIENT
Start: 2024-02-26 | End: 2024-02-26 | Stop reason: HOSPADM

## 2024-02-26 RX ORDER — LIDOCAINE HYDROCHLORIDE 10 MG/ML
0.5 INJECTION, SOLUTION INFILTRATION; PERINEURAL ONCE AS NEEDED
Status: DISCONTINUED | OUTPATIENT
Start: 2024-02-26 | End: 2024-02-26 | Stop reason: HOSPADM

## 2024-02-26 RX ORDER — METHOCARBAMOL 500 MG/1
500 TABLET, FILM COATED ORAL 4 TIMES DAILY PRN
Status: DISCONTINUED | OUTPATIENT
Start: 2024-02-26 | End: 2024-02-26

## 2024-02-26 RX ORDER — LISINOPRIL 10 MG/1
10 TABLET ORAL DAILY
Status: DISCONTINUED | OUTPATIENT
Start: 2024-02-26 | End: 2024-02-27 | Stop reason: HOSPADM

## 2024-02-26 RX ORDER — ONDANSETRON 2 MG/ML
INJECTION INTRAMUSCULAR; INTRAVENOUS AS NEEDED
Status: DISCONTINUED | OUTPATIENT
Start: 2024-02-26 | End: 2024-02-26 | Stop reason: SURG

## 2024-02-26 RX ADMIN — HYDROMORPHONE HYDROCHLORIDE 0.25 MG: 1 INJECTION, SOLUTION INTRAMUSCULAR; INTRAVENOUS; SUBCUTANEOUS at 15:43

## 2024-02-26 RX ADMIN — Medication 20 MG: at 12:41

## 2024-02-26 RX ADMIN — MIDAZOLAM 1 MG: 1 INJECTION INTRAMUSCULAR; INTRAVENOUS at 09:28

## 2024-02-26 RX ADMIN — Medication 200 MCG: at 12:33

## 2024-02-26 RX ADMIN — FENTANYL CITRATE 25 MCG: 50 INJECTION, SOLUTION INTRAMUSCULAR; INTRAVENOUS at 15:35

## 2024-02-26 RX ADMIN — HYDROCODONE BITARTRATE AND ACETAMINOPHEN 1 TABLET: 5; 325 TABLET ORAL at 17:53

## 2024-02-26 RX ADMIN — HYDROMORPHONE HYDROCHLORIDE 0.25 MG: 1 INJECTION, SOLUTION INTRAMUSCULAR; INTRAVENOUS; SUBCUTANEOUS at 15:35

## 2024-02-26 RX ADMIN — SODIUM CHLORIDE, POTASSIUM CHLORIDE, SODIUM LACTATE AND CALCIUM CHLORIDE 9 ML/HR: 600; 310; 30; 20 INJECTION, SOLUTION INTRAVENOUS at 09:28

## 2024-02-26 RX ADMIN — DEXAMETHASONE SODIUM PHOSPHATE 8 MG: 4 INJECTION, SOLUTION INTRA-ARTICULAR; INTRALESIONAL; INTRAMUSCULAR; INTRAVENOUS; SOFT TISSUE at 12:28

## 2024-02-26 RX ADMIN — MIDAZOLAM 1 MG: 1 INJECTION INTRAMUSCULAR; INTRAVENOUS at 09:33

## 2024-02-26 RX ADMIN — HYDROMORPHONE HYDROCHLORIDE 0.25 MG: 1 INJECTION, SOLUTION INTRAMUSCULAR; INTRAVENOUS; SUBCUTANEOUS at 15:57

## 2024-02-26 RX ADMIN — GABAPENTIN 300 MG: 300 CAPSULE ORAL at 17:53

## 2024-02-26 RX ADMIN — LIDOCAINE HYDROCHLORIDE 80 MG: 20 INJECTION, SOLUTION INFILTRATION; PERINEURAL at 12:07

## 2024-02-26 RX ADMIN — ROCURONIUM BROMIDE 40 MG: 10 INJECTION, SOLUTION INTRAVENOUS at 12:07

## 2024-02-26 RX ADMIN — ROCURONIUM BROMIDE 10 MG: 10 INJECTION, SOLUTION INTRAVENOUS at 12:46

## 2024-02-26 RX ADMIN — LIDOCAINE HYDROCHLORIDE 2 MG/KG/HR: 4 INJECTION, SOLUTION INTRAVENOUS at 12:31

## 2024-02-26 RX ADMIN — FENTANYL CITRATE 25 MCG: 50 INJECTION, SOLUTION INTRAMUSCULAR; INTRAVENOUS at 15:51

## 2024-02-26 RX ADMIN — SUGAMMADEX 200 MG: 100 INJECTION, SOLUTION INTRAVENOUS at 14:39

## 2024-02-26 RX ADMIN — ONDANSETRON 4 MG: 2 INJECTION INTRAMUSCULAR; INTRAVENOUS at 14:38

## 2024-02-26 RX ADMIN — Medication 200 MCG: at 14:28

## 2024-02-26 RX ADMIN — CEFAZOLIN SODIUM 2000 MG: 2 INJECTION, SOLUTION INTRAVENOUS at 20:15

## 2024-02-26 RX ADMIN — CEFAZOLIN SODIUM 2000 MG: 2 INJECTION, SOLUTION INTRAVENOUS at 11:52

## 2024-02-26 RX ADMIN — Medication 10 MG: at 13:40

## 2024-02-26 RX ADMIN — ACETAMINOPHEN 1000 MG: 500 TABLET ORAL at 09:27

## 2024-02-26 RX ADMIN — SODIUM CHLORIDE, SODIUM LACTATE, POTASSIUM CHLORIDE, AND CALCIUM CHLORIDE: 600; 310; 30; 20 INJECTION, SOLUTION INTRAVENOUS at 11:56

## 2024-02-26 RX ADMIN — HYDROMORPHONE HYDROCHLORIDE 0.25 MG: 1 INJECTION, SOLUTION INTRAMUSCULAR; INTRAVENOUS; SUBCUTANEOUS at 15:01

## 2024-02-26 RX ADMIN — PROPOFOL 150 MG: 10 INJECTION, EMULSION INTRAVENOUS at 12:07

## 2024-02-26 RX ADMIN — Medication 20 MG: at 14:28

## 2024-02-26 RX ADMIN — HYDROMORPHONE HYDROCHLORIDE 0.25 MG: 1 INJECTION, SOLUTION INTRAMUSCULAR; INTRAVENOUS; SUBCUTANEOUS at 15:06

## 2024-02-26 RX ADMIN — PHENYLEPHRINE HYDROCHLORIDE 0.5 MCG/KG/MIN: 10 INJECTION, SOLUTION INTRAVENOUS at 13:01

## 2024-02-26 RX ADMIN — FENTANYL CITRATE 25 MCG: 50 INJECTION, SOLUTION INTRAMUSCULAR; INTRAVENOUS at 15:57

## 2024-02-26 RX ADMIN — FENTANYL CITRATE 50 MCG: 50 INJECTION, SOLUTION INTRAMUSCULAR; INTRAVENOUS at 12:07

## 2024-02-26 RX ADMIN — FENTANYL CITRATE 50 MCG: 50 INJECTION, SOLUTION INTRAMUSCULAR; INTRAVENOUS at 14:38

## 2024-02-26 RX ADMIN — METHOCARBAMOL TABLETS 500 MG: 500 TABLET, COATED ORAL at 16:00

## 2024-02-26 RX ADMIN — MAGNESIUM SULFATE HEPTAHYDRATE 2 G: 500 INJECTION, SOLUTION INTRAMUSCULAR; INTRAVENOUS at 12:35

## 2024-02-26 RX ADMIN — Medication 200 MCG: at 12:58

## 2024-02-26 RX ADMIN — GABAPENTIN 300 MG: 300 CAPSULE ORAL at 09:27

## 2024-02-26 RX ADMIN — FENTANYL CITRATE 25 MCG: 50 INJECTION, SOLUTION INTRAMUSCULAR; INTRAVENOUS at 15:01

## 2024-02-26 RX ADMIN — ROCURONIUM BROMIDE 10 MG: 10 INJECTION, SOLUTION INTRAVENOUS at 13:40

## 2024-02-26 RX ADMIN — Medication 200 MCG: at 14:10

## 2024-02-26 RX ADMIN — FENTANYL CITRATE 25 MCG: 50 INJECTION, SOLUTION INTRAMUSCULAR; INTRAVENOUS at 15:06

## 2024-02-26 RX ADMIN — Medication 10 ML: at 20:16

## 2024-02-26 RX ADMIN — DOCUSATE SODIUM 50MG AND SENNOSIDES 8.6MG 1 TABLET: 8.6; 5 TABLET, FILM COATED ORAL at 20:15

## 2024-02-26 RX ADMIN — GLYCOPYRROLATE 0.1 MG: 0.2 INJECTION INTRAMUSCULAR; INTRAVENOUS at 12:28

## 2024-02-26 RX ADMIN — HYDROMORPHONE HYDROCHLORIDE 0.25 MG: 1 INJECTION, SOLUTION INTRAMUSCULAR; INTRAVENOUS; SUBCUTANEOUS at 15:52

## 2024-02-26 RX ADMIN — HYDROCODONE BITARTRATE AND ACETAMINOPHEN 1 TABLET: 5; 325 TABLET ORAL at 22:28

## 2024-02-26 RX ADMIN — FENTANYL CITRATE 25 MCG: 50 INJECTION, SOLUTION INTRAMUSCULAR; INTRAVENOUS at 15:43

## 2024-02-26 NOTE — OP NOTE
Posterior lateral lumbar fusion procedure Note    Summer Jefferson  2/26/2024  [id number]    SURGEON  Calderon Whittaker MD    Assistant:    Crissy Woodward CSA was present throughout the entire surgery to provide intraoperative suction, retraction, suturing, and irrigation to promote visualization by the operative surgeon and assist with opening and closure.  The skilled assistance was necessary for the success of this case.  Our practice does not have a relationship with neurosurgical residents.    Indication:  Summer Jefferson is a 68 y.o. female who presents with previous L2-3 and L4-5 stand-alone OLIF with some settling of her L4-5 interbody and worsening anterior listhesis as result of poor bone quality.  We discussed placing posterior hardware to secure her fusion and promote posterior lateral fusion in light of her x-ray findings on follow-up.  We discussed the risks and benefits and alternatives of surgery including CSF leak, injury to nerve roots, need for further surgery down the line.  She understood and was willing to proceed with surgery.    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  Posterior segmental instrumentation from L2 to L5 bilaterally  2.  Posterior lateral fusion with magnetos allograft at L2-3 bilaterally, L3-4 bilaterally, L4-5 bilaterally  3. Stealth navigation    Anesthesia: General    Staff:   Circulator: Anat Kirby RN; Tammy Douglas RN; Caitlin John RN  Radiology Technologist: Renetta Zamora  Scrub Person: Megan Dimas  Vendor Representative: Lindsey Hugo Ben H  Assistant: Cirssy Woodward CSA    Estimated Blood Loss:  100ml    Specimens:  * No orders in the log *     Drains: None    Findings: Spondylolisthesis    Complications: None apparent    Narrative Description:     Positioning:  After proper informed consent, the patient was taken to the OR in stable condition and placed under general anesthesia. Once anesthetized the patient was turned prone on a Cedric spinal table and was prepped and draped in the usual sterile fashion. A needle was used to localize the appropriate level of the spine and then an incision was infiltrated with local anesthesia.  Hip pin was placed and the Med[employer]onic O-arm was brought in for an intraoperative CT scan.    Approach:  The skin incision was taken down to the superficial fascia which was then incised with Bovie electrocautery. The subcutaneous tissue was reflected using cautery to expose the superficial muscle fascia.      Instrumentation: An awl-tipped tap was used to find the pedicle entry point at each of the pedicle levels and 5.5 x 45 mm screws were placed in the L2 pedicles, 6.5 x 50 mm screws were placed in the bilateral L3, L4, L5 pedicles.  Using the navigation, I decorticated the facet joints at L2-3, L3-4, L4-5 bilaterally and packed them with magnetos allograft to promote posterior lateral fusion.  Finally a 85 mm janet was placed on the right and a 85 mm janet was placed on the left using a percutaneous insertion technique to ensure that the janet stayed beneath the muscle fascia.    Closure:  The wound was irrigated and hemostasis was achieved with Flowseal and bipolar electrocautery of the epidural plexus. No CSF leak was seen. The superficial fascia closed with 0 Vicryl suture in an interrupted fashion, the superficial subcutaneous tissue closed with 3-0 Vicryl suture interrupted, and the superficial skin was closed with a staples.  Two 15 [place] SUDEEP drains were left in the superficial space to drain off seroma.  No intraoperative complications are recorded and patient was transferred  to postop recovery in stable condition.    Calderon Whittaker MD     Date: 2/26/2024  Time: 14:12 EST

## 2024-02-26 NOTE — INTERVAL H&P NOTE
H&P reviewed. The patient was examined and there are no changes to the H&P.      Was initially doing well but unfortunately has had some slippage of her L4-5 level in relation to the interbody cage.  Plan is to secure this with posterior instrumentation and will try to attempt some level of reduction as well using the instrumentation.

## 2024-02-26 NOTE — ANESTHESIA PROCEDURE NOTES
Airway  Urgency: elective    Date/Time: 2/26/2024 12:11 PM  Airway not difficult    General Information and Staff    Patient location during procedure: OR  Anesthesiologist: Geri Mooney MD    Indications and Patient Condition  Indications for airway management: airway protection    Preoxygenated: yes  Mask difficulty assessment: 2 - vent by mask + OA or adjuvant +/- NMBA    Final Airway Details  Final airway type: endotracheal airway      Successful airway: ETT  Cuffed: yes   Successful intubation technique: direct laryngoscopy  Facilitating devices/methods: intubating stylet  Endotracheal tube insertion site: oral  Blade: Humberto  Blade size: 3  ETT size (mm): 7.0  Cormack-Lehane Classification: grade IIb - view of arytenoids or posterior of glottis only  Placement verified by: chest auscultation and capnometry   Measured from: lips  ETT/EBT  to lips (cm): 22  Number of attempts at approach: 1  Assessment: lips, teeth, and gum same as pre-op and atraumatic intubation

## 2024-02-26 NOTE — ANESTHESIA PREPROCEDURE EVALUATION
Anesthesia Evaluation     Patient summary reviewed and Nursing notes reviewed   NPO Solid Status: > 6 hours  NPO Liquid Status: > 2 hours           Airway   Mallampati: II  TM distance: >3 FB  Neck ROM: full  Dental - normal exam     Pulmonary    (-) COPD, asthma, not a smoker  Cardiovascular   Exercise tolerance: good (4-7 METS)    ECG reviewed    (+) hypertension, hyperlipidemia  (-) past MI, dysrhythmias, angina      Neuro/Psych  (+) numbness (Lumbar radiculopathy, takes gabapentin), psychiatric history Anxiety and Depression  (-) seizures, CVA  GI/Hepatic/Renal/Endo    (+) diabetes mellitus type 2, thyroid problem hypothyroidism  (-) GERD, liver disease, no renal disease    Musculoskeletal     Abdominal    Substance History      OB/GYN          Other   arthritis,                   Anesthesia Plan    ASA 2     general       Anesthetic plan, risks, benefits, and alternatives have been provided, discussed and informed consent has been obtained with: patient.    CODE STATUS:

## 2024-02-26 NOTE — PLAN OF CARE
Goal Outcome Evaluation:  Plan of Care Reviewed With: patient      Vss, nvi, dressing c/d/I, dtv @ 2300, prn meds given for pain, unable to ambulate d/t dizziness, able to sit on the side of bed, plans to d/c home, educated on bp meds and monitoring.   Progress: improving

## 2024-02-27 ENCOUNTER — APPOINTMENT (OUTPATIENT)
Dept: GENERAL RADIOLOGY | Facility: HOSPITAL | Age: 69
DRG: 460 | End: 2024-02-27
Payer: MEDICARE

## 2024-02-27 ENCOUNTER — READMISSION MANAGEMENT (OUTPATIENT)
Dept: CALL CENTER | Facility: HOSPITAL | Age: 69
End: 2024-02-27
Payer: MEDICARE

## 2024-02-27 VITALS
SYSTOLIC BLOOD PRESSURE: 120 MMHG | RESPIRATION RATE: 16 BRPM | OXYGEN SATURATION: 92 % | BODY MASS INDEX: 30.56 KG/M2 | HEIGHT: 64 IN | TEMPERATURE: 98.5 F | WEIGHT: 179.01 LBS | DIASTOLIC BLOOD PRESSURE: 65 MMHG | HEART RATE: 91 BPM

## 2024-02-27 PROCEDURE — 99024 POSTOP FOLLOW-UP VISIT: CPT

## 2024-02-27 PROCEDURE — 97530 THERAPEUTIC ACTIVITIES: CPT

## 2024-02-27 PROCEDURE — 25010000002 CEFAZOLIN IN DEXTROSE 2-4 GM/100ML-% SOLUTION: Performed by: NEUROLOGICAL SURGERY

## 2024-02-27 PROCEDURE — 97161 PT EVAL LOW COMPLEX 20 MIN: CPT

## 2024-02-27 PROCEDURE — 72100 X-RAY EXAM L-S SPINE 2/3 VWS: CPT

## 2024-02-27 RX ORDER — AMOXICILLIN 250 MG
1 CAPSULE ORAL 2 TIMES DAILY
Qty: 90 TABLET | Refills: 2 | Status: SHIPPED | OUTPATIENT
Start: 2024-02-27 | End: 2024-03-13

## 2024-02-27 RX ORDER — CEFDINIR 300 MG/1
300 CAPSULE ORAL EVERY 12 HOURS SCHEDULED
Status: DISCONTINUED | OUTPATIENT
Start: 2024-02-27 | End: 2024-02-27 | Stop reason: HOSPADM

## 2024-02-27 RX ORDER — HYDROCODONE BITARTRATE AND ACETAMINOPHEN 5; 325 MG/1; MG/1
1 TABLET ORAL EVERY 4 HOURS PRN
Qty: 45 TABLET | Refills: 0 | Status: SHIPPED | OUTPATIENT
Start: 2024-02-27 | End: 2024-03-06

## 2024-02-27 RX ADMIN — LEVOTHYROXINE SODIUM 50 MCG: 50 TABLET ORAL at 05:32

## 2024-02-27 RX ADMIN — HYDROCODONE BITARTRATE AND ACETAMINOPHEN 1 TABLET: 5; 325 TABLET ORAL at 08:00

## 2024-02-27 RX ADMIN — FLUOXETINE HYDROCHLORIDE 20 MG: 20 CAPSULE ORAL at 08:00

## 2024-02-27 RX ADMIN — ROSUVASTATIN CALCIUM 10 MG: 10 TABLET, FILM COATED ORAL at 08:00

## 2024-02-27 RX ADMIN — GABAPENTIN 300 MG: 300 CAPSULE ORAL at 08:00

## 2024-02-27 RX ADMIN — METHOCARBAMOL TABLETS 500 MG: 500 TABLET, COATED ORAL at 01:26

## 2024-02-27 RX ADMIN — CEFAZOLIN SODIUM 2000 MG: 2 INJECTION, SOLUTION INTRAVENOUS at 05:32

## 2024-02-27 RX ADMIN — DOCUSATE SODIUM 50MG AND SENNOSIDES 8.6MG 1 TABLET: 8.6; 5 TABLET, FILM COATED ORAL at 08:00

## 2024-02-27 RX ADMIN — HYDROCODONE BITARTRATE AND ACETAMINOPHEN 1 TABLET: 5; 325 TABLET ORAL at 13:01

## 2024-02-27 RX ADMIN — CEFDINIR 300 MG: 300 CAPSULE ORAL at 11:53

## 2024-02-27 RX ADMIN — METHOCARBAMOL TABLETS 500 MG: 500 TABLET, COATED ORAL at 08:00

## 2024-02-27 RX ADMIN — HYDROCODONE BITARTRATE AND ACETAMINOPHEN 1 TABLET: 5; 325 TABLET ORAL at 03:52

## 2024-02-27 NOTE — PROGRESS NOTES
NEUROSURGERY PROGRESS NOTE     LOS: 1 day   Patient Care Team:  Ruth Benitez APRN as PCP - General (Family Medicine)    Chief Complaint:  No chief complaint on file.  Back pain    Subjective     Interval History: NAEO.  Incisional pain and muscle spasm as expected.    While in the room and during my examination of the patient I wore a mask and eye protection.  I washed my hands before and after this patient encounter.  The patient was also wearing a mask.     History taken from: patient    Objective      Vital Signs  Temp:  [97.7 °F (36.5 °C)-99 °F (37.2 °C)] 98.3 °F (36.8 °C)  Heart Rate:  [] 100  Resp:  [14-18] 16  BP: (101-147)/(42-83) 114/69  Body mass index is 31.21 kg/m².    Intake/Output last 3 shifts:  I/O last 3 completed shifts:  In: 1980 [P.O.:480; I.V.:1500]  Out: 50 [Blood:50]    Intake/Output this shift:  No intake/output data recorded.    Physical    GENERAL: No acute distress. Appears well nourished. Appears stated age.  HEENT: Atraumatic and normocephalic  CARDIO: RRR on monitoring. Pulses 2+  PULM: breathing nonlabored on room air  NEURO: AAOx3. EOMI. PERRL. CNi. Strength 5/5 in all extremities. Sensation intact to light touch. Reflexes 2+ throughout.  WOUND: Incision CDI, closed with Dermabond    Results Review:  I reviewed the patient's new clinical results.    Labs:    Lab Results (last 24 hours)       Procedure Component Value Units Date/Time    POC Glucose Once [[id number]]  (Normal) Collected: 02/26/24 0847    Specimen: Blood Updated: 02/26/24 0854     Glucose 124 mg/dL     POC Glucose Once [[id number]]  (Abnormal) Collected: 02/26/24 1503    Specimen: Blood Updated: 02/26/24 1504     Glucose 139 mg/dL     POC Glucose Once [[id number]]  (Abnormal) Collected: 02/26/24 1654    Specimen: Blood Updated: 02/26/24 1655     Glucose 179 mg/dL             Imaging:    X-ray lumbar spine pending    Current Medications:   Scheduled Meds:amLODIPine, 5 mg, Oral, Daily  ceFAZolin, 2,000 mg,  "Intravenous, Q8H  enoxaparin, 40 mg, Subcutaneous, Nightly  FLUoxetine, 20 mg, Oral, Daily  gabapentin, 300 mg, Oral, TID  hydroCHLOROthiazide, 12.5 mg, Oral, Daily  levothyroxine, 50 mcg, Oral, Q AM  lisinopril, 10 mg, Oral, Daily  rosuvastatin, 10 mg, Oral, Daily  senna-docusate sodium, 1 tablet, Oral, BID  sodium chloride, 10 mL, Intravenous, Q12H      Continuous Infusions:lactated ringers, 9 mL/hr, Last Rate: 9 mL/hr (02/26/24 1274)        Assessment & Plan       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 female with posterior lateral fusion from L2-L5, previous anterior interbody fusion at L2-3 and L4-5    Neuro: Continue routine neuro checks.  Continue current pain and bowel regimen.  Hopefully her pain will continue to improve and resolve as her incisional pain resolves  Wound/Incision: Continue dressing.  Cardio: Goal Normotensive  Pulm: Room air.  Activity: No restrictions to activity., Encourage ambulation., Work with PT/OT.  Diet: Advance diet as tolerated.  DVT Prophylaxis: Prophylactic Lovenox  GI Prophylaxis: Not indicated due to low risk.  Dispo: Dispo pending PT/OT assessment      Calderon Whittaker MD  02/27/24  08:29 EST    \"Dictated utilizing Dragon dictation\".      "

## 2024-02-27 NOTE — PLAN OF CARE
Goal Outcome Evaluation:  Plan of Care Reviewed With: patient, daughter      Patient is POD 1. Pain managed well throughout shift. Patient ambulated to bathroom 3x, with gait belt and walker with minimal assistance. Daughter-in- law is at bedside and involved in patient's care. Vital signs stable, A&Ox4, 2L NC, and dressings clean, dry and intact.     Care ongoing

## 2024-02-27 NOTE — PLAN OF CARE
Goal Outcome Evaluation:  Plan of Care Reviewed With: patient, family           Outcome Evaluation: Pt seen for PT evaluation this AM s/p L2-5 posterior lateral fusion. Pt lives with spouse, has been using a walker since previous back surgery, has a few steps to enter. Pt demo expected post op pain, weakness, decreased activity tolerance and general functional mobility deficits below her baseline. She completed supine<>sit via log roll with cga to min A. Pt completed sit<>stand with cga and ambulated 300' sba with walker. Pt also completed stair training without balance or safety concerns. Pt may benefit from ongoing skilled PT services at d/c to continue addressing impairments but is cleared for d/c home with assist from PT standpoint. PT signing off. Encourage pt to mobilize with [facility] staff and family as tolerated.      Anticipated Discharge Disposition (PT): home with assist, home with outpatient therapy services, home with home health

## 2024-02-27 NOTE — ANESTHESIA POSTPROCEDURE EVALUATION
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        Post Anesthesia Care and Evaluation    Patient location during evaluation: PACU  Patient participation: complete - patient participated  Level of consciousness: awake and alert  Pain management: adequate    Airway patency: patent  Anesthetic complications: No anesthetic complications  PONV Status: controlled  Cardiovascular status: acceptable and hemodynamically stable  Respiratory status: acceptable  Hydration status: acceptable    Comments: /77 (BP Location: Right arm, Patient Position: Lying)   Pulse 94   Temp 36.5 °C (97.7 °F) (Oral)   Resp 16   Ht 161.3 cm (63.5\")   Wt 81.2 kg (179 lb 0.2 oz)   SpO2 94%   BMI 31.21 kg/m²         "

## 2024-02-27 NOTE — CASE MANAGEMENT/SOCIAL WORK
Case Management Discharge Note      Final Note: dc home         Selected Continued Care - Discharged on 2/27/2024 Admission date: 2/26/2024 - Discharge disposition: Home or Self Care      Destination    No services have been selected for the patient.                Durable Medical Equipment    No services have been selected for the patient.                Dialysis/Infusion    No services have been selected for the patient.                Home Medical Care    No services have been selected for the patient.                Therapy    No services have been selected for the patient.                Community Resources    No services have been selected for the patient.                Community & DME    No services have been selected for the patient.                    Transportation Services  Private: Car    Final Discharge Disposition Code: 01 - home or self-care

## 2024-02-27 NOTE — DISCHARGE SUMMARY
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            Condition on Discharge: stable    Discharge disposition: home    Pertinent Test Results:     Lumbar x-ray 2/27/2024:  FINDINGS:     Intact appearing posterior janet and screw fusion hardware is seen at L2,  L3, L4, L5, as well as pre-existing intervertebral fusion hardware L2/3,  L4/5. Alignment appears stable. No acute fracture is identified. Disc  space narrowing and endplate spurring are seen at L1/2. Arterial  calcification is present.     IMPRESSION:     Postsurgical and degenerative changes.    Brief HPI: Patient evaluated in office after having previous L2-3 and L4-5 OLIF. Imaging revealed settling of her L4-5 interbody spacer and worsening anterior listhesis due to poor bone quality. RBAs of treatment were discussed including the above procedure. Patient consented to above procedure.    Hospital Course: Patient admitted for above procedure. The procedure itself was without complication. The patient was transferred to hospitals following recovery.  Postop day 1, she is doing well.  She states that she is not having any issues in the legs besides baseline weakness in the right lower extremity as before as well as expected surgical site pain and her baseline numbness around the area of her lower abdomen upper groin region.  She states that this numbness is improving already.  She is not having any issues with p.o. and she is voiding appropriately.  If she develops any new signs or symptoms in the legs or back or develops any signs of infection she will  call immediately.  We will follow-up in the office with her on March 13.    Discharge Physical Exam:    Temp:  [97.7 °F (36.5 °C)-99 °F (37.2 °C)] 98.5 °F (36.9 °C)  Heart Rate:  [] 91  Resp:  [14-18] 16  BP: (101-147)/(42-83) 120/65    Current labs:  Lab Results (last 24 hours)       Procedure Component Value Units Date/Time    POC Glucose Once [[id number]]  (Abnormal) Collected: 02/26/24 1503    Specimen: Blood Updated: 02/26/24 1504     Glucose 139 mg/dL     POC Glucose Once [[id number]]  (Abnormal) Collected: 02/26/24 1654    Specimen: Blood Updated: 02/26/24 1655     Glucose 179 mg/dL               General Appearance No acute distress   HEENT NC/AT;    Neurological Awake, Alert, and oriented x 3   Motor Strength 5/5 LLE, RLE is 5/5 except for quadriceps and hamstring which are at 5 -/5-this is baseline, tone normal   Sensory Intact to light touch throughout   Reflexes No clonus   Gait and station Ambulating without difficulty per patient  Per PT note 2/27/2024:Pt seen for PT evaluation this AM s/p L2-5 posterior lateral fusion. Pt lives with spouse, has been using a walker since previous back surgery, has a few steps to enter. Pt demo expected post op pain, weakness, decreased activity tolerance and general functional mobility deficits below her baseline. She completed supine<>sit via log roll with cga to min A. Pt completed sit<>stand with cga and ambulated 300' sba with walker. Pt also completed stair training without balance or safety concerns. Pt may benefit from ongoing skilled PT services at d/c to continue addressing impairments but is cleared for d/c home with assist from PT standpoint. PT signing off. Encourage pt to mobilize with nsg staff and family as tolerated.      Back Bilateral lumbar incision sites with surgical glue in place.  No signs of erythema, swelling or drainage   Extremities Moves all extremities well, no edema, no cyanosis, no redness         Discharge Medications  Juan Miguel has been  reviewed and narcotic consent is on file in the patient's chart.     Your medication list        START taking these medications        Instructions Last Dose Given Next Dose Due   HYDROcodone-acetaminophen 5-325 MG per tablet  Commonly known as: NORCO      Take 1 tablet by mouth Every 4 (Four) Hours As Needed for Moderate Pain for up to 8 days.       sennosides-docusate 8.6-50 MG per tablet  Commonly known as: PERICOLACE      Take 1 tablet by mouth 2 (Two) Times a Day.              CONTINUE taking these medications        Instructions Last Dose Given Next Dose Due   amLODIPine 5 MG tablet  Commonly known as: NORVASC      Take 1 tablet by mouth Daily.       celecoxib 200 MG capsule  Commonly known as: CeleBREX      Take 1 capsule by mouth 2 (Two) Times a Day.       Diclofenac Sodium 1 % gel gel  Commonly known as: Voltaren Arthritis Pain      Apply 4 g topically to the appropriate area as directed 4 (Four) Times a Day As Needed (Rub over low back for achy pain).       famciclovir 500 MG tablet  Commonly known as: FAMVIR      Take 1 tablet by mouth Every 12 (Twelve) Hours As Needed (as directed).       FLUoxetine 20 MG capsule  Commonly known as: PROzac      Take 1 capsule by mouth Daily.       FREESTYLE TEST STRIPS test strip  Generic drug: glucose blood      One touch ultra strips; check blood sugar once a day as needed       gabapentin 300 MG capsule  Commonly known as: NEURONTIN      Take 1 capsule by mouth 3 (Three) Times a Day.       hydroCHLOROthiazide 12.5 MG tablet      Take 1 tablet by mouth Daily.       levothyroxine 50 MCG tablet  Commonly known as: SYNTHROID, LEVOTHROID      Take 1 tablet by mouth Daily.       lidocaine 5 %  Commonly known as: LIDODERM      Place 1 patch on the skin as directed by provider Daily. Remove & Discard patch within 12 hours; use as needed for back pain       lisinopril 10 MG tablet  Commonly known as: PRINIVIL,ZESTRIL      Take 1 tablet by mouth Daily.       rosuvastatin 10 MG  "tablet  Commonly known as: CRESTOR      Take 1 tablet by mouth Daily.       Rybelsus 7 MG tablet  Generic drug: Semaglutide      Take 7 mg by mouth Daily.                 Where to Get Your Medications        These medications were sent to [facility] [id number] - [place], [place] - [address] [facility] AT [facility] [address] - [phone]  - [phone] FX  [address] [address], [facility] [address]      Phone: [phone]   HYDROcodone-acetaminophen 5-325 MG per tablet  sennosides-docusate 8.6-50 MG per tablet         Discharge Diet:   Diet Instructions       Diet: Regular/House Diet; Regular Texture (IDDSI 7); Thin (IDDSI 0)      Discharge Diet: Regular/House Diet    Texture: Regular Texture (IDDSI 7)    Fluid Consistency: Thin (IDDSI 0)          Diet Order   Procedures    Diet: Regular/House Diet; Texture: Regular Texture (IDDSI 7); Fluid Consistency: Thin (IDDSI 0)       Activity at Discharge:   Activity Instructions       Discharge Activity      1) No driving until longer taking narcotics.   2) Return to school / work in 2 weeks.  3) May shower / sponge bathe  4) Do not lift / push / pull more than 5 lbs.            Call for: questions or concerns    Follow-up Appointments  Future Appointments   Date Time Provider Department Center   3/13/2024 11:45 AM Calderon Whittaker MD [facility]   5/15/2024 10:30 AM [facility]   5/20/2024 10:00 AM Ruth Benitez APRN [facility]      Follow-up Information       Ruth Benitez APRN .    Specialties: Family Medicine, Nurse Practitioner  Contact information:  [address] [facility] [id number] [phone]                               Test Results Pending at Discharge     None    I discussed the discharge instructions with patient, family, and Dr. Whittaker.    GILBERT Johansen  02/27/24  13:17 EST      \"Dictated utilizing Dragon dictation\".    "

## 2024-02-27 NOTE — OUTREACH NOTE
Prep Survey      Flowsheet Row Responses   [facility] patient discharged from? [place]   Is LACE score < 7 ? Yes   Eligibility [facility]   Date of Admission 02/26/24   Date of Discharge 02/27/24   Discharge Disposition Home or Self Care   Discharge diagnosis LUMBAR TWO THREE, THREE TO FOUR, FOUR TO FIVE FUSION MINIMALLY INVASIVE NAVIGATION   Does the patient have one of the following disease processes/diagnoses(primary or secondary)? General Surgery   Does the patient have Home health ordered? No   Is there a DME ordered? No   Prep survey completed? Yes            Lilia ALLEN - Registered Nurse

## 2024-02-27 NOTE — THERAPY DISCHARGE NOTE
Patient Name: Summer Jefferson  : 1955    MRN: [id number]                              Today's Date: 2024       Admit Date: 2024    Visit Dx:     ICD-10-CM ICD-9-CM   1. 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     Past Medical History:   Diagnosis Date    Anxiety and depression 03/15/2022    Backache     Diabetes mellitus type 2, controlled     HSV (herpes simplex virus) infection 03/15/2022    Hypercholesterolemia     Hypertension     Hypothyroidism     Osteoarthritis of multiple joints     Spinal stenosis     LUMBAR     Past Surgical History:   Procedure Laterality Date    ANKLE ARTHROSCOPY Right     2013 & 10/2013    COLONOSCOPY      2007    EPIDURAL BLOCK      X5    LAPAROSCOPIC GASTRIC BANDING  2007    LUMBAR FUSION Left 2023    Procedure: PRONE OBLIQUE LATERAL LUMBAR TWO TO THREE, FOUR TO FIVE INTERBODY FUSION WITH ANTERIOR PLATING AND NEUROMONITORING;  Surgeon: Calderon Whittaker MD;  Location: [facility];  Service: Neurosurgery;  Laterality: Left;    LUMBAR FUSION N/A 2023    Procedure: LUMBAR FUSION MINIMALLY INVASIVE NAVIGATION;  Surgeon: Calderon Whittaker MD;  Location: [facility];  Service: Neurosurgery;  Laterality: N/A;    ROTATOR CUFF REPAIR Right     2014    TOTAL KNEE ARTHROPLASTY Right     2014      General Information       Row Name 24 1129           Physical Therapy Time and Intention    Document Type discharge evaluation/summary  -CW     Mode of Treatment individual therapy;physical therapy  -CW       Row Name 02/27/24 1129          General Information    Patient Profile Reviewed yes  -CW     Prior Level of Function independent:  -CW     Existing Precautions/Restrictions fall;spinal  -CW     Barriers to Rehab none identified  -CW       Row Name 02/27/24 1129          Living Environment    People in Home spouse  -CW       Row Name 02/27/24 1129          Home Main Entrance    Number of Stairs, Main Entrance three  -CW       Row Name 02/27/24 1129          Stairs Within Home, Primary    Number of Stairs, Within Home, Primary none  -CW       Row Name 02/27/24 1129          Cognition    Orientation Status (Cognition) oriented x 4  -CW       Row Name 02/27/24 1129          Safety Issues, Functional Mobility    Impairments Affecting Function (Mobility) endurance/activity tolerance  -CW               User Key  (r) = Recorded By, (t) = Taken By, (c) = Cosigned By      Initials Name Provider Type    CW Sheila Jimenez PT Physical Therapist                   Mobility       Row Name 02/27/24 1130          Bed Mobility    Bed Mobility supine-sit;sit-supine  -CW     Supine-Sit [place] (Bed Mobility) standby assist  -CW     Sit-Supine [place] (Bed Mobility) minimum assist (75% patient effort);1 person assist;verbal cues  -     Assistive Device (Bed Mobility) bed rails  -     Comment, (Bed Mobility) completed via log roll, min A at RLE for return to supine  -CW       Row Name 02/27/24 1130          Sit-Stand Transfer    Sit-Stand [place] (Transfers) standby assist  -     Assistive Device (Sit-Stand Transfers) walker, front-wheeled  -CW       Row Name 02/27/24 1130          Gait/Stairs (Locomotion)    [place] Level (Gait) standby assist  -CW     Assistive Device (Gait) walker, front-wheeled  -CW     Distance in Feet (Gait) 300'  -CW      Deviations/Abnormal Patterns (Gait) gait speed decreased  -CW     [place] Level (Stairs) contact guard  -CW     Handrail Location (Stairs) both sides  -CW     Number of Steps (Stairs) 4  -CW     Ascending Technique (Stairs) step-to-step  -CW     Descending Technique (Stairs) step-to-step  -CW               User Key  (r) = Recorded By, (t) = Taken By, (c) = Cosigned By      Initials Name Provider Type    Sheila Salazar PT Physical Therapist                   Obj/Interventions       Row Name 02/27/24 1130          Range of Motion Comprehensive    General Range of Motion bilateral lower extremity ROM WFL  -CW       Row Name 02/27/24 1130          Balance    Balance Assessment standing static balance;standing dynamic balance  -CW     Static Standing Balance standby assist  -CW     Dynamic Standing Balance standby assist  -CW     Position/Device Used, Standing Balance supported;walker, front-wheeled  -CW     Comment, Balance no overt loss of balance observed  -CW               User Key  (r) = Recorded By, (t) = Taken By, (c) = Cosigned By      Initials Name Provider Type    Sheila Salazar PT Physical Therapist                   Goals/Plan    No documentation.                  Clinical Impression       Row Name 02/27/24 1136          Pain    Pretreatment Pain Rating 3/10  -CW     Posttreatment Pain Rating 3/10  -CW     Pain Location - back  -CW     Pain Intervention(s) Repositioned;Rest;Ambulation/increased activity  -       Row Name 02/27/24 1136          Plan of Care Review    Plan of Care Reviewed With patient;family  -     Outcome Evaluation Pt seen for PT evaluation this AM s/p L2-5 posterior lateral fusion. Pt lives with spouse, has been using a walker since previous back surgery, has a few steps to enter. Pt demo expected post op pain, weakness, decreased activity tolerance and general functional mobility deficits below her baseline. She completed supine<>sit via log roll with cga to min A.  Pt completed sit<>stand with cga and ambulated 300' sba with walker. Pt also completed stair training without balance or safety concerns. Pt may benefit from ongoing skilled PT services at d/c to continue addressing impairments but is cleared for d/c home with assist from PT standpoint. PT signing off. Encourage pt to mobilize with nsg staff and family as tolerated.  -       Row Name 02/27/24 1136          Therapy Assessment/Plan (PT)    Therapy Frequency (PT) evaluation only  -CW       Row Name 02/27/24 1136          Vital Signs    O2 Delivery Pre Treatment room air  -CW       Row Name 02/27/24 1136          Positioning and Restraints    Pre-Treatment Position in bed  -CW     Post Treatment Position bed  -CW     In Bed supine;call light within reach;encouraged to call for assist;exit alarm on;notified nsg;with family/caregiver  -CW               User Key  (r) = Recorded By, (t) = Taken By, (c) = Cosigned By      Initials Name Provider Type    CW Sheila Jimenez, PT Physical Therapist                   Outcome Measures       Row Name 02/27/24 1140 02/27/24 0800       How much help from another person do you currently need...    Turning from your back to your side while in flat bed without using bedrails? 3  -CW 3  -RK    Moving from lying on back to sitting on the side of a flat bed without bedrails? 3  -CW 3  -RK    Moving to and from a bed to a chair (including a wheelchair)? 4  -CW 3  -RK    Standing up from a chair using your arms (e.g., wheelchair, bedside chair)? 4  -CW 3  -RK    Climbing 3-5 steps with a railing? 3  -CW 2  -RK    To walk in hospital room? 3  -CW 3  -RK    AM-PAC 6 Clicks Score (PT) 20  -CW 17  -RK    Highest Level of Mobility Goal 6 --> Walk 10 steps or more  -CW 5 --> Static standing  -RK      Row Name 02/27/24 1140          Functional Assessment    Outcome Measure Options AM-PAC 6 Clicks Basic Mobility (PT)  -CW               User Key  (r) = Recorded By, (t) = Taken By, (c) = Cosigned  By      Initials Name Provider Type    CW Sheila Jimenez, PT Physical Therapist    Caitlin Wayne RN Registered Nurse                  Physical Therapy Education       Title: PT OT SLP Therapies (In Progress)       Topic: Physical Therapy (In Progress)       Point: Mobility training (Done)       Learning Progress Summary             Patient Acceptance, E, VU by CW at 2/27/2024 1141                         Point: Home exercise program (Not Started)       Learner Progress:  Not documented in this visit.              Point: Body mechanics (Done)       Learning Progress Summary             Patient Acceptance, E, VU by CW at 2/27/2024 1141                         Point: Precautions (Done)       Learning Progress Summary             Patient Acceptance, E, VU by CW at 2/27/2024 1141                                         User Key       Initials Effective Dates Name Provider Type Discipline     12/13/22 -  Sheila Jimenez PT Physical Therapist PT                  PT Recommendation and Plan     Plan of Care Reviewed With: patient, family  Outcome Evaluation: Pt seen for PT evaluation this AM s/p L2-5 posterior lateral fusion. Pt lives with spouse, has been using a walker since previous back surgery, has a few steps to enter. Pt demo expected post op pain, weakness, decreased activity tolerance and general functional mobility deficits below her baseline. She completed supine<>sit via log roll with cga to min A. Pt completed sit<>stand with cga and ambulated 300' sba with walker. Pt also completed stair training without balance or safety concerns. Pt may benefit from ongoing skilled PT services at d/c to continue addressing impairments but is cleared for d/c home with assist from PT standpoint. PT signing off. Encourage pt to mobilize with nsg staff and family as tolerated.     Time Calculation:         PT Charges       Row Name 02/27/24 4379             Time Calculation    Start Time 1045  -CW      Stop Time  1110  -CW      Time Calculation (min) 25 min  -CW      PT Received On 02/27/24  -CW         Time Calculation- PT    Total Timed Code Minutes- PT 15 minute(s)  -CW         Timed Charges    92080 - PT Therapeutic Activity Minutes 15  -CW         Total Minutes    Timed Charges Total Minutes 15  -CW       Total Minutes 15  -CW                User Key  (r) = Recorded By, (t) = Taken By, (c) = Cosigned By      Initials Name Provider Type    CW Sheila Jimenez, PT Physical Therapist                  Therapy Charges for Today       Code Description Service Date Service Provider Modifiers Qty    [id number]  PT EVAL LOW COMPLEXITY 2 2/27/2024 Sheila Jimenez, PT GP 1    [id number]  PT THERAPEUTIC ACT EA 15 MIN 2/27/2024 Sheila Jimenez, PT GP 1            PT G-Codes  Outcome Measure Options: AM-PAC 6 Clicks Basic Mobility (PT)  AM-PAC 6 Clicks Score (PT): 20    PT Discharge Summary  Anticipated Discharge Disposition (PT): home with assist, home with outpatient therapy services, home with home health    Sheila Jimenez PT  2/27/2024

## 2024-02-28 ENCOUNTER — TELEPHONE (OUTPATIENT)
Dept: NEUROSURGERY | Facility: CLINIC | Age: 69
End: 2024-02-28
Payer: MEDICARE

## 2024-02-28 ENCOUNTER — TRANSITIONAL CARE MANAGEMENT TELEPHONE ENCOUNTER (OUTPATIENT)
Dept: CALL CENTER | Facility: HOSPITAL | Age: 69
End: 2024-02-28
Payer: MEDICARE

## 2024-02-28 RX ORDER — METHOCARBAMOL 500 MG/1
500 TABLET, FILM COATED ORAL 4 TIMES DAILY
Qty: 90 TABLET | Refills: 2 | Status: SHIPPED | OUTPATIENT
Start: 2024-02-28

## 2024-02-28 NOTE — TELEPHONE ENCOUNTER
Patient called stating that she needs a Rx for the muscle relaxer Robaxin. States that she was given a pain Rx but not the muscle relaxer, and would like that to be sent to the pharmacy at formerly [facility] in [place]. Please call and advise.

## 2024-02-28 NOTE — OUTREACH NOTE
Call Center TCM Note      Flowsheet Row Responses   [facility] patient discharged from? [place]   Does the patient have one of the following disease processes/diagnoses(primary or secondary)? General Surgery   TCM attempt successful? Yes   Call start time 1302   Call end time 1304   Discharge diagnosis LUMBAR TWO THREE, THREE TO FOUR, FOUR TO FIVE FUSION MINIMALLY INVASIVE NAVIGATION   Meds reviewed with patient/caregiver? Yes   Is the patient having any side effects they believe may be caused by any medication additions or changes? No   Does the patient have all medications related to this admission filled (includes all antibiotics, pain medications, etc.) Yes   Is the patient taking all medications as directed (includes completed medication regime)? Yes   Comments Pt declines HOSP DC FU appt with PCP at this time. States she has an appt at a later date.   Does the patient have an appointment with their PCP within 7-14 days of discharge? No   Nursing Interventions Routed TCM call to PCP office, Patient declined scheduling/rescheduling appointment at this time   Has home health visited the patient within 72 hours of discharge? N/A   Psychosocial issues? No   Did the patient receive a copy of their discharge instructions? Yes   Nursing interventions Reviewed instructions with patient   What is the patient's perception of their health status since discharge? Improving   Nursing interventions Nurse provided patient education   Is the patient /caregiver able to teach back basic post-op care? Drive as instructed by MD in discharge instructions, Lifting as instructed by MD in discharge instructions   Is the patient/caregiver able to teach back signs and symptoms of incisional infection? Increased redness, swelling or pain at the incisonal site, Increased drainage or bleeding, Incisional warmth, Pus or odor from incision, Fever   Is the patient/caregiver able to teach back steps to recovery at home? Set small,  achievable goals for return to baseline health, Eat a well-balance diet   Is the patient/caregiver able to teach back the hierarchy of who to call/visit for symptoms/problems? PCP, Specialist, Home health nurse, Urgent Care, ED, 911 Yes   TCM call completed? Yes   Wrap up additional comments Pt reports she is doing ok. No needs. Pt has POST OP appt in place. Declines PCP appt at this time   Call end time 1818            Gaby Simeon RN    2/28/2024, 13:06 EST

## 2024-03-04 ENCOUNTER — TELEPHONE (OUTPATIENT)
Dept: NEUROSURGERY | Facility: CLINIC | Age: 69
End: 2024-03-04
Payer: MEDICARE

## 2024-03-04 NOTE — TELEPHONE ENCOUNTER
Spoke to Ms. Jefferson who is requesting to have an order put in so that she can have an X-Ray of her back done before she comes in for her Post Op appointment on 03/13/24, please call patient and advise thanks!

## 2024-03-07 ENCOUNTER — TELEPHONE (OUTPATIENT)
Dept: NEUROSURGERY | Facility: CLINIC | Age: 69
End: 2024-03-07
Payer: MEDICARE

## 2024-03-07 NOTE — TELEPHONE ENCOUNTER
Patient called back. Informed patient that she can get her XRAY before her appointment or a couple days before.

## 2024-03-11 DIAGNOSIS — M43.16 SPONDYLOLISTHESIS OF LUMBAR REGION: Primary | ICD-10-CM

## 2024-03-11 NOTE — TELEPHONE ENCOUNTER
Please let her know that I put an x-ray order in and if she would like she can have that done prior to her visit

## 2024-03-12 ENCOUNTER — HOSPITAL ENCOUNTER (OUTPATIENT)
Dept: GENERAL RADIOLOGY | Facility: HOSPITAL | Age: 69
Discharge: HOME | End: 2024-03-12
Admitting: NEUROLOGICAL SURGERY
Payer: MEDICARE

## 2024-03-12 DIAGNOSIS — M43.16 SPONDYLOLISTHESIS OF LUMBAR REGION: ICD-10-CM

## 2024-03-12 PROCEDURE — 72100 X-RAY EXAM L-S SPINE 2/3 VWS: CPT

## 2024-03-13 ENCOUNTER — OFFICE VISIT (OUTPATIENT)
Dept: NEUROSURGERY | Facility: CLINIC | Age: 69
End: 2024-03-13
Payer: MEDICARE

## 2024-03-13 ENCOUNTER — TELEPHONE (OUTPATIENT)
Dept: NEUROSURGERY | Facility: CLINIC | Age: 69
End: 2024-03-13

## 2024-03-13 VITALS
SYSTOLIC BLOOD PRESSURE: 114 MMHG | HEIGHT: 64 IN | RESPIRATION RATE: 16 BRPM | DIASTOLIC BLOOD PRESSURE: 66 MMHG | HEART RATE: 103 BPM | OXYGEN SATURATION: 98 % | BODY MASS INDEX: 30.56 KG/M2 | WEIGHT: 179.01 LBS

## 2024-03-13 DIAGNOSIS — G89.29 CHRONIC BILATERAL LOW BACK PAIN WITH LEFT-SIDED SCIATICA: ICD-10-CM

## 2024-03-13 DIAGNOSIS — M54.42 CHRONIC BILATERAL LOW BACK PAIN WITH LEFT-SIDED SCIATICA: ICD-10-CM

## 2024-03-13 DIAGNOSIS — T84.216A HARDWARE FAILURE OF ANTERIOR COLUMN OF SPINE: Primary | ICD-10-CM

## 2024-03-13 DIAGNOSIS — M43.16 SPONDYLOLISTHESIS OF LUMBAR REGION: ICD-10-CM

## 2024-03-13 PROCEDURE — 99024 POSTOP FOLLOW-UP VISIT: CPT | Performed by: NEUROLOGICAL SURGERY

## 2024-03-13 PROCEDURE — 3074F SYST BP LT 130 MM HG: CPT | Performed by: NEUROLOGICAL SURGERY

## 2024-03-13 PROCEDURE — 1160F RVW MEDS BY RX/DR IN RCRD: CPT | Performed by: NEUROLOGICAL SURGERY

## 2024-03-13 PROCEDURE — 3078F DIAST BP <80 MM HG: CPT | Performed by: NEUROLOGICAL SURGERY

## 2024-03-13 PROCEDURE — 1159F MED LIST DOCD IN RCRD: CPT | Performed by: NEUROLOGICAL SURGERY

## 2024-03-13 RX ORDER — LIDOCAINE 50 MG/G
1 PATCH TOPICAL EVERY 24 HOURS
Qty: 12 EACH | Refills: 3 | Status: SHIPPED | OUTPATIENT
Start: 2024-03-13

## 2024-03-13 NOTE — TELEPHONE ENCOUNTER
Patient called she wants PT order. Sent staff message to  to put in an order so I can route to [facility].

## 2024-03-13 NOTE — PROGRESS NOTES
Patient ID: Summer Jefferson is a 68 y.o. female is an established patient with hardware failure of anterior column of the spine who has previously undergone lumbar 2 to 3, 3 to 4, 4 to 5 fusion minimally invasive on 02/26/2024.    Subjective:     History of Present Illness  Summer is doing well after surgery.  She no longer has the same type of back pain that she was having before which included exacerbation with movement.  She has static back pain that is likely due to soft tissue manipulation between her 2 lumbar incisions.  This pain is completely relieved with lidocaine patch.      Review of Systems   Constitutional:  Negative for fever.   Musculoskeletal:  Positive for back pain. Negative for gait problem, neck pain and neck stiffness.   Neurological:  Positive for weakness. Negative for dizziness, light-headedness, numbness and headaches.        While in the room and during my examination of the patient I wore a mask and eye protection.  I washed my hands before and after this patient encounter.  The patient was also wearing a mask.    The following portions of the patient's history were reviewed and updated as appropriate: allergies, current medications, past family history, past medical history, past social history, past surgical history and problem list.     Objective:    Vitals:    03/13/24 1150   BP: 114/66   Pulse: 103   Resp: 16   SpO2: 98%     Body mass index is 31.21 kg/m².    Physical Exam  Constitutional:       Appearance: Normal appearance.   HENT:      Head: Normocephalic and atraumatic.   Eyes:      Extraocular Movements: Extraocular movements intact.      Conjunctiva/sclera: Conjunctivae normal.      Pupils: Pupils are equal, round, and reactive to light.   Cardiovascular:      Rate and Rhythm: Normal rate and regular rhythm.      Pulses: Normal pulses.   Pulmonary:      Breath sounds: Normal breath sounds.   Abdominal:      Palpations: Abdomen is soft.   Musculoskeletal:         General:  Normal range of motion.      Cervical back: Normal range of motion and neck supple.   Skin:     General: Skin is warm and dry.   Neurological:      Mental Status: She is alert and oriented to person, place, and time.      Cranial Nerves: Cranial nerves 2-12 are intact.      Motor: Motor function is intact. No weakness or atrophy.      Coordination: Coordination is intact. Romberg sign negative. Romberg Test normal.      Gait: Gait is intact. Gait normal.      Deep Tendon Reflexes: Reflexes are normal and symmetric.      Reflex Scores:       Tricep reflexes are 2+ on the right side and 2+ on the left side.       Bicep reflexes are 2+ on the right side and 2+ on the left side.       Brachioradialis reflexes are 2+ on the right side and 2+ on the left side.       Patellar reflexes are 2+ on the right side and 2+ on the left side.       Achilles reflexes are 2+ on the right side and 2+ on the left side.  Psychiatric:         Speech: Speech normal.       Neurologic Exam     Mental Status   Oriented to person, place, and time.   Attention: normal. Concentration: normal.   Speech: speech is normal   Level of consciousness: alert    Cranial Nerves   Cranial nerves II through XII intact.     CN III, IV, VI   Pupils are equal, round, and reactive to light.    Motor Exam   Muscle bulk: normal  Overall muscle tone: normal    Strength   Strength 5/5 except as noted.     Sensory Exam   Light touch normal.     Gait, Coordination, and Reflexes     Gait  Gait: normal    Coordination   Romberg: negative    Reflexes   Reflexes 2+ except as noted.   Right brachioradialis: 2+  Left brachioradialis: 2+  Right biceps: 2+  Left biceps: 2+  Right triceps: 2+  Left triceps: 2+  Right patellar: 2+  Left patellar: 2+  Right achilles: 2+  Left achilles: 2+       Results: X-ray of the lumbar spine shows stable hardware and alignment with some residual spondylolisthesis at L4-5    Assessment/Plan: Her x-rays look good and they are stable.   Hopefully this will prevent any further loosening or failure of her hardware.  She is cleared to lift up to 15 pounds and go to the gym for exercise on the elliptical.  She is cleared for travel as long as she is tight not taking narcotic pain medication she can drive.  I think she would benefit from additional lidocaine patches and encouraged her to use muscle relaxants.       Diagnoses and all orders for this visit:    1. 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 Chronic bilateral low back pain with left-sided sciatica  -     XR Spine Lumbar 2 or 3 View; Future  -     lidocaine (LIDODERM) 5 %; Place 1 patch on the skin as directed by provider Daily. Remove & Discard patch within 12 hours; use as needed for back pain  Dispense: 12 each; Refill: 3    3. Spondylolisthesis of lumbar region  -     XR Spine Lumbar 2 or 3 View; Future                Calderon Whittaker MD  03/13/24  12:14 EDT

## 2024-03-14 DIAGNOSIS — M43.16 SPONDYLOLISTHESIS OF LUMBAR REGION: Primary | ICD-10-CM

## 2024-03-18 DIAGNOSIS — E11.9 CONTROLLED TYPE 2 DIABETES MELLITUS WITHOUT COMPLICATION, WITHOUT LONG-TERM CURRENT USE OF INSULIN: Chronic | ICD-10-CM

## 2024-03-18 RX ORDER — ORAL SEMAGLUTIDE 7 MG/1
1 TABLET ORAL DAILY
Qty: 30 TABLET | Refills: 11 | Status: SHIPPED | OUTPATIENT
Start: 2024-03-18

## 2024-04-10 ENCOUNTER — HOSPITAL ENCOUNTER (OUTPATIENT)
Dept: GENERAL RADIOLOGY | Facility: HOSPITAL | Age: 69
Discharge: HOME | End: 2024-04-10
Admitting: NEUROLOGICAL SURGERY
Payer: MEDICARE

## 2024-04-10 DIAGNOSIS — G89.29 CHRONIC BILATERAL LOW BACK PAIN WITH LEFT-SIDED SCIATICA: ICD-10-CM

## 2024-04-10 DIAGNOSIS — T84.216A HARDWARE FAILURE OF ANTERIOR COLUMN OF SPINE: ICD-10-CM

## 2024-04-10 DIAGNOSIS — M54.42 CHRONIC BILATERAL LOW BACK PAIN WITH LEFT-SIDED SCIATICA: ICD-10-CM

## 2024-04-10 DIAGNOSIS — M43.16 SPONDYLOLISTHESIS OF LUMBAR REGION: ICD-10-CM

## 2024-04-10 PROCEDURE — 72100 X-RAY EXAM L-S SPINE 2/3 VWS: CPT

## 2024-04-11 ENCOUNTER — OFFICE VISIT (OUTPATIENT)
Dept: NEUROSURGERY | Facility: CLINIC | Age: 69
End: 2024-04-11
Payer: MEDICARE

## 2024-04-11 VITALS
RESPIRATION RATE: 16 BRPM | DIASTOLIC BLOOD PRESSURE: 68 MMHG | SYSTOLIC BLOOD PRESSURE: 118 MMHG | HEIGHT: 64 IN | HEART RATE: 92 BPM | WEIGHT: 179.01 LBS | BODY MASS INDEX: 30.56 KG/M2 | OXYGEN SATURATION: 95 %

## 2024-04-11 DIAGNOSIS — M47.816 FACET ARTHROPATHY, LUMBAR: ICD-10-CM

## 2024-04-11 DIAGNOSIS — M54.16 LUMBAR RADICULOPATHY: Primary | ICD-10-CM

## 2024-04-11 NOTE — PROGRESS NOTES
Patient ID: Summer Jefferson is a 68 y.o. female is here today for follow-up chronic bilateral low back pain with left sided sciatica.    Imaging: XR of the lumbar spine last performed on 04/10/2024    Subjective     The patient is here in regards to   Chief Complaint   Patient presents with    Back Pain    Post-op Follow-up       History of Present Illness  Summer is overall doing well.  She does have some pain around her incision towards the left of her incision.  Otherwise does not have deep back pain.  She is able to manage his pain with a lidocaine patch over her left-sided incision which seems to take care of it and then she endorses that she is pain-free.      While in the room and during my examination of the patient I wore a mask and eye protection.  I washed my hands before and after this patient encounter.  The patient was also wearing a mask.    The following portions of the patient's history were reviewed and updated as appropriate: allergies, current medications, past family history, past medical history, past social history, past surgical history and problem list.    Review of Systems   Constitutional:  Negative for fever.   Musculoskeletal:  Positive for back pain and gait problem (unable to walk for extnded periods of time).   Neurological:  Positive for weakness (lower back). Negative for dizziness, light-headedness, numbness and headaches.        Past Medical History:   Diagnosis Date    Anxiety and depression 03/15/2022    Backache     Diabetes mellitus type 2, controlled     HSV (herpes simplex virus) infection 03/15/2022    Hypercholesterolemia     Hypertension     Hypothyroidism     Osteoarthritis of multiple joints     Spinal stenosis     LUMBAR       No Known Allergies    Family History   Problem Relation Age of Onset    Thyroid disease Mother     Diabetes Mother     Hypertension Mother     Heart disease Mother     Vision loss Father     Malig Hyperthermia Neg Hx        Social History      Socioeconomic History    Marital status:    Tobacco Use    Smoking status: Former     Current packs/day: 0.00     Average packs/day: 1 pack/day for 10.0 years (10.0 ttl pk-yrs)     Types: Cigarettes     Start date: 1983     Quit date: 1993     Years since quittin.3     Passive exposure: Never    Smokeless tobacco: Never    Tobacco comments:     quit 15 yrs ago   Vaping Use    Vaping status: Never Used   Substance and Sexual Activity    Alcohol use: Yes     Comment: 1-2 a month    Drug use: Never    Sexual activity: Defer       Past Surgical History:   Procedure Laterality Date    ANKLE ARTHROSCOPY Right     2013 & 10/2013    COLONOSCOPY      2007    EPIDURAL BLOCK      X5    LAPAROSCOPIC GASTRIC BANDING  2007    LUMBAR FUSION Left 2023    Procedure: PRONE OBLIQUE LATERAL LUMBAR TWO TO THREE, FOUR TO FIVE INTERBODY FUSION WITH ANTERIOR PLATING AND NEUROMONITORING;  Surgeon: Calderon Whittaker MD;  Location: [facility];  Service: Neurosurgery;  Laterality: Left;    LUMBAR FUSION N/A 2023    Procedure: LUMBAR FUSION MINIMALLY INVASIVE NAVIGATION;  Surgeon: Calderon Whittaker MD;  Location: [facility];  Service: Neurosurgery;  Laterality: N/A;    LUMBAR FUSION N/A 2024    Procedure: LUMBAR TWO THREE, THREE TO FOUR, FOUR TO FIVE FUSION MINIMALLY INVASIVE NAVIGATION;  Surgeon: Calderon Whittaker MD;  Location: [facility];  Service: Neurosurgery;  Laterality: N/A;    ROTATOR CUFF REPAIR Right     2014    TOTAL KNEE ARTHROPLASTY Right     2014         Objective     Vitals:    24 1114   BP: 118/68   Pulse: 92   Resp: 16   SpO2: 95%     Body mass index is 31.21 kg/m².    Physical Exam  Constitutional:       Appearance: Normal appearance.   HENT:      Head: Normocephalic and atraumatic.   Eyes:      Extraocular Movements: Extraocular movements intact.      Conjunctiva/sclera: Conjunctivae normal.      Pupils: Pupils are equal, round, and reactive to light.    Cardiovascular:      Rate and Rhythm: Normal rate and regular rhythm.      Pulses: Normal pulses.   Pulmonary:      Breath sounds: Normal breath sounds.   Abdominal:      Palpations: Abdomen is soft.   Musculoskeletal:         General: Normal range of motion.      Cervical back: Normal range of motion and neck supple.   Skin:     General: Skin is warm and dry.   Neurological:      Mental Status: She is alert and oriented to person, place, and time.      Cranial Nerves: Cranial nerves 2-12 are intact.      Motor: Motor function is intact. No weakness or atrophy.      Coordination: Coordination is intact. Romberg sign negative. Romberg Test normal.      Gait: Gait is intact. Gait normal.      Deep Tendon Reflexes: Reflexes are normal and symmetric.      Reflex Scores:       Tricep reflexes are 2+ on the right side and 2+ on the left side.       Bicep reflexes are 2+ on the right side and 2+ on the left side.       Brachioradialis reflexes are 2+ on the right side and 2+ on the left side.       Patellar reflexes are 2+ on the right side and 2+ on the left side.       Achilles reflexes are 2+ on the right side and 2+ on the left side.  Psychiatric:         Speech: Speech normal.         Neurologic Exam     Mental Status   Oriented to person, place, and time.   Attention: normal. Concentration: normal.   Speech: speech is normal   Level of consciousness: alert    Cranial Nerves   Cranial nerves II through XII intact.     CN III, IV, VI   Pupils are equal, round, and reactive to light.    Motor Exam   Muscle bulk: normal  Overall muscle tone: normal    Strength   Strength 5/5 except as noted.     Sensory Exam   Light touch normal.     Gait, Coordination, and Reflexes     Gait  Gait: normal    Coordination   Romberg: negative    Reflexes   Reflexes 2+ except as noted.   Right brachioradialis: 2+  Left brachioradialis: 2+  Right biceps: 2+  Left biceps: 2+  Right triceps: 2+  Left triceps: 2+  Right patellar: 2+  Left  patellar: 2+  Right achilles: 2+  Left achilles: 2+      Assessment & Plan   Independent Review of Radiographic Studies:      I personally reviewed the images from the following studies.    XR: XR of the lumbar spine was reviewed and shows intact hardware and alignment    Assessment/Plan: Seems to be doing well after lumbar fusion.  No return of radiculopathy.  She still has pain over her incision which is strange but it is manageable with local anesthetic patches which I think is a very smart idea.  I do think that pain will gradually improve over time.  She is cleared to lift up to 30 pounds and proceed with normal activity    Medical Decision Making:      X-ray lumbar spine         Diagnoses and all orders for this visit:    1. Lumbar radiculopathy (Primary)  -     XR Spine Lumbar 2 or 3 View; Future    2. Facet arthropathy, lumbar  -     XR Spine Lumbar 2 or 3 View; Future             Patient Instructions/Recommendations:    Follow-up in 3 months with x-ray      Calderon Whittaker MD  04/11/24  11:44 EDT

## 2024-04-22 DIAGNOSIS — G89.29 CHRONIC BILATERAL LOW BACK PAIN WITH LEFT-SIDED SCIATICA: ICD-10-CM

## 2024-04-22 DIAGNOSIS — M54.42 CHRONIC BILATERAL LOW BACK PAIN WITH LEFT-SIDED SCIATICA: ICD-10-CM

## 2024-04-22 DIAGNOSIS — M43.16 SPONDYLOLISTHESIS OF LUMBAR REGION: ICD-10-CM

## 2024-04-22 RX ORDER — GABAPENTIN 300 MG/1
300 CAPSULE ORAL 3 TIMES DAILY
Qty: 180 CAPSULE | Refills: 2 | Status: SHIPPED | OUTPATIENT
Start: 2024-04-22

## 2024-04-25 ENCOUNTER — TELEPHONE (OUTPATIENT)
Dept: NEUROSURGERY | Facility: CLINIC | Age: 69
End: 2024-04-25
Payer: MEDICARE

## 2024-04-25 NOTE — TELEPHONE ENCOUNTER
Patient called asked questions about Gabapentin. I let her know it was sent to [facility] pharmacy. She will contact [facility].

## 2024-05-20 ENCOUNTER — OFFICE VISIT (OUTPATIENT)
Dept: FAMILY MEDICINE CLINIC | Facility: CLINIC | Age: 69
End: 2024-05-20
Payer: MEDICARE

## 2024-05-20 VITALS
DIASTOLIC BLOOD PRESSURE: 70 MMHG | HEART RATE: 86 BPM | OXYGEN SATURATION: 97 % | HEIGHT: 64 IN | TEMPERATURE: 98 F | RESPIRATION RATE: 16 BRPM | WEIGHT: 187 LBS | SYSTOLIC BLOOD PRESSURE: 118 MMHG | BODY MASS INDEX: 31.92 KG/M2

## 2024-05-20 DIAGNOSIS — E11.9 CONTROLLED TYPE 2 DIABETES MELLITUS WITHOUT COMPLICATION, WITHOUT LONG-TERM CURRENT USE OF INSULIN: Primary | Chronic | ICD-10-CM

## 2024-05-20 PROCEDURE — 1160F RVW MEDS BY RX/DR IN RCRD: CPT | Performed by: NURSE PRACTITIONER

## 2024-05-20 PROCEDURE — 1159F MED LIST DOCD IN RCRD: CPT | Performed by: NURSE PRACTITIONER

## 2024-05-20 PROCEDURE — 1125F AMNT PAIN NOTED PAIN PRSNT: CPT | Performed by: NURSE PRACTITIONER

## 2024-05-20 PROCEDURE — 3078F DIAST BP <80 MM HG: CPT | Performed by: NURSE PRACTITIONER

## 2024-05-20 PROCEDURE — 3044F HG A1C LEVEL LT 7.0%: CPT | Performed by: NURSE PRACTITIONER

## 2024-05-20 PROCEDURE — 3074F SYST BP LT 130 MM HG: CPT | Performed by: NURSE PRACTITIONER

## 2024-05-20 PROCEDURE — 99213 OFFICE O/P EST LOW 20 MIN: CPT | Performed by: NURSE PRACTITIONER

## 2024-05-20 NOTE — PROGRESS NOTES
Subjective     Summer Jefferson is a 68 y.o.. female.     Patient here today for follow up on diabetes. Patient stating she had labs done before coming into office but no labs found. Patient stating she eating healthy, eating more often now, drinking diet sodas through out day, drinks water 32-48 oz a day, and doing Physical therapy twice a week for her post back surgery therapy. Patient stating she is feeling grogging after eating. Patient stating she is taking 12.5 mg Jardiance and 7 mg Rybelsus at this time.           The following portions of the patient's history were reviewed and updated as appropriate: allergies, current medications, past family history, past medical history, past social history, past surgical history and problem list.    Past Medical History:   Diagnosis Date    Anxiety and depression 03/15/2022    Backache     Diabetes mellitus type 2, controlled     HSV (herpes simplex virus) infection 03/15/2022    Hypercholesterolemia     Hypertension     Hypothyroidism     Osteoarthritis of multiple joints     Spinal stenosis     LUMBAR       Past Surgical History:   Procedure Laterality Date    ANKLE ARTHROSCOPY Right     1/2013 & 10/2013    COLONOSCOPY      7/27/2007    EPIDURAL BLOCK      X5    LAPAROSCOPIC GASTRIC BANDING  08/01/2007    LUMBAR FUSION Left 12/11/2023    Procedure: PRONE OBLIQUE LATERAL LUMBAR TWO TO THREE, FOUR TO FIVE INTERBODY FUSION WITH ANTERIOR PLATING AND NEUROMONITORING;  Surgeon: Calderon Whittaker MD;  Location: [facility];  Service: Neurosurgery;  Laterality: Left;    LUMBAR FUSION N/A 12/11/2023    Procedure: LUMBAR FUSION MINIMALLY INVASIVE NAVIGATION;  Surgeon: Calderon Whittaker MD;  Location: [facility] OR;  Service: Neurosurgery;  Laterality: N/A;    LUMBAR FUSION N/A 2/26/2024    Procedure: LUMBAR TWO THREE, THREE TO FOUR, FOUR TO FIVE FUSION MINIMALLY INVASIVE NAVIGATION;  Surgeon: Calderon Whittaker MD;  Location: [facility] OR;  Service: Neurosurgery;  Laterality:  "N/A;    ROTATOR CUFF REPAIR Right     5/2014    TOTAL KNEE ARTHROPLASTY Right     12/2014       Review of Systems   Constitutional: Negative.    Respiratory: Negative.     Cardiovascular: Negative.        No Known Allergies    Objective     Vitals:    05/20/24 0944   BP: 118/70   Pulse: 86   Resp: 16   Temp: 98 °F (36.7 °C)   SpO2: 97%   Weight: 84.8 kg (187 lb)   Height: 161.3 cm (63.5\")     Body mass index is 32.6 kg/m².    Physical Exam  Vitals reviewed.   HENT:      Head: Normocephalic.   Eyes:      Pupils: Pupils are equal, round, and reactive to light.   Neck:      Vascular: No carotid bruit.   Cardiovascular:      Rate and Rhythm: Normal rate and regular rhythm.      Pulses: Normal pulses.   Pulmonary:      Effort: Pulmonary effort is normal.      Breath sounds: Normal breath sounds.   Musculoskeletal:         General: Normal range of motion.      Right lower leg: No edema.      Left lower leg: No edema.   Skin:     General: Skin is warm and dry.   Neurological:      Mental Status: She is alert and oriented to person, place, and time.   Psychiatric:         Behavior: Behavior normal.           Current Outpatient Medications:     amLODIPine (NORVASC) 5 MG tablet, Take 1 tablet by mouth Daily., Disp: 90 tablet, Rfl: 0    celecoxib (CeleBREX) 200 MG capsule, Take 1 capsule by mouth 2 (Two) Times a Day., Disp: 60 capsule, Rfl: 2    empagliflozin (Jardiance) 10 MG tablet tablet, Take 12.5 mg by mouth Daily., Disp: , Rfl:     famciclovir (FAMVIR) 500 MG tablet, Take 1 tablet by mouth Every 12 (Twelve) Hours As Needed (as directed)., Disp: , Rfl:     FLUoxetine (PROzac) 20 MG capsule, Take 1 capsule by mouth Daily., Disp: 90 capsule, Rfl: 3    gabapentin (NEURONTIN) 300 MG capsule, Take 1 capsule by mouth 3 (Three) Times a Day., Disp: 180 capsule, Rfl: 2    hydroCHLOROthiazide (HYDRODIURIL) 12.5 MG tablet, Take 1 tablet by mouth Daily., Disp: 90 tablet, Rfl: 0    levothyroxine (SYNTHROID, LEVOTHROID) 50 MCG tablet, " Take 1 tablet by mouth Daily., Disp: 90 tablet, Rfl: 3    lidocaine (LIDODERM) 5 %, Place 1 patch on the skin as directed by provider Daily. Remove & Discard patch within 12 hours; use as needed for back pain, Disp: 12 each, Rfl: 3    lisinopril (PRINIVIL,ZESTRIL) 10 MG tablet, Take 1 tablet by mouth Daily., Disp: 90 tablet, Rfl: 0    rosuvastatin (CRESTOR) 10 MG tablet, Take 1 tablet by mouth Daily., Disp: 90 tablet, Rfl: 3    Rybelsus 7 MG tablet, TAKE 1 TABLET EVERY DAY, Disp: 30 tablet, Rfl: 11    Diclofenac Sodium (Voltaren Arthritis Pain) 1 % gel gel, Apply 4 g topically to the appropriate area as directed 4 (Four) Times a Day As Needed (Rub over low back for achy pain). (Patient not taking: Reported on 3/13/2024), Disp: 350 g, Rfl: 1    glucose blood (FREESTYLE TEST STRIPS) test strip, One touch ultra strips; check blood sugar once a day as needed (Patient not taking: Reported on 4/11/2024), Disp: 50 each, Rfl: 1    methocarbamol (ROBAXIN) 500 MG tablet, Take 1 tablet by mouth 4 (Four) Times a Day. (Patient not taking: Reported on 3/13/2024), Disp: 90 tablet, Rfl: 2      Diagnoses and all orders for this visit:    1. Controlled type 2 diabetes mellitus without complication, without long-term current use of insulin (Primary)  -     Comprehensive metabolic panel  -     Hemoglobin A1c        Patient Instructions   Eating a heart healthy low fat low sugar diet  Drink plenty of water with goal 64 oz a day  Will call Patient once labs available

## 2024-05-20 NOTE — PATIENT INSTRUCTIONS
Eating a heart healthy low fat low sugar diet  Drink plenty of water with goal 64 oz a day  Will call Patient once labs available

## 2024-05-21 DIAGNOSIS — E11.9 CONTROLLED TYPE 2 DIABETES MELLITUS WITHOUT COMPLICATION, WITHOUT LONG-TERM CURRENT USE OF INSULIN: Chronic | ICD-10-CM

## 2024-05-21 DIAGNOSIS — E11.65 CONTROLLED TYPE 2 DIABETES MELLITUS WITH HYPERGLYCEMIA, WITHOUT LONG-TERM CURRENT USE OF INSULIN: Primary | Chronic | ICD-10-CM

## 2024-05-21 LAB
ALBUMIN SERPL-MCNC: 4.6 G/DL (ref 3.9–4.9)
ALBUMIN/GLOB SERPL: 1.7 {RATIO} (ref 1.2–2.2)
ALP SERPL-CCNC: 91 IU/L (ref 44–121)
ALT SERPL-CCNC: 21 IU/L (ref 0–32)
AST SERPL-CCNC: 23 IU/L (ref 0–40)
BILIRUB SERPL-MCNC: 0.4 MG/DL (ref 0–1.2)
BUN SERPL-MCNC: 14 MG/DL (ref 8–27)
BUN/CREAT SERPL: 16 (ref 12–28)
CALCIUM SERPL-MCNC: 10 MG/DL (ref 8.7–10.3)
CHLORIDE SERPL-SCNC: 102 MMOL/L (ref 96–106)
CO2 SERPL-SCNC: 25 MMOL/L (ref 20–29)
CREAT SERPL-MCNC: 0.85 MG/DL (ref 0.57–1)
EGFRCR SERPLBLD CKD-EPI 2021: 74 ML/MIN/1.73
GLOBULIN SER CALC-MCNC: 2.7 G/DL (ref 1.5–4.5)
GLUCOSE SERPL-MCNC: 163 MG/DL (ref 70–99)
HBA1C MFR BLD: 6.9 % (ref 4.8–5.6)
POTASSIUM SERPL-SCNC: 4.7 MMOL/L (ref 3.5–5.2)
PROT SERPL-MCNC: 7.3 G/DL (ref 6–8.5)
SODIUM SERPL-SCNC: 141 MMOL/L (ref 134–144)

## 2024-05-21 RX ORDER — ORAL SEMAGLUTIDE 14 MG/1
14 TABLET ORAL DAILY
Qty: 30 TABLET | Refills: 2 | Status: SHIPPED | OUTPATIENT
Start: 2024-05-21

## 2024-06-26 DIAGNOSIS — I10 PRIMARY HYPERTENSION: Chronic | ICD-10-CM

## 2024-06-26 RX ORDER — AMLODIPINE BESYLATE 5 MG/1
5 TABLET ORAL DAILY
Qty: 90 TABLET | Refills: 3 | Status: SHIPPED | OUTPATIENT
Start: 2024-06-26

## 2024-06-26 RX ORDER — LISINOPRIL 10 MG/1
10 TABLET ORAL DAILY
Qty: 90 TABLET | Refills: 3 | Status: SHIPPED | OUTPATIENT
Start: 2024-06-26

## 2024-07-09 ENCOUNTER — HOSPITAL ENCOUNTER (OUTPATIENT)
Dept: GENERAL RADIOLOGY | Facility: HOSPITAL | Age: 69
Discharge: HOME | End: 2024-07-09
Admitting: NEUROLOGICAL SURGERY
Payer: MEDICARE

## 2024-07-09 DIAGNOSIS — M47.816 FACET ARTHROPATHY, LUMBAR: ICD-10-CM

## 2024-07-09 DIAGNOSIS — M54.16 LUMBAR RADICULOPATHY: ICD-10-CM

## 2024-07-09 PROCEDURE — 72100 X-RAY EXAM L-S SPINE 2/3 VWS: CPT

## 2024-07-09 NOTE — PROGRESS NOTES
Patient ID: Summer Jefferson is a 68 y.o. female is here today for follow-up for lumbar radiculopathy.    Imaging: XR of the lumbar spine performed on 07/09/2024    Treatment: Physical therapy completed over the last 3 months    Subjective     The patient is here in regards to   Chief Complaint   Patient presents with    Back Pain    Follow-up       History of Present Illness  Jeannie continues to do well.  She does have some superficial nerve pain around her incision on the right side but that is well-managed with a lidocaine patch alone.      While in the room and during my examination of the patient I wore a mask and eye protection.  I washed my hands before and after this patient encounter.  The patient was also wearing a mask.    The following portions of the patient's history were reviewed and updated as appropriate: allergies, current medications, past family history, past medical history, past social history, past surgical history and problem list.    Review of Systems   Constitutional:  Negative for fever.   Musculoskeletal:  Positive for back pain. Negative for gait problem.   Neurological:  Positive for weakness. Negative for dizziness, light-headedness, numbness and headaches.        Past Medical History:   Diagnosis Date    Anxiety and depression 03/15/2022    Backache     Diabetes mellitus type 2, controlled     HSV (herpes simplex virus) infection 03/15/2022    Hypercholesterolemia     Hypertension     Hypothyroidism     Osteoarthritis of multiple joints     Spinal stenosis     LUMBAR       No Known Allergies    Family History   Problem Relation Age of Onset    Thyroid disease Mother     Diabetes Mother     Hypertension Mother     Heart disease Mother     Vision loss Father     Malig Hyperthermia Neg Hx        Social History     Socioeconomic History    Marital status:    Tobacco Use    Smoking status: Former     Current packs/day: 0.00     Average packs/day: 1 pack/day for 10.0 years  (10.0 ttl pk-yrs)     Types: Cigarettes     Start date: 1983     Quit date: 1993     Years since quittin.6     Passive exposure: Never    Smokeless tobacco: Never    Tobacco comments:     quit 15 yrs ago   Vaping Use    Vaping status: Never Used   Substance and Sexual Activity    Alcohol use: Yes     Comment: 1-2 a month    Drug use: Never    Sexual activity: Defer       Past Surgical History:   Procedure Laterality Date    ANKLE ARTHROSCOPY Right     2013 & 10/2013    COLONOSCOPY      2007    EPIDURAL BLOCK      X5    LAPAROSCOPIC GASTRIC BANDING  2007    LUMBAR FUSION Left 2023    Procedure: PRONE OBLIQUE LATERAL LUMBAR TWO TO THREE, FOUR TO FIVE INTERBODY FUSION WITH ANTERIOR PLATING AND NEUROMONITORING;  Surgeon: Calderon Whittaker MD;  Location: [facility];  Service: Neurosurgery;  Laterality: Left;    LUMBAR FUSION N/A 2023    Procedure: LUMBAR FUSION MINIMALLY INVASIVE NAVIGATION;  Surgeon: Calderon Whittaker MD;  Location: [facility] OR;  Service: Neurosurgery;  Laterality: N/A;    LUMBAR FUSION N/A 2024    Procedure: LUMBAR TWO THREE, THREE TO FOUR, FOUR TO FIVE FUSION MINIMALLY INVASIVE NAVIGATION;  Surgeon: Calderon Whittaker MD;  Location: [facility];  Service: Neurosurgery;  Laterality: N/A;    ROTATOR CUFF REPAIR Right     2014    TOTAL KNEE ARTHROPLASTY Right     2014         Objective     Vitals:    07/10/24 1121   BP: 126/78   Pulse: 76   Resp: 16   SpO2: 96%     Body mass index is 32.95 kg/m².    Physical Exam  Constitutional:       Appearance: Normal appearance.   HENT:      Head: Normocephalic and atraumatic.   Eyes:      Extraocular Movements: Extraocular movements intact.      Conjunctiva/sclera: Conjunctivae normal.      Pupils: Pupils are equal, round, and reactive to light.   Cardiovascular:      Rate and Rhythm: Normal rate and regular rhythm.      Pulses: Normal pulses.   Pulmonary:      Breath sounds: Normal breath sounds.   Abdominal:       Palpations: Abdomen is soft.   Musculoskeletal:         General: Normal range of motion.      Cervical back: Normal range of motion and neck supple.   Skin:     General: Skin is warm and dry.   Neurological:      Mental Status: She is alert and oriented to person, place, and time.      Cranial Nerves: Cranial nerves 2-12 are intact.      Motor: Motor function is intact. No weakness or atrophy.      Coordination: Coordination is intact. Romberg sign negative. Romberg Test normal.      Gait: Gait is intact. Gait normal.      Deep Tendon Reflexes: Reflexes are normal and symmetric.      Reflex Scores:       Tricep reflexes are 2+ on the right side and 2+ on the left side.       Bicep reflexes are 2+ on the right side and 2+ on the left side.       Brachioradialis reflexes are 2+ on the right side and 2+ on the left side.       Patellar reflexes are 2+ on the right side and 2+ on the left side.       Achilles reflexes are 2+ on the right side and 2+ on the left side.  Psychiatric:         Speech: Speech normal.         Neurologic Exam     Mental Status   Oriented to person, place, and time.   Attention: normal. Concentration: normal.   Speech: speech is normal   Level of consciousness: alert    Cranial Nerves   Cranial nerves II through XII intact.     CN III, IV, VI   Pupils are equal, round, and reactive to light.    Motor Exam   Muscle bulk: normal  Overall muscle tone: normal    Strength   Strength 5/5 except as noted.     Sensory Exam   Light touch normal.     Gait, Coordination, and Reflexes     Gait  Gait: normal    Coordination   Romberg: negative    Reflexes   Reflexes 2+ except as noted.   Right brachioradialis: 2+  Left brachioradialis: 2+  Right biceps: 2+  Left biceps: 2+  Right triceps: 2+  Left triceps: 2+  Right patellar: 2+  Left patellar: 2+  Right achilles: 2+  Left achilles: 2+      Assessment & Plan   Independent Review of Radiographic Studies:      I personally reviewed the images from the  following studies.    XR: XR of the lumbar spine was reviewed and shows adequate and stable instrumentation and alignment    Assessment/Plan: Doing well overall.  Her symptoms are managed almost completely with a lidocaine patch.  Will represcribe her some more with refills.    Medical Decision Making:      Lidoderm patch         Diagnoses and all orders for this visit:    1. Spondylolisthesis, lumbar region (Primary)    2. Chronic bilateral low back pain with left-sided sciatica  -     lidocaine (LIDODERM) 5 %; Place 1 patch on the skin as directed by provider Daily. Remove & Discard patch within 12 hours; use as needed for back pain  Dispense: 60 each; Refill: 5             Patient Instructions/Recommendations:    Follow-up as needed      Calderon Whittaker MD  07/10/24  11:37 EDT

## 2024-07-10 ENCOUNTER — OFFICE VISIT (OUTPATIENT)
Dept: NEUROSURGERY | Facility: CLINIC | Age: 69
End: 2024-07-10
Payer: MEDICARE

## 2024-07-10 VITALS
HEART RATE: 76 BPM | DIASTOLIC BLOOD PRESSURE: 78 MMHG | BODY MASS INDEX: 32.27 KG/M2 | RESPIRATION RATE: 16 BRPM | WEIGHT: 189 LBS | HEIGHT: 64 IN | OXYGEN SATURATION: 96 % | SYSTOLIC BLOOD PRESSURE: 126 MMHG

## 2024-07-10 DIAGNOSIS — M43.16 SPONDYLOLISTHESIS, LUMBAR REGION: Primary | ICD-10-CM

## 2024-07-10 DIAGNOSIS — M54.42 CHRONIC BILATERAL LOW BACK PAIN WITH LEFT-SIDED SCIATICA: ICD-10-CM

## 2024-07-10 DIAGNOSIS — G89.29 CHRONIC BILATERAL LOW BACK PAIN WITH LEFT-SIDED SCIATICA: ICD-10-CM

## 2024-07-10 RX ORDER — LIDOCAINE 50 MG/G
1 PATCH TOPICAL EVERY 24 HOURS
Qty: 60 EACH | Refills: 5 | Status: SHIPPED | OUTPATIENT
Start: 2024-07-10

## 2024-08-02 DIAGNOSIS — E11.65 CONTROLLED TYPE 2 DIABETES MELLITUS WITH HYPERGLYCEMIA, WITHOUT LONG-TERM CURRENT USE OF INSULIN: Chronic | ICD-10-CM

## 2024-08-02 RX ORDER — ORAL SEMAGLUTIDE 14 MG/1
TABLET ORAL
Qty: 90 TABLET | Refills: 3 | Status: SHIPPED | OUTPATIENT
Start: 2024-08-02

## 2024-08-14 DIAGNOSIS — E66.3 OVERWEIGHT (BMI 25.0-29.9): ICD-10-CM

## 2024-08-14 DIAGNOSIS — I10 PRIMARY HYPERTENSION: Primary | Chronic | ICD-10-CM

## 2024-08-14 DIAGNOSIS — E78.00 HYPERCHOLESTEROLEMIA: Chronic | ICD-10-CM

## 2024-08-14 DIAGNOSIS — E11.65 CONTROLLED TYPE 2 DIABETES MELLITUS WITH HYPERGLYCEMIA, WITHOUT LONG-TERM CURRENT USE OF INSULIN: ICD-10-CM

## 2024-08-14 DIAGNOSIS — E11.9 CONTROLLED TYPE 2 DIABETES MELLITUS WITHOUT COMPLICATION, WITHOUT LONG-TERM CURRENT USE OF INSULIN: ICD-10-CM

## 2024-08-14 DIAGNOSIS — E66.09 CLASS 1 OBESITY DUE TO EXCESS CALORIES WITH SERIOUS COMORBIDITY AND BODY MASS INDEX (BMI) OF 30.0 TO 30.9 IN ADULT: ICD-10-CM

## 2024-08-15 LAB
ALBUMIN SERPL-MCNC: 4.8 G/DL (ref 3.9–4.9)
ALP SERPL-CCNC: 83 IU/L (ref 44–121)
ALT SERPL-CCNC: 21 IU/L (ref 0–32)
AST SERPL-CCNC: 24 IU/L (ref 0–40)
BASOPHILS # BLD AUTO: 0 X10E3/UL (ref 0–0.2)
BASOPHILS NFR BLD AUTO: 0 %
BILIRUB SERPL-MCNC: 0.4 MG/DL (ref 0–1.2)
BUN SERPL-MCNC: 17 MG/DL (ref 8–27)
BUN/CREAT SERPL: 20 (ref 12–28)
CALCIUM SERPL-MCNC: 10 MG/DL (ref 8.7–10.3)
CHLORIDE SERPL-SCNC: 106 MMOL/L (ref 96–106)
CHOLEST SERPL-MCNC: 159 MG/DL (ref 100–199)
CO2 SERPL-SCNC: 24 MMOL/L (ref 20–29)
CREAT SERPL-MCNC: 0.83 MG/DL (ref 0.57–1)
CREAT UR-MCNC: NORMAL MG/DL
EGFRCR SERPLBLD CKD-EPI 2021: 76 ML/MIN/1.73
EOSINOPHIL # BLD AUTO: 0.3 X10E3/UL (ref 0–0.4)
EOSINOPHIL NFR BLD AUTO: 4 %
ERYTHROCYTE [DISTWIDTH] IN BLOOD BY AUTOMATED COUNT: 14.3 % (ref 11.7–15.4)
GLOBULIN SER CALC-MCNC: 2.6 G/DL (ref 1.5–4.5)
GLUCOSE SERPL-MCNC: 137 MG/DL (ref 70–99)
GLUCOSE UR QL STRIP: NORMAL
HBA1C MFR BLD: 6.7 % (ref 4.8–5.6)
HCT VFR BLD AUTO: 47.9 % (ref 34–46.6)
HCV IGG SERPL QL IA: NON REACTIVE
HDLC SERPL-MCNC: 59 MG/DL
HGB BLD-MCNC: 15.9 G/DL (ref 11.1–15.9)
IMM GRANULOCYTES # BLD AUTO: 0 X10E3/UL (ref 0–0.1)
IMM GRANULOCYTES NFR BLD AUTO: 0 %
KETONES UR QL STRIP: NORMAL
LDLC SERPL CALC-MCNC: 79 MG/DL (ref 0–99)
LDLC/HDLC SERPL: 1.3 RATIO (ref 0–3.2)
LYMPHOCYTES # BLD AUTO: 0.9 X10E3/UL (ref 0.7–3.1)
LYMPHOCYTES NFR BLD AUTO: 13 %
MCH RBC QN AUTO: 28.4 PG (ref 26.6–33)
MCHC RBC AUTO-ENTMCNC: 33.2 G/DL (ref 31.5–35.7)
MCV RBC AUTO: 86 FL (ref 79–97)
MICROALBUMIN UR-MCNC: NORMAL
MONOCYTES # BLD AUTO: 0.6 X10E3/UL (ref 0.1–0.9)
MONOCYTES NFR BLD AUTO: 8 %
NEUTROPHILS # BLD AUTO: 5.7 X10E3/UL (ref 1.4–7)
NEUTROPHILS NFR BLD AUTO: 75 %
PH UR STRIP: NORMAL [PH]
PLATELET # BLD AUTO: 272 X10E3/UL (ref 150–450)
POTASSIUM SERPL-SCNC: 4.6 MMOL/L (ref 3.5–5.2)
PROT SERPL-MCNC: 7.4 G/DL (ref 6–8.5)
PROT UR QL STRIP: NORMAL
RBC # BLD AUTO: 5.59 X10E6/UL (ref 3.77–5.28)
SODIUM SERPL-SCNC: 144 MMOL/L (ref 134–144)
SP GR UR STRIP: NORMAL
TRIGL SERPL-MCNC: 121 MG/DL (ref 0–149)
VLDLC SERPL CALC-MCNC: 21 MG/DL (ref 5–40)
WBC # BLD AUTO: 7.5 X10E3/UL (ref 3.4–10.8)

## 2024-08-19 ENCOUNTER — OFFICE VISIT (OUTPATIENT)
Dept: FAMILY MEDICINE CLINIC | Facility: CLINIC | Age: 69
End: 2024-08-19
Payer: MEDICARE

## 2024-08-19 VITALS
HEART RATE: 72 BPM | RESPIRATION RATE: 14 BRPM | BODY MASS INDEX: 32.68 KG/M2 | HEIGHT: 64 IN | SYSTOLIC BLOOD PRESSURE: 128 MMHG | DIASTOLIC BLOOD PRESSURE: 72 MMHG | WEIGHT: 191.4 LBS | OXYGEN SATURATION: 94 %

## 2024-08-19 DIAGNOSIS — E03.9 HYPOTHYROIDISM, ADULT: Chronic | ICD-10-CM

## 2024-08-19 DIAGNOSIS — F32.A ANXIETY AND DEPRESSION: ICD-10-CM

## 2024-08-19 DIAGNOSIS — E11.65 CONTROLLED TYPE 2 DIABETES MELLITUS WITH HYPERGLYCEMIA, WITHOUT LONG-TERM CURRENT USE OF INSULIN: Primary | Chronic | ICD-10-CM

## 2024-08-19 DIAGNOSIS — Z13.820 ENCOUNTER FOR OSTEOPOROSIS SCREENING IN ASYMPTOMATIC POSTMENOPAUSAL PATIENT: ICD-10-CM

## 2024-08-19 DIAGNOSIS — I10 PRIMARY HYPERTENSION: Chronic | ICD-10-CM

## 2024-08-19 DIAGNOSIS — F41.9 ANXIETY AND DEPRESSION: ICD-10-CM

## 2024-08-19 DIAGNOSIS — E78.00 HYPERCHOLESTEROLEMIA: Chronic | ICD-10-CM

## 2024-08-19 DIAGNOSIS — Z78.0 ENCOUNTER FOR OSTEOPOROSIS SCREENING IN ASYMPTOMATIC POSTMENOPAUSAL PATIENT: ICD-10-CM

## 2024-08-19 PROCEDURE — 3078F DIAST BP <80 MM HG: CPT | Performed by: NURSE PRACTITIONER

## 2024-08-19 PROCEDURE — 3074F SYST BP LT 130 MM HG: CPT | Performed by: NURSE PRACTITIONER

## 2024-08-19 PROCEDURE — 1159F MED LIST DOCD IN RCRD: CPT | Performed by: NURSE PRACTITIONER

## 2024-08-19 PROCEDURE — 3044F HG A1C LEVEL LT 7.0%: CPT | Performed by: NURSE PRACTITIONER

## 2024-08-19 PROCEDURE — 1160F RVW MEDS BY RX/DR IN RCRD: CPT | Performed by: NURSE PRACTITIONER

## 2024-08-19 PROCEDURE — 99214 OFFICE O/P EST MOD 30 MIN: CPT | Performed by: NURSE PRACTITIONER

## 2024-08-19 PROCEDURE — 1126F AMNT PAIN NOTED NONE PRSNT: CPT | Performed by: NURSE PRACTITIONER

## 2024-08-19 RX ORDER — FLUOXETINE HYDROCHLORIDE 40 MG/1
40 CAPSULE ORAL DAILY
Start: 2024-08-19

## 2024-08-19 NOTE — PATIENT INSTRUCTIONS
Diabetes: HgA1c 6.7, increasing Jardiance from 10 to 25 mg daily, continue Rybelsus 14 mg 1 tab daily; recommend working on eating a heart healthy low fat low sugar diet, drink plenty of water with goal 64 oz a day and start exercise routine-low impact 2-3 times a week, for more than 30 minutes at a time    Hypertension: stable, continue amlodipine 5 mg daily, lisinopril 10 mg daily, and HCTZ 12.5 mg daily    Hyperlipidemia: stable, continue rosuvastatin 10 mg daily    Anxiety and depression: increasing fluoxetine from 20 to 40 mg daily; recommend getting regular exercise, getting outside, getting plenty of sleep, eating a heart healthy diet, joining [employer] group/volunteering and finding activities that you enjoy doing and making time to do them. National Suicide Prevention Lifeline 988. This is free, 24-hour help.    Hypothyroidism: stable, continue levothyroxine 50 mcg daily.

## 2024-08-19 NOTE — PROGRESS NOTES
Subjective     Summer Jefferson is a 69 y.o.. female.     Patient here today for follow up on diabetes, hypertension, hyperlipidemia, anxiety and depression.     Patient stating she is not eating healthy, is drinking water through out the day, and not exercising. Patient stating her back is better, denies having pain, and is released by the neurosurgeon.     Patient stating she is having issues with being retired, feeling more down/depressed, and not getting out much at this time.           The following portions of the patient's history were reviewed and updated as appropriate: allergies, current medications, past family history, past medical history, past social history, past surgical history and problem list.    Past Medical History:   Diagnosis Date    Anxiety and depression 03/15/2022    Backache     Diabetes mellitus type 2, controlled     HSV (herpes simplex virus) infection 03/15/2022    Hypercholesterolemia     Hypertension     Hypothyroidism     Osteoarthritis of multiple joints     Spinal stenosis     LUMBAR       Past Surgical History:   Procedure Laterality Date    ANKLE ARTHROSCOPY Right     1/2013 & 10/2013    COLONOSCOPY      7/27/2007    EPIDURAL BLOCK      X5    LAPAROSCOPIC GASTRIC BANDING  08/01/2007    LUMBAR FUSION Left 12/11/2023    Procedure: PRONE OBLIQUE LATERAL LUMBAR TWO TO THREE, FOUR TO FIVE INTERBODY FUSION WITH ANTERIOR PLATING AND NEUROMONITORING;  Surgeon: Calderon Whittaker MD;  Location: [facility] OR;  Service: Neurosurgery;  Laterality: Left;    LUMBAR FUSION N/A 12/11/2023    Procedure: LUMBAR FUSION MINIMALLY INVASIVE NAVIGATION;  Surgeon: Calderon Whittaker MD;  Location: [facility] OR;  Service: Neurosurgery;  Laterality: N/A;    LUMBAR FUSION N/A 2/26/2024    Procedure: LUMBAR TWO THREE, THREE TO FOUR, FOUR TO FIVE FUSION MINIMALLY INVASIVE NAVIGATION;  Surgeon: Calderon Whittaker MD;  Location: [facility] OR;  Service: Neurosurgery;  Laterality: N/A;    ROTATOR CUFF REPAIR Right   "   5/2014    TOTAL KNEE ARTHROPLASTY Right     12/2014       Review of Systems   HENT: Negative.     Respiratory: Negative.     Cardiovascular: Negative.    Gastrointestinal:  Negative for abdominal pain, diarrhea, nausea and vomiting.   Psychiatric/Behavioral:  Positive for dysphoric mood. Negative for self-injury, sleep disturbance and suicidal ideas. The patient is not nervous/anxious.        No Known Allergies    Objective     Vitals:    08/19/24 0954   BP: 128/72   Pulse: 72   Resp: 14   SpO2: 94%   Weight: 86.8 kg (191 lb 6.4 oz)   Height: 161.3 cm (63.5\")     Body mass index is 33.37 kg/m².    Physical Exam  Vitals reviewed.   HENT:      Head: Normocephalic.   Eyes:      Pupils: Pupils are equal, round, and reactive to light.   Neck:      Vascular: No carotid bruit.   Cardiovascular:      Rate and Rhythm: Normal rate and regular rhythm.      Pulses: Normal pulses.   Pulmonary:      Effort: Pulmonary effort is normal.      Breath sounds: Normal breath sounds.   Musculoskeletal:         General: Normal range of motion.      Right lower leg: No edema.      Left lower leg: No edema.   Skin:     General: Skin is warm and dry.   Neurological:      Mental Status: She is alert and oriented to person, place, and time.      Cranial Nerves: No dysarthria or facial asymmetry.      Motor: Motor function is intact.      Coordination: Coordination is intact.      Gait: Gait is intact.   Psychiatric:         Mood and Affect: Mood is depressed. Affect is tearful.         Speech: Speech normal.         Behavior: Behavior normal.           Current Outpatient Medications:     amLODIPine (NORVASC) 5 MG tablet, TAKE 1 TABLET EVERY DAY, Disp: 90 tablet, Rfl: 3    celecoxib (CeleBREX) 200 MG capsule, Take 1 capsule by mouth 2 (Two) Times a Day., Disp: 60 capsule, Rfl: 2    Diclofenac Sodium (Voltaren Arthritis Pain) 1 % gel gel, Apply 4 g topically to the appropriate area as directed 4 (Four) Times a Day As Needed (Rub over low back " for achy pain)., Disp: 350 g, Rfl: 1    empagliflozin (Jardiance) 25 MG tablet tablet, Take 1 tablet by mouth Daily., Disp: 90 tablet, Rfl: 0    famciclovir (FAMVIR) 500 MG tablet, Take 1 tablet by mouth Every 12 (Twelve) Hours As Needed (as directed)., Disp: , Rfl:     glucose blood (FREESTYLE TEST STRIPS) test strip, One touch ultra strips; check blood sugar once a day as needed, Disp: 50 each, Rfl: 1    hydroCHLOROthiazide (HYDRODIURIL) 12.5 MG tablet, Take 1 tablet by mouth Daily., Disp: 90 tablet, Rfl: 0    levothyroxine (SYNTHROID, LEVOTHROID) 50 MCG tablet, Take 1 tablet by mouth Daily., Disp: 90 tablet, Rfl: 3    lidocaine (LIDODERM) 5 %, Place 1 patch on the skin as directed by provider Daily. Remove & Discard patch within 12 hours; use as needed for back pain, Disp: 60 each, Rfl: 5    lisinopril (PRINIVIL,ZESTRIL) 10 MG tablet, TAKE 1 TABLET EVERY DAY, Disp: 90 tablet, Rfl: 3    rosuvastatin (CRESTOR) 10 MG tablet, Take 1 tablet by mouth Daily., Disp: 90 tablet, Rfl: 3    Rybelsus 14 MG tablet, TAKE 1 TABLET EVERY DAY (DOSE CHANGE), Disp: 90 tablet, Rfl: 3    FLUoxetine (PROzac) 40 MG capsule, Take 1 capsule by mouth Daily., Disp: , Rfl:     Recent Results (from the past 2016 hour(s))   CBC & Differential    Collection Time: 08/14/24  9:45 AM    Specimen: Blood   Result Value Ref Range    WBC 7.5 3.4 - 10.8 x10E3/uL    RBC 5.59 (H) 3.77 - 5.28 x10E6/uL    Hemoglobin 15.9 11.1 - 15.9 g/dL    Hematocrit 47.9 (H) 34.0 - 46.6 %    MCV 86 79 - 97 fL    MCH 28.4 26.6 - 33.0 pg    MCHC 33.2 31.5 - 35.7 g/dL    RDW 14.3 11.7 - 15.4 %    Platelets 272 150 - 450 x10E3/uL    Neutrophil Rel % 75 Not Estab. %    Lymphocyte Rel % 13 Not Estab. %    Monocyte Rel % 8 Not Estab. %    Eosinophil Rel % 4 Not Estab. %    Basophil Rel % 0 Not Estab. %    Neutrophils Absolute 5.7 1.4 - 7.0 x10E3/uL    Lymphocytes Absolute 0.9 0.7 - 3.1 x10E3/uL    Monocytes Absolute 0.6 0.1 - 0.9 x10E3/uL    Eosinophils Absolute 0.3 0.0 - 0.4  x10E3/uL    Basophils Absolute 0.0 0.0 - 0.2 x10E3/uL    Immature Granulocyte Rel % 0 Not Estab. %    Immature Grans Absolute 0.0 0.0 - 0.1 x10E3/uL   Comprehensive Metabolic Panel    Collection Time: 08/14/24  9:45 AM    Specimen: Blood   Result Value Ref Range    Glucose 137 (H) 70 - 99 mg/dL    BUN 17 8 - 27 mg/dL    Creatinine 0.83 0.57 - 1.00 mg/dL    EGFR Result 76 >59 mL/min/1.73    BUN/Creatinine Ratio 20 12 - 28    Sodium 144 134 - 144 mmol/L    Potassium 4.6 3.5 - 5.2 mmol/L    Chloride 106 96 - 106 mmol/L    Total CO2 24 20 - 29 mmol/L    Calcium 10.0 8.7 - 10.3 mg/dL    Total Protein 7.4 6.0 - 8.5 g/dL    Albumin 4.8 3.9 - 4.9 g/dL    Globulin 2.6 1.5 - 4.5 g/dL    Total Bilirubin 0.4 0.0 - 1.2 mg/dL    Alkaline Phosphatase 83 44 - 121 IU/L    AST (SGOT) 24 0 - 40 IU/L    ALT (SGPT) 21 0 - 32 IU/L   Lipid Panel With LDL / HDL Ratio    Collection Time: 08/14/24  9:45 AM    Specimen: Blood   Result Value Ref Range    Total Cholesterol 159 100 - 199 mg/dL    Triglycerides 121 0 - 149 mg/dL    HDL Cholesterol 59 >39 mg/dL    VLDL Cholesterol Gee 21 5 - 40 mg/dL    LDL Chol Calc (NIH) 79 0 - 99 mg/dL    LDL/HDL RATIO 1.3 0.0 - 3.2 ratio   Hemoglobin A1c    Collection Time: 08/14/24  9:45 AM    Specimen: Blood   Result Value Ref Range    Hemoglobin A1C 6.7 (H) 4.8 - 5.6 %   Microalbumin / Creatinine Urine Ratio - Urine, Clean Catch    Collection Time: 08/14/24  9:45 AM    Specimen: Urine, Clean Catch   Result Value Ref Range    Creatinine, Urine CANCELED mg/dL    Microalbumin, Urine CANCELED    Urinalysis With Microscopic If Indicated (No Culture) - Urine, Clean Catch    Collection Time: 08/14/24  9:45 AM    Specimen: Urine, Clean Catch   Result Value Ref Range    Specific Gravity, UA CANCELED     pH, UA CANCELED     Protein CANCELED     Glucose, UA CANCELED     Ketones CANCELED    Hepatitis C Antibody    Collection Time: 08/14/24  9:45 AM    Specimen: Blood   Result Value Ref Range    Hep C Virus Ab Non  Reactive Non Reactive       XR Spine Lumbar 2 or 3 View  Narrative: XR SPINE LUMBAR 2 OR 3 VW-     DATE OF EXAM: 7/9/2024 10:19 AM     INDICATION: Follow-up postop fusion.     COMPARISON: Radiographs 4/10/2024, 3/12/2024, 2/27/2024, and 2/8/2024.     TECHNIQUE: 3 views of the lumbar spine were obtained.     FINDINGS:  Stable postoperative changes from posterior spinal fusion from L2-L4 and  anterior spinal fusion at L2-L3 and L4-L5 without evidence of hardware  complications. Stable postoperative grade 1 anterolisthesis of L4 on L5  and retrolisthesis of L3 on L4. No evidence of acute fracture of the  lumbar spine. Likely chronic/degenerative grade 1 retrolisthesis of L1  on L2 with degenerative disc base narrowing and endplate changes at  L1-L2. Mild SI joint DJD. Phleboliths in the pelvis. Partially imaged  gastric lap band device.     Impression:    1. Stable postoperative changes without evidence of hardware  complications.  2. Similar-appearing lumbar spondylosis and multilevel spondylosis  without radiographic evidence of acute fracture of the lumbar spine.     This report was finalized on 7/11/2024 9:41 AM by Fernando Sanders MD on  Workstation: [id number]           Diagnoses and all orders for this visit:    1. Controlled type 2 diabetes mellitus with hyperglycemia, without long-term current use of insulin (Primary)  -     Discontinue: empagliflozin (Jardiance) 25 MG tablet tablet; Take 1 tablet by mouth Daily.  -     empagliflozin (Jardiance) 25 MG tablet tablet; Take 1 tablet by mouth Daily.  Dispense: 90 tablet; Refill: 0  -     Comprehensive Metabolic Panel; Future  -     Hemoglobin A1c; Future  -     MicroAlbumin, Urine, Random - Urine, Clean Catch; Future    2. Primary hypertension    3. Hypercholesterolemia    4. Anxiety and depression  -     FLUoxetine (PROzac) 40 MG capsule; Take 1 capsule by mouth Daily.    5. Hypothyroidism, adult  -     TSH; Future  -     T4, Free; Future  -     T3, Free;  Future    6. Encounter for osteoporosis screening in asymptomatic postmenopausal patient  -     DEXA Bone Density Axial      Patient Instructions   Diabetes: HgA1c 6.7, increasing Jardiance from 10 to 25 mg daily, continue Rybelsus 14 mg 1 tab daily; recommend working on eating a heart healthy low fat low sugar diet, drink plenty of water with goal 64 oz a day and start exercise routine-low impact 2-3 times a week, for more than 30 minutes at a time    Hypertension: stable, continue amlodipine 5 mg daily, lisinopril 10 mg daily, and HCTZ 12.5 mg daily    Hyperlipidemia: stable, continue rosuvastatin 10 mg daily    Anxiety and depression: increasing fluoxetine from 20 to 40 mg daily; recommend getting regular exercise, getting outside, getting plenty of sleep, eating a heart healthy diet, joining [employer] group/volunteering and finding activities that you enjoy doing and making time to do them. National Suicide Prevention Lifeline 988. This is free, 24-hour help.    Hypothyroidism: stable, continue levothyroxine 50 mcg daily.     Return for fasting labs in  months, recheck in 3 months.

## 2024-09-03 ENCOUNTER — HOSPITAL ENCOUNTER (OUTPATIENT)
Dept: BONE DENSITY | Facility: HOSPITAL | Age: 69
Discharge: HOME | End: 2024-09-03
Admitting: NURSE PRACTITIONER
Payer: MEDICARE

## 2024-09-03 PROCEDURE — 77080 DXA BONE DENSITY AXIAL: CPT

## 2024-10-04 DIAGNOSIS — M19.09 PRIMARY OSTEOARTHRITIS OF OTHER SITE: ICD-10-CM

## 2024-10-04 RX ORDER — CELECOXIB 200 MG/1
200 CAPSULE ORAL 2 TIMES DAILY
Qty: 60 CAPSULE | Refills: 2 | Status: SHIPPED | OUTPATIENT
Start: 2024-10-04

## 2024-10-28 ENCOUNTER — TELEPHONE (OUTPATIENT)
Dept: NEUROSURGERY | Facility: CLINIC | Age: 69
End: 2024-10-28
Payer: MEDICARE

## 2024-10-28 DIAGNOSIS — M43.16 SPONDYLOLISTHESIS OF LUMBAR REGION: ICD-10-CM

## 2024-10-28 DIAGNOSIS — M54.16 LUMBAR RADICULOPATHY: Primary | ICD-10-CM

## 2024-10-28 NOTE — ADDENDUM NOTE
Addended by: SAILAJA FREY on: 10/28/2024 04:15 PM     Modules accepted: Orders     ED Attending Physician Note      Patient : Niurka Wilson Age: 75 year old Sex: female   MRN: [id number] Encounter Date: 2/8/2023      History         Chief Complaint   Patient presents with   • Abnormal Lab       As is customary patient seen and evaluated with resident please see their note for full history of present illness details.    HPI: 75 year old female presents with medical history of G-tube dementia diabetes trach vent hypothyroidism GERD anemia esophagitis presents from nursing home for hemoglobin of 6.7.  She is nonverbal unable to provide any history though we did get some from EMS.  Routine lab work noted the finding of hemoglobin of 6.7.  There were no reports of melena or hematochezia however nursing notes that whenever they turn the patient there was black stool stuck to her buttocks.    Not on File    There are no discharge medications for this patient.      There are no discharge medications for this patient.    Past medical past surgical history as above  Unknown family history social history and review of systems secondary to clinical condition    Review of Systems   Unable to perform ROS: Patient nonverbal       Physical Exam     ED Triage Vitals [02/08/23 0242]   ED Triage Vitals Group      Temp 98.1 °F (36.7 °C)      Heart Rate 92      Resp 20      /80      SpO2 98 %      EtCO2 mmHg       Height       Weight       Weight Scale Used       BMI (Calculated)       IBW/kg (Calculated)        Physical Exam  Vitals and nursing note reviewed.   Constitutional:       General: She is not in acute distress.     Appearance: Normal appearance. She is not toxic-appearing.   HENT:      Head: Normocephalic and atraumatic.      Right Ear: External ear normal.      Left Ear: External ear normal.      Nose: Nose normal.      Mouth/Throat:      Mouth: Mucous membranes are moist.      Pharynx: Oropharynx is clear.      Comments: Trach     Neck: No rigidity.   Eyes:      Extraocular Movements: Extraocular  movements intact.      Conjunctiva/sclera: Conjunctivae normal.   Cardiovascular:      Rate and Rhythm: Normal rate and regular rhythm.      Pulses: Normal pulses.      Heart sounds: Normal heart sounds.   Pulmonary:      Effort: Pulmonary effort is normal.      Breath sounds: Normal breath sounds.   Abdominal:      Palpations: Abdomen is soft.      Tenderness: There is no abdominal tenderness.      Comments: G-tube   Musculoskeletal:         General: No tenderness or deformity.   Lymphadenopathy:      Cervical: No cervical adenopathy.   Skin:     General: Skin is warm and dry.      Capillary Refill: Capillary refill takes less than 2 seconds.      Coloration: Skin is not pale.   Neurological:      Mental Status: Mental status is at baseline.         ED Course     EKG:     Lab Results     Results for orders placed or performed during the hospital encounter of 02/08/23   Comprehensive Metabolic Panel   Result Value Ref Range    Fasting Status      Sodium 141 135 - 145 mmol/L    Potassium 4.9 3.4 - 5.1 mmol/L    Chloride 105 97 - 110 mmol/L    Carbon Dioxide 37 (H) 21 - 32 mmol/L    Anion Gap 4 (L) 7 - 19 mmol/L    Glucose 326 (H) 70 - 99 mg/dL    BUN 58 (H) 6 - 20 mg/dL    Creatinine 1.03 (H) 0.51 - 0.95 mg/dL    Glomerular Filtration Rate 56 (L) >=60    BUN/ Creatinine Ratio 56 (H) 7 - 25    Calcium 9.2 8.4 - 10.2 mg/dL    Bilirubin, Total 0.2 0.2 - 1.0 mg/dL    GOT/AST 11 <=37 Units/L    GPT/ALT 9 <64 Units/L    Alkaline Phosphatase 128 (H) 45 - 117 Units/L    Albumin 2.0 (L) 3.6 - 5.1 g/dL    Protein, Total 7.3 6.4 - 8.2 g/dL    Globulin 5.3 (H) 2.0 - 4.0 g/dL    A/G Ratio 0.4 (L) 1.0 - 2.4   Prothrombin Time   Result Value Ref Range    Prothrombin Time 11.6 9.7 - 11.8 sec    INR 1.1     Partial Thromboplastin Time   Result Value Ref Range    PTT 30 22 - 30 sec   CBC with Automated Differential (performable only)   Result Value Ref Range    WBC 5.6 4.2 - 11.0 K/mcL    RBC 2.16 (L) 4.00 - 5.20 mil/mcL    HGB 6.5  (LL) 12.0 - 15.5 g/dL    HCT 22.2 (L) 36.0 - 46.5 %    .8 (H) 78.0 - 100.0 fl    MCH 30.1 26.0 - 34.0 pg    MCHC 29.3 (L) 32.0 - 36.5 g/dL    RDW-CV 14.7 11.0 - 15.0 %    RDW-SD 55.3 (H) 39.0 - 50.0 fL     140 - 450 K/mcL    NRBC 0 <=0 /100 WBC    Neutrophil, Percent 57 %    Lymphocytes, Percent 22 %    Mono, Percent 14 %    Eosinophils, Percent 6 %    Basophils, Percent 1 %    Immature Granulocytes 0 %    Absolute Neutrophils 3.2 1.8 - 7.7 K/mcL    Absolute Lymphocytes 1.2 1.0 - 4.0 K/mcL    Absolute Monocytes 0.8 0.3 - 0.9 K/mcL    Absolute Eosinophils  0.3 0.0 - 0.5 K/mcL    Absolute Basophils 0.1 0.0 - 0.3 K/mcL    Absolute Immmature Granulocytes 0.0 0.0 - 0.2 K/mcL   TYPE/SCREEN   Result Value Ref Range    ABO/RH(D) O Rh Positive     ANTIBODY SCREEN Negative     TYPE AND SCREEN EXPIRATION DATE 02/11/2023 23:59    Prepare Red Blood Cells: 1 Units   Result Value Ref Range    UNIT BLOOD TYPE O Pos     ISBT BLOOD TYPE 5100     BLOOD EXPIRATION DATE [id number]     UNIT NUMBER [id number]     DISPENSE STATUS Issued     PRODUCT ID Red Blood Cells     PRODUCT CODE [id number]     PRODUCT DESCRIPTION RBC AS-1 LR     CROSSMATCH RESULT Compatible     ISSUE DATE/TIME [id number]          Radiology Results     Imaging Results    None         ED Medication Orders (From admission, onward)    Ordered Start     Status Ordering Provider    02/08/23 0312 02/08/23 0315  pantoprazole (PROTONIX INJECT) injection 80 mg  ONCE         Last MAR action: Given STEWART MAZARIEGOS          ED Course as of 02/08/23 0446   Wed Feb 08, 2023   0353 CBC notable for anemia to 6.5, after chart merge shows most recent hemoglobin from [place] 5 days ago 7.5.  Type and screen sent, will transfuse 1 unit at this time. [SY]   0355 CMP notable for AKIKO with creatinine 1.03 from most recent baseline 0.9, per chart review from lab 6 days ago.  Notable for glucose 326, normal anion gap, normal bicarb, not consistent with DKA. [SY]   4273  GI consulted, admitted to hospitalist at this time. [SY]      ED Course User Index  [SY] Jeremias Haider MD       MDM: H&P as above, patient presents for presumed GI bleed has melena on exam of rectum by resident physician as well as dried black stool on buttocks whenever flipped abdomen seems to be benign vitals largely unremarkable with hemoglobin of 6.5 is not actively having black stool here in the department.  We will transfuse and admit patient for further work-up and management Protonix given.    .Total critical care time: 38 minutes  Due to a high probability of clinically significant, life-threatening deterioration, the patient required my highest level of preparedness to intervene emergently and I have personally spent the critical care time (excluding billable procedure time) directly and personally managing the patient.     Clinical Impression     ED Diagnosis   1. Upper GI bleed        2. Anemia, unspecified type            Disposition        Admit 2/8/2023  4:07 AM  Telemetry Bed?: Yes  Admitting Physician: KAREN ARCE [[id number]]  Is this a telephone or verbal order?: This is a telephone order from the admitting physician  Transferring Patient to? Only adjust for transfers [facility] [[id number]]      Skip Tang MD   2/8/2023 4:46 AM       Patient was seen in conjunction with the resident physician.  I performed an independent evaluation including history and physical with the resident physician or after the patient was seen by the resident physician. For further details about elements of the patient encounter including PMH, Social History, Family History, and ROS, see the resident note, and I agree with his/her documentation and care except as noted.                    Skip Tang MD  02/08/23 0459

## 2024-10-28 NOTE — TELEPHONE ENCOUNTER
Patient called office in regards to her sciatica pain returning. Sent message to  to see if he will order PT for patient.

## 2024-10-29 ENCOUNTER — HOSPITAL ENCOUNTER (OUTPATIENT)
Dept: GENERAL RADIOLOGY | Facility: HOSPITAL | Age: 69
Discharge: HOME | End: 2024-10-29
Admitting: NEUROLOGICAL SURGERY
Payer: MEDICARE

## 2024-10-29 DIAGNOSIS — M43.16 SPONDYLOLISTHESIS OF LUMBAR REGION: ICD-10-CM

## 2024-10-29 DIAGNOSIS — M54.16 LUMBAR RADICULOPATHY: ICD-10-CM

## 2024-10-29 PROCEDURE — 72100 X-RAY EXAM L-S SPINE 2/3 VWS: CPT

## 2024-11-01 ENCOUNTER — TELEPHONE (OUTPATIENT)
Dept: NEUROSURGERY | Facility: CLINIC | Age: 69
End: 2024-11-01
Payer: MEDICARE

## 2024-11-01 NOTE — TELEPHONE ENCOUNTER
----- Message from Calderon Whittaker sent at 11/1/2024 10:42 AM EDT -----  Please schedule her for a clinic visit next week

## 2024-11-05 ENCOUNTER — HOSPITAL ENCOUNTER (OUTPATIENT)
Facility: HOSPITAL | Age: 69
Discharge: HOME | End: 2024-11-05
Attending: EMERGENCY MEDICINE | Admitting: EMERGENCY MEDICINE
Payer: MEDICARE

## 2024-11-05 ENCOUNTER — OFFICE VISIT (OUTPATIENT)
Dept: ORTHOPEDIC SURGERY | Facility: CLINIC | Age: 69
End: 2024-11-05
Payer: MEDICARE

## 2024-11-05 ENCOUNTER — APPOINTMENT (OUTPATIENT)
Dept: GENERAL RADIOLOGY | Facility: HOSPITAL | Age: 69
End: 2024-11-05
Payer: MEDICARE

## 2024-11-05 VITALS
HEIGHT: 64 IN | SYSTOLIC BLOOD PRESSURE: 103 MMHG | RESPIRATION RATE: 15 BRPM | DIASTOLIC BLOOD PRESSURE: 73 MMHG | BODY MASS INDEX: 29.88 KG/M2 | HEART RATE: 83 BPM | TEMPERATURE: 98.6 F | WEIGHT: 175 LBS | OXYGEN SATURATION: 98 %

## 2024-11-05 VITALS — BODY MASS INDEX: 29.88 KG/M2 | HEIGHT: 64 IN | TEMPERATURE: 97.1 F | WEIGHT: 175 LBS

## 2024-11-05 DIAGNOSIS — S92.354A NONDISPLACED FRACTURE OF FIFTH METATARSAL BONE, RIGHT FOOT, INITIAL ENCOUNTER FOR CLOSED FRACTURE: Primary | ICD-10-CM

## 2024-11-05 DIAGNOSIS — S92.355A CLOSED NONDISPLACED FRACTURE OF FIFTH METATARSAL BONE OF LEFT FOOT, INITIAL ENCOUNTER: Primary | ICD-10-CM

## 2024-11-05 DIAGNOSIS — S92.354A CLOSED NONDISPLACED FRACTURE OF FIFTH METATARSAL BONE OF RIGHT FOOT, INITIAL ENCOUNTER: ICD-10-CM

## 2024-11-05 PROCEDURE — 73630 X-RAY EXAM OF FOOT: CPT

## 2024-11-05 PROCEDURE — G0463 HOSPITAL OUTPT CLINIC VISIT: HCPCS | Performed by: EMERGENCY MEDICINE

## 2024-11-05 NOTE — PROGRESS NOTES
Patient ID: Summer Jefferson is a 69 y.o. female is here today for follow-up for on going back pain.    Imaging: XR lumbar completed 10/29/2024    Subjective     The patient is here in regards to   Chief Complaint   Patient presents with    Back Pain    Follow-up       History of Present Illness  Stephanie has previously had SI joint inflammation which was treated well with SI joint injections.  She feels that her back pain has gotten worse over the past few weeks after initially doing very well from surgery.  She disparate describes crushing low back pain that is worse in her buttocks and occasionally down the posterior lateral portion of her legs but does not cross her knees.  Incidentally, she did fall last Monday and broke her right foot and is in a boot on the right side currently.      While in the room and during my examination of the patient I wore a mask and eye protection.  I washed my hands before and after this patient encounter.  The patient was also wearing a mask.    The following portions of the patient's history were reviewed and updated as appropriate: allergies, current medications, past family history, past medical history, past social history, past surgical history and problem list.    Review of Systems   Constitutional:  Negative for fever.   Genitourinary:  Negative for difficulty urinating.   Musculoskeletal:  Positive for back pain (butt pain and leg pain). Negative for gait problem.   Neurological:  Negative for dizziness, weakness, light-headedness, numbness and headaches.        Past Medical History:   Diagnosis Date    Anxiety and depression 03/15/2022    Backache     Diabetes mellitus type 2, controlled     HSV (herpes simplex virus) infection 03/15/2022    Hypercholesterolemia     Hypertension     Hypothyroidism     Osteoarthritis of multiple joints     Spinal stenosis     LUMBAR       No Known Allergies    Family History   Problem Relation Age of Onset    Thyroid disease Mother      Diabetes Mother     Hypertension Mother     Heart disease Mother     Vision loss Father     Malig Hyperthermia Neg Hx        Social History     Socioeconomic History    Marital status:    Tobacco Use    Smoking status: Former     Current packs/day: 0.00     Average packs/day: 1 pack/day for 10.0 years (10.0 ttl pk-yrs)     Types: Cigarettes     Start date: 1983     Quit date: 1993     Years since quittin.9     Passive exposure: Past    Smokeless tobacco: Never    Tobacco comments:     quit 15 yrs ago   Vaping Use    Vaping status: Never Used   Substance and Sexual Activity    Alcohol use: Yes     Comment: 1-2 a month    Drug use: Never    Sexual activity: Defer       Past Surgical History:   Procedure Laterality Date    ANKLE ARTHROSCOPY Right     2013 & 10/2013    COLONOSCOPY      2007    EPIDURAL BLOCK      X5    LAPAROSCOPIC GASTRIC BANDING  2007    LUMBAR FUSION Left 2023    Procedure: PRONE OBLIQUE LATERAL LUMBAR TWO TO THREE, FOUR TO FIVE INTERBODY FUSION WITH ANTERIOR PLATING AND NEUROMONITORING;  Surgeon: Calderon Whittaker MD;  Location: [facility];  Service: Neurosurgery;  Laterality: Left;    LUMBAR FUSION N/A 2023    Procedure: LUMBAR FUSION MINIMALLY INVASIVE NAVIGATION;  Surgeon: Calderon Whittaker MD;  Location: [facility] OR;  Service: Neurosurgery;  Laterality: N/A;    LUMBAR FUSION N/A 2024    Procedure: LUMBAR TWO THREE, THREE TO FOUR, FOUR TO FIVE FUSION MINIMALLY INVASIVE NAVIGATION;  Surgeon: Calderon Whittaker MD;  Location: [facility] OR;  Service: Neurosurgery;  Laterality: N/A;    ROTATOR CUFF REPAIR Right     2014    TOTAL KNEE ARTHROPLASTY Right     2014         Objective     Vitals:    24 0945   BP: 124/72   Pulse: 58   Resp: 16   Temp: 97.1 °F (36.2 °C)   SpO2: 97%     Body mass index is 30.04 kg/m².    Physical Exam  Constitutional:       Appearance: Normal appearance.   HENT:      Head: Normocephalic and atraumatic.   Eyes:       Extraocular Movements: Extraocular movements intact.      Conjunctiva/sclera: Conjunctivae normal.      Pupils: Pupils are equal, round, and reactive to light.   Cardiovascular:      Rate and Rhythm: Normal rate and regular rhythm.      Pulses: Normal pulses.   Pulmonary:      Breath sounds: Normal breath sounds.   Abdominal:      Palpations: Abdomen is soft.   Musculoskeletal:         General: Normal range of motion.      Cervical back: Normal range of motion and neck supple.      Comments: Bilateral SI joint inflammation characterized by:    -Pelvic Distraction test   -Lateral Iliac compression test   -FABERS (Kareem's test)   -Ganslen's Test     Skin:     General: Skin is warm and dry.   Neurological:      Mental Status: She is alert and oriented to person, place, and time.      Motor: Motor function is intact. No weakness or atrophy.      Coordination: Coordination is intact. Romberg sign negative.      Gait: Gait is intact. Gait normal.      Deep Tendon Reflexes: Reflexes are normal and symmetric.      Reflex Scores:       Tricep reflexes are 2+ on the right side and 2+ on the left side.       Bicep reflexes are 2+ on the right side and 2+ on the left side.       Brachioradialis reflexes are 2+ on the right side and 2+ on the left side.       Patellar reflexes are 2+ on the right side and 2+ on the left side.       Achilles reflexes are 2+ on the right side and 2+ on the left side.        Neurological Exam  Mental Status  Alert. Oriented to person, place, and time.    Cranial Nerves  CN III, IV, VI: Extraocular movements intact bilaterally. Pupils equal round and reactive to light bilaterally.    Reflexes  Deep tendon reflexes are 2+ and symmetric in all four extremities.                                            Right                      Left  Brachioradialis                    2+                         2+  Biceps                                 2+                         2+  Triceps                                 2+                         2+  Patellar                                2+                         2+  Achilles                                2+                         2+    Coordination    Finger-to-nose, rapid alternating movements and heel-to-shin normal bilaterally without dysmetria.    Gait   Normal gait. Normal gait. Romberg is absent.      Assessment & Plan   Independent Review of Radiographic Studies:      I personally reviewed the images from the following studies.    INDICATION: Follow up from fusion    FINDINGS:    XR: XR of the lumbar spine was reviewed and shows stable and unchanged hardware and alignment compared to previous x-ray.    Assessment/Plan: Her x-ray looks good.  Her clinical picture is consistent with SI joint inflammation.  She has had it in the past in conjunction with her previous spinal stenosis.  I think she will benefit from another round of SI joint injections and ice baths.    Medical Decision Making:      SI joint injection, follow-up in 3 months         Diagnoses and all orders for this visit:    1. SI (sacroiliac) joint inflammation (Primary)  -     SI Joint Injection             Patient Instructions/Recommendations:    After your SI joint injection:  -Please schedule your second SI joint injection at the time of your first SI joint injection for 2 weeks later  -Alternate 1 tablet of extra strength Tylenol and 2 tablets of Aleve (or any other NSAID) in a scheduled manner every 4 hours for at least 2 weeks after the injection    -Take an ice bath by submerging the buttocks area in 50 degree water for 30 minutes/day for 5 days consecutively after your injection.   -Try adding ice gradually to decrease the shock of the ice bath   -Try putting a robe in the dryer for 35 minutes so that the patient can have a warm robe to wear after the ice bath    -Continue to use ice packs as much as tolerable when you are not taking an ice bath        Calderon Whittaker MD  11/07/24  10:08  EST

## 2024-11-05 NOTE — DISCHARGE INSTRUCTIONS
Today you do have a fracture at the base of the fifth metatarsal.  It is nondisplaced and hopefully will heal without any surgical intervention    Please utilize the walking boot primarily for balance with very little weightbearing.    Go ahead and contact your orthopedic surgeons office to schedule follow-up appointment.  They will likely set you up with one of their foot specialist.    I did also give you our on-call orthopedic surgeon just in case you have difficulty establishing a follow-up appointment    Good luck and I am sorry that you injured yourself.    Please read all of the instructions in this handout.  If you receive prescriptions please fill them and take them as directed.  Please call your primary care physician for follow-up appointment in the next 5 to 7 days.  If you do not have a physician you may call the [facility] referral line at [phone].    You may return to the emergency department at any time for any concerns such as worsening symptoms.  If you received a work or school note it will be printed at the back of this packet.

## 2024-11-05 NOTE — FS[facility] PROVIDER NOTE
EMERGENCY DEPARTMENT ENCOUNTER    Room Number:  11/11  Date seen:  11/5/2024  Time seen: 10:59 EST  PCP: Ruth Benitez APRN  Historian:     Discussed/obtained information from independent historians:     HPI:    The patient is a very nice 69-year-old female.  She presents with right foot pain.  She was walking yesterday when she stepped on a large rock causing her right foot to twist acutely.  She presents today with pain, swelling, bruising overlying the fifth metatarsal of the right foot.  No ankle or knee discomfort    External (non-ED) record review:        Chronic or social conditions impacting care:    ALLERGIES  Patient has no known allergies.    PAST MEDICAL HISTORY  Active Ambulatory Problems     Diagnosis Date Noted    Diabetes mellitus type 2, controlled     Hypothyroidism, adult     Hypertension     Hypercholesterolemia     HSV (herpes simplex virus) infection 03/15/2022    Anxiety and depression 03/15/2022    Chronic bilateral low back pain with left-sided sciatica 03/15/2022    Facet arthropathy, lumbar 03/30/2022    Spondylolisthesis, lumbar region 07/14/2023    Lumbar radiculopathy 07/14/2023    SI (sacroiliac) joint inflammation 07/14/2023    Spondylolisthesis of lumbar region 10/19/2023    Overweight (BMI 25.0-29.9) 11/14/2023    S/P lumbar fusion 12/14/2023    DJD (degenerative joint disease) 01/24/2024    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 01/26/2024     Resolved Ambulatory Problems     Diagnosis Date Noted    Uncomplicated herpes simplex 12/04/2017    Class 1 obesity due to excess calories with serious comorbidity and body mass index (BMI) of 30.0 to 30.9 in adult 08/03/2022     Past Medical History:   Diagnosis Date    Backache     Hypothyroidism     Osteoarthritis of multiple joints     Spinal stenosis        PAST SURGICAL HISTORY  Past Surgical History:   Procedure Laterality Date    ANKLE ARTHROSCOPY Right     1/2013 & 10/2013    COLONOSCOPY      7/27/2007    EPIDURAL  BLOCK      X5    LAPAROSCOPIC GASTRIC BANDING  2007    LUMBAR FUSION Left 2023    Procedure: PRONE OBLIQUE LATERAL LUMBAR TWO TO THREE, FOUR TO FIVE INTERBODY FUSION WITH ANTERIOR PLATING AND NEUROMONITORING;  Surgeon: Calderon Whittaker MD;  Location: [facility] OR;  Service: Neurosurgery;  Laterality: Left;    LUMBAR FUSION N/A 2023    Procedure: LUMBAR FUSION MINIMALLY INVASIVE NAVIGATION;  Surgeon: Calderon Whittaker MD;  Location: [facility] MAIN OR;  Service: Neurosurgery;  Laterality: N/A;    LUMBAR FUSION N/A 2024    Procedure: LUMBAR TWO THREE, THREE TO FOUR, FOUR TO FIVE FUSION MINIMALLY INVASIVE NAVIGATION;  Surgeon: Calderon Whittaker MD;  Location: [facility] MAIN OR;  Service: Neurosurgery;  Laterality: N/A;    ROTATOR CUFF REPAIR Right     2014    TOTAL KNEE ARTHROPLASTY Right     2014       FAMILY HISTORY  Family History   Problem Relation Age of Onset    Thyroid disease Mother     Diabetes Mother     Hypertension Mother     Heart disease Mother     Vision loss Father     Malig Hyperthermia Neg Hx        SOCIAL HISTORY  Social History     Socioeconomic History    Marital status:    Tobacco Use    Smoking status: Former     Current packs/day: 0.00     Average packs/day: 1 pack/day for 10.0 years (10.0 ttl pk-yrs)     Types: Cigarettes     Start date: 1983     Quit date: 1993     Years since quittin.9     Passive exposure: Past    Smokeless tobacco: Never    Tobacco comments:     quit 15 yrs ago   Vaping Use    Vaping status: Never Used   Substance and Sexual Activity    Alcohol use: Yes     Comment: 1-2 a month    Drug use: Never    Sexual activity: Defer       REVIEW OF SYSTEMS  Review of Systems    All systems reviewed and negative except for those discussed in HPI.       PHYSICAL EXAM    I have reviewed the triage vital signs and nursing notes.  Vitals:    24 1007   BP: 103/73   Pulse: 83   Resp: 15   Temp: 98.6 °F (37 °C)   SpO2: 98%     Physical Exam    General:  Awake alert no acute distress      Musculoskeletal: Right lower extremity finds the knee to be nontender with full range of motion.  The right ankle reveals no tenderness to palpation over the lateral medial malleoli.  DP PT are 2+ and equal.  There is moderate soft tissue swelling overlying the base of the fifth metatarsal with significant ecchymosis.  The skin itself is intact.  There is significant tenderness to palpation at the base of the fifth metatarsal.  Still full range of motion all 5 toes      LAB RESULTS  No results found for this or any previous visit (from the past 24 hours).    Ordered the above labs and independently interpreted results.  My findings will be discussed in the ED course or medical decision making section below      PROCEDURES:  Procedures        RADIOLOGY RESULTS  XR Foot 3+ View Right    Result Date: 11/5/2024  XR FOOT 3+ VW RIGHT-  INDICATIONS: Trauma.  TECHNIQUE: 3 VIEWS OF THE RIGHT FOOT  COMPARISON: None available  FINDINGS:  A nondisplaced transverse fracture is seen at the base of the fifth metatarsal with adjacent soft tissue swelling. No other fractures are identified. No dislocation. Mild calcaneal spurring is present.      Fracture of the fifth metatarsal.     This report was finalized on 11/5/2024 10:30 AM by Dr. Abelardo Ochoa M.D on Workstation: [employer]        Ordered the above noted radiological studies.  Independently interpreted by me.  My findings will be discussed in the medical decision section below.     PROGRESS, DATA ANALYSIS, CONSULTS AND MEDICAL DECISION MAKING    Please note that this section constitutes my independent interpretation of clinical data including lab results, radiology, EKG's.  This constitutes my independent professional opinion regarding differential diagnosis and management of this patient.  It may include any factors such as history from outside sources, review of external records, social determinants of health, management of medications,  response to those treatments, and discussions with other providers.       Orders placed during this visit:  Orders Placed This Encounter   Procedures    [facility] 08.  CAM Boot; Yes; Yes; right metatarsal fx; Yes    XR Foot 3+ View Right       Medications - No data to display           A short walking boot was applied to the right foot and ankle.  After application the foot and ankle are noted to be neurovascular intact    Medical Decision Making          DIAGNOSIS  Final diagnoses:   Closed nondisplaced fracture of fifth metatarsal bone of left foot, initial encounter   Closed nondisplaced fracture of fifth metatarsal bone of right foot, initial encounter          Medication List      No changes were made to your prescriptions during this visit.         FOLLOW-UP  Orthopedic Surgeon    Schedule an appointment as soon as possible for a visit       Jagdeep Kelly MD  [address] [facility] [id number] [phone]    Schedule an appointment as soon as possible for a visit           Latest Documented Vital Signs:  As of 17:15 EST  BP- 103/73 HR- 83 Temp- 98.6 °F (37 °C) (Oral) O2 sat- 98%    Appropriate PPE utilized throughout this patient encounter to include mask, if indicated, per current protocol. Hand hygiene was performed before donning PPE and after removal when leaving the room.    Please note that portions of this were completed with a voice recognition program.     Note Disclaimer: At [facility], we believe that sharing information builds trust and better relationships. You are receiving this note because you are receiving care at [facility] or recently visited. It is possible you will see health information before a provider has talked with you about it. This kind of information can be easy to misunderstand. To help you fully understand what it means for your health, we urge you to discuss this note with your provider.

## 2024-11-05 NOTE — PROGRESS NOTES
Patient Name: Summer Jefferson   YOB: 1955  Referring Primary Care Physician: Ruth Benitez APRN  BMI: Body mass index is 30.04 kg/m².    Chief Complaint:    Chief Complaint   Patient presents with   • Right Foot - Pain        HPI:  New pt to me that presents with pain in foot after tripping over a rock in the parking lot and twisted her foot yesterday. Pt has followed  with [facility] in the past   Hx of back problems and surgery and follows with dr alvarez     Summer Jefferson is a 69 y.o. female who presents today for evaluation of   Chief Complaint   Patient presents with   • Right Foot - Pain         Subjective   Medications:   Home Medications:  Current Outpatient Medications on File Prior to Visit   Medication Sig   • amLODIPine (NORVASC) 5 MG tablet TAKE 1 TABLET EVERY DAY   • celecoxib (CeleBREX) 200 MG capsule Take 1 capsule by mouth 2 (Two) Times a Day.   • Diclofenac Sodium (Voltaren Arthritis Pain) 1 % gel gel Apply 4 g topically to the appropriate area as directed 4 (Four) Times a Day As Needed (Rub over low back for achy pain).   • empagliflozin (Jardiance) 25 MG tablet tablet Take 1 tablet by mouth Daily.   • famciclovir (FAMVIR) 500 MG tablet Take 1 tablet by mouth Every 12 (Twelve) Hours As Needed (as directed).   • FLUoxetine (PROzac) 40 MG capsule Take 1 capsule by mouth Daily.   • glucose blood (FREESTYLE TEST STRIPS) test strip One touch ultra strips; check blood sugar once a day as needed   • hydroCHLOROthiazide (HYDRODIURIL) 12.5 MG tablet Take 1 tablet by mouth Daily.   • levothyroxine (SYNTHROID, LEVOTHROID) 50 MCG tablet Take 1 tablet by mouth Daily.   • lidocaine (LIDODERM) 5 % Place 1 patch on the skin as directed by provider Daily. Remove & Discard patch within 12 hours; use as needed for back pain   • lisinopril (PRINIVIL,ZESTRIL) 10 MG tablet TAKE 1 TABLET EVERY DAY   • rosuvastatin (CRESTOR) 10 MG tablet Take 1 tablet by mouth Daily.   • Rybelsus 14 MG tablet TAKE 1  TABLET EVERY DAY (DOSE CHANGE)     No current facility-administered medications on file prior to visit.     Current Medications:  Scheduled Meds:  Continuous Infusions:No current facility-administered medications for this visit.    PRN Meds:.    I have reviewed the patient's medical history in detail and updated the computerized patient record.  Review and summarization of old records includes:    Past Medical History:   Diagnosis Date   • Anxiety and depression 03/15/2022   • Backache    • Diabetes mellitus type 2, controlled    • HSV (herpes simplex virus) infection 03/15/2022   • Hypercholesterolemia    • Hypertension    • Hypothyroidism    • Osteoarthritis of multiple joints    • Spinal stenosis     LUMBAR        Past Surgical History:   Procedure Laterality Date   • ANKLE ARTHROSCOPY Right     1/2013 & 10/2013   • COLONOSCOPY      7/27/2007   • EPIDURAL BLOCK      X5   • LAPAROSCOPIC GASTRIC BANDING  08/01/2007   • LUMBAR FUSION Left 12/11/2023    Procedure: PRONE OBLIQUE LATERAL LUMBAR TWO TO THREE, FOUR TO FIVE INTERBODY FUSION WITH ANTERIOR PLATING AND NEUROMONITORING;  Surgeon: Calderon Whittaker MD;  Location: [facility];  Service: Neurosurgery;  Laterality: Left;   • LUMBAR FUSION N/A 12/11/2023    Procedure: LUMBAR FUSION MINIMALLY INVASIVE NAVIGATION;  Surgeon: Calderon Whittaker MD;  Location: [facility] OR;  Service: Neurosurgery;  Laterality: N/A;   • LUMBAR FUSION N/A 2/26/2024    Procedure: LUMBAR TWO THREE, THREE TO FOUR, FOUR TO FIVE FUSION MINIMALLY INVASIVE NAVIGATION;  Surgeon: Calderon Whittaker MD;  Location: [facility];  Service: Neurosurgery;  Laterality: N/A;   • ROTATOR CUFF REPAIR Right     5/2014   • TOTAL KNEE ARTHROPLASTY Right     12/2014        Social History     Occupational History   • Not on file   Tobacco Use   • Smoking status: Former     Current packs/day: 0.00     Average packs/day: 1 pack/day for 10.0 years (10.0 ttl pk-yrs)     Types: Cigarettes     Start date: 12/1/1983      "Quit date: 1993     Years since quittin.9     Passive exposure: Past   • Smokeless tobacco: Never   • Tobacco comments:     quit 15 yrs ago   Vaping Use   • Vaping status: Never Used   Substance and Sexual Activity   • Alcohol use: Yes     Comment: 1-2 a month   • Drug use: Never   • Sexual activity: Defer      Social History     Social History Narrative   • Not on file        Family History   Problem Relation Age of Onset   • Thyroid disease Mother    • Diabetes Mother    • Hypertension Mother    • Heart disease Mother    • Vision loss Father    • Malig Hyperthermia Neg Hx        ROS: 14 point review of systems was performed and all other systems were reviewed and are negative except for documented findings in HPI and today's encounter.     Allergies: No Known Allergies  Constitutional:  Denies fever, shaking or chills   Eyes:  Denies change in visual acuity   HENT:  Denies nasal congestion or sore throat   Respiratory:  Denies cough or shortness of breath   Cardiovascular:  Denies chest pain or severe LE edema   GI:  Denies abdominal pain, nausea, vomiting, bloody stools or diarrhea   Musculoskeletal:  Numbness, tingling, pain, or loss of motor function only as noted above in history of present illness.  : Denies painful urination or hematuria  Integument:  Denies rash, lesion or ulceration   Neurologic:  Denies headache or focal weakness  Endocrine:  Denies lymphadenopathy  Psych:  Denies confusion or change in mental status   Hem:  Denies active bleeding    OBJECTIVE:  Physical Exam: 69 y.o. female  Wt Readings from Last 3 Encounters:   24 79.4 kg (175 lb)   24 79.4 kg (175 lb)   24 86.8 kg (191 lb 6.4 oz)     Ht Readings from Last 1 Encounters:   24 162.6 cm (64\")     Body mass index is 30.04 kg/m².  Vitals:    24 1519   Temp: 97.1 °F (36.2 °C)     Vital signs reviewed.     General Appearance:    Alert, cooperative, in no acute distress                  Eyes: " conjunctiva clear  ENT: external ears and nose atraumatic  CV: no peripheral edema  Resp: normal respiratory effort  Skin: no rashes or wounds; normal turgor  Psych: mood and affect appropriate  Lymph: no nodes appreciated  Neuro: gross sensation intact  Vascular:  Palpable peripheral pulse in noted extremity  Musculoskeletal Extremities: right foot painful to the base of the fifth metatarsal with no deformity and capp refill intact and calf soft and nttp     Radiology:     Study Result    Narrative & Impression   XR FOOT 3+ VW RIGHT-     INDICATIONS: Trauma.     TECHNIQUE: 3 VIEWS OF THE RIGHT FOOT     COMPARISON: None available     FINDINGS:     A nondisplaced transverse fracture is seen at the base of the fifth  metatarsal with adjacent soft tissue swelling. No other fractures are  identified. No dislocation. Mild calcaneal spurring is present.     IMPRESSION:  Fracture of the fifth metatarsal.          Assessment:     ICD-10-CM ICD-9-CM   1. Nondisplaced fracture of fifth metatarsal bone, right foot, initial encounter for closed fracture  S92.354A 825.25        Procedures  Short cam boot and offload with a walker and partial weight bear after 10 days and follow up with [facility]     MDM/Plan:   The diagnosis(es), natural history, pathophysiology and treatment for diagnosis(es) were discussed. Opportunity given and questions answered.  Biomechanics of pertinent body areas discussed.  When appropriate, the use of ambulatory aids discussed.  EXERCISES:  Advice on benefits of, and types of regular/moderate exercise pertaining to orthopedic diagnosis(es).  MEDICATIONS:  The risks, benefits, warnings,side effects and alternatives of medications discussed.  Inflammation/pain control; with cold, heat, elevation and/or liniments discussed as appropriate  HOME EXERCISE/PT program encouraged  MEDICAL RECORDS reviewed from other provider(s) for past and current medical history pertinent to this complaint.      11/5/2024    Much  of this encounter note is an electronic transcription/translation of spoken language to printed text. The electronic translation of spoken language may permit erroneous, or at times, nonsensical words or phrases to be inadvertently transcribed; Although I have reviewed the note for such errors, some may still exist

## 2024-11-07 ENCOUNTER — TELEPHONE (OUTPATIENT)
Dept: FAMILY MEDICINE CLINIC | Facility: CLINIC | Age: 69
End: 2024-11-07
Payer: MEDICARE

## 2024-11-07 ENCOUNTER — OFFICE VISIT (OUTPATIENT)
Dept: NEUROSURGERY | Facility: CLINIC | Age: 69
End: 2024-11-07
Payer: MEDICARE

## 2024-11-07 VITALS
HEART RATE: 58 BPM | DIASTOLIC BLOOD PRESSURE: 72 MMHG | BODY MASS INDEX: 29.88 KG/M2 | WEIGHT: 175 LBS | RESPIRATION RATE: 16 BRPM | HEIGHT: 64 IN | OXYGEN SATURATION: 97 % | SYSTOLIC BLOOD PRESSURE: 124 MMHG | TEMPERATURE: 97.1 F

## 2024-11-07 DIAGNOSIS — M46.1 SI (SACROILIAC) JOINT INFLAMMATION: Primary | ICD-10-CM

## 2024-11-07 NOTE — TELEPHONE ENCOUNTER
HUB transferred call, patient wants to R/S lab appt to Monday.     She also wants to make 11/13/24 visit with you a MYCHART VISIT. She broke her foot and has to have it elevated.    I did advise her that her results would need to be back before her 11/13/24 office visit but she said she doesn't think you will need results for her visit.    Patient was informed I would forward message to you and Adianet so you could advise how you want appointment R/S, changed to [facility], etc.    Patient was informed staff would call her back after it's decided how how to proceed.

## 2024-11-11 ENCOUNTER — OFFICE VISIT (OUTPATIENT)
Dept: ORTHOPEDIC SURGERY | Facility: CLINIC | Age: 69
End: 2024-11-11
Payer: MEDICARE

## 2024-11-11 VITALS — HEIGHT: 64 IN | TEMPERATURE: 97.5 F | WEIGHT: 175 LBS | BODY MASS INDEX: 29.88 KG/M2

## 2024-11-11 DIAGNOSIS — S92.354A CLOSED NONDISPLACED FRACTURE OF FIFTH METATARSAL BONE OF RIGHT FOOT, INITIAL ENCOUNTER: ICD-10-CM

## 2024-11-11 DIAGNOSIS — R52 PAIN: Primary | ICD-10-CM

## 2024-11-11 NOTE — PROGRESS NOTES
New Patient Complaint      Patient: Summer Jefferson  YOB: 1955 69 y.o. female  Medical Record Number: [id number]    Chief Complaints: I hurt my foot    History of Present Illness: Patient injured her right foot approximately 1 week prior when she stepped on a stone in a parking lot.  She had pain swelling and bruising at the lateral midfoot but really none to the ankle.  She had been seen in urgent care on and fitted with a short boot.    She reports mild pain in the proximal lateral aspect of the fifth metatarsal.    HPI    Allergies: No Known Allergies    Medications:   Current Outpatient Medications on File Prior to Visit   Medication Sig    amLODIPine (NORVASC) 5 MG tablet TAKE 1 TABLET EVERY DAY    celecoxib (CeleBREX) 200 MG capsule Take 1 capsule by mouth 2 (Two) Times a Day.    empagliflozin (Jardiance) 25 MG tablet tablet Take 1 tablet by mouth Daily.    FLUoxetine (PROzac) 40 MG capsule Take 1 capsule by mouth Daily.    hydroCHLOROthiazide (HYDRODIURIL) 12.5 MG tablet Take 1 tablet by mouth Daily.    levothyroxine (SYNTHROID, LEVOTHROID) 50 MCG tablet Take 1 tablet by mouth Daily.    lidocaine (LIDODERM) 5 % Place 1 patch on the skin as directed by provider Daily. Remove & Discard patch within 12 hours; use as needed for back pain    lisinopril (PRINIVIL,ZESTRIL) 10 MG tablet TAKE 1 TABLET EVERY DAY    rosuvastatin (CRESTOR) 10 MG tablet Take 1 tablet by mouth Daily.    Rybelsus 14 MG tablet TAKE 1 TABLET EVERY DAY (DOSE CHANGE)     No current facility-administered medications on file prior to visit.       Past Medical History:   Diagnosis Date    Anxiety and depression 03/15/2022    Backache     Diabetes mellitus type 2, controlled     HSV (herpes simplex virus) infection 03/15/2022    Hypercholesterolemia     Hypertension     Hypothyroidism     Osteoarthritis of multiple joints     Spinal stenosis     LUMBAR     Past Surgical History:   Procedure Laterality Date    ANKLE  ARTHROSCOPY Right     2013 & 10/2013    COLONOSCOPY      2007    EPIDURAL BLOCK      X5    LAPAROSCOPIC GASTRIC BANDING  2007    LUMBAR FUSION Left 2023    Procedure: PRONE OBLIQUE LATERAL LUMBAR TWO TO THREE, FOUR TO FIVE INTERBODY FUSION WITH ANTERIOR PLATING AND NEUROMONITORING;  Surgeon: Calderon Whittaker MD;  Location: [facility] OR;  Service: Neurosurgery;  Laterality: Left;    LUMBAR FUSION N/A 2023    Procedure: LUMBAR FUSION MINIMALLY INVASIVE NAVIGATION;  Surgeon: Calderon Whittaker MD;  Location: [facility] MAIN OR;  Service: Neurosurgery;  Laterality: N/A;    LUMBAR FUSION N/A 2024    Procedure: LUMBAR TWO THREE, THREE TO FOUR, FOUR TO FIVE FUSION MINIMALLY INVASIVE NAVIGATION;  Surgeon: Calderon Whittaker MD;  Location: [facility] MAIN OR;  Service: Neurosurgery;  Laterality: N/A;    ROTATOR CUFF REPAIR Right     2014    TOTAL KNEE ARTHROPLASTY Right     2014     Social History     Occupational History    Not on file   Tobacco Use    Smoking status: Former     Current packs/day: 0.00     Average packs/day: 1 pack/day for 10.0 years (10.0 ttl pk-yrs)     Types: Cigarettes     Start date: 1983     Quit date: 1993     Years since quittin.9     Passive exposure: Past    Smokeless tobacco: Never    Tobacco comments:     quit 15 yrs ago   Vaping Use    Vaping status: Never Used   Substance and Sexual Activity    Alcohol use: Yes     Comment: 1-2 a month    Drug use: Never    Sexual activity: Defer      Social History     Social History Narrative    Not on file     Family History   Problem Relation Age of Onset    Thyroid disease Mother     Diabetes Mother     Hypertension Mother     Heart disease Mother     Vision loss Father     Malig Hyperthermia Neg Hx        Review of Systems: 14 point review of systems performed, positive pertinent findings identified in HPI. All remaining systems negative     Review of Systems      Physical Exam:   Vitals:    24 1055   Temp: 97.5 °F  "(36.4 °C)   Weight: 79.4 kg (175 lb)   Height: 162.6 cm (64\")   PainSc: 0-No pain   PainLoc: Foot     Physical Exam   Constitutional: pleasant, well developed   Eyes: sclera non icteric  Hearing : adequate for exam  Cardiovascular: palpable pulses in right foot, right calf/ thigh NT without sign of DVT  Respiratoy: breathing unlabored   Neurological: grossly sensate to LT throughout right LE  Psychiatric: oriented with normal mood and affect.   Lymphatic: No palpable popliteal lymphadenopathy right LE  Skin: intact throughout right leg/foot  Musculoskeletal: On exam she has mild tenderness palpation of the base left metatarsal.  There was no discomfort over the anterolateral ankle and really no focal pain on the peroneal tendons.  She is nontender over the dorsum of the midfoot.  Physical Exam  Ortho Exam    Radiology: 3 views of the right foot ordered evaluate fracture reviewed and compared to x-rays in the [facility] system from 11/5/24.  There remains an essentially nondisplaced transverse fracture through the proximal fifth metatarsal.  There is no obvious and articular incongruity.  There is lateral gapping of about 2 mm.  There are some arthritis of the midfoot at the second and third TMT joint.  No obvious malalignment or fracture there of    Assessment/Plan: 1.  Left fifth metatarsal base fracture with clear sign of ankle injury      We discussed treatment going forward and would not recommend surgical treatment for this fracture unless it failed he will symptomatic.    Will have her use a boot and a sleep in a postoperative shoe recommend she offload with a cane.    She will avoid anti-inflammatories other than Tylenol so that minimize bone healing inhibition.    Anything worsens she will let me know otherwise I will see her back in 3 weeks with x-rays of her left foot.  X-ray left ankle if having any pain.  "

## 2024-11-13 ENCOUNTER — TELEPHONE (OUTPATIENT)
Dept: ORTHOPEDIC SURGERY | Facility: CLINIC | Age: 69
End: 2024-11-13
Payer: MEDICARE

## 2024-11-13 NOTE — TELEPHONE ENCOUNTER
Pt called to clarify WB status.  I informed her what you had in your note but that I would confirm that again with you.

## 2024-11-13 NOTE — TELEPHONE ENCOUNTER
Spoke with pt and confirmed that she just needs to offload with a cane and she voiced understanding.

## 2024-11-14 ENCOUNTER — TELEMEDICINE (OUTPATIENT)
Dept: FAMILY MEDICINE CLINIC | Facility: CLINIC | Age: 69
End: 2024-11-14
Payer: MEDICARE

## 2024-11-14 VITALS — BODY MASS INDEX: 29.88 KG/M2 | HEIGHT: 64 IN | WEIGHT: 175 LBS

## 2024-11-14 DIAGNOSIS — E03.9 HYPOTHYROIDISM, ADULT: Chronic | ICD-10-CM

## 2024-11-14 DIAGNOSIS — F41.9 ANXIETY AND DEPRESSION: ICD-10-CM

## 2024-11-14 DIAGNOSIS — M85.80 OSTEOPENIA, UNSPECIFIED LOCATION: ICD-10-CM

## 2024-11-14 DIAGNOSIS — E78.00 HYPERCHOLESTEROLEMIA: Chronic | ICD-10-CM

## 2024-11-14 DIAGNOSIS — F32.A ANXIETY AND DEPRESSION: ICD-10-CM

## 2024-11-14 DIAGNOSIS — I10 PRIMARY HYPERTENSION: Chronic | ICD-10-CM

## 2024-11-14 DIAGNOSIS — M19.09 PRIMARY OSTEOARTHRITIS OF OTHER SITE: ICD-10-CM

## 2024-11-14 DIAGNOSIS — E11.65 CONTROLLED TYPE 2 DIABETES MELLITUS WITH HYPERGLYCEMIA, WITHOUT LONG-TERM CURRENT USE OF INSULIN: Primary | Chronic | ICD-10-CM

## 2024-11-14 PROCEDURE — 3051F HG A1C>EQUAL 7.0%<8.0%: CPT | Performed by: NURSE PRACTITIONER

## 2024-11-14 PROCEDURE — 1159F MED LIST DOCD IN RCRD: CPT | Performed by: NURSE PRACTITIONER

## 2024-11-14 PROCEDURE — 1126F AMNT PAIN NOTED NONE PRSNT: CPT | Performed by: NURSE PRACTITIONER

## 2024-11-14 PROCEDURE — 99213 OFFICE O/P EST LOW 20 MIN: CPT | Performed by: NURSE PRACTITIONER

## 2024-11-14 PROCEDURE — 1160F RVW MEDS BY RX/DR IN RCRD: CPT | Performed by: NURSE PRACTITIONER

## 2024-11-14 RX ORDER — CELECOXIB 200 MG/1
200 CAPSULE ORAL 2 TIMES DAILY
Qty: 60 CAPSULE | Refills: 2 | Status: SHIPPED | OUTPATIENT
Start: 2024-11-14

## 2024-11-14 RX ORDER — LEVOTHYROXINE SODIUM 50 UG/1
50 TABLET ORAL DAILY
Qty: 90 TABLET | Refills: 3 | Status: SHIPPED | OUTPATIENT
Start: 2024-11-14

## 2024-11-14 RX ORDER — AMLODIPINE BESYLATE 5 MG/1
5 TABLET ORAL DAILY
Qty: 90 TABLET | Refills: 3 | Status: SHIPPED | OUTPATIENT
Start: 2024-11-14

## 2024-11-14 RX ORDER — HYDROCHLOROTHIAZIDE 12.5 MG/1
12.5 TABLET ORAL DAILY
Qty: 90 TABLET | Refills: 3 | Status: SHIPPED | OUTPATIENT
Start: 2024-11-14

## 2024-11-14 RX ORDER — ORAL SEMAGLUTIDE 14 MG/1
14 TABLET ORAL DAILY
Qty: 90 TABLET | Refills: 3 | Status: SHIPPED | OUTPATIENT
Start: 2024-11-14

## 2024-11-14 RX ORDER — LISINOPRIL 10 MG/1
10 TABLET ORAL DAILY
Qty: 90 TABLET | Refills: 3 | Status: SHIPPED | OUTPATIENT
Start: 2024-11-14

## 2024-11-14 RX ORDER — ROSUVASTATIN CALCIUM 10 MG/1
10 TABLET, COATED ORAL DAILY
Qty: 90 TABLET | Refills: 3 | Status: SHIPPED | OUTPATIENT
Start: 2024-11-14

## 2024-11-14 NOTE — PATIENT INSTRUCTIONS
Diabetes: uncontrolled, HgA1c 7.0; Patient to start Jardiance 25 mg daily and continue Rybelsus 14 mg daily; going to try to keep on Rybelsus 14 mg for at least 3 more months if insurance allows; then will discuss medication change due to insurance after that. Patient agreeable to injections. Patient to watch portion controls and work on diabetes diet more. Will repeat labs in March 2025 and follow up appt.    Anxiety/depression: stable, Patient on lower dose of fluoxetine and tolerating well. Patient denies any issues and/or concerns and requesting to stay on fluoxetine 20 mg daily.    Hypothyroidism: stable, continue levothyroxine at 50 mcg daily.    Osteopenia: continue vitamin D/Calcium supplement daily, continue to eat adequate daily protein, recommend weight bearing activities once approved by orthopedic due to Patient's foot fracture, limit alcohol intake and recheck Dexa scan in 2 years.     Hypertension/hyperlipidemia/OA: continue maintenance medications as current.

## 2024-12-02 ENCOUNTER — TELEPHONE (OUTPATIENT)
Dept: FAMILY MEDICINE CLINIC | Facility: CLINIC | Age: 69
End: 2024-12-02

## 2024-12-02 DIAGNOSIS — B00.9 HSV (HERPES SIMPLEX VIRUS) INFECTION: Primary | ICD-10-CM

## 2024-12-02 RX ORDER — FAMCICLOVIR 125 MG/1
TABLET ORAL
Qty: 10 TABLET | Refills: 5 | Status: SHIPPED | OUTPATIENT
Start: 2024-12-02 | End: 2024-12-02 | Stop reason: SDUPTHER

## 2024-12-02 RX ORDER — FAMCICLOVIR 125 MG/1
TABLET ORAL
Qty: 10 TABLET | Refills: 5 | Status: SHIPPED | OUTPATIENT
Start: 2024-12-02

## 2024-12-02 NOTE — TELEPHONE ENCOUNTER
Caller: Summer Jefferson    Relationship: Self    Best call back number: [phone]    What medication are you requesting:   famciclovir (FAMVIR) 500 MG tablet        Sig: Take 500 mg by mouth 2 (two) times a day.            What are your current symptoms:     How long have you been experiencing symptoms:     Have you had these symptoms before:    [x] Yes  [] No    Have you been treated for these symptoms before:   [x] Yes  [] No    If a prescription is needed, what is your preferred pharmacy and phone number: [facility] [id number] - [place], [place] - [address] AT [address] - [phone]  - [phone] FX     Additional notes:

## 2024-12-05 ENCOUNTER — OFFICE VISIT (OUTPATIENT)
Dept: ORTHOPEDIC SURGERY | Facility: CLINIC | Age: 69
End: 2024-12-05
Payer: MEDICARE

## 2024-12-05 VITALS — WEIGHT: 175 LBS | HEIGHT: 64 IN | BODY MASS INDEX: 29.88 KG/M2 | TEMPERATURE: 96.9 F

## 2024-12-05 DIAGNOSIS — S92.354D CLOSED NONDISPLACED FRACTURE OF FIFTH METATARSAL BONE OF RIGHT FOOT WITH ROUTINE HEALING, SUBSEQUENT ENCOUNTER: Primary | ICD-10-CM

## 2024-12-05 NOTE — PROGRESS NOTES
"Foot Follow Up      Patient: Summer Jefferson    YOB: 1955 69 y.o. female    Chief Complaints: Foot feels fine    History of Present Illness:Patient was seen on 11/11/2024 reporting that she injured her right foot approximately 1 week prior when she stepped on a stone in a parking lot.  She had had pain swelling and bruising at the lateral midfoot but really none to the ankle.  She had been seen in urgent care on and fitted with a short boot.     When seen on 11/11/2024 she reported mild pain in the proximal lateral aspect of the right fifth metatarsal.    At that time she was found to have fifth metatarsal base fracture without clear evidence of ankle injury.  Did not recommend any surgical treatment at that time.  She was instructed on continue send boot and sleeping in a postoperative shoe and offloading with a cane.  She was also counseled avoid anti-inflammatories.    Patient is seen back today reporting that her foot feels fine.  She been limiting activity on it and reports she does not have any \"real pain\".  She been using her boot for weightbearing and offloading with a cane and sleeping in a postoperative shoe.  She has minimal if any pain at the base of the right fifth metatarsal no pain about the ankle.  HPI    ROS: Foot pain  Past Medical History:   Diagnosis Date    Anxiety and depression 03/15/2022    Backache     Diabetes mellitus type 2, controlled     HSV (herpes simplex virus) infection 03/15/2022    Hypercholesterolemia     Hypertension     Hypothyroidism     Osteoarthritis of multiple joints     Spinal stenosis     LUMBAR     Physical Exam:   Vitals:    12/05/24 1334   Temp: 96.9 °F (36.1 °C)   Weight: 79.4 kg (175 lb)   Height: 162.6 cm (64\")   PainSc: 0-No pain     Well developed with normal mood.  Patient is minimal tenderness palpation at the base of the right fifth metatarsal and was nontender about the right ankle.      Radiology: 3 view right foot ordered evaluate " "fracture reviewed and compared to previous x-rays these show no appreciable change in alignment to the proximal fifth metatarsal fracture does appear to be some resorption present increased gapping laterally at this point remains congruent.  Lateral Measures about 2.8 mm and 2 mm plantarly.    Vitamin D 1/17/2024 42.7      Assessment/Plan:  1. Right fifth metatarsal base fracture with clear sign of ankle injury     We discussed treatment going forward and reviewed that she does have a little bit more displacement but joint is not incongruent and her symptoms are improving I would not recommend any surgical treatment at this time or further workup nor did she desire to understanding that should she fail to heal and be symptomatic could require surgical treatment.  She reports she did have some surgery on her back where things had \"shifted in the past\".    Will continue with nonoperative treatment for now.  She will use her boot or postoperative shoe for any weightbearing activities and offload with a cane.    She reports that she is taking \"1-1/2 times the vitamins that she is supposed to take including vitamin D and her level was decent in January so we will hold off on rechecking that unless she has further healing issues     she may walk is much as she needs to for activities of daily living with protection as outlined above.    Will see her back in 4 weeks with x-rays of her right foot.  "

## 2024-12-16 DIAGNOSIS — E78.00 HYPERCHOLESTEROLEMIA: Chronic | ICD-10-CM

## 2024-12-16 RX ORDER — ROSUVASTATIN CALCIUM 10 MG/1
10 TABLET, COATED ORAL DAILY
Qty: 90 TABLET | Refills: 3 | Status: SHIPPED | OUTPATIENT
Start: 2024-12-16

## 2024-12-17 DIAGNOSIS — M19.09 PRIMARY OSTEOARTHRITIS OF OTHER SITE: ICD-10-CM

## 2024-12-17 RX ORDER — CELECOXIB 200 MG/1
200 CAPSULE ORAL 2 TIMES DAILY
Qty: 60 CAPSULE | Refills: 2 | Status: SHIPPED | OUTPATIENT
Start: 2024-12-17

## 2024-12-17 NOTE — TELEPHONE ENCOUNTER
Caller: Summer Jefferson Edward    Relationship: Self    Best call back number:  [phone]      Requested Prescriptions:   Requested Prescriptions     Pending Prescriptions Disp Refills    celecoxib (CeleBREX) 200 MG capsule 60 capsule 2     Sig: Take 1 capsule by mouth 2 (Two) Times a Day.        Pharmacy where request should be sent: [facility] - [facility] [address] - [phone]  - [phone] FX     Last office visit with prescribing clinician: 8/19/2024   Last telemedicine visit with prescribing clinician: 11/14/2024   Next office visit with prescribing clinician: 3/10/2025     Additional details provided by patient: ABOUT OUT OF MEDICATION.  SHE NEEDS 90 DAYS SUPPLY    Does the patient have less than a 3 day supply:  [x] Yes  [] No    Would you like a call back once the refill request has been completed: [] Yes [x] No    If the office needs to give you a call back, can they leave a voicemail: [] Yes [x] No    Sara Heck Rep   12/17/24 11:15 EST

## 2024-12-23 ENCOUNTER — ANESTHESIA (OUTPATIENT)
Dept: PAIN MEDICINE | Facility: HOSPITAL | Age: 69
End: 2024-12-23
Payer: MEDICARE

## 2024-12-23 ENCOUNTER — ANESTHESIA EVENT (OUTPATIENT)
Dept: PAIN MEDICINE | Facility: HOSPITAL | Age: 69
End: 2024-12-23
Payer: MEDICARE

## 2024-12-23 ENCOUNTER — HOSPITAL ENCOUNTER (OUTPATIENT)
Dept: GENERAL RADIOLOGY | Facility: HOSPITAL | Age: 69
Discharge: HOME | End: 2024-12-23
Payer: MEDICARE

## 2024-12-23 ENCOUNTER — HOSPITAL ENCOUNTER (OUTPATIENT)
Dept: PAIN MEDICINE | Facility: HOSPITAL | Age: 69
Discharge: HOME | End: 2024-12-23
Payer: MEDICARE

## 2024-12-23 VITALS
HEART RATE: 78 BPM | SYSTOLIC BLOOD PRESSURE: 132 MMHG | DIASTOLIC BLOOD PRESSURE: 84 MMHG | RESPIRATION RATE: 14 BRPM | TEMPERATURE: 96.9 F | OXYGEN SATURATION: 95 %

## 2024-12-23 DIAGNOSIS — R52 PAIN: ICD-10-CM

## 2024-12-23 DIAGNOSIS — M46.1 SACROILIITIS: Primary | ICD-10-CM

## 2024-12-23 LAB — GLUCOSE BLDC GLUCOMTR-MCNC: 134 MG/DL (ref 70–130)

## 2024-12-23 PROCEDURE — 25010000002 BUPIVACAINE (PF) 0.25 % SOLUTION: Performed by: STUDENT IN AN ORGANIZED HEALTH CARE EDUCATION/TRAINING PROGRAM

## 2024-12-23 PROCEDURE — 82948 REAGENT STRIP/BLOOD GLUCOSE: CPT

## 2024-12-23 PROCEDURE — 25010000002 METHYLPREDNISOLONE PER 80 MG: Performed by: STUDENT IN AN ORGANIZED HEALTH CARE EDUCATION/TRAINING PROGRAM

## 2024-12-23 RX ORDER — METHYLPREDNISOLONE ACETATE 80 MG/ML
80 INJECTION, SUSPENSION INTRA-ARTICULAR; INTRALESIONAL; INTRAMUSCULAR; SOFT TISSUE ONCE
Status: COMPLETED | OUTPATIENT
Start: 2024-12-23 | End: 2024-12-23

## 2024-12-23 RX ORDER — AMOXICILLIN 500 MG/1
500 CAPSULE ORAL 3 TIMES DAILY
COMMUNITY
Start: 2024-12-19

## 2024-12-23 RX ORDER — BUPIVACAINE HYDROCHLORIDE 2.5 MG/ML
10 INJECTION, SOLUTION EPIDURAL; INFILTRATION; INTRACAUDAL ONCE
Status: COMPLETED | OUTPATIENT
Start: 2024-12-23 | End: 2024-12-23

## 2024-12-23 RX ORDER — MIDAZOLAM HYDROCHLORIDE 1 MG/ML
1 INJECTION, SOLUTION INTRAMUSCULAR; INTRAVENOUS AS NEEDED
Status: DISCONTINUED | OUTPATIENT
Start: 2024-12-23 | End: 2024-12-24 | Stop reason: HOSPADM

## 2024-12-23 RX ORDER — LIDOCAINE HYDROCHLORIDE 10 MG/ML
1 INJECTION, SOLUTION INFILTRATION; PERINEURAL ONCE
Status: DISCONTINUED | OUTPATIENT
Start: 2024-12-23 | End: 2024-12-24 | Stop reason: HOSPADM

## 2024-12-23 RX ORDER — FENTANYL CITRATE 50 UG/ML
25 INJECTION, SOLUTION INTRAMUSCULAR; INTRAVENOUS AS NEEDED
Status: DISCONTINUED | OUTPATIENT
Start: 2024-12-23 | End: 2024-12-24 | Stop reason: HOSPADM

## 2024-12-23 RX ADMIN — METHYLPREDNISOLONE ACETATE 80 MG: 80 INJECTION, SUSPENSION INTRA-ARTICULAR; INTRALESIONAL; INTRAMUSCULAR; SOFT TISSUE at 14:34

## 2024-12-23 RX ADMIN — BUPIVACAINE HYDROCHLORIDE 10 ML: 2.5 INJECTION, SOLUTION EPIDURAL; INFILTRATION; INTRACAUDAL; PERINEURAL at 14:34

## 2024-12-23 NOTE — ANESTHESIA PROCEDURE NOTES
PAIN SI joint injection      Patient reassessed immediately prior to procedure    Patient location during procedure: Pain Clinic    Reason for block: procedure for pain  Performed by  Anesthesiologist: Dee Bowens MD  Preanesthetic Checklist  Completed: patient identified, IV checked, site marked, risks and benefits discussed, surgical consent, monitors and equipment checked, pre-op evaluation and timeout performed  Prep:  Patient position: prone  Sterile barriers:gloves, mask and sterile barrier  Prep: ChloraPrep  Patient monitoring: blood pressure monitoring and continuous pulse oximetry  Procedure:  Sedation:no  Guidance:fluoroscopy  Contrast Medium:no  Location:Bilateral  Needle Type:Quincke  Needle Gauge:22 G  Aspiration:negative  Medications:  Depomedrol:80 mg  Comment:0.25% Bupivacaine 4ml.  Total volume of injectate divided between each laterality    Post Assessment  Injection Assessment: negative  Patient Tolerance:comfortable throughout block  Complications:no  Additional Notes  Diagnosis: Post-Op Diagnosis Codes:     * Sacroiliitis [M46.1]     Sedation:  none     Sedation time:     Under fluoroscopic guidance and after ChloraPrep, the SI joint was accessed and injected with the above medications.

## 2024-12-23 NOTE — H&P
CHIEF COMPLAINT:   Bilateral buttock pain    HISTORY OF PRESENT ILLNESS:    The patient is a 69 y.o. female who presents today with complaints of bilateral buttock pain that has been present for approximately 2 months.  She has a history of lumbar fusion at L2-3, L3-4, L4-5 with Dr. Whittaker in February 2024.  She has a history of sacroiliitis that has been previously treated with SI joint injections.    The patient's pain is located in bilateral buttock and radiates to bilateral lower extremities.  Their pain is a 6-9/10.  The symptom is described as intense ache.  The pain is worsening in severity.   Their symptoms are exacerbated by activity, movement, changing positions and alleviated by rest and medications.    The conservative measures the patient has attempted recently without significant improvement include: Rest, ice, heat, OTC medications, NSAIDs.  The physical therapy exercises for her back seem to be exacerbating her SI pain.  She has plans to resume PT after the SI joint injection  Her lumbar x-ray from 10/29/2024 was reviewed that showed stable postsurgical changes with L2-L5 fusion.  There is disc degeneration at L1-2 and L5-S1.      PAST MEDICAL HISTORY:  Current Outpatient Medications on File Prior to Encounter   Medication Sig Dispense Refill    amLODIPine (NORVASC) 5 MG tablet Take 1 tablet by mouth Daily. 90 tablet 3    amoxicillin (AMOXIL) 500 MG capsule Take 1 capsule by mouth 3 (Three) Times a Day. FOR BROKEN CAP ON TOOTH      celecoxib (CeleBREX) 200 MG capsule Take 1 capsule by mouth 2 (Two) Times a Day. 60 capsule 2    empagliflozin (Jardiance) 25 MG tablet tablet Take 1 tablet by mouth Daily. 90 tablet 3    FLUoxetine (PROzac) 20 MG capsule Take 1 capsule by mouth Daily. 90 capsule 3    hydroCHLOROthiazide 12.5 MG tablet Take 1 tablet by mouth Daily. 90 tablet 3    levothyroxine (SYNTHROID, LEVOTHROID) 50 MCG tablet Take 1 tablet by mouth Daily. 90 tablet 3    lidocaine (LIDODERM) 5 % Place 1  patch on the skin as directed by provider Daily. Remove & Discard patch within 12 hours; use as needed for back pain 60 each 5    lisinopril (PRINIVIL,ZESTRIL) 10 MG tablet Take 1 tablet by mouth Daily. 90 tablet 3    rosuvastatin (CRESTOR) 10 MG tablet Take 1 tablet by mouth Daily. 90 tablet 3    Semaglutide (Rybelsus) 14 MG tablet Take 1 tablet by mouth Daily. 90 tablet 3    famciclovir (FAMVIR) 125 MG tablet 1 tab twice a day for 5 days with flair ups 10 tablet 5     No current facility-administered medications on file prior to encounter.       Past Medical History:   Diagnosis Date    Anxiety and depression 03/15/2022    Backache     Diabetes mellitus type 2, controlled     HSV (herpes simplex virus) infection 03/15/2022    Hypercholesterolemia     Hypertension     Hypothyroidism     Osteoarthritis of multiple joints     Spinal stenosis     LUMBAR         SOCIAL HISTORY:  No current tobacco    REVIEW OF SYSTEMS:  No hematologic infectious or constitutional symptoms  Other review of systems non-contributory  Negative screen for KHLOE      PHYSICAL EXAM:  /82 (BP Location: Left arm, Patient Position: Lying)   Pulse 82   Temp 36.1 °C (96.9 °F) (Temporal)   Resp 16   SpO2 96%   Well-developed well-nourished no acute distress  Extra ocular movements intact  Mallampati class 2 airway  Alert and oriented ×3  Deep tendon reflexes normal in the bilateral patella  Positive straight leg raise bilaterally  Positive bilateral SI joint tenderness  Positive bilateral Oanh maneuver  Positive compression test  Positive distraction test    DIAGNOSIS:  Post-Op Diagnosis Codes:     * Sacroiliitis [M46.1]    PLAN:  1.  Bilateral sacroiliac joint steroid injections, up to 3. If pain control is acceptable after 1 or 2 injections, it was discussed with the patient that they may return for the subsequent injections if and when their pain returns.  The risks were discussed with the patient including failure of relief, worsening  pain, bleeding, infection, injury.  2.  Physical therapy exercises at home as prescribed by physical therapy or from the pain clinic handout.  Continuation of these exercises every day, or multiple times per week, even when the patient has good pain relief, was stressed to the patient as a preventative measure to decrease the frequency and severity of future pain episodes.  3.  Continue pain medicines as already prescribed.  If patient not currently taking any, it is recommended to begin Acetaminophen 1000 mg po q 8 hours.  If other medicines containing Acetaminophen are currently prescribed, maintain daily dose at 3000 mg.    4.  If they can tolerate NSAIDS, it is recommended to take Ibuprofen 600 mg po q 6 hours for 7 days during pain exacerbations.  Alternatively, they may substitute an NSAID of their choice (e.g. Aleve).  This may be taken at the same time as Acetaminophen.  5.  Heat and ice to the affected area as tolerated for pain control.    6.  Daily low impact exercise such as walking or water exercise was recommended to maintain overall health and aid in weight control.   7.  Follow up as needed for subsequent injections.

## 2025-01-03 ENCOUNTER — TELEPHONE (OUTPATIENT)
Dept: ORTHOPEDIC SURGERY | Facility: CLINIC | Age: 70
End: 2025-01-03

## 2025-01-03 ENCOUNTER — OFFICE VISIT (OUTPATIENT)
Dept: ORTHOPEDIC SURGERY | Facility: CLINIC | Age: 70
End: 2025-01-03
Payer: MEDICARE

## 2025-01-03 VITALS — TEMPERATURE: 96.9 F | HEIGHT: 64 IN | WEIGHT: 192 LBS | BODY MASS INDEX: 32.78 KG/M2

## 2025-01-03 DIAGNOSIS — S92.354D CLOSED NONDISPLACED FRACTURE OF FIFTH METATARSAL BONE OF RIGHT FOOT WITH ROUTINE HEALING, SUBSEQUENT ENCOUNTER: ICD-10-CM

## 2025-01-03 DIAGNOSIS — R52 PAIN: Primary | ICD-10-CM

## 2025-01-03 NOTE — PROGRESS NOTES
"Foot Follow Up      Patient: Summer Jefferson    YOB: 1955 69 y.o. female    Chief Complaints: Foot \"feels great\"    History of Present Illness:Patient was seen on 11/11/2024 reporting that she injured her right foot approximately 1 week prior when she stepped on a stone in a parking lot.  She had had pain swelling and bruising at the lateral midfoot but really none to the ankle.  She had been seen in urgent care on and fitted with a short boot.     When seen on 11/11/2024 she reported mild pain in the proximal lateral aspect of the right fifth metatarsal.     At that time she was found to have fifth metatarsal base fracture without clear evidence of ankle injury.  Did not recommend any surgical treatment at that time.  She was instructed on continue send boot and sleeping in a postoperative shoe and offloading with a cane.  She was also counseled avoid anti-inflammatories.     Patient was seen on 12/5/2024 reporting that her foot felt fine.  She had been limiting activity on it and reported that she does not have any \"real pain\".  She had been using her boot for weightbearing and offloading with a cane and sleeping in a postoperative shoe.  She had minimal if any pain at the base of the right fifth metatarsal and no pain about the ankle.    We discussed treatment at that time and reviewed x-rays and did have a bit more displacement but joint did not show incongruity.  Symptoms are improving and I did not recommend any further workup or surgical treatment nor does she desire it.  She was instructed on use of boot or postoperative shoe for weightbearing activities and to offload with a cane.  She also reported that she was taking \"1-1/2 times\" the vitamins that she was supposed to take including vitamin D and her level was decent in January 2024 so we held off on rechecking at that time unless she had further healing issues.  Reviewed she could walk is much as needed for daily activity with " "protection as previously outlined.    Patient is seen back today reporting that her foot feels great.  She been using her boot for weightbearing activities and sleeping in a postoperative shoe with no pain in the right foot.  Pain is rated 2 out of 10  HPI    ROS: No foot pain  Past Medical History:   Diagnosis Date    Anxiety and depression 03/15/2022    Backache     Diabetes mellitus type 2, controlled     HSV (herpes simplex virus) infection 03/15/2022    Hypercholesterolemia     Hypertension     Hypothyroidism     Osteoarthritis of multiple joints     Spinal stenosis     LUMBAR     Physical Exam:   Vitals:    01/03/25 1320   Temp: 96.9 °F (36.1 °C)   TempSrc: Temporal   Weight: 87.1 kg (192 lb)   Height: 162.6 cm (64\")   PainSc: 0-No pain   PainLoc: Foot     Well developed with normal mood.  On exam she was without focal tenderness at the base of the right fifth metatarsal and had good eversion strength.      Radiology: 3 views of the right foot ordered evaluate fracture reviewed and compared to previous x-rays these show no additional displacement compared with previous x-rays with persistent approximately 3 mm gap laterally and plantarly but fracture does appear to be filling in and is somewhat less prominent than on previous x-rays.    Vitamin D 1/17/2024 42.7        Assessment/Plan:  1. Right fifth metatarsal base fracture with clear sign of ankle injury    We discussed treatment going forward and reviewed x-ray findings with her.  Although there is still some gapping she is clinically asymptomatic and would not recommend any further workup or surgical treatment at this time.    She is anxious to get out of her boot.  Will allow her to start weaning out of her boot or postoperative shoe initially around the house for a week or so and then gradually out of the house.  If she is tolerating this she may then start weaning into a sturdy athletic shoe initially around the house and gradually out of the " house.    She is leaving in 2 weeks to go on a cruise so we will see her when she gets back in about 4 weeks and she is due to take her boot with her.  If she has any recurrent pain she will get into her boot get office and let me know    Follow-up in 4 weeks with x-rays of right foot.  She told me how much she has appreciated my care and we had a pleasant conversation about cruises today.

## 2025-01-03 NOTE — TELEPHONE ENCOUNTER
Caller: Summer Jefferson    Relationship: Self    Best call back number: [phone]    What was the call regarding: PT REQUESTING XRAY ORDER FROM DR ALEX TO BE PLACED TODAY FOR R FOOT. PT IS CONCERNED SHE WILL NOT BE ABLE TO COME TO APPT ON 01/06/25 DUE TO WEATHER, AND PT WOULD LIKE TO SEE IF SHE CAN GO TO THE [facility] TO GET HER R FOOT XRAYED AND BE CLEARED TO REMOVE HER BOOT/CAST ON HER R FOOT. PLEASE CONTACT PT TO DISCUSS.

## 2025-01-03 NOTE — TELEPHONE ENCOUNTER
Spoke with patient and she is going to come in this afternoon at 1:30.    Yajaira please add patient to the schedule!

## 2025-01-08 ENCOUNTER — TELEPHONE (OUTPATIENT)
Dept: FAMILY MEDICINE CLINIC | Facility: CLINIC | Age: 70
End: 2025-01-08

## 2025-01-08 NOTE — TELEPHONE ENCOUNTER
Caller: Summer Jefferson    Relationship: Self    Best call back number: [phone]    What medication are you requesting: MOUNJARO    If a prescription is needed, what is your preferred pharmacy and phone number: [facility] - [place], [place] - [address] - [phone]  - [phone]      Additional notes: PATIENT STATES THAT HER INSURANCE WILL NO LONGER BE COVERING THIS MEDICATION.     PATIENT STATES THAT SHE SPOKE WITH HER PHARMACY AND THEY WILL COVER MOUNJARO SO SHE WOULD LIKE TO SEE IF SHE CAN BE PRESCRIBED THAT MEDICATION.     PLEASE CALL PATIENT WITH ANY QUESTIONS OR CONCERNS.

## 2025-01-09 DIAGNOSIS — E11.65 CONTROLLED TYPE 2 DIABETES MELLITUS WITH HYPERGLYCEMIA, WITHOUT LONG-TERM CURRENT USE OF INSULIN: Primary | Chronic | ICD-10-CM

## 2025-01-13 ENCOUNTER — TELEPHONE (OUTPATIENT)
Dept: FAMILY MEDICINE CLINIC | Facility: CLINIC | Age: 70
End: 2025-01-13

## 2025-01-13 DIAGNOSIS — T75.3XXA MOTION SICKNESS, INITIAL ENCOUNTER: Primary | ICD-10-CM

## 2025-01-13 RX ORDER — SCOLOPAMINE TRANSDERMAL SYSTEM 1 MG/1
1 PATCH, EXTENDED RELEASE TRANSDERMAL
Qty: 4 PATCH | Refills: 0 | Status: SHIPPED | OUTPATIENT
Start: 2025-01-13

## 2025-01-13 NOTE — TELEPHONE ENCOUNTER
Caller: Summer Jefferson    Relationship: Self    Best call back number: [phone]    What medication are you requesting: MOTION SICKNESS PATCHES (2 WEEK SUPPLY)     If a prescription is needed, what is your preferred pharmacy and phone number: [id number] - [place], [place] - [address] - [phone] [facility] [phone]      Additional notes: PT IS GOING ON A CRUISE.

## 2025-01-21 ENCOUNTER — DOCUMENTATION (OUTPATIENT)
Dept: FAMILY MEDICINE CLINIC | Facility: CLINIC | Age: 70
End: 2025-01-21
Payer: MEDICARE

## 2025-01-31 ENCOUNTER — TELEPHONE (OUTPATIENT)
Dept: NEUROSURGERY | Facility: CLINIC | Age: 70
End: 2025-01-31
Payer: MEDICARE

## 2025-01-31 NOTE — TELEPHONE ENCOUNTER
Called and spoke to patient in regards to her 02/06/2025 appt with . Informed that he has a surgery to perform that morning but he will still see patient's afterwards. Scheduled patient for later in the day at 2:45 pm.

## 2025-02-05 ENCOUNTER — OFFICE VISIT (OUTPATIENT)
Dept: ORTHOPEDIC SURGERY | Facility: CLINIC | Age: 70
End: 2025-02-05
Payer: MEDICARE

## 2025-02-05 VITALS — BODY MASS INDEX: 32.78 KG/M2 | WEIGHT: 192 LBS | TEMPERATURE: 96.6 F | HEIGHT: 64 IN

## 2025-02-05 DIAGNOSIS — E55.9 VITAMIN D DEFICIENCY: ICD-10-CM

## 2025-02-05 DIAGNOSIS — S92.354D CLOSED NONDISPLACED FRACTURE OF FIFTH METATARSAL BONE OF RIGHT FOOT WITH ROUTINE HEALING, SUBSEQUENT ENCOUNTER: ICD-10-CM

## 2025-02-05 DIAGNOSIS — R52 PAIN: Primary | ICD-10-CM

## 2025-02-05 DIAGNOSIS — S92.354K CLOSED NONDISPLACED FRACTURE OF FIFTH METATARSAL BONE OF RIGHT FOOT WITH NONUNION, SUBSEQUENT ENCOUNTER: ICD-10-CM

## 2025-02-05 NOTE — PROGRESS NOTES
"Foot Follow Up      Patient: Summer Jefferson    YOB: 1955 69 y.o. female    Chief Complaints: Foot \"feels okay\"    History of Present Illness:Patient was seen on 11/11/2024 reporting that she injured her right foot approximately 1 week prior when she stepped on a stone in a parking lot.  She had had pain swelling and bruising at the lateral midfoot but really none to the ankle.  She had been seen in urgent care on and fitted with a short boot.     When seen on 11/11/2024 she reported mild pain in the proximal lateral aspect of the right fifth metatarsal.     At that time she was found to have fifth metatarsal base fracture without clear evidence of ankle injury.  Did not recommend any surgical treatment at that time.  She was instructed on continue send boot and sleeping in a postoperative shoe and offloading with a cane.  She was also counseled avoid anti-inflammatories.     Patient was seen on 12/5/2024 reporting that her foot felt fine.  She had been limiting activity on it and reported that she does not have any \"real pain\".  She had been using her boot for weightbearing and offloading with a cane and sleeping in a postoperative shoe.  She had minimal if any pain at the base of the right fifth metatarsal and no pain about the ankle.     We discussed treatment at that time and reviewed x-rays and did have a bit more displacement but joint did not show incongruity.  Symptoms are improving and I did not recommend any further workup or surgical treatment nor does she desire it.  She was instructed on use of boot or postoperative shoe for weightbearing activities and to offload with a cane.  She also reported that she was taking \"1-1/2 times\" the vitamins that she was supposed to take including vitamin D and her level was decent in January 2024 so we held off on rechecking at that time unless she had further healing issues.  Reviewed she could walk is much as needed for daily activity with " "protection as previously outlined.     Patient was seen on 1/3/2025 reporting that her foot felt had great.  She had been using her boot for weightbearing activities and sleeping in a postoperative shoe with no pain in the right foot.  Pain was rated 0 out of 10.    At that time she seemed to be doing well with fifth metatarsal base fracture without clear sign of ankle injury.  Reviewed x-ray findings with her and although there was still some gapping she was clinically asymptomatic and did not recommend further workup.  She was anxious to get out of her boot.  She was allowed start weaning out of her boot and into  postoperative shoe initially around the house for a week or so and gradually out of the house and tolerating then start weaning into a sturdy athletic shoe initially around the house and then gradually out of the house.  She was leaving in 2 weeks to go on a cruise and recommended she take her boot with her.      Patient is seen back today reporting that her foot feels okay.  She used her boot for about another week after I saw her last and then subsequent started using tennis shoes or stiff shoes.  She did take her boot with her on her cruise and used it about half the time.  She reports that overall her foot feels okay but gets an occasional slight discomfort at the base of the fifth metatarsal.  Symptoms are no worse out of her boot than they were in her boot.  HPI    ROS: Foot pain  Past Medical History:   Diagnosis Date    Anxiety and depression 03/15/2022    Backache     Diabetes mellitus type 2, controlled     HSV (herpes simplex virus) infection 03/15/2022    Hypercholesterolemia     Hypertension     Hypothyroidism     Osteoarthritis of multiple joints     Spinal stenosis     LUMBAR     Physical Exam:   Vitals:    02/05/25 1151   Temp: 96.6 °F (35.9 °C)   Weight: 87.1 kg (192 lb)   Height: 162.6 cm (64\")   PainSc:   3     Well developed with normal mood.  On exam she has slight tenderness to " palpation at the base of the right fifth metatarsal but good eversion strength without pain on the peroneal tendons      Radiology: 3 views right foot ordered evaluate fracture reviewed with her and she took a picture and compared to previous x-rays these show some persistence of fracture line over the lateral aspect of the foot with gapping of about 3.5 mm and similarly plantarly there may be some healing medially but difficult to say for sure.  Joint does not appear to be incongruent     Vitamin D 1/17/2024 42.7        Assessment/Plan:  1. Right fifth metatarsal base fracture without clear sign of ankle injury    We discussed treatment going forward reviewed x-rays with her.  There appears to be areas of nonunion which are somewhat symptomatic but would not recommend any surgical treatment at this time nor does she wish to pursue this without exhausting conservative measures.    Will have her get her vitamin D level checked over the outpatient lab and may need to augment this.  Will also order a bone stimulator as I think this to be an excellent adjunct to facilitate nonoperative treatment and would be helpful even if she does eventually have to need surgical treatment.    Been very pleasant visit she said she enjoyed her cruise and I will see her back in about 6 weeks with x-rays of her right foot but if anything worsens in the interim she will get back into her boot and let me know.

## 2025-02-06 ENCOUNTER — OFFICE VISIT (OUTPATIENT)
Dept: NEUROSURGERY | Facility: CLINIC | Age: 70
End: 2025-02-06
Payer: MEDICARE

## 2025-02-06 ENCOUNTER — LAB (OUTPATIENT)
Dept: LAB | Facility: HOSPITAL | Age: 70
End: 2025-02-06
Payer: MEDICARE

## 2025-02-06 VITALS
WEIGHT: 192.02 LBS | SYSTOLIC BLOOD PRESSURE: 108 MMHG | TEMPERATURE: 97 F | HEART RATE: 111 BPM | HEIGHT: 64 IN | OXYGEN SATURATION: 95 % | BODY MASS INDEX: 32.78 KG/M2 | RESPIRATION RATE: 16 BRPM | DIASTOLIC BLOOD PRESSURE: 62 MMHG

## 2025-02-06 DIAGNOSIS — M46.1 SI (SACROILIAC) JOINT INFLAMMATION: Primary | ICD-10-CM

## 2025-02-06 DIAGNOSIS — E55.9 VITAMIN D DEFICIENCY: ICD-10-CM

## 2025-02-06 DIAGNOSIS — S92.354K CLOSED NONDISPLACED FRACTURE OF FIFTH METATARSAL BONE OF RIGHT FOOT WITH NONUNION, SUBSEQUENT ENCOUNTER: ICD-10-CM

## 2025-02-06 LAB — 25(OH)D3 SERPL-MCNC: 41.2 NG/ML (ref 30–100)

## 2025-02-06 PROCEDURE — 80053 COMPREHEN METABOLIC PANEL: CPT | Performed by: NURSE PRACTITIONER

## 2025-02-06 PROCEDURE — 36415 COLL VENOUS BLD VENIPUNCTURE: CPT

## 2025-02-06 PROCEDURE — 82306 VITAMIN D 25 HYDROXY: CPT

## 2025-02-06 PROCEDURE — 83036 HEMOGLOBIN GLYCOSYLATED A1C: CPT | Performed by: NURSE PRACTITIONER

## 2025-02-06 RX ORDER — MELOXICAM 15 MG/1
15 TABLET ORAL DAILY
Qty: 14 TABLET | Refills: 0 | Status: SHIPPED | OUTPATIENT
Start: 2025-02-06

## 2025-02-06 NOTE — PROGRESS NOTES
Patient ID: Summer Jefferson is a 69 y.o. female is here today for follow-up for SI joint inflammation.    Treatment: SI joint injections completed on 12/23/2024    Subjective     The patient is here in regards to   Chief Complaint   Patient presents with    Back Pain    Leg Pain    Follow-up       History of Present Illness  Summer got excellent relief from her SI joint injection for about 7 days.  During that time she had excellent 100% relief.  However that has worn off.  She still continues to walk with a slight limp due to her broken foot which has not fully healed yet.      While in the room and during my examination of the patient I wore a mask and eye protection.  I washed my hands before and after this patient encounter.  The patient was also wearing a mask.    The following portions of the patient's history were reviewed and updated as appropriate: allergies, current medications, past family history, past medical history, past social history, past surgical history and problem list.    Review of Systems   Constitutional:  Negative for fever.   Musculoskeletal:  Positive for back pain (and leg pain) and gait problem.   Neurological:  Negative for dizziness, weakness, light-headedness, numbness and headaches.        Past Medical History:   Diagnosis Date    Anxiety and depression 03/15/2022    Backache     Diabetes mellitus type 2, controlled     HSV (herpes simplex virus) infection 03/15/2022    Hypercholesterolemia     Hypertension     Hypothyroidism     Osteoarthritis of multiple joints     Spinal stenosis     LUMBAR       No Known Allergies    Family History   Problem Relation Age of Onset    Thyroid disease Mother     Diabetes Mother     Hypertension Mother     Heart disease Mother     Vision loss Father     Malig Hyperthermia Neg Hx        Social History     Socioeconomic History    Marital status:    Tobacco Use    Smoking status: Former     Current packs/day: 0.00     Average packs/day: 1  pack/day for 10.0 years (10.0 ttl pk-yrs)     Types: Cigarettes     Start date: 1983     Quit date: 1993     Years since quittin.2     Passive exposure: Past    Smokeless tobacco: Never    Tobacco comments:     quit 15 yrs ago   Vaping Use    Vaping status: Never Used   Substance and Sexual Activity    Alcohol use: Yes     Comment: 1-2 a month    Drug use: Never    Sexual activity: Defer       Past Surgical History:   Procedure Laterality Date    ANKLE ARTHROSCOPY Right     2013 & 10/2013    COLONOSCOPY      2007    EPIDURAL BLOCK      X5    LAPAROSCOPIC GASTRIC BANDING  2007    LUMBAR FUSION Left 2023    Procedure: PRONE OBLIQUE LATERAL LUMBAR TWO TO THREE, FOUR TO FIVE INTERBODY FUSION WITH ANTERIOR PLATING AND NEUROMONITORING;  Surgeon: Calderon Whittaker MD;  Location: [facility];  Service: Neurosurgery;  Laterality: Left;    LUMBAR FUSION N/A 2023    Procedure: LUMBAR FUSION MINIMALLY INVASIVE NAVIGATION;  Surgeon: Calderon Whittaker MD;  Location: [facility] OR;  Service: Neurosurgery;  Laterality: N/A;    LUMBAR FUSION N/A 2024    Procedure: LUMBAR TWO THREE, THREE TO FOUR, FOUR TO FIVE FUSION MINIMALLY INVASIVE NAVIGATION;  Surgeon: Calderon Whittaker MD;  Location: [facility] OR;  Service: Neurosurgery;  Laterality: N/A;    ROTATOR CUFF REPAIR Right     2014    TOTAL KNEE ARTHROPLASTY Right     2014         Objective     Vitals:    25 1430   BP: 108/62   Pulse: 111   Resp: 16   Temp: 97 °F (36.1 °C)   SpO2: 95%     Body mass index is 32.96 kg/m².    Physical Exam  Constitutional:       General: She is awake.   HENT:      Head: Normocephalic and atraumatic.   Eyes:      General: Lids are normal.      Extraocular Movements: Extraocular movements intact.      Conjunctiva/sclera: Conjunctivae normal.      Pupils: Pupils are equal, round, and reactive to light.   Pulmonary:      Breath sounds: Normal breath sounds.   Abdominal:      Palpations: Abdomen is soft.    Musculoskeletal:         General: Normal range of motion.      Comments: Bilateral SI joint inflammation characterized by:    -Pelvic Distraction test   -Lateral Iliac compression test   -FABERS (Kareem's test)   -Ganslen's Test     Skin:     General: Skin is warm and dry.   Neurological:      Mental Status: She is alert.      Coordination: Coordination is intact.      Deep Tendon Reflexes: Reflexes are normal and symmetric.   Psychiatric:         Behavior: Behavior is cooperative.         Neurological Exam  Mental Status  Awake and alert. Oriented to person, place and time.    Cranial Nerves  CN II: Visual acuity is normal. Visual fields full to confrontation.  CN III, IV, VI: Extraocular movements intact bilaterally. Normal lids and orbits bilaterally. Pupils equal round and reactive to light bilaterally.  CN V: Facial sensation is normal.  CN VII: Full and symmetric facial movement.  CN IX, X: Palate elevates symmetrically. Normal gag reflex.  CN XI: Shoulder shrug strength is normal.  CN XII: Tongue midline without atrophy or fasciculations.    Motor                                               Right                     Left  Deltoid                                   5                          5   Biceps                                   5                          5   Triceps                                  5                          5   Iliopsoas                               5                          5   Quadriceps                           5                          5   Hamstring                             5                          5   Gastrocnemius                     5                           5   Anterior tibialis                      5                          5    Sensory  Sensation is intact to light touch, pinprick, vibration and proprioception in all four extremities.    Reflexes  Deep tendon reflexes are 2+ and symmetric in all four extremities.    Coordination    Finger-to-nose, rapid  alternating movements and heel-to-shin normal bilaterally without dysmetria.    Gait  Normal casual, toe, heel and tandem gait.      Assessment & Plan   Independent Review of Radiographic Studies:      I personally reviewed the images from the following studies.    FINDINGS:    No new neuroimaging.    Assessment/Plan: Clearly has SI joint inflammation and has done very well from the last injection.  I recommend that she try another round of injections at this time with ice.  She is going to work on that and I will prescribe her some Mobic to take during the 1 to 2 weeks after her ice baths.    Do think that given her spine fusion she is more prone to developing SI joint issues and the cause of her SI joint issues is likely due to her limp secondary to the broken foot.  She does need surgery later down the line I think would be reasonable to pursue a bilateral SI joint percutaneous fusion but I would like her to try conservative management for as long as possible.    Medical Decision Making:      SI joint injections, follow-up in 3 months         Diagnoses and all orders for this visit:    1. SI (sacroiliac) joint inflammation (Primary)    Other orders  -     meloxicam (MOBIC) 15 MG tablet; Take 1 tablet by mouth Daily.  Dispense: 14 tablet; Refill: 0             Patient Instructions/Recommendations:    After your SI joint injection:  -Please schedule your second SI joint injection at the time of your first SI joint injection for 2 weeks later  -Alternate 1 tablet of extra strength Tylenol and 2 tablets of Aleve (or any other NSAID) in a scheduled manner every 4 hours for at least 2 weeks after the injection    -Take an ice bath by submerging the buttocks area in 50 degree water for 30 minutes/day for 5 days consecutively after your injection.   -Try adding ice gradually to decrease the shock of the ice bath   -Try putting a robe in the dryer for 35 minutes so that the patient can have a warm robe to wear after the  ice bath    -Continue to use ice packs as much as tolerable when you are not taking an ice bath        Calderon Whittaker MD  02/06/25  15:01 EST

## 2025-02-07 ENCOUNTER — TELEPHONE (OUTPATIENT)
Dept: ORTHOPEDIC SURGERY | Facility: CLINIC | Age: 70
End: 2025-02-07
Payer: MEDICARE

## 2025-02-07 NOTE — TELEPHONE ENCOUNTER
Serum vitamin D level is 41.2.  Have notified the patient that her vitamin D level is acceptable and no further changes need to be made to her medication

## 2025-02-07 NOTE — TELEPHONE ENCOUNTER
----- Message from Ron Wu sent at 2/6/2025  2:24 PM EST -----  Please let her know that her vitamin D is at an acceptable level and I would not recommend further augmentation.

## 2025-02-10 ENCOUNTER — OFFICE VISIT (OUTPATIENT)
Dept: FAMILY MEDICINE CLINIC | Facility: CLINIC | Age: 70
End: 2025-02-10
Payer: MEDICARE

## 2025-02-10 VITALS
OXYGEN SATURATION: 95 % | DIASTOLIC BLOOD PRESSURE: 68 MMHG | HEIGHT: 64 IN | SYSTOLIC BLOOD PRESSURE: 124 MMHG | WEIGHT: 190.2 LBS | TEMPERATURE: 97.5 F | BODY MASS INDEX: 32.47 KG/M2 | RESPIRATION RATE: 16 BRPM | HEART RATE: 80 BPM

## 2025-02-10 DIAGNOSIS — J06.9 ACUTE URI: Primary | ICD-10-CM

## 2025-02-10 DIAGNOSIS — J98.01 COUGH DUE TO BRONCHOSPASM: ICD-10-CM

## 2025-02-10 DIAGNOSIS — R06.2 WHEEZING: ICD-10-CM

## 2025-02-10 PROCEDURE — 3078F DIAST BP <80 MM HG: CPT | Performed by: NURSE PRACTITIONER

## 2025-02-10 PROCEDURE — 99213 OFFICE O/P EST LOW 20 MIN: CPT | Performed by: NURSE PRACTITIONER

## 2025-02-10 PROCEDURE — 1126F AMNT PAIN NOTED NONE PRSNT: CPT | Performed by: NURSE PRACTITIONER

## 2025-02-10 PROCEDURE — 3044F HG A1C LEVEL LT 7.0%: CPT | Performed by: NURSE PRACTITIONER

## 2025-02-10 PROCEDURE — 1160F RVW MEDS BY RX/DR IN RCRD: CPT | Performed by: NURSE PRACTITIONER

## 2025-02-10 PROCEDURE — 3074F SYST BP LT 130 MM HG: CPT | Performed by: NURSE PRACTITIONER

## 2025-02-10 PROCEDURE — 1159F MED LIST DOCD IN RCRD: CPT | Performed by: NURSE PRACTITIONER

## 2025-02-10 RX ORDER — PREDNISONE 20 MG/1
20 TABLET ORAL 2 TIMES DAILY
Qty: 6 TABLET | Refills: 0 | Status: SHIPPED | OUTPATIENT
Start: 2025-02-10 | End: 2025-02-13

## 2025-02-10 RX ORDER — ALBUTEROL SULFATE 90 UG/1
2 INHALANT RESPIRATORY (INHALATION) EVERY 4 HOURS PRN
Qty: 18 G | Refills: 0 | Status: SHIPPED | OUTPATIENT
Start: 2025-02-10

## 2025-02-10 NOTE — PROGRESS NOTES
Mitzi Jefferson is a 69 y.o.. female.     Cough  This is a new problem. Episode onset: 4 days. The problem has been unchanged. The cough is Dry. Associated symptoms include shortness of breath (with cough). Pertinent negatives include no chills, ear congestion, ear pain, fever, headaches, postnasal drip, rhinorrhea or sore throat. Exacerbated by: talking.       The following portions of the patient's history were reviewed and updated as appropriate: allergies, current medications, past family history, past medical history, past social history, past surgical history and problem list.    Past Medical History:   Diagnosis Date    Anxiety and depression 03/15/2022    Backache     Diabetes mellitus type 2, controlled     HSV (herpes simplex virus) infection 03/15/2022    Hypercholesterolemia     Hypertension     Hypothyroidism     Osteoarthritis of multiple joints     Spinal stenosis     LUMBAR       Past Surgical History:   Procedure Laterality Date    ANKLE ARTHROSCOPY Right     1/2013 & 10/2013    COLONOSCOPY      7/27/2007    EPIDURAL BLOCK      X5    LAPAROSCOPIC GASTRIC BANDING  08/01/2007    LUMBAR FUSION Left 12/11/2023    Procedure: PRONE OBLIQUE LATERAL LUMBAR TWO TO THREE, FOUR TO FIVE INTERBODY FUSION WITH ANTERIOR PLATING AND NEUROMONITORING;  Surgeon: Calderon Whittaker MD;  Location: [facility];  Service: Neurosurgery;  Laterality: Left;    LUMBAR FUSION N/A 12/11/2023    Procedure: LUMBAR FUSION MINIMALLY INVASIVE NAVIGATION;  Surgeon: Calderon Whittaker MD;  Location: [facility] OR;  Service: Neurosurgery;  Laterality: N/A;    LUMBAR FUSION N/A 2/26/2024    Procedure: LUMBAR TWO THREE, THREE TO FOUR, FOUR TO FIVE FUSION MINIMALLY INVASIVE NAVIGATION;  Surgeon: Calderon Whittaker MD;  Location: [facility] OR;  Service: Neurosurgery;  Laterality: N/A;    ROTATOR CUFF REPAIR Right     5/2014    TOTAL KNEE ARTHROPLASTY Right     12/2014       Review of Systems   Constitutional:  Negative for  "chills and fever.   HENT:  Negative for congestion, ear pain, postnasal drip, rhinorrhea and sore throat.    Respiratory:  Positive for cough and shortness of breath (with cough).    Cardiovascular: Negative.    Gastrointestinal:  Negative for diarrhea, nausea and vomiting.   Neurological:  Negative for headaches.       No Known Allergies    Objective     Vitals:    02/10/25 1042   BP: 124/68   BP Location: Right arm   Patient Position: Sitting   Cuff Size: Adult   Pulse: 80   Resp: 16   Temp: 97.5 °F (36.4 °C)   TempSrc: Temporal   SpO2: 95%   Weight: 86.3 kg (190 lb 3.2 oz)   Height: 162.6 cm (64.02\")     Body mass index is 32.63 kg/m².    Physical Exam  Vitals reviewed.   Constitutional:       Appearance: She is well-developed.   HENT:      Head: Normocephalic and atraumatic.      Right Ear: Tympanic membrane normal. Tympanic membrane is not erythematous.      Left Ear: Tympanic membrane normal. Tympanic membrane is not erythematous.      Nose: Nose normal.      Mouth/Throat:      Mouth: Mucous membranes are moist.      Pharynx: No pharyngeal swelling, oropharyngeal exudate or posterior oropharyngeal erythema.   Eyes:      Conjunctiva/sclera: Conjunctivae normal.      Pupils: Pupils are equal, round, and reactive to light.   Cardiovascular:      Rate and Rhythm: Normal rate and regular rhythm.      Heart sounds: No murmur heard.  Pulmonary:      Effort: Pulmonary effort is normal. No accessory muscle usage or respiratory distress.      Breath sounds: No stridor. Examination of the right-upper field reveals wheezing. Examination of the left-upper field reveals wheezing. Wheezing present. No rhonchi or rales.   Musculoskeletal:         General: Normal range of motion.   Lymphadenopathy:      Cervical: No cervical adenopathy.   Skin:     General: Skin is warm and dry.   Neurological:      Mental Status: She is alert and oriented to person, place, and time.           Current Outpatient Medications:     amLODIPine " (NORVASC) 5 MG tablet, Take 1 tablet by mouth Daily., Disp: 90 tablet, Rfl: 3    celecoxib (CeleBREX) 200 MG capsule, Take 1 capsule by mouth 2 (Two) Times a Day., Disp: 60 capsule, Rfl: 2    empagliflozin (Jardiance) 25 MG tablet tablet, Take 1 tablet by mouth Daily., Disp: 90 tablet, Rfl: 3    famciclovir (FAMVIR) 125 MG tablet, 1 tab twice a day for 5 days with flair ups, Disp: 10 tablet, Rfl: 5    FLUoxetine (PROzac) 20 MG capsule, Take 1 capsule by mouth Daily., Disp: 90 capsule, Rfl: 3    hydroCHLOROthiazide 12.5 MG tablet, Take 1 tablet by mouth Daily., Disp: 90 tablet, Rfl: 3    levothyroxine (SYNTHROID, LEVOTHROID) 50 MCG tablet, Take 1 tablet by mouth Daily., Disp: 90 tablet, Rfl: 3    lidocaine (LIDODERM) 5 %, Place 1 patch on the skin as directed by provider Daily. Remove & Discard patch within 12 hours; use as needed for back pain, Disp: 60 each, Rfl: 5    lisinopril (PRINIVIL,ZESTRIL) 10 MG tablet, Take 1 tablet by mouth Daily., Disp: 90 tablet, Rfl: 3    meloxicam (MOBIC) 15 MG tablet, Take 1 tablet by mouth Daily., Disp: 14 tablet, Rfl: 0    rosuvastatin (CRESTOR) 10 MG tablet, Take 1 tablet by mouth Daily., Disp: 90 tablet, Rfl: 3    Scopolamine 1 MG/3DAYS patch, Place 1 patch on the skin as directed by provider Every 72 (Seventy-Two) Hours., Disp: 4 patch, Rfl: 0    Tirzepatide 2.5 MG/0.5ML solution auto-injector, Inject 2.5 mg under the skin into the appropriate area as directed 1 (One) Time Per Week., Disp: 2 mL, Rfl: 2    albuterol sulfate  (90 Base) MCG/ACT inhaler, Inhale 2 puffs Every 4 (Four) Hours As Needed for Wheezing or Shortness of Air (coughing episode)., Disp: 18 g, Rfl: 0    predniSONE (DELTASONE) 20 MG tablet, Take 1 tablet by mouth 2 (Two) Times a Day for 3 days., Disp: 6 tablet, Rfl: 0    No results found for this or any previous visit (from the past 84 hours).      Diagnoses and all orders for this visit:    1. Acute URI (Primary)    2. Cough due to bronchospasm  -      albuterol sulfate  (90 Base) MCG/ACT inhaler; Inhale 2 puffs Every 4 (Four) Hours As Needed for Wheezing or Shortness of Air (coughing episode).  Dispense: 18 g; Refill: 0    3. Wheezing  -     albuterol sulfate  (90 Base) MCG/ACT inhaler; Inhale 2 puffs Every 4 (Four) Hours As Needed for Wheezing or Shortness of Air (coughing episode).  Dispense: 18 g; Refill: 0  -     predniSONE (DELTASONE) 20 MG tablet; Take 1 tablet by mouth 2 (Two) Times a Day for 3 days.  Dispense: 6 tablet; Refill: 0        Patient Instructions   Drink plenty of fluids-water preferably, eat a heart healthy diet, get plenty of sleep and do warm salt water gargles twice a day until feeling better; recommend otc mucinexDM for 1-2 weeks.     Return if symptoms worsen or fail to improve.

## 2025-02-10 NOTE — PATIENT INSTRUCTIONS
Drink plenty of fluids-water preferably, eat a heart healthy diet, get plenty of sleep and do warm salt water gargles twice a day until feeling better; recommend otc mucinexDM for 1-2 weeks.

## 2025-02-14 ENCOUNTER — TELEPHONE (OUTPATIENT)
Dept: FAMILY MEDICINE CLINIC | Facility: CLINIC | Age: 70
End: 2025-02-14

## 2025-02-14 NOTE — TELEPHONE ENCOUNTER
Caller: Summer Jefferson    Relationship: Self    Best call back number: [phone]     What medication are you requesting: ANH KEYS    What are your current symptoms: PATIENT FINISHED STEROID PACK BUT STARTING TO HAVE COUGH AND HEAVINESS FEELING IN CHEST    How long have you been experiencing symptoms: 02/07    Have you had these symptoms before:    [x] Yes  [] No    Have you been treated for these symptoms before:   [x] Yes  [] No    If a prescription is needed, what is your preferred pharmacy and phone number: [facility] [phone] [facility] [address] [facility] [place] AT [facility] - [phone] [facility] [phone]      Additional notes:PATIENT IS STILL USING INHALER. FINISHED STEROIDS YESTERDAY, BUT COUGH STARTED BACK UP AGAIN

## 2025-02-24 ENCOUNTER — TELEPHONE (OUTPATIENT)
Dept: ORTHOPEDIC SURGERY | Facility: CLINIC | Age: 70
End: 2025-02-24
Payer: MEDICARE

## 2025-02-24 DIAGNOSIS — S92.354K CLOSED NONDISPLACED FRACTURE OF FIFTH METATARSAL BONE OF RIGHT FOOT WITH NONUNION, SUBSEQUENT ENCOUNTER: Primary | ICD-10-CM

## 2025-02-24 NOTE — TELEPHONE ENCOUNTER
Spoke with patient and she has been using the bone stimulator 3 hours a day for the last 15 days and has had no worsening onset of pain in the area of the fracture site.  She can back off on the posterior layer some.  We may end up pursuing surgical treatment for this and so we will go ahead and get a CT scan for further evaluation of fracture alignment extent of healing etc. to help with preoperative planning with which she was in agreement.  Will see her back in about 3 weeks to review CT and determine treatment course going forward.

## 2025-02-24 NOTE — TELEPHONE ENCOUNTER
Provider: DR ALEX    Caller: CLAUDIO QUINTANA     Relationship to Patient: PATIENT     Phone Number: [phone]    Reason for Call: RT FOOT FX 5TH METATARSAL BONE - INJURED AROUND 11/1/2024    When was the patient last seen: 02/05/2025      PATIENT STATES HER RT FOOT HAD BEEN PAIN FREE AND SHE STARTED THE ELECTROMAGNETIC TWO WEEKS AGO FOR 3 HOURS PER DAY AND SHE IS EXPERIENCING PAIN IN HER RT FOOT.    PATIENT HAS CONCERNS THAT HER FX WAS 3.5 IN BREAK AND MAYBE THIS IS TOO MUCH.  PLEASE CALL PATIENT TO DISCUSS

## 2025-03-06 ENCOUNTER — HOSPITAL ENCOUNTER (OUTPATIENT)
Dept: CT IMAGING | Facility: HOSPITAL | Age: 70
Discharge: HOME | End: 2025-03-06
Admitting: ORTHOPAEDIC SURGERY
Payer: MEDICARE

## 2025-03-06 DIAGNOSIS — S92.354K CLOSED NONDISPLACED FRACTURE OF FIFTH METATARSAL BONE OF RIGHT FOOT WITH NONUNION, SUBSEQUENT ENCOUNTER: ICD-10-CM

## 2025-03-06 PROCEDURE — 76376 3D RENDER W/INTRP POSTPROCES: CPT

## 2025-03-06 PROCEDURE — 73700 CT LOWER EXTREMITY W/O DYE: CPT

## 2025-03-10 ENCOUNTER — PREP FOR SURGERY (OUTPATIENT)
Dept: OTHER | Facility: HOSPITAL | Age: 70
End: 2025-03-10
Payer: MEDICARE

## 2025-03-10 ENCOUNTER — OFFICE VISIT (OUTPATIENT)
Dept: FAMILY MEDICINE CLINIC | Facility: CLINIC | Age: 70
End: 2025-03-10
Payer: MEDICARE

## 2025-03-10 ENCOUNTER — TELEPHONE (OUTPATIENT)
Dept: ORTHOPEDIC SURGERY | Facility: CLINIC | Age: 70
End: 2025-03-10
Payer: MEDICARE

## 2025-03-10 VITALS
RESPIRATION RATE: 16 BRPM | TEMPERATURE: 98 F | OXYGEN SATURATION: 100 % | DIASTOLIC BLOOD PRESSURE: 70 MMHG | HEIGHT: 64 IN | HEART RATE: 95 BPM | WEIGHT: 191.7 LBS | SYSTOLIC BLOOD PRESSURE: 128 MMHG | BODY MASS INDEX: 32.73 KG/M2

## 2025-03-10 DIAGNOSIS — I10 PRIMARY HYPERTENSION: Chronic | ICD-10-CM

## 2025-03-10 DIAGNOSIS — F32.A ANXIETY AND DEPRESSION: ICD-10-CM

## 2025-03-10 DIAGNOSIS — S92.354K CLOSED NONDISPLACED FRACTURE OF FIFTH METATARSAL BONE OF RIGHT FOOT WITH NONUNION, SUBSEQUENT ENCOUNTER: Primary | ICD-10-CM

## 2025-03-10 DIAGNOSIS — M19.09 PRIMARY OSTEOARTHRITIS OF OTHER SITE: ICD-10-CM

## 2025-03-10 DIAGNOSIS — F41.9 ANXIETY AND DEPRESSION: ICD-10-CM

## 2025-03-10 DIAGNOSIS — E11.65 CONTROLLED TYPE 2 DIABETES MELLITUS WITH HYPERGLYCEMIA, WITHOUT LONG-TERM CURRENT USE OF INSULIN: Primary | Chronic | ICD-10-CM

## 2025-03-10 PROBLEM — S92.354A CLOSED NONDISPLACED FRACTURE OF FIFTH RIGHT METATARSAL BONE: Status: ACTIVE | Noted: 2025-03-10

## 2025-03-10 PROCEDURE — 1160F RVW MEDS BY RX/DR IN RCRD: CPT | Performed by: NURSE PRACTITIONER

## 2025-03-10 PROCEDURE — 3078F DIAST BP <80 MM HG: CPT | Performed by: NURSE PRACTITIONER

## 2025-03-10 PROCEDURE — 1126F AMNT PAIN NOTED NONE PRSNT: CPT | Performed by: NURSE PRACTITIONER

## 2025-03-10 PROCEDURE — 99214 OFFICE O/P EST MOD 30 MIN: CPT | Performed by: NURSE PRACTITIONER

## 2025-03-10 PROCEDURE — 3074F SYST BP LT 130 MM HG: CPT | Performed by: NURSE PRACTITIONER

## 2025-03-10 PROCEDURE — 3044F HG A1C LEVEL LT 7.0%: CPT | Performed by: NURSE PRACTITIONER

## 2025-03-10 PROCEDURE — 1159F MED LIST DOCD IN RCRD: CPT | Performed by: NURSE PRACTITIONER

## 2025-03-10 RX ORDER — CELECOXIB 200 MG/1
200 CAPSULE ORAL DAILY
Qty: 90 CAPSULE | Refills: 3 | Status: SHIPPED | OUTPATIENT
Start: 2025-03-10

## 2025-03-10 NOTE — TELEPHONE ENCOUNTER
I called and returned patient's message about her CT results.  It shows a majority the fracture is not healed.  We discussed continued nonoperative versus operative treatment options and she requested to proceed with operative treatment which I think is reasonable.  Reviewed the operative procedure with her with internal fixation and possible bone graft from the heel or possibly cadaver graft.    I am scheduled to see her already on 3/19/2025 and we will go over things in more detail at that time and we will go ahead and place a case request but will be scheduled to occur until after 3/19/2025.

## 2025-03-10 NOTE — PROGRESS NOTES
Subjective     Summer Jefferson is a 69 y.o.. female.     Patient here today for follow up on diabetes.     Patient currently on Jardiance 25 mg daily and Tirzepatide 2.5 mg per week. Patient's labs from 2/6/25 show HgA1c 6.5 and glucose 212. Patient denies any side effects or issues with Tirzepatide. Patient denies any nausea and/or vomiting with Tirzepatide.     Patient stating she is not exercising (due to hurt foot) and not eating healthy.        The following portions of the patient's history were reviewed and updated as appropriate: allergies, current medications, past family history, past medical history, past social history, past surgical history and problem list.    Past Medical History:   Diagnosis Date    Anxiety and depression 03/15/2022    Backache     Diabetes mellitus type 2, controlled     HSV (herpes simplex virus) infection 03/15/2022    Hypercholesterolemia     Hypertension     Hypothyroidism     Osteoarthritis of multiple joints     Spinal stenosis     LUMBAR       Past Surgical History:   Procedure Laterality Date    ANKLE ARTHROSCOPY Right     1/2013 & 10/2013    APPENDECTOMY      BARIATRIC SURGERY      COLONOSCOPY      7/27/2007    EPIDURAL BLOCK      X5    JOINT REPLACEMENT      LAPAROSCOPIC GASTRIC BANDING  08/01/2007    LUMBAR FUSION Left 12/11/2023    Procedure: PRONE OBLIQUE LATERAL LUMBAR TWO TO THREE, FOUR TO FIVE INTERBODY FUSION WITH ANTERIOR PLATING AND NEUROMONITORING;  Surgeon: Calderon Whittaker MD;  Location: [facility];  Service: Neurosurgery;  Laterality: Left;    LUMBAR FUSION N/A 12/11/2023    Procedure: LUMBAR FUSION MINIMALLY INVASIVE NAVIGATION;  Surgeon: Calderon Whittaker MD;  Location: [facility] OR;  Service: Neurosurgery;  Laterality: N/A;    LUMBAR FUSION N/A 02/26/2024    Procedure: LUMBAR TWO THREE, THREE TO FOUR, FOUR TO FIVE FUSION MINIMALLY INVASIVE NAVIGATION;  Surgeon: Calderon Whittaker MD;  Location: [facility] OR;  Service: Neurosurgery;  Laterality: N/A;     "ROTATOR CUFF REPAIR Right     5/2014    TOTAL KNEE ARTHROPLASTY Right     12/2014    TUBAL ABDOMINAL LIGATION         Review of Systems   Constitutional: Negative.    Respiratory: Negative.     Cardiovascular: Negative.    Psychiatric/Behavioral:  Negative for dysphoric mood, self-injury, sleep disturbance and suicidal ideas. The patient is not nervous/anxious.        No Known Allergies    Objective     Vitals:    03/10/25 0856 03/10/25 0916   BP: 130/60 128/70   BP Location: Left arm    Patient Position: Sitting    Cuff Size: Adult    Pulse: 95    Resp: 16    Temp: 98 °F (36.7 °C)    TempSrc: Oral    SpO2: 100%    Weight: 87 kg (191 lb 11.2 oz)    Height: 162.6 cm (64\")      Body mass index is 32.91 kg/m².    Physical Exam  Vitals reviewed.   HENT:      Head: Normocephalic.   Eyes:      Pupils: Pupils are equal, round, and reactive to light.   Cardiovascular:      Rate and Rhythm: Normal rate and regular rhythm.   Pulmonary:      Effort: Pulmonary effort is normal.      Breath sounds: Normal breath sounds.   Musculoskeletal:         General: Normal range of motion.   Skin:     General: Skin is warm and dry.   Neurological:      Mental Status: She is alert and oriented to person, place, and time.   Psychiatric:         Behavior: Behavior normal.           Current Outpatient Medications:     albuterol sulfate  (90 Base) MCG/ACT inhaler, Inhale 2 puffs Every 4 (Four) Hours As Needed for Wheezing or Shortness of Air (coughing episode)., Disp: 18 g, Rfl: 0    amLODIPine (NORVASC) 5 MG tablet, Take 1 tablet by mouth Daily., Disp: 90 tablet, Rfl: 3    celecoxib (CeleBREX) 200 MG capsule, Take 1 capsule by mouth Daily., Disp: 90 capsule, Rfl: 3    empagliflozin (Jardiance) 25 MG tablet tablet, Take 1 tablet by mouth Daily., Disp: 90 tablet, Rfl: 3    famciclovir (FAMVIR) 125 MG tablet, 1 tab twice a day for 5 days with flair ups, Disp: 10 tablet, Rfl: 5    FLUoxetine (PROzac) 20 MG capsule, Take 1 capsule by mouth " Daily., Disp: 90 capsule, Rfl: 3    hydroCHLOROthiazide 12.5 MG tablet, Take 1 tablet by mouth Daily., Disp: 90 tablet, Rfl: 3    levothyroxine (SYNTHROID, LEVOTHROID) 50 MCG tablet, Take 1 tablet by mouth Daily., Disp: 90 tablet, Rfl: 3    lidocaine (LIDODERM) 5 %, Place 1 patch on the skin as directed by provider Daily. Remove & Discard patch within 12 hours; use as needed for back pain, Disp: 60 each, Rfl: 5    lisinopril (PRINIVIL,ZESTRIL) 10 MG tablet, Take 1 tablet by mouth Daily., Disp: 90 tablet, Rfl: 3    rosuvastatin (CRESTOR) 10 MG tablet, Take 1 tablet by mouth Daily., Disp: 90 tablet, Rfl: 3    Scopolamine 1 MG/3DAYS patch, Place 1 patch on the skin as directed by provider Every 72 (Seventy-Two) Hours., Disp: 4 patch, Rfl: 0    Tirzepatide 5 MG/0.5ML solution auto-injector, Inject 5 mg under the skin into the appropriate area as directed 1 (One) Time Per Week., Disp: 2 mL, Rfl: 1    Recent Results (from the past 8 weeks)   Comprehensive metabolic panel    Collection Time: 02/06/25  1:24 PM    Specimen: Blood   Result Value Ref Range    Glucose 212 (H) 65 - 99 mg/dL    BUN 21 8 - 23 mg/dL    Creatinine 1.00 0.57 - 1.00 mg/dL    Sodium 137 136 - 145 mmol/L    Potassium 4.2 3.5 - 5.2 mmol/L    Chloride 100 98 - 107 mmol/L    CO2 26.0 22.0 - 29.0 mmol/L    Calcium 9.6 8.6 - 10.5 mg/dL    Total Protein 7.4 6.0 - 8.5 g/dL    Albumin 4.5 3.5 - 5.2 g/dL    ALT (SGPT) 18 1 - 33 U/L    AST (SGOT) 21 1 - 32 U/L    Alkaline Phosphatase 92 39 - 117 U/L    Total Bilirubin 0.5 0.0 - 1.2 mg/dL    Globulin 2.9 gm/dL    A/G Ratio 1.6 g/dL    BUN/Creatinine Ratio 21.0 7.0 - 25.0    Anion Gap 11.0 5.0 - 15.0 mmol/L    eGFR 60.7 >60.0 mL/min/1.73   Hemoglobin A1c    Collection Time: 02/06/25  1:24 PM    Specimen: Blood   Result Value Ref Range    Hemoglobin A1C 6.50 (H) 4.80 - 5.60 %   Vitamin D,25-Hydroxy    Collection Time: 02/06/25  1:24 PM    Specimen: Blood   Result Value Ref Range    25 Hydroxy, Vitamin D 41.2 30.0 -  100.0 ng/ml       CT foot right wo contrast  Narrative: CT FOOT RIGHT WO CONTRAST-     DATE OF EXAM: 3/6/2025 1:07 PM     INDICATION: Evaluate healing and fifth metatarsal fracture alignment for  possible preoperative planning.     COMPARISON: Radiographs 2/5/2025 and 11/5/2024.     TECHNIQUE: Multiple contiguous axial images were acquired through the  ankle and foot without the intravenous administration of contrast.  Reformatted coronal, sagittal, and 3D reconstruction images were also  reviewed. Radiation dose reduction techniques were utilized, including  automated exposure control and exposure modulation based on body size.     FINDINGS:  Nondisplaced intra-articular fracture of the proximal fifth metatarsal  extending to the tarsometatarsal joint with an estimated 10% solid bony  bridging across the dorsal aspect of the fracture.     No new fracture. Small well-corticated ossifications distal to the  medial and lateral malleoli, likely sequela of remote trauma. No  concerning osseous lesion.     No significant soft tissue edema. No cystic or solid soft tissue mass.  Mild thickening of the proximal plantar fascia with a small plantar  calcaneal enthesophyte, likely chronic planter fasciitis. The sinus  tarsi and intermetatarsal spaces are unremarkable in noncontrast CT  appearance.     No significant joint effusion but the tibiotalar and subtalar joint  spaces are well-maintained. Mild multifocal DJD at the midfoot the  midfoot is well aligned. Moderate to severe DJD of the great toe MTP  joint with possible osteochondral bodies the plantar joint space.     The medial flexor tendons, peroneal tendons, and extensor tendons are  intact. The Achilles tendon is intact. No significant muscular fatty  atrophy.     Impression: Nondisplaced intra-articular fracture of the proximal fifth metatarsal  with an estimated 10% solid bony bridging across the dorsal aspect of  the fracture.     This report was finalized on  3/6/2025 6:03 PM by Fernando Sanders MD on  Workstation: [id number]           Diagnoses and all orders for this visit:    1. Controlled type 2 diabetes mellitus with hyperglycemia, without long-term current use of insulin (Primary)  -     Tirzepatide 5 MG/0.5ML solution auto-injector; Inject 5 mg under the skin into the appropriate area as directed 1 (One) Time Per Week.  Dispense: 2 mL; Refill: 1  -     Comprehensive Metabolic Panel; Future  -     Hemoglobin A1c; Future  -     MicroAlbumin, Urine, Random - Urine, Clean Catch; Future    2. Primary hypertension    3. Anxiety and depression  -     Comprehensive Metabolic Panel; Future    4. Primary osteoarthritis of other site  Comments:  carlos. thumbs and hands  Orders:  -     celecoxib (CeleBREX) 200 MG capsule; Take 1 capsule by mouth Daily.  Dispense: 90 capsule; Refill: 3  -     Comprehensive Metabolic Panel; Future          Patient Instructions   Diabetes: increasing Tirzepatide to 5 mg weekly, will continue on this dosage for 3 months and then recheck labs. Continue Jardiance at 25 mg daily. Recommend Patient to work on eating a heart healthy low carb low sugar diet and increase activity as able.    Hypertension: stable, continue amlodipine 5 mg daily, HCTZ 12.5 mg daily, and lisinopril 10 mg daily. Patient does not need refills today. Will refill when needed. Call pharmacy for request.    Anxiety and depression: stable, continue fluoxetine 20 mg daily. Patient does not need refills today. Will refill when needed. Call pharmacy for request.    OA: continue celebrex 200 mg daily.     Return for fasting labs in 3 months, recheck in 3 months.

## 2025-03-10 NOTE — PATIENT INSTRUCTIONS
Diabetes: increasing Tirzepatide to 5 mg weekly, will continue on this dosage for 3 months and then recheck labs. Continue Jardiance at 25 mg daily. Recommend Patient to work on eating a heart healthy low carb low sugar diet and increase activity as able.    Hypertension: stable, continue amlodipine 5 mg daily, HCTZ 12.5 mg daily, and lisinopril 10 mg daily. Patient does not need refills today. Will refill when needed. Call pharmacy for request.    Anxiety and depression: stable, continue fluoxetine 20 mg daily. Patient does not need refills today. Will refill when needed. Call pharmacy for request.    OA: continue celebrex 200 mg daily.

## 2025-03-10 NOTE — TELEPHONE ENCOUNTER
Caller: CLAUDIO QUINTANA    Phone Number: [phone] (home) [phone] (work)    Reason for Call:   PATIENT HAD CT SCAN OF RIGHT FOOT ON 3-6-25 WOULD LIKE DR. ALEX TO CALL HER WITH RESULTS

## 2025-03-18 PROBLEM — M19.071 ARTHRITIS OF RIGHT FOOT: Status: ACTIVE | Noted: 2025-03-18

## 2025-03-19 ENCOUNTER — OFFICE VISIT (OUTPATIENT)
Dept: ORTHOPEDIC SURGERY | Facility: CLINIC | Age: 70
End: 2025-03-19
Payer: MEDICARE

## 2025-03-19 VITALS — TEMPERATURE: 96.9 F | WEIGHT: 192 LBS | BODY MASS INDEX: 32.78 KG/M2 | HEIGHT: 64 IN

## 2025-03-19 DIAGNOSIS — M19.071 ARTHRITIS OF FIRST METATARSOPHALANGEAL (MTP) JOINT OF RIGHT FOOT: ICD-10-CM

## 2025-03-19 DIAGNOSIS — M19.071 ARTHRITIS OF RIGHT FOOT: ICD-10-CM

## 2025-03-19 DIAGNOSIS — S92.354K CLOSED NONDISPLACED FRACTURE OF FIFTH METATARSAL BONE OF RIGHT FOOT WITH NONUNION, SUBSEQUENT ENCOUNTER: Primary | ICD-10-CM

## 2025-03-19 NOTE — PROGRESS NOTES
"Foot Follow Up      Patient: Summer Jefferson    YOB: 1955 69 y.o. female    Chief Complaints: Foot is okay but sore    History of Present Illness:Patient was seen on 11/11/2024 reporting that she injured her right foot approximately 1 week prior when she stepped on a stone in a parking lot.  She had had pain swelling and bruising at the lateral midfoot but really none to the ankle.  She had been seen in urgent care on and fitted with a short boot.     When seen on 11/11/2024 she reported mild pain in the proximal lateral aspect of the right fifth metatarsal.     At that time she was found to have fifth metatarsal base fracture without clear evidence of ankle injury.  Did not recommend any surgical treatment at that time.  She was instructed on continue send boot and sleeping in a postoperative shoe and offloading with a cane.  She was also counseled avoid anti-inflammatories.     Patient was seen on 12/5/2024 reporting that her foot felt fine.  She had been limiting activity on it and reported that she does not have any \"real pain\".  She had been using her boot for weightbearing and offloading with a cane and sleeping in a postoperative shoe.  She had minimal if any pain at the base of the right fifth metatarsal and no pain about the ankle.     We discussed treatment at that time and reviewed x-rays and did have a bit more displacement but joint did not show incongruity.  Symptoms are improving and I did not recommend any further workup or surgical treatment nor does she desire it.  She was instructed on use of boot or postoperative shoe for weightbearing activities and to offload with a cane.  She also reported that she was taking \"1-1/2 times\" the vitamins that she was supposed to take including vitamin D and her level was decent in January 2024 so we held off on rechecking at that time unless she had further healing issues.  Reviewed she could walk is much as needed for daily activity with " protection as previously outlined.     Patient was seen on 1/3/2025 reporting that her foot felt had great.  She had been using her boot for weightbearing activities and sleeping in a postoperative shoe with no pain in the right foot.  Pain was rated 0 out of 10.     At that time she seemed to be doing well with fifth metatarsal base fracture without clear sign of ankle injury.  Reviewed x-ray findings with her and although there was still some gapping she was clinically asymptomatic and did not recommend further workup.  She was anxious to get out of her boot.  She was allowed start weaning out of her boot and into  postoperative shoe initially around the house for a week or so and gradually out of the house and tolerating then start weaning into a sturdy athletic shoe initially around the house and then gradually out of the house.  She was leaving in 2 weeks to go on a cruise and recommended she take her boot with her.       Patient was seen on 2/5/2025 reporting that her foot felt okay.  Since previous appointment she had used her boot for about another week and then subsequently started using tennis shoes or stiff shoes.  She did take her boot with her on her cruise and used it about half the time.  She reported that overall her foot felt okay but did get an occasional slight discomfort at the base of the fifth metatarsal.  Symptoms were no worse out of her boot than they were in her boot.    We discussed treatment and reviewed x-rays and there appeared to be some areas of nonunion she did not wish to pursue surgical treatment at that time without exhausting further conservative measures.  She was sent for vitamin D evaluation and bone stimulator was ordered.    On 2/6/2025 her vitamin D was found to be adequate at 41.2 and no further augmentation was recommended.    I spoke with her again on 2/24/2025 and she been using the bone stimulator and went ahead and ordered a CT scan.    Patient had her CT scan and I  "reviewed results with her via telephone on 3/10/2025 and we discussed proceeding with operative treatment and would review that with further detail with her at follow-up appointment on 3/19/2025.    Patient is seen back today reporting that her foot is doing okay but still has pain at the base of the right fifth metatarsal.  Pain is rated 2 out of 10      HPI    ROS: Foot pain  Past Medical History:   Diagnosis Date    Anxiety and depression 03/15/2022    Backache     Diabetes mellitus type 2, controlled     HSV (herpes simplex virus) infection 03/15/2022    Hypercholesterolemia     Hypertension     Hypothyroidism     Osteoarthritis of multiple joints     Spinal stenosis     LUMBAR     Physical Exam:   Vitals:    03/19/25 1044   Temp: 96.9 °F (36.1 °C)   Weight: 87.1 kg (192 lb)   Height: 162.6 cm (64\")   PainSc: 2      Well developed with normal mood.  On exam she has mild tenderness palpation at the base of the right fifth metatarsal.  She had good eversion strength      Radiology:    3 views right foot ordered evaluate fracture alignment reviewed and compared to previous x-rays.  There is been no change in alignment to fifth metatarsal fracture approximately still with gapping laterally without gross joint incongruency.      CT scan films and report of the right foot dated 3/6/2025 reviewed which show a nondisplaced intra-articular fracture of the proximal fifth metatarsal extending to the TMT joint with an estimated 10% bony bridging across the dorsal aspect of the fracture.    Small well-corticated ossifications distal to the medial lateral malleolus and likely from remote trauma    Mild thickening of the proximal plantar fascia with small plantar calcaneal spur.    Multifocal mild degenerative change of the midfoot with good alignment and moderate to severe degenerative change first MTP joint with possible osteochondral bodies in the plantar joint space.      Vitamin D 1/17/2024 42.7   Vitamin D 2/6/2025 " 41.2      Assessment/Plan:1. 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  Right first MTP arthritis  3.  Right midfoot arthritis    We discussed as we had previously continued nonoperative versus operative treatment options and although no guarantees could be given mutual decision was made to proceed with operative treatment.  Will plan on open reduction internal fixation with plate and screw fixation and bone graft from heel and possible cadaver graft.    Patient voiced clear standing the operative procedure and postoperative course with associated risk benefits potential outcomes and complications which can include but are not limited to heart attack stroke death pneumonia infection bleeding damage to blood vessels nerves or tendons blood clots pulmonary embolism persistent or worsening pain stiffness malunion nonunion need for subsequent surgery and failure to return to presurgery and precondition levels of activity as well as wound healing complications.  Small risk of fracture or infection and bone graft harvest site and/or disease transmission from cadaver graft.    She was advised on obtaining a scooter for postoperative mobilization and we will plan to proceed with this on outpatient basis at a mutually convenient time.  She was encouraged to call if she has any questions prior to surgery.  She will continue her bone stimulator in the interim

## 2025-03-24 ENCOUNTER — HOSPITAL ENCOUNTER (OUTPATIENT)
Dept: GENERAL RADIOLOGY | Facility: HOSPITAL | Age: 70
Discharge: HOME | End: 2025-03-24
Payer: MEDICARE

## 2025-03-24 ENCOUNTER — HOSPITAL ENCOUNTER (OUTPATIENT)
Dept: PAIN MEDICINE | Facility: HOSPITAL | Age: 70
Discharge: HOME | End: 2025-03-24
Payer: MEDICARE

## 2025-03-24 ENCOUNTER — PRE-ADMISSION TESTING (OUTPATIENT)
Dept: PREADMISSION TESTING | Facility: HOSPITAL | Age: 70
End: 2025-03-24
Payer: MEDICARE

## 2025-03-24 ENCOUNTER — ANESTHESIA EVENT (OUTPATIENT)
Dept: PAIN MEDICINE | Facility: HOSPITAL | Age: 70
End: 2025-03-24
Payer: MEDICARE

## 2025-03-24 ENCOUNTER — ANESTHESIA (OUTPATIENT)
Dept: PAIN MEDICINE | Facility: HOSPITAL | Age: 70
End: 2025-03-24
Payer: MEDICARE

## 2025-03-24 VITALS
HEART RATE: 89 BPM | RESPIRATION RATE: 20 BRPM | OXYGEN SATURATION: 94 % | DIASTOLIC BLOOD PRESSURE: 73 MMHG | TEMPERATURE: 97.9 F | HEIGHT: 64 IN | SYSTOLIC BLOOD PRESSURE: 108 MMHG | BODY MASS INDEX: 32.44 KG/M2 | WEIGHT: 190 LBS

## 2025-03-24 VITALS
DIASTOLIC BLOOD PRESSURE: 66 MMHG | OXYGEN SATURATION: 95 % | HEART RATE: 84 BPM | RESPIRATION RATE: 14 BRPM | SYSTOLIC BLOOD PRESSURE: 123 MMHG

## 2025-03-24 DIAGNOSIS — M46.1 SACROILIITIS: ICD-10-CM

## 2025-03-24 DIAGNOSIS — R52 PAIN: ICD-10-CM

## 2025-03-24 LAB
ANION GAP SERPL CALCULATED.3IONS-SCNC: 12 MMOL/L (ref 5–15)
BUN SERPL-MCNC: 20 MG/DL (ref 8–23)
BUN/CREAT SERPL: 25.3 (ref 7–25)
CALCIUM SPEC-SCNC: 10 MG/DL (ref 8.6–10.5)
CHLORIDE SERPL-SCNC: 100 MMOL/L (ref 98–107)
CO2 SERPL-SCNC: 25 MMOL/L (ref 22–29)
CREAT SERPL-MCNC: 0.79 MG/DL (ref 0.57–1)
DEPRECATED RDW RBC AUTO: 41.8 FL (ref 37–54)
EGFRCR SERPLBLD CKD-EPI 2021: 80.6 ML/MIN/1.73
ERYTHROCYTE [DISTWIDTH] IN BLOOD BY AUTOMATED COUNT: 13.6 % (ref 12.3–15.4)
GLUCOSE SERPL-MCNC: 123 MG/DL (ref 65–99)
HCT VFR BLD AUTO: 46.8 % (ref 34–46.6)
HGB BLD-MCNC: 16 G/DL (ref 12–15.9)
MCH RBC QN AUTO: 28.9 PG (ref 26.6–33)
MCHC RBC AUTO-ENTMCNC: 34.2 G/DL (ref 31.5–35.7)
MCV RBC AUTO: 84.5 FL (ref 79–97)
PLATELET # BLD AUTO: 287 10*3/MM3 (ref 140–450)
PMV BLD AUTO: 9.7 FL (ref 6–12)
POTASSIUM SERPL-SCNC: 3.8 MMOL/L (ref 3.5–5.2)
QT INTERVAL: 338 MS
QTC INTERVAL: 417 MS
RBC # BLD AUTO: 5.54 10*6/MM3 (ref 3.77–5.28)
SODIUM SERPL-SCNC: 137 MMOL/L (ref 136–145)
WBC NRBC COR # BLD AUTO: 6.91 10*3/MM3 (ref 3.4–10.8)

## 2025-03-24 PROCEDURE — 36415 COLL VENOUS BLD VENIPUNCTURE: CPT

## 2025-03-24 PROCEDURE — 93010 ELECTROCARDIOGRAM REPORT: CPT | Performed by: INTERNAL MEDICINE

## 2025-03-24 PROCEDURE — 93005 ELECTROCARDIOGRAM TRACING: CPT

## 2025-03-24 PROCEDURE — 80048 BASIC METABOLIC PNL TOTAL CA: CPT

## 2025-03-24 PROCEDURE — 85027 COMPLETE CBC AUTOMATED: CPT

## 2025-03-24 PROCEDURE — 25010000002 METHYLPREDNISOLONE PER 80 MG: Performed by: ANESTHESIOLOGY

## 2025-03-24 RX ORDER — MELOXICAM 15 MG/1
15 TABLET ORAL DAILY
COMMUNITY

## 2025-03-24 RX ORDER — LIDOCAINE HYDROCHLORIDE 10 MG/ML
1 INJECTION, SOLUTION INFILTRATION; PERINEURAL ONCE
Status: DISCONTINUED | OUTPATIENT
Start: 2025-03-24 | End: 2025-03-25 | Stop reason: HOSPADM

## 2025-03-24 RX ORDER — DIPHENHYDRAMINE HCL 25 MG
25 CAPSULE ORAL EVERY 6 HOURS PRN
COMMUNITY

## 2025-03-24 RX ORDER — METHYLPREDNISOLONE ACETATE 80 MG/ML
80 INJECTION, SUSPENSION INTRA-ARTICULAR; INTRALESIONAL; INTRAMUSCULAR; SOFT TISSUE ONCE
Status: COMPLETED | OUTPATIENT
Start: 2025-03-24 | End: 2025-03-24

## 2025-03-24 RX ORDER — MIDAZOLAM HYDROCHLORIDE 1 MG/ML
1 INJECTION, SOLUTION INTRAMUSCULAR; INTRAVENOUS ONCE AS NEEDED
Status: DISCONTINUED | OUTPATIENT
Start: 2025-03-24 | End: 2025-03-25 | Stop reason: HOSPADM

## 2025-03-24 RX ORDER — BUPIVACAINE HYDROCHLORIDE AND EPINEPHRINE 2.5; 5 MG/ML; UG/ML
10 INJECTION, SOLUTION EPIDURAL; INFILTRATION; INTRACAUDAL; PERINEURAL ONCE
Status: COMPLETED | OUTPATIENT
Start: 2025-03-24 | End: 2025-03-24

## 2025-03-24 RX ORDER — FENTANYL CITRATE 50 UG/ML
50 INJECTION, SOLUTION INTRAMUSCULAR; INTRAVENOUS ONCE
Status: DISCONTINUED | OUTPATIENT
Start: 2025-03-24 | End: 2025-03-25 | Stop reason: HOSPADM

## 2025-03-24 RX ADMIN — METHYLPREDNISOLONE ACETATE 80 MG: 80 INJECTION, SUSPENSION INTRA-ARTICULAR; INTRALESIONAL; INTRAMUSCULAR; SOFT TISSUE at 10:32

## 2025-03-24 RX ADMIN — BUPIVACAINE HYDROCHLORIDE AND EPINEPHRINE BITARTRATE 10 ML: 2.5; .005 INJECTION, SOLUTION EPIDURAL; INFILTRATION; INTRACAUDAL; PERINEURAL at 10:33

## 2025-03-24 NOTE — PAT
Fascia iliaca Procedure Note    Pre-Procedure   Staff -        Anesthesiologist:  Alvaro Crowley MD       Performed By: anesthesiologist       Location: pre-op       Procedure Start/Stop Times: 9/14/2024 2:36 PM and 9/14/2024 2:46 PM       Pre-Anesthestic Checklist: patient identified, IV checked, site marked, risks and benefits discussed, informed consent, monitors and equipment checked, pre-op evaluation, at physician/surgeon's request and post-op pain management  Timeout:       Correct Patient: Yes        Correct Procedure: Yes        Correct Site: Yes        Correct Position: Yes        Correct Laterality: Yes        Site Marked: Yes  Procedure Documentation  Procedure: Fascia iliaca       Diagnosis: POST OP PAIN CONTROL       Laterality: right       Patient Position: supine       Patient Prep/Sterile Barriers: sterile gloves, mask       Skin prep: Chloraprep       Needle Type: short bevel       Needle Gauge: 21.        Needle Length (Inches): 4        Ultrasound guided       1. Ultrasound was used to identify targeted nerve, plexus, vascular marker, or fascial plane and place a needle adjacent to it in real-time.       2. Ultrasound was used to visualize the spread of anesthetic in close proximity to the above referenced structure.       3. A permanent image is entered into the patient's record.       4. The visualized anatomic structures appeared normal.       5. There were no apparent abnormal pathologic findings.    Assessment/Narrative         The placement was negative for: blood aspirated, painful injection and site bleeding       Paresthesias: No.       Bolus given via needle..        Secured via.        Insertion/Infusion Method: Single Shot       Complications: none       Injection made incrementally with aspirations every 5 mL.    Medication(s) Administered   Bupivacaine 0.25% w/ 1:200K Epi (Injection) - Injection   20 mL - 9/14/2024 2:36:00 PM  Medication Administration Time: 9/14/2024 2:36  Pt had SI joint injections this morning and felt a little weak. She said she didn't get any anesthesia prior to injections. I did ask her if she needed a W/C to go from PAT to car..    "PM      FOR [facility] ONLY:   Pain Team Contact information: please page the Pain Team Via [employer]. Search \"Pain\". During daytime hours, please page the attending first. At night please page the resident first.      "

## 2025-03-24 NOTE — H&P
[facility]    History and Physical    Patient Name: Summer Jefferson  :  1955  MRN:  [id number]  Date of Admission: 3/24/2025    Subjective     Patient is a 69 y.o. female presents with chief complaint of chronic, moderate low back and buttock pain.  H/o L2/3, L3/4, L4/5 fusion w/ dr whittaker . Onset of symptoms was gradual starting several months ago.  Symptoms are associated/aggravated by activity. Symptoms improve with injection.  Has utilized conservative therapy in interim.  Presents today for b/l SI joint injection.      The following portions of the patients history were reviewed and updated as appropriate: current medications, allergies, past medical history, past surgical history, past family history, past social history, and problem list                Objective     Past Medical History:   Past Medical History:   Diagnosis Date    Anxiety and depression 03/15/2022    Backache     Diabetes mellitus type 2, controlled     HSV (herpes simplex virus) infection 03/15/2022    Hypercholesterolemia     Hypertension     Hypothyroidism     Osteoarthritis of multiple joints     Spinal stenosis     LUMBAR     Past Surgical History:   Past Surgical History:   Procedure Laterality Date    ANKLE ARTHROSCOPY Right     2013 & 10/2013    APPENDECTOMY      BARIATRIC SURGERY      COLONOSCOPY      2007    EPIDURAL BLOCK      X5    JOINT REPLACEMENT      LAPAROSCOPIC GASTRIC BANDING  2007    LUMBAR FUSION Left 2023    Procedure: PRONE OBLIQUE LATERAL LUMBAR TWO TO THREE, FOUR TO FIVE INTERBODY FUSION WITH ANTERIOR PLATING AND NEUROMONITORING;  Surgeon: Calderon Whittaker MD;  Location: [facility];  Service: Neurosurgery;  Laterality: Left;    LUMBAR FUSION N/A 2023    Procedure: LUMBAR FUSION MINIMALLY INVASIVE NAVIGATION;  Surgeon: Calderon Whittaker MD;  Location: [facility];  Service: Neurosurgery;  Laterality: N/A;    LUMBAR FUSION N/A 2024    Procedure: LUMBAR TWO THREE, THREE  TO FOUR, FOUR TO FIVE FUSION MINIMALLY INVASIVE NAVIGATION;  Surgeon: Calderon Whittaker MD;  Location: [facility];  Service: Neurosurgery;  Laterality: N/A;    ROTATOR CUFF REPAIR Right     2014    TOTAL KNEE ARTHROPLASTY Right     2014    TUBAL ABDOMINAL LIGATION       Family History:   Family History   Problem Relation Age of Onset    Thyroid disease Mother     Diabetes Mother     Hypertension Mother     Heart disease Mother     Arthritis Mother     Vision loss Father     Heart disease Father     Hyperlipidemia Father     Malig Hyperthermia Neg Hx      Social History:   Social History     Socioeconomic History    Marital status:    Tobacco Use    Smoking status: Former     Current packs/day: 0.00     Average packs/day: 1 pack/day for 10.0 years (10.0 ttl pk-yrs)     Types: Cigarettes     Start date: 1983     Quit date: 1993     Years since quittin.3     Passive exposure: Past    Smokeless tobacco: Never    Tobacco comments:     quit 15 yrs ago   Vaping Use    Vaping status: Never Used   Substance and Sexual Activity    Alcohol use: Yes     Comment: 1-2 a month    Drug use: Never    Sexual activity: Defer       Vital Signs Range for the last 24 hours  Temperature:     Temp Source:     BP:     Pulse:     Respirations:     SPO2:     O2 Amount (l/min):     O2 Devices     Weight:           --------------------------------------------------------------------------------    Current Outpatient Medications   Medication Sig Dispense Refill    amLODIPine (NORVASC) 5 MG tablet Take 1 tablet by mouth Daily. 90 tablet 3    celecoxib (CeleBREX) 200 MG capsule Take 1 capsule by mouth Daily. 90 capsule 3    empagliflozin (Jardiance) 25 MG tablet tablet Take 1 tablet by mouth Daily. 90 tablet 3    famciclovir (FAMVIR) 125 MG tablet 1 tab twice a day for 5 days with flair ups 10 tablet 5    FLUoxetine (PROzac) 20 MG capsule Take 1 capsule by mouth Daily. 90 capsule 3    hydroCHLOROthiazide 12.5 MG tablet  Take 1 tablet by mouth Daily. 90 tablet 3    levothyroxine (SYNTHROID, LEVOTHROID) 50 MCG tablet Take 1 tablet by mouth Daily. 90 tablet 3    lidocaine (LIDODERM) 5 % Place 1 patch on the skin as directed by provider Daily. Remove & Discard patch within 12 hours; use as needed for back pain 60 each 5    rosuvastatin (CRESTOR) 10 MG tablet Take 1 tablet by mouth Daily. 90 tablet 3    Tirzepatide 5 MG/0.5ML solution auto-injector Inject 5 mg under the skin into the appropriate area as directed 1 (One) Time Per Week. 2 mL 1    albuterol sulfate  (90 Base) MCG/ACT inhaler Inhale 2 puffs Every 4 (Four) Hours As Needed for Wheezing or Shortness of Air (coughing episode). 18 g 0    lisinopril (PRINIVIL,ZESTRIL) 10 MG tablet Take 1 tablet by mouth Daily. 90 tablet 3    Scopolamine 1 MG/3DAYS patch Place 1 patch on the skin as directed by provider Every 72 (Seventy-Two) Hours. 4 patch 0     Current Facility-Administered Medications   Medication Dose Route Frequency Provider Last Rate Last Admin    bupivacaine-EPINEPHrine PF (MARCAINE w/EPI) 0.25% -[id number] injection 10 mL  10 mL Injection Once Bella Alegria MD        fentaNYL citrate (PF) (SUBLIMAZE) injection 50 mcg  50 mcg Intravenous Once Bella Alegria MD        lidocaine (XYLOCAINE) 1 % injection 1 mL  1 mL Intradermal Once Bella Alegria MD        methylPREDNISolone acetate (DEPO-medrol) injection 80 mg  80 mg Epidural Once Bella Alegria MD        midazolam (VERSED) injection 1 mg  1 mg Intravenous Once PRN Bella Alegria MD           --------------------------------------------------------------------------------  Assessment & Plan      Anesthesia Evaluation     Patient summary reviewed and Nursing notes reviewed   no history of anesthetic complications:                Airway   Mallampati: II  Dental      Pulmonary - negative pulmonary ROS   Cardiovascular   Exercise tolerance: good (4-7  METS)    (+) hypertension, hyperlipidemia      Neuro/Psych  (+) numbness, psychiatric history Anxiety and Depression  GI/Hepatic/Renal/Endo    (+) diabetes mellitus, thyroid problem hypothyroidism    Musculoskeletal     (+) chronic pain, ONUR test, radiculopathy      PE comment: + b/l compression/distraction  Abdominal    Substance History - negative use     OB/GYN negative ob/gyn ROS         Other   arthritis,                  Diagnosis and Plan    Treatment Plan  ASA 2   Patient has had previous injection/procedure with 50-75% improvement.   Procedures: Sacroiliac joint injection, With fluoroscopy,      Anesthetic plan and risks discussed with patient.          Diagnosis     * Sacroiliitis [M46.1]

## 2025-03-24 NOTE — ANESTHESIA PROCEDURE NOTES
PAIN SI joint injection    Pre-sedation assessment completed: 3/24/2025 10:24 AM    Patient reassessed immediately prior to procedure    Patient location during procedure: Pain Clinic  Start time: 3/24/2025 10:24 AM  Stop time: 3/24/2025 10:38 AM    Reason for block: procedure for pain  Performed by  Anesthesiologist: Bella Alegria MD  Preanesthetic Checklist  Completed: patient identified, IV checked, site marked, risks and benefits discussed, surgical consent, monitors and equipment checked, pre-op evaluation and timeout performed  Prep:  Patient position: supine  Sterile barriers:mask, gloves and cap  Prep: ChloraPrep  Patient monitoring: blood pressure monitoring, continuous pulse oximetry and EKG  Procedure:  Sedation:no  Guidance:fluoroscopy  Contrast Medium:no  Location:Bilateral  Needle Type:Tuohy  Needle Gauge:22 G  Aspiration:negative  Medications:  Depomedrol:80 mg  Isovue:0  Comment:B/l sacroiliitis  Fluoro used  10 ml 0.25% marcaine w/ 1:200,000 epi mixture divided @ R & L sites    Post Assessment  Injection Assessment: negative  Patient Tolerance:comfortable throughout block  Complications:no

## 2025-03-24 NOTE — DISCHARGE INSTRUCTIONS
"Holding GLP-1 Receptor Agonists Prior to Anesthesia    In order for safe anesthesia, GLP-1 receptor agonist mediations need to be held before the procedure.     What are GLP-1 Receptor Agonists?  Glucagon-like peptide-1 (GLP-1) receptor agonists are approved by the Food and Drug Administration for treatment of type 2 diabetes mellitus and cardiovascular risk reduction. In addition, GLP-1 receptor agonists are also used for weight loss.     Which of my medications are GLP-1 Receptor Agonists?   Exanetide (Byetta®, Bydureon®)  Lixisenatide (Adlyxin®, Soliqua®)  Semaglutide (Wegovy®, Ozempic®, Rybelsus®)  Liraglutide (Saxenda®, Victoza®, Xutolphy®)  Dulaglutide (Trulicity®)   Tirzepatide (Mounjaro®, Zepbound®)    How can GLP-1 Receptor Agonists cause problems during surgery?  GLP-1 agonists can potentially cause adverse gastrointestinal effects, including the consequences of delayed gastric emptying. This can cause the stomach to be full even after refraining from eating and drinking before surgery and can cause regurgitation or \"throwing up\" of the stomach contents during anesthesia. If the contents enter the airway and the lungs, it could cause anesthesia complications and a severe pneumonia.     What should I do prior to my procedure?  According to the recommendations of the American Society of Anesthesiologists:    If you take GLP-1 agonists every day: Hold the medication on the day of the procedure/surgery.   If you take GLP-1 agonists once a week: Hold the medication a week prior to the procedure/surgery    What if I have questions?  If you have concerns or questions about holding your medications prior to your procedure, contact your doctors before stopping your medications.    Modified from the American Society of Anesthesiologists Consensus-Based Guidance on Preoperative Management of Patients (Adults and Children) on Glucagon-Like Peptide-1 (GLP-1) Receptor Agonists June 29,2023    Take the following " medications the morning of surgery:    Prozac   amlodipine   levothyroxine    If you are on prescription narcotic pain medication to control your pain you may also take that medication the morning of surgery.      General Instructions:     Do not eat solid food after midnight the night before surgery.  Clear liquids day of surgery are allowed but must be stopped at least two hours before your hospital arrival time.       Allowed clear liquids      Water, sodas, and tea or coffee with no cream or milk added.       12 to 20 ounces of a clear liquid that contains carbohydrates is recommended.  If non-diabetic, have Gatorade or Powerade.  If diabetic, have G2 or Powerade Zero.     Do not have liquids red in color.  Do not consume chicken, beef, pork or vegetable broth or bouillon cubes of any variety as they are not considered clear liquids and are not allowed.      Infants may have breast milk up to four hours before surgery.  Infants drinking formula may drink formula up to six hours before surgery.   Patients who avoid smoking, chewing tobacco and alcohol for 4 weeks prior to surgery have a reduced risk of post-operative complications.  Quit smoking as many days before surgery as you can.  Do not smoke, use chewing tobacco or drink alcohol the day of surgery.   If applicable bring your C-PAP/ BI-PAP machine in with you to preop day of surgery.  Bring any papers given to you in the doctor’s office.  Wear clean comfortable clothes.  Do not wear contact lenses, false eyelashes or make-up.  Bring a case for your glasses.   Bring crutches or walker if applicable.  Remove all piercings.  Leave jewelry and any other valuables at home.  Hair extensions with metal clips must be removed prior to surgery.  The Pre-Admission Testing nurse will instruct you to bring medications if unable to obtain an accurate list in Pre-Admission Testing.    Day of surgery you will need to let the preoperative nurse know the last time you took  each of your medications.  To ensure a safe environment for patients and staff, we kindly ask that children under the age of 16 not accompany patients.  If you must bring a dependent child or dependent adult please ensure a responsible adult, other than yourself, is present to supervise them.      If you were given a blood bank ID arm band remember to bring it with you the day of surgery.    Preventing a Surgical Site Infection:  For 2 to 3 days before surgery, avoid shaving with a razor because the razor can irritate skin and make it easier to develop an infection.    Any areas of open skin can increase the risk of a post-operative wound infection by allowing bacteria to enter and travel throughout the body.  Notify your surgeon if you have any skin wounds / rashes even if it is not near the expected surgical site.  The area will need assessed to determine if surgery should be delayed until it is healed.  The night prior to surgery shower using a fresh bar of anti-bacterial soap (such as Dial) and clean washcloth.  Sleep in a clean bed with clean clothing.  Do not allow pets to sleep with you.  Shower on the morning of surgery using a fresh bar of anti-bacterial soap (such as Dial) and clean washcloth.  Dry with a clean towel and dress in clean clothing.  Ask your surgeon if you will be receiving antibiotics prior to surgery.  Make sure you, your family, and all healthcare providers clean their hands with soap and water or an alcohol based hand  before caring for you or your wound.    Day of surgery:  Your arrival time is approximately two hours before your scheduled surgery time.  Please note if you have an early arrival time the surgery doors do not open before 5:00 AM.  Upon arrival, a Pre-op nurse and Anesthesiologist will review your health history, obtain vital signs, and answer questions you may have.  The only belongings needed at this time will be a list of your home medications and if applicable  your C-PAP/BI-PAP machine.  A Pre-op nurse will start an IV and you may receive medication in preparation for surgery, including something to help you relax.     Please be aware that surgery does come with discomfort.  We want to make every effort to control your discomfort so please discuss any uncontrolled symptoms with your nurse.   Your doctor will most likely have prescribed pain medications.      If you are going home after surgery you will receive individualized written care instructions before being discharged.  A responsible adult must drive you to and from the hospital on the day of your surgery and ideally stay with you through the night.   .  Discharge prescriptions can be filled by the hospital pharmacy during regular pharmacy hours.  If you are having surgery late in the day/evening your prescription may be e-prescribed to your pharmacy.  Please verify your pharmacy hours or chose a 24 hour pharmacy to avoid not having access to your prescription because your pharmacy has closed for the day.    If you are staying overnight following surgery, you will be transported to your hospital room following the recovery period.  [facility] has all private rooms.    If you have any questions please call Pre-Admission Testing at [phone].  Deductibles and co-payments are collected on the day of service. Please be prepared to pay the required co-pay, deductible or deposit on the day of service as defined by your plan.    Call your surgeon immediately if you experience any of the following symptoms:  Sore Throat  Shortness of Breath or difficulty breathing  Cough  Chills  Body soreness or muscle pain  Headache  Fever  New loss of taste or smell  Do not arrive for your surgery ill.  Your procedure will need to be rescheduled to another time.  You will need to call your physician before the day of surgery to avoid any unnecessary exposure to hospital staff as well as other patients.

## 2025-03-28 NOTE — H&P
"Patient: Summer Jefferson     YOB: 1955 69 y.o. female     Chief Complaints: Foot is okay but sore     History of Present Illness:Patient was seen on 11/11/2024 reporting that she injured her right foot approximately 1 week prior when she stepped on a stone in a parking lot.  She had had pain swelling and bruising at the lateral midfoot but really none to the ankle.  She had been seen in urgent care on and fitted with a short boot.     When seen on 11/11/2024 she reported mild pain in the proximal lateral aspect of the right fifth metatarsal.     At that time she was found to have fifth metatarsal base fracture without clear evidence of ankle injury.  Did not recommend any surgical treatment at that time.  She was instructed on continue send boot and sleeping in a postoperative shoe and offloading with a cane.  She was also counseled avoid anti-inflammatories.     Patient was seen on 12/5/2024 reporting that her foot felt fine.  She had been limiting activity on it and reported that she does not have any \"real pain\".  She had been using her boot for weightbearing and offloading with a cane and sleeping in a postoperative shoe.  She had minimal if any pain at the base of the right fifth metatarsal and no pain about the ankle.     We discussed treatment at that time and reviewed x-rays and did have a bit more displacement but joint did not show incongruity.  Symptoms are improving and I did not recommend any further workup or surgical treatment nor does she desire it.  She was instructed on use of boot or postoperative shoe for weightbearing activities and to offload with a cane.  She also reported that she was taking \"1-1/2 times\" the vitamins that she was supposed to take including vitamin D and her level was decent in January 2024 so we held off on rechecking at that time unless she had further healing issues.  Reviewed she could walk is much as needed for daily activity with protection as " previously outlined.     Patient was seen on 1/3/2025 reporting that her foot felt had great.  She had been using her boot for weightbearing activities and sleeping in a postoperative shoe with no pain in the right foot.  Pain was rated 0 out of 10.     At that time she seemed to be doing well with fifth metatarsal base fracture without clear sign of ankle injury.  Reviewed x-ray findings with her and although there was still some gapping she was clinically asymptomatic and did not recommend further workup.  She was anxious to get out of her boot.  She was allowed start weaning out of her boot and into  postoperative shoe initially around the house for a week or so and gradually out of the house and tolerating then start weaning into a sturdy athletic shoe initially around the house and then gradually out of the house.  She was leaving in 2 weeks to go on a cruise and recommended she take her boot with her.       Patient was seen on 2/5/2025 reporting that her foot felt okay.  Since previous appointment she had used her boot for about another week and then subsequently started using tennis shoes or stiff shoes.  She did take her boot with her on her cruise and used it about half the time.  She reported that overall her foot felt okay but did get an occasional slight discomfort at the base of the fifth metatarsal.  Symptoms were no worse out of her boot than they were in her boot.     We discussed treatment and reviewed x-rays and there appeared to be some areas of nonunion she did not wish to pursue surgical treatment at that time without exhausting further conservative measures.  She was sent for vitamin D evaluation and bone stimulator was ordered.     On 2/6/2025 her vitamin D was found to be adequate at 41.2 and no further augmentation was recommended.     I spoke with her again on 2/24/2025 and she been using the bone stimulator and went ahead and ordered a CT scan.     Patient had her CT scan and I reviewed  "results with her via telephone on 3/10/2025 and we discussed proceeding with operative treatment and would review that with further detail with her at follow-up appointment on 3/19/2025.     Patient was seen on 3/19/2025 reporting that her foot was doing okay but still had pain at the base of the right fifth metatarsal.  Pain was rated 2 out of 10        HPI     ROS: Foot pain  Medical History        Past Medical History:   Diagnosis Date    Anxiety and depression 03/15/2022    Backache      Diabetes mellitus type 2, controlled      HSV (herpes simplex virus) infection 03/15/2022    Hypercholesterolemia      Hypertension      Hypothyroidism      Osteoarthritis of multiple joints      Spinal stenosis       LUMBAR         Physical Exam:   Vitals       Vitals:     03/19/25 1044   Temp: 96.9 °F (36.1 °C)   Weight: 87.1 kg (192 lb)   Height: 162.6 cm (64\")   PainSc: 2       Performed 3/19/2025  Well developed with normal mood.  On exam she has mild tenderness palpation at the base of the right fifth metatarsal.  She had good eversion strength        Radiology:     3 views right foot ordered evaluate fracture alignment reviewed and compared to previous x-rays.  There is been no change in alignment to fifth metatarsal fracture approximately still with gapping laterally without gross joint incongruency.        CT scan films and report of the right foot dated 3/6/2025 reviewed which show a nondisplaced intra-articular fracture of the proximal fifth metatarsal extending to the TMT joint with an estimated 10% bony bridging across the dorsal aspect of the fracture.     Small well-corticated ossifications distal to the medial lateral malleolus and likely from remote trauma     Mild thickening of the proximal plantar fascia with small plantar calcaneal spur.     Multifocal mild degenerative change of the midfoot with good alignment and moderate to severe degenerative change first MTP joint with possible osteochondral bodies in " the plantar joint space.        Vitamin D 1/17/2024 42.7   Vitamin D 2/6/2025 41.2        Assessment/Plan:1. 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  Right first MTP arthritis  3.  Right midfoot arthritis     3/19/2025 we discussed as we had previously continued nonoperative versus operative treatment options and although no guarantees could be given mutual decision was made to proceed with operative treatment.  That we would plan on open reduction internal fixation with plate and screw fixation and bone graft from heel and possible cadaver graft.     Patient voiced clear standing the operative procedure and postoperative course with associated risk benefits potential outcomes and complications which can include but are not limited to heart attack stroke death pneumonia infection bleeding damage to blood vessels nerves or tendons blood clots pulmonary embolism persistent or worsening pain stiffness malunion nonunion need for subsequent surgery and failure to return to presurgery and precondition levels of activity as well as wound healing complications.  Small risk of fracture or infection and bone graft harvest site and/or disease transmission from cadaver graft.     She was advised on obtaining a scooter for postoperative mobilization and plans were made to proceed with this on outpatient basis at a mutually convenient time.  She was encouraged to call if she had any questions prior to surgery.  She was advised to continue using her bone stimulator in the interim.     Copied text in this note has been reviewed by me and is accurate as of  03/28/25 .

## 2025-04-07 ENCOUNTER — OFFICE VISIT (OUTPATIENT)
Dept: FAMILY MEDICINE CLINIC | Facility: CLINIC | Age: 70
End: 2025-04-07
Payer: MEDICARE

## 2025-04-07 VITALS
HEIGHT: 64 IN | TEMPERATURE: 98.5 F | RESPIRATION RATE: 16 BRPM | SYSTOLIC BLOOD PRESSURE: 102 MMHG | WEIGHT: 190.1 LBS | OXYGEN SATURATION: 97 % | BODY MASS INDEX: 32.46 KG/M2 | HEART RATE: 79 BPM | DIASTOLIC BLOOD PRESSURE: 66 MMHG

## 2025-04-07 DIAGNOSIS — R30.0 BURNING WITH URINATION: ICD-10-CM

## 2025-04-07 DIAGNOSIS — N30.90 CYSTITIS: Primary | ICD-10-CM

## 2025-04-07 LAB
BILIRUB BLD-MCNC: NEGATIVE MG/DL
CLARITY, POC: ABNORMAL
COLOR UR: YELLOW
EXPIRATION DATE: ABNORMAL
GLUCOSE UR STRIP-MCNC: ABNORMAL MG/DL
KETONES UR QL: NEGATIVE
LEUKOCYTE EST, POC: ABNORMAL
Lab: ABNORMAL
NITRITE UR-MCNC: NEGATIVE MG/ML
PH UR: 6 [PH] (ref 5–8)
PROT UR STRIP-MCNC: NEGATIVE MG/DL
RBC # UR STRIP: ABNORMAL /UL
SP GR UR: 1.01 (ref 1–1.03)
UROBILINOGEN UR QL: ABNORMAL

## 2025-04-07 PROCEDURE — 1126F AMNT PAIN NOTED NONE PRSNT: CPT | Performed by: NURSE PRACTITIONER

## 2025-04-07 PROCEDURE — 3044F HG A1C LEVEL LT 7.0%: CPT | Performed by: NURSE PRACTITIONER

## 2025-04-07 PROCEDURE — 99213 OFFICE O/P EST LOW 20 MIN: CPT | Performed by: NURSE PRACTITIONER

## 2025-04-07 PROCEDURE — 3078F DIAST BP <80 MM HG: CPT | Performed by: NURSE PRACTITIONER

## 2025-04-07 PROCEDURE — 81003 URINALYSIS AUTO W/O SCOPE: CPT | Performed by: NURSE PRACTITIONER

## 2025-04-07 PROCEDURE — 3074F SYST BP LT 130 MM HG: CPT | Performed by: NURSE PRACTITIONER

## 2025-04-07 RX ORDER — CEFUROXIME AXETIL 250 MG/1
250 TABLET ORAL 2 TIMES DAILY
Qty: 14 TABLET | Refills: 0 | Status: SHIPPED | OUTPATIENT
Start: 2025-04-07 | End: 2025-04-14

## 2025-04-07 RX ORDER — CEFUROXIME AXETIL 250 MG/1
250 TABLET ORAL 2 TIMES DAILY
Qty: 14 TABLET | Refills: 0 | Status: SHIPPED | OUTPATIENT
Start: 2025-04-07 | End: 2025-04-07

## 2025-04-07 NOTE — PROGRESS NOTES
Subjective     Summer Jefferson is a 69 y.o.. female.     Urinary Frequency   This is a new problem. Episode onset: 2-3 days. The problem has been unchanged. The quality of the pain is described as burning. There has been no fever. Associated symptoms include frequency and urgency. Pertinent negatives include no nausea or vomiting.       The following portions of the patient's history were reviewed and updated as appropriate: allergies, current medications, past family history, past medical history, past social history, past surgical history and problem list.    Past Medical History:   Diagnosis Date    Anxiety and depression 03/15/2022    Backache     Diabetes mellitus type 2, controlled     HSV (herpes simplex virus) infection 03/15/2022    Hypercholesterolemia     Hypertension     Hypothyroidism     Osteoarthritis of multiple joints     Spinal stenosis     LUMBAR       Past Surgical History:   Procedure Laterality Date    ANKLE ARTHROSCOPY Right     1/2013 & 10/2013    APPENDECTOMY      COLONOSCOPY      7/27/2007    EPIDURAL BLOCK      X5    LAPAROSCOPIC GASTRIC BANDING  08/01/2007    LUMBAR FUSION Left 12/11/2023    Procedure: PRONE OBLIQUE LATERAL LUMBAR TWO TO THREE, FOUR TO FIVE INTERBODY FUSION WITH ANTERIOR PLATING AND NEUROMONITORING;  Surgeon: Calderon Whittaker MD;  Location: [facility]  Service: Neurosurgery;  Laterality: Left;    LUMBAR FUSION N/A 12/11/2023    Procedure: LUMBAR FUSION MINIMALLY INVASIVE NAVIGATION;  Surgeon: Calderon Whittaker MD;  Location: [facility] OR;  Service: Neurosurgery;  Laterality: N/A;    LUMBAR FUSION N/A 02/26/2024    Procedure: LUMBAR TWO THREE, THREE TO FOUR, FOUR TO FIVE FUSION MINIMALLY INVASIVE NAVIGATION;  Surgeon: Calderon Whittaker MD;  Location: [facility] OR;  Service: Neurosurgery;  Laterality: N/A;    ROTATOR CUFF REPAIR Right     5/2014    TOTAL KNEE ARTHROPLASTY Right     12/2014    TUBAL ABDOMINAL LIGATION         Review of Systems   Constitutional:  Negative  "for fever.   Respiratory: Negative.     Cardiovascular: Negative.    Gastrointestinal: Negative.  Negative for nausea and vomiting.   Genitourinary:  Positive for dysuria, frequency and urgency.       No Known Allergies    Objective     Vitals:    04/07/25 1427   BP: 102/66   BP Location: Right arm   Patient Position: Sitting   Cuff Size: Adult   Pulse: 79   Resp: 16   Temp: 98.5 °F (36.9 °C)   TempSrc: Oral   SpO2: 97%   Weight: 86.2 kg (190 lb 1.6 oz)   Height: 162.6 cm (64.02\")     Body mass index is 32.61 kg/m².    Physical Exam  Vitals reviewed.   HENT:      Head: Normocephalic.   Eyes:      Pupils: Pupils are equal, round, and reactive to light.   Cardiovascular:      Rate and Rhythm: Normal rate and regular rhythm.   Pulmonary:      Effort: Pulmonary effort is normal.      Breath sounds: Normal breath sounds.   Musculoskeletal:         General: Normal range of motion.   Skin:     General: Skin is warm and dry.   Neurological:      Mental Status: She is alert and oriented to person, place, and time.   Psychiatric:         Behavior: Behavior normal.           Current Outpatient Medications:     amLODIPine (NORVASC) 5 MG tablet, Take 1 tablet by mouth Daily., Disp: 90 tablet, Rfl: 3    cefuroxime (CEFTIN) 250 MG tablet, Take 1 tablet by mouth 2 (Two) Times a Day for 7 days., Disp: 14 tablet, Rfl: 0    celecoxib (CeleBREX) 200 MG capsule, Take 1 capsule by mouth Daily., Disp: 90 capsule, Rfl: 3    diphenhydrAMINE (BENADRYL) 25 mg capsule, Take 1 capsule by mouth Every 6 (Six) Hours As Needed for Itching., Disp: , Rfl:     empagliflozin (Jardiance) 25 MG tablet tablet, Take 1 tablet by mouth Daily., Disp: 90 tablet, Rfl: 3    famciclovir (FAMVIR) 125 MG tablet, 1 tab twice a day for 5 days with flair ups (Patient taking differently: Take 1 tablet by mouth 2 (Two) Times a Day As Needed (flare up). 1 tab twice a day for 5 days with flair ups), Disp: 10 tablet, Rfl: 5    FLUoxetine (PROzac) 20 MG capsule, Take 1 " capsule by mouth Daily., Disp: 90 capsule, Rfl: 3    hydroCHLOROthiazide 12.5 MG tablet, Take 1 tablet by mouth Daily., Disp: 90 tablet, Rfl: 3    levothyroxine (SYNTHROID, LEVOTHROID) 50 MCG tablet, Take 1 tablet by mouth Daily. (Patient taking differently: Take 1 tablet by mouth Every Morning.), Disp: 90 tablet, Rfl: 3    lidocaine (LIDODERM) 5 %, Place 1 patch on the skin as directed by provider Daily. Remove & Discard patch within 12 hours; use as needed for back pain, Disp: 60 each, Rfl: 5    lisinopril (PRINIVIL,ZESTRIL) 10 MG tablet, Take 1 tablet by mouth Daily., Disp: 90 tablet, Rfl: 3    meloxicam (MOBIC) 15 MG tablet, Take 1 tablet by mouth Daily., Disp: , Rfl:     rosuvastatin (CRESTOR) 10 MG tablet, Take 1 tablet by mouth Daily., Disp: 90 tablet, Rfl: 3    Tirzepatide 5 MG/0.5ML solution auto-injector, Inject 5 mg under the skin into the appropriate area as directed 1 (One) Time Per Week. (Patient taking differently: Inject 5 mg under the skin into the appropriate area as directed 1 (One) Time Per Week. Sun   pt verbalizes not to take after 3/30/2025 dose), Disp: 2 mL, Rfl: 1  No current facility-administered medications for this visit.    Recent Results (from the past week)   POC Urinalysis Dipstick, Automated    Collection Time: 04/07/25  2:38 PM    Specimen: Urine   Result Value Ref Range    Color Yellow Yellow, Straw, Dark Yellow, Serena    Clarity, UA Cloudy (A) Clear    Specific Gravity  1.010 1.005 - 1.030    pH, Urine 6.0 5.0 - 8.0    Leukocytes Moderate (2+) (A) Negative    Nitrite, UA Negative Negative    Protein, POC Negative Negative mg/dL    Glucose, UA 3+ (A) Negative mg/dL    Ketones, UA Negative Negative    Urobilinogen, UA 0.2 E.U./dL Normal, 0.2 E.U./dL    Bilirubin Negative Negative    Blood, UA Small (A) Negative    Lot Number [id number]     Expiration Date 1,005,026    POC Glucose Once    Collection Time: 04/08/25  9:56 AM    Specimen: Blood   Result Value Ref Range    Glucose  107 70 - 130 mg/dL         Diagnoses and all orders for this visit:    1. Cystitis (Primary)  -     Urine Culture - Urine, Urine, Clean Catch  -     Discontinue: cefuroxime (CEFTIN) 250 MG tablet; Take 1 tablet by mouth 2 (Two) Times a Day for 7 days.  Dispense: 14 tablet; Refill: 0  -     cefuroxime (CEFTIN) 250 MG tablet; Take 1 tablet by mouth 2 (Two) Times a Day for 7 days.  Dispense: 14 tablet; Refill: 0    2. Burning with urination  -     POC Urinalysis Dipstick, Automated        Patient Instructions   Make sure to drink plenty of water daily    Return if symptoms worsen or fail to improve.

## 2025-04-08 ENCOUNTER — HOSPITAL ENCOUNTER (OUTPATIENT)
Facility: HOSPITAL | Age: 70
Setting detail: HOSPITAL OUTPATIENT SURGERY
Discharge: HOME | End: 2025-04-08
Attending: ORTHOPAEDIC SURGERY | Admitting: ORTHOPAEDIC SURGERY
Payer: MEDICARE

## 2025-04-08 ENCOUNTER — APPOINTMENT (OUTPATIENT)
Dept: GENERAL RADIOLOGY | Facility: HOSPITAL | Age: 70
End: 2025-04-08
Payer: MEDICARE

## 2025-04-08 ENCOUNTER — ANESTHESIA EVENT (OUTPATIENT)
Dept: PERIOP | Facility: HOSPITAL | Age: 70
End: 2025-04-08
Payer: MEDICARE

## 2025-04-08 ENCOUNTER — ANESTHESIA (OUTPATIENT)
Dept: PERIOP | Facility: HOSPITAL | Age: 70
End: 2025-04-08
Payer: MEDICARE

## 2025-04-08 VITALS
SYSTOLIC BLOOD PRESSURE: 153 MMHG | HEART RATE: 84 BPM | DIASTOLIC BLOOD PRESSURE: 84 MMHG | HEIGHT: 64 IN | RESPIRATION RATE: 16 BRPM | BODY MASS INDEX: 32.44 KG/M2 | TEMPERATURE: 98 F | WEIGHT: 190.04 LBS | OXYGEN SATURATION: 93 %

## 2025-04-08 DIAGNOSIS — S92.354K CLOSED NONDISPLACED FRACTURE OF FIFTH METATARSAL BONE OF RIGHT FOOT WITH NONUNION, SUBSEQUENT ENCOUNTER: ICD-10-CM

## 2025-04-08 LAB — GLUCOSE BLDC GLUCOMTR-MCNC: 107 MG/DL (ref 70–130)

## 2025-04-08 PROCEDURE — 82948 REAGENT STRIP/BLOOD GLUCOSE: CPT

## 2025-04-08 PROCEDURE — G0463 HOSPITAL OUTPT CLINIC VISIT: HCPCS | Performed by: ORTHOPAEDIC SURGERY

## 2025-04-08 RX ORDER — ACETAMINOPHEN 500 MG
1000 TABLET ORAL ONCE
Status: COMPLETED | OUTPATIENT
Start: 2025-04-08 | End: 2025-04-08

## 2025-04-08 RX ORDER — LIDOCAINE HYDROCHLORIDE 10 MG/ML
0.5 INJECTION, SOLUTION INFILTRATION; PERINEURAL ONCE AS NEEDED
Status: DISCONTINUED | OUTPATIENT
Start: 2025-04-08 | End: 2025-04-08 | Stop reason: HOSPADM

## 2025-04-08 RX ORDER — SODIUM CHLORIDE 0.9 % (FLUSH) 0.9 %
3 SYRINGE (ML) INJECTION EVERY 12 HOURS SCHEDULED
Status: DISCONTINUED | OUTPATIENT
Start: 2025-04-08 | End: 2025-04-08 | Stop reason: HOSPADM

## 2025-04-08 RX ORDER — SODIUM CHLORIDE 0.9 % (FLUSH) 0.9 %
3-10 SYRINGE (ML) INJECTION AS NEEDED
Status: DISCONTINUED | OUTPATIENT
Start: 2025-04-08 | End: 2025-04-08 | Stop reason: HOSPADM

## 2025-04-08 RX ORDER — MIDAZOLAM HYDROCHLORIDE 1 MG/ML
0.5 INJECTION, SOLUTION INTRAMUSCULAR; INTRAVENOUS
Status: DISCONTINUED | OUTPATIENT
Start: 2025-04-08 | End: 2025-04-08 | Stop reason: HOSPADM

## 2025-04-08 RX ORDER — FENTANYL CITRATE 50 UG/ML
50 INJECTION, SOLUTION INTRAMUSCULAR; INTRAVENOUS ONCE AS NEEDED
Status: DISCONTINUED | OUTPATIENT
Start: 2025-04-08 | End: 2025-04-08 | Stop reason: HOSPADM

## 2025-04-08 RX ORDER — SODIUM CHLORIDE, SODIUM LACTATE, POTASSIUM CHLORIDE, CALCIUM CHLORIDE 600; 310; 30; 20 MG/100ML; MG/100ML; MG/100ML; MG/100ML
9 INJECTION, SOLUTION INTRAVENOUS CONTINUOUS
Status: DISCONTINUED | OUTPATIENT
Start: 2025-04-08 | End: 2025-04-08 | Stop reason: HOSPADM

## 2025-04-08 RX ADMIN — ACETAMINOPHEN 1000 MG: 500 TABLET, FILM COATED ORAL at 10:36

## 2025-04-08 NOTE — ANESTHESIA PREPROCEDURE EVALUATION
Anesthesia Evaluation     Patient summary reviewed and Nursing notes reviewed   no history of anesthetic complications:   NPO Solid Status: > 8 hours  NPO Liquid Status: > 2 hours           Airway   Mallampati: II  TM distance: >3 FB  Neck ROM: full  Dental      Pulmonary    Cardiovascular     ECG reviewed    (+) hypertension, hyperlipidemia      Neuro/Psych  (+) psychiatric history  GI/Hepatic/Renal/Endo    (+) obesity, diabetes mellitus, thyroid problem hypothyroidism    Musculoskeletal     Abdominal    Substance History      OB/GYN          Other   arthritis,                       Anesthesia Plan    ASA 2     general     intravenous induction     Anesthetic plan, risks, benefits, and alternatives have been provided, discussed and informed consent has been obtained with: patient.        CODE STATUS:

## 2025-04-08 NOTE — INTERVAL H&P NOTE
H&P updated. The patient was examined and reports that she had had urinary frequency.  She was seen elsewhere yesterday and diagnosed with urinary tract infection and started on cefuroxime which she has not yet started.  Reviewed with her that given her recent onset of symptomatic urinary tract infection feel that it is best to delay her surgery at least 2 weeks until this has been treated in order to minimize complications of which she was clear to standing and was in agreement.

## 2025-04-08 NOTE — SIGNIFICANT NOTE
Patient states that she was diagnosed with new onset of UTI by GILBERT Bryan at [facility] yesterday afternoon at 1430.  She has not started prescribed antibiotic treatment, and had not informed Dr. Wu prior to arrival for surgery today.    Spoke with Dr. Wu regarding these developments, and he has decided to cancel her procedure for today, and to have patient reschedule for a later date.  Patient & spouse informed, and will speak with Dr. Wu before being discharged back home.

## 2025-04-09 ENCOUNTER — PREP FOR SURGERY (OUTPATIENT)
Dept: OTHER | Facility: HOSPITAL | Age: 70
End: 2025-04-09
Payer: MEDICARE

## 2025-04-09 ENCOUNTER — TELEPHONE (OUTPATIENT)
Dept: ORTHOPEDIC SURGERY | Facility: CLINIC | Age: 70
End: 2025-04-09

## 2025-04-09 DIAGNOSIS — S92.354K CLOSED NONDISPLACED FRACTURE OF FIFTH METATARSAL BONE OF RIGHT FOOT WITH NONUNION, SUBSEQUENT ENCOUNTER: Primary | ICD-10-CM

## 2025-04-09 NOTE — TELEPHONE ENCOUNTER
Provider: ISAI    Caller: Summer Jefferson    Relationship to Patient: Self    Phone Number: [phone]*    Reason for Call: PT REQUESTING CALL BACK FROM SX  TO HAVE SX THAT WAS CANCELLED YESTERDAY R/S WITHIN 10-14 DAYS. PLEASE ADVISE.

## 2025-04-10 LAB
BACTERIA UR CULT: ABNORMAL
BACTERIA UR CULT: ABNORMAL
OTHER ANTIBIOTIC SUSC ISLT: ABNORMAL

## 2025-04-11 ENCOUNTER — RESULTS FOLLOW-UP (OUTPATIENT)
Dept: FAMILY MEDICINE CLINIC | Facility: CLINIC | Age: 70
End: 2025-04-11
Payer: MEDICARE

## 2025-04-11 DIAGNOSIS — N30.90 CYSTITIS: Primary | ICD-10-CM

## 2025-04-11 DIAGNOSIS — E11.65 CONTROLLED TYPE 2 DIABETES MELLITUS WITH HYPERGLYCEMIA, WITHOUT LONG-TERM CURRENT USE OF INSULIN: Chronic | ICD-10-CM

## 2025-04-11 RX ORDER — NITROFURANTOIN 25; 75 MG/1; MG/1
100 CAPSULE ORAL 2 TIMES DAILY
Qty: 14 CAPSULE | Refills: 0 | Status: SHIPPED | OUTPATIENT
Start: 2025-04-11 | End: 2025-04-18

## 2025-04-15 ENCOUNTER — TELEPHONE (OUTPATIENT)
Dept: FAMILY MEDICINE CLINIC | Facility: CLINIC | Age: 70
End: 2025-04-15

## 2025-04-15 DIAGNOSIS — R30.0 DYSURIA: Primary | ICD-10-CM

## 2025-04-15 NOTE — TELEPHONE ENCOUNTER
Caller: Summer Jefferson    Relationship: Self    Best call back number: [phone]     What is the best time to reach you: ANY    Who are you requesting to speak with (clinical staff, provider,  specific staff member): PROVIDER     Do you know the name of the person who called:      What was the call regarding: PATIENT CALLED TO STATE THAT SHE IS STILL HAVING SYMPTOMS AFTER ROUND OF ANTIBIOTIC. PLEASE CALL TO ADVISE.    Is it okay if the provider responds through MyChart:

## 2025-04-16 LAB
APPEARANCE UR: CLEAR
BACTERIA #/AREA URNS HPF: NORMAL /[HPF]
BILIRUB UR QL STRIP: NEGATIVE
CASTS URNS QL MICRO: NORMAL /LPF
COLOR UR: YELLOW
EPI CELLS #/AREA URNS HPF: NORMAL /HPF (ref 0–10)
GLUCOSE UR QL STRIP: ABNORMAL
HGB UR QL STRIP: NEGATIVE
KETONES UR QL STRIP: NEGATIVE
LEUKOCYTE ESTERASE UR QL STRIP: NEGATIVE
MICRO URNS: ABNORMAL
MICRO URNS: ABNORMAL
NITRITE UR QL STRIP: NEGATIVE
PH UR STRIP: 6 [PH] (ref 5–7.5)
PROT UR QL STRIP: NEGATIVE
RBC #/AREA URNS HPF: NORMAL /HPF (ref 0–2)
SP GR UR STRIP: >=1.03 (ref 1–1.03)
UROBILINOGEN UR STRIP-MCNC: 0.2 MG/DL (ref 0.2–1)
WBC #/AREA URNS HPF: NORMAL /HPF (ref 0–5)

## 2025-04-28 ENCOUNTER — TELEPHONE (OUTPATIENT)
Dept: ORTHOPEDIC SURGERY | Facility: CLINIC | Age: 70
End: 2025-04-28
Payer: MEDICARE

## 2025-04-28 NOTE — TELEPHONE ENCOUNTER
Patient called and states that she has a dental issue and patient may need a tooth pulled within the next couple weeks. Surgery is scheduled for 4-

## 2025-04-28 NOTE — TELEPHONE ENCOUNTER
I returned patient's call again today.  Last week when I had spoken with her and she had not only had her crown removed but it had a root canal.  She says she is having quite a bit of pain from that and is on pain medication.  She was concerned about proceeding with surgery tomorrow as she has been told by her dentist that she may have to have this tooth removed if symptoms do not improve and she understands that could potentially increase risk for infection and is not something that she wishes to risk which I think is reasonable and would make that same recommendation.  Will hold off on her surgery for now until she knows what was going on with her tooth.  She did asked to be seen in the office again for x-rays for evaluation as she has been using her bone stimulator and wants to see if there is any difference with I think is reasonable so we will have her keep her postop appointment on 5/12/2025.  She appreciated the call.

## 2025-05-05 ENCOUNTER — TELEPHONE (OUTPATIENT)
Dept: ORTHOPEDIC SURGERY | Facility: CLINIC | Age: 70
End: 2025-05-05

## 2025-05-05 NOTE — PROGRESS NOTES
Patient ID: Summer Jefferson is a 69 y.o. female is here today for follow-up for SI joint inflammation.    Procedure: SI joint injection completed on 03/24/2025    Subjective     The patient is here in regards to   Chief Complaint   Patient presents with    Back Pain    Leg Pain    Follow-up       History of Present Illness  Stephanie is overall doing well after her SI joint injection.  Ice has been helpful as well.  She still has not had her right foot fixed due to scheduling issues after UTI but is no longer walking very asymmetrically.  She feels that her pain is under reasonable control she is scheduled to have another SI joint injection soon.      While in the room and during my examination of the patient I wore a mask and eye protection.  I washed my hands before and after this patient encounter.  The patient was also wearing a mask.    The following portions of the patient's history were reviewed and updated as appropriate: allergies, current medications, past family history, past medical history, past social history, past surgical history and problem list.    Review of Systems   Constitutional:  Negative for fever.   Musculoskeletal:  Positive for gait problem. Negative for back pain.   Neurological:  Negative for dizziness, weakness, light-headedness, numbness and headaches.        Past Medical History:   Diagnosis Date    Anxiety and depression 03/15/2022    Backache     Diabetes mellitus type 2, controlled     HSV (herpes simplex virus) infection 03/15/2022    Hypercholesterolemia     Hypertension     Hypothyroidism     Osteoarthritis of multiple joints     Spinal stenosis     LUMBAR       No Known Allergies    Family History   Problem Relation Age of Onset    Thyroid disease Mother     Diabetes Mother     Hypertension Mother     Heart disease Mother     Arthritis Mother     Vision loss Father     Heart disease Father     Hyperlipidemia Father     Malig Hyperthermia Neg Hx        Social History      Socioeconomic History    Marital status:    Tobacco Use    Smoking status: Former     Current packs/day: 0.00     Average packs/day: 1 pack/day for 10.0 years (10.0 ttl pk-yrs)     Types: Cigarettes     Start date: 1983     Quit date: 1993     Years since quittin.4     Passive exposure: Past    Smokeless tobacco: Never    Tobacco comments:     quit   smoked for 10 years  1/2 pack daily   Vaping Use    Vaping status: Never Used   Substance and Sexual Activity    Alcohol use: Yes     Comment: 1-2 a month    Drug use: Never    Sexual activity: Defer       Past Surgical History:   Procedure Laterality Date    ANKLE ARTHROSCOPY Right     2013 & 10/2013    APPENDECTOMY      COLONOSCOPY      2007    EPIDURAL BLOCK      X5    LAPAROSCOPIC GASTRIC BANDING  2007    LUMBAR FUSION Left 2023    Procedure: PRONE OBLIQUE LATERAL LUMBAR TWO TO THREE, FOUR TO FIVE INTERBODY FUSION WITH ANTERIOR PLATING AND NEUROMONITORING;  Surgeon: Calderon Whittaker MD;  Location: [facility];  Service: Neurosurgery;  Laterality: Left;    LUMBAR FUSION N/A 2023    Procedure: LUMBAR FUSION MINIMALLY INVASIVE NAVIGATION;  Surgeon: Calderon Whittaker MD;  Location: [facility];  Service: Neurosurgery;  Laterality: N/A;    LUMBAR FUSION N/A 2024    Procedure: LUMBAR TWO THREE, THREE TO FOUR, FOUR TO FIVE FUSION MINIMALLY INVASIVE NAVIGATION;  Surgeon: Calderon Whittaker MD;  Location: [facility];  Service: Neurosurgery;  Laterality: N/A;    ROTATOR CUFF REPAIR Right     2014    TOTAL KNEE ARTHROPLASTY Right     2014    TUBAL ABDOMINAL LIGATION           Objective     Vitals:    25 1116   BP: 112/68   Pulse: 82   Resp: 16   SpO2: 95%     Body mass index is 33.81 kg/m².    Physical Exam  Constitutional:       General: She is awake.   HENT:      Head: Normocephalic and atraumatic.   Eyes:      General: Lids are normal.      Extraocular Movements: Extraocular movements intact.       Conjunctiva/sclera: Conjunctivae normal.      Pupils: Pupils are equal, round, and reactive to light.   Pulmonary:      Breath sounds: Normal breath sounds.   Abdominal:      Palpations: Abdomen is soft.   Musculoskeletal:         General: Normal range of motion.   Skin:     General: Skin is warm and dry.   Neurological:      Mental Status: She is alert.      Coordination: Coordination is intact.      Deep Tendon Reflexes: Reflexes are normal and symmetric.   Psychiatric:         Behavior: Behavior is cooperative.         Neurological Exam  Mental Status  Awake and alert. Oriented to person, place and time.    Cranial Nerves  CN II: Visual acuity is normal. Visual fields full to confrontation.  CN III, IV, VI: Extraocular movements intact bilaterally. Normal lids and orbits bilaterally. Pupils equal round and reactive to light bilaterally.  CN V: Facial sensation is normal.  CN VII: Full and symmetric facial movement.  CN IX, X: Palate elevates symmetrically. Normal gag reflex.  CN XI: Shoulder shrug strength is normal.  CN XII: Tongue midline without atrophy or fasciculations.    Motor                                               Right                     Left  Deltoid                                   5                          5   Biceps                                   5                          5   Triceps                                  5                          5   Iliopsoas                               5                          5   Quadriceps                           5                          5   Hamstring                             5                          5   Gastrocnemius                     5                           5   Anterior tibialis                      5                          5    Sensory  Sensation is intact to light touch, pinprick, vibration and proprioception in all four extremities.    Reflexes  Deep tendon reflexes are 2+ and symmetric in all four  extremities.    Coordination    Finger-to-nose, rapid alternating movements and heel-to-shin normal bilaterally without dysmetria.    Gait  Normal casual, toe, heel and tandem gait.      Assessment & Plan   Independent Review of Radiographic Studies:      I personally reviewed the images from the following studies.  FINDINGS:    No new neuroimaging.    Assessment/Plan: Has some mild bilateral SI joint soreness but significantly improved compared to before.  I recommended she continue with conservative management including injections and ice when needed.    Medical Decision Making:      Follow-up as needed         Diagnoses and all orders for this visit:    1. SI (sacroiliac) joint inflammation (Primary)             Patient Instructions/Recommendations:    Call with any questions or concerns      Calderon Whittaker MD  05/07/25  11:30 EDT

## 2025-05-05 NOTE — TELEPHONE ENCOUNTER
Caller: Summer Jefferson    Relationship to patient: Self    Best call back number: [phone] (home) [phone] (work)    Patient is needing: PATIENT STATES ALL OF HER DENTAL WORK SHE NEEDED TO GET DONE HAS BEEN COMPLETED AND SHE WANTS TO GET HER SX WITH DR ALEX R/S   SHE STATES HER DENTIST TOLD HER SHE WOULD BE GOOD TO PROCEED WITH SX   PLEASE ADVISE

## 2025-05-07 ENCOUNTER — OFFICE VISIT (OUTPATIENT)
Dept: NEUROSURGERY | Facility: CLINIC | Age: 70
End: 2025-05-07
Payer: MEDICARE

## 2025-05-07 VITALS
HEART RATE: 82 BPM | OXYGEN SATURATION: 95 % | DIASTOLIC BLOOD PRESSURE: 68 MMHG | WEIGHT: 197 LBS | SYSTOLIC BLOOD PRESSURE: 112 MMHG | BODY MASS INDEX: 33.63 KG/M2 | RESPIRATION RATE: 16 BRPM | HEIGHT: 64 IN

## 2025-05-07 DIAGNOSIS — M46.1 SI (SACROILIAC) JOINT INFLAMMATION: Primary | ICD-10-CM

## 2025-05-07 NOTE — TELEPHONE ENCOUNTER
Caller: Summer Jefferson    Relationship: Self    Best call back number: [phone]    What was the call regarding: PT CALLING TO RESCHEDULE SX WITH DR ALEX. PLEASE CONTACT PT TO RESCHEDULE.

## 2025-05-12 ENCOUNTER — OFFICE VISIT (OUTPATIENT)
Dept: ORTHOPEDIC SURGERY | Facility: CLINIC | Age: 70
End: 2025-05-12
Payer: MEDICARE

## 2025-05-12 VITALS — WEIGHT: 197 LBS | BODY MASS INDEX: 33.63 KG/M2 | HEIGHT: 64 IN | TEMPERATURE: 97.1 F

## 2025-05-12 DIAGNOSIS — R52 PAIN: Primary | ICD-10-CM

## 2025-05-12 PROCEDURE — 99213 OFFICE O/P EST LOW 20 MIN: CPT | Performed by: ORTHOPAEDIC SURGERY

## 2025-05-12 NOTE — PROGRESS NOTES
"Foot Follow Up      Patient: Summer Jefferson    YOB: 1955 69 y.o. female    Chief Complaints: Foot feels okay    History of Present Illness:Patient was seen on 11/11/2024 reporting that she injured her right foot approximately 1 week prior when she stepped on a stone in a parking lot.  She had had pain swelling and bruising at the lateral midfoot but really none to the ankle.  She had been seen in urgent care on and fitted with a short boot.     When seen on 11/11/2024 she reported mild pain in the proximal lateral aspect of the right fifth metatarsal.     At that time she was found to have fifth metatarsal base fracture without clear evidence of ankle injury.  Did not recommend any surgical treatment at that time.  She was instructed on continue send boot and sleeping in a postoperative shoe and offloading with a cane.  She was also counseled avoid anti-inflammatories.     Patient was seen on 12/5/2024 reporting that her foot felt fine.  She had been limiting activity on it and reported that she does not have any \"real pain\".  She had been using her boot for weightbearing and offloading with a cane and sleeping in a postoperative shoe.  She had minimal if any pain at the base of the right fifth metatarsal and no pain about the ankle.     We discussed treatment at that time and reviewed x-rays and did have a bit more displacement but joint did not show incongruity.  Symptoms are improving and I did not recommend any further workup or surgical treatment nor does she desire it.  She was instructed on use of boot or postoperative shoe for weightbearing activities and to offload with a cane.  She also reported that she was taking \"1-1/2 times\" the vitamins that she was supposed to take including vitamin D and her level was decent in January 2024 so we held off on rechecking at that time unless she had further healing issues.  Reviewed she could walk is much as needed for daily activity with " protection as previously outlined.     Patient was seen on 1/3/2025 reporting that her foot felt had great.  She had been using her boot for weightbearing activities and sleeping in a postoperative shoe with no pain in the right foot.  Pain was rated 0 out of 10.     At that time she seemed to be doing well with fifth metatarsal base fracture without clear sign of ankle injury.  Reviewed x-ray findings with her and although there was still some gapping she was clinically asymptomatic and did not recommend further workup.  She was anxious to get out of her boot.  She was allowed start weaning out of her boot and into  postoperative shoe initially around the house for a week or so and gradually out of the house and tolerating then start weaning into a sturdy athletic shoe initially around the house and then gradually out of the house.  She was leaving in 2 weeks to go on a cruise and recommended she take her boot with her.       Patient was seen on 2/5/2025 reporting that her foot felt okay.  Since previous appointment she had used her boot for about another week and then subsequently started using tennis shoes or stiff shoes.  She did take her boot with her on her cruise and used it about half the time.  She reported that overall her foot felt okay but did get an occasional slight discomfort at the base of the fifth metatarsal.  Symptoms were no worse out of her boot than they were in her boot.     We discussed treatment and reviewed x-rays and there appeared to be some areas of nonunion she did not wish to pursue surgical treatment at that time without exhausting further conservative measures.  She was sent for vitamin D evaluation and bone stimulator was ordered.     On 2/6/2025 her vitamin D was found to be adequate at 41.2 and no further augmentation was recommended.     I spoke with her again on 2/24/2025 and she been using the bone stimulator and went ahead and ordered a CT scan.     Patient had her CT scan and  "I reviewed results with her via telephone on 3/10/2025 and we discussed proceeding with operative treatment and would review that with further detail with her at follow-up appointment on 3/19/2025.     Patient was seen on 3/19/2025 reporting that her foot was doing okay but still had pain at the base of the right fifth metatarsal.  Pain was rated 2 out of 10.    We discussed continued nonoperative versus operative treatment options with mutual decision made proceed with operative treatment with open reduction internal fixation and bone grafting of the proximal fifth metatarsal.    This was scheduled for 4/8/2025 however patient had dental issues that precluded surgery and it was subsequently rescheduled to 5/27/2025.  She was brought in today for further evaluation to check x-rays etc.  She reports that she was not having any pain with walking or activities of daily living and now having appreciable pain in the right foot.  Pain is rated 2 out of 10  HPI    ROS: Foot pain  Past Medical History:   Diagnosis Date    Anxiety and depression 03/15/2022    Backache     Diabetes mellitus type 2, controlled     HSV (herpes simplex virus) infection 03/15/2022    Hypercholesterolemia     Hypertension     Hypothyroidism     Osteoarthritis of multiple joints     Spinal stenosis     LUMBAR     Physical Exam:   Vitals:    05/12/25 1034   Temp: 97.1 °F (36.2 °C)   Weight: 89.4 kg (197 lb)   Height: 162.6 cm (64\")   PainSc: 0-No pain     Well developed with normal mood.  Nonantalgic gait.  Minimal if any tenderness to palpation at the base of the right fifth metatarsal good eversion strength      Radiology: 3 views right foot ordered evaluate pain and alignment reviewed and compared to previous x-rays.  Proximal fifth metatarsal fracture still remains somewhat evident.  Fracture line is maybe little less evident than on previous views there is still some gapping laterally.    CT scan films and report of the right foot dated " 3/6/2025 reviewed which show a nondisplaced intra-articular fracture of the proximal fifth metatarsal extending to the TMT joint with an estimated 10% bony bridging across the dorsal aspect of the fracture.     Small well-corticated ossifications distal to the medial lateral malleolus and likely from remote trauma     Mild thickening of the proximal plantar fascia with small plantar calcaneal spur.     Multifocal mild degenerative change of the midfoot with good alignment and moderate to severe degenerative change first MTP joint with possible osteochondral bodies in the plantar joint space.        Vitamin D 1/17/2024 42.7   Vitamin D 2/6/2025 41.2        Assessment/Plan:1. 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  Right first MTP arthritis  3.  Right midfoot arthritis    We discussed treatment going forward and she would prefer not to do surgery was absolutely necessary.  Reviewed with her that given her relative paucity of pain and some improvement on x-rays there is nothing we have to do from an operative standpoint and she would prefer not to pursue as such understanding could be required down the road.  Will cancel her upcoming surgery for the time being    She may gradually start increasing her activities and see how she does.  Should continue with her bone stimulator as she has been using this daily and used her accommodative shoes.  Anything worsens she will let me know otherwise I will see her back in 6 to 8 weeks with x-rays of her right foot.

## 2025-05-30 ENCOUNTER — OFFICE VISIT (OUTPATIENT)
Dept: FAMILY MEDICINE CLINIC | Facility: CLINIC | Age: 70
End: 2025-05-30
Payer: MEDICARE

## 2025-05-30 VITALS
TEMPERATURE: 98.7 F | HEART RATE: 86 BPM | OXYGEN SATURATION: 95 % | HEIGHT: 64 IN | DIASTOLIC BLOOD PRESSURE: 80 MMHG | RESPIRATION RATE: 16 BRPM | SYSTOLIC BLOOD PRESSURE: 120 MMHG | WEIGHT: 189.9 LBS | BODY MASS INDEX: 32.42 KG/M2

## 2025-05-30 DIAGNOSIS — E11.65 CONTROLLED TYPE 2 DIABETES MELLITUS WITH HYPERGLYCEMIA, WITHOUT LONG-TERM CURRENT USE OF INSULIN: Chronic | ICD-10-CM

## 2025-05-30 DIAGNOSIS — I10 PRIMARY HYPERTENSION: Primary | Chronic | ICD-10-CM

## 2025-05-30 DIAGNOSIS — F41.9 ANXIETY AND DEPRESSION: ICD-10-CM

## 2025-05-30 DIAGNOSIS — F32.A ANXIETY AND DEPRESSION: ICD-10-CM

## 2025-05-30 NOTE — PATIENT INSTRUCTIONS
Hypertension: stable, continue amlodipine 5 mg daily, HCTZ 12.5 mg  1 tab daily, and lisinopril 10 mg daily    Diabetes: HgA1c 6.5, continue to eat a heart healthy diabetic diet, continue to increase activity, continue Jardiance 25 mg 1 tab daily, increasing tirzepatide to 7.5 mg once a week. Will get lipase level at next labs.    Anxiety/depression: stable, continue fluoxetine 20 mg 1 tab daily

## 2025-05-30 NOTE — PROGRESS NOTES
Subjective     Summer Jefferson is a 69 y.o.. female.     Patient here today for follow up on hypertension, diabetes, anxiety and depression.    Patient stating she is eating healthy and drinking water. Patient stating she has been increasing her walking.        The following portions of the patient's history were reviewed and updated as appropriate: allergies, current medications, past family history, past medical history, past social history, past surgical history and problem list.    Past Medical History:   Diagnosis Date    Anxiety and depression 03/15/2022    Backache     Diabetes mellitus type 2, controlled     HSV (herpes simplex virus) infection 03/15/2022    Hypercholesterolemia     Hypertension     Hypothyroidism     Osteoarthritis of multiple joints     Spinal stenosis     LUMBAR       Past Surgical History:   Procedure Laterality Date    ANKLE ARTHROSCOPY Right     1/2013 & 10/2013    APPENDECTOMY      COLONOSCOPY      7/27/2007    EPIDURAL BLOCK      X5    LAPAROSCOPIC GASTRIC BANDING  08/01/2007    LUMBAR FUSION Left 12/11/2023    Procedure: PRONE OBLIQUE LATERAL LUMBAR TWO TO THREE, FOUR TO FIVE INTERBODY FUSION WITH ANTERIOR PLATING AND NEUROMONITORING;  Surgeon: Calderon Whittaker MD;  Location: [facility];  Service: Neurosurgery;  Laterality: Left;    LUMBAR FUSION N/A 12/11/2023    Procedure: LUMBAR FUSION MINIMALLY INVASIVE NAVIGATION;  Surgeon: Calderon Whittaker MD;  Location: [facility];  Service: Neurosurgery;  Laterality: N/A;    LUMBAR FUSION N/A 02/26/2024    Procedure: LUMBAR TWO THREE, THREE TO FOUR, FOUR TO FIVE FUSION MINIMALLY INVASIVE NAVIGATION;  Surgeon: Calderon Whittaker MD;  Location: [facility];  Service: Neurosurgery;  Laterality: N/A;    ROTATOR CUFF REPAIR Right     5/2014    TOTAL KNEE ARTHROPLASTY Right     12/2014    TUBAL ABDOMINAL LIGATION         Review of Systems   Constitutional: Negative.    Respiratory: Negative.     Cardiovascular: Negative.    Gastrointestinal:  "Negative.    Neurological: Negative.    Psychiatric/Behavioral:  Negative for dysphoric mood, self-injury, sleep disturbance and suicidal ideas. The patient is not nervous/anxious.        No Known Allergies    Objective     Vitals:    05/30/25 0952   BP: 120/80   BP Location: Right arm   Patient Position: Sitting   Cuff Size: Adult   Pulse: 86   Resp: 16   Temp: 98.7 °F (37.1 °C)   TempSrc: Oral   SpO2: 95%   Weight: 86.1 kg (189 lb 14.4 oz)   Height: 162.6 cm (64.02\")     Body mass index is 32.58 kg/m².    Physical Exam  Vitals reviewed.   HENT:      Head: Normocephalic.   Eyes:      Pupils: Pupils are equal, round, and reactive to light.   Cardiovascular:      Rate and Rhythm: Normal rate and regular rhythm.      Pulses: Normal pulses.   Pulmonary:      Effort: Pulmonary effort is normal.      Breath sounds: Normal breath sounds.   Musculoskeletal:         General: Normal range of motion.      Right lower leg: No edema.      Left lower leg: No edema.   Skin:     General: Skin is warm and dry.   Neurological:      Mental Status: She is alert and oriented to person, place, and time.   Psychiatric:         Behavior: Behavior normal.           Current Outpatient Medications:     amLODIPine (NORVASC) 5 MG tablet, Take 1 tablet by mouth Daily., Disp: 90 tablet, Rfl: 3    celecoxib (CeleBREX) 200 MG capsule, Take 1 capsule by mouth Daily., Disp: 90 capsule, Rfl: 3    diphenhydrAMINE (BENADRYL) 25 mg capsule, Take 1 capsule by mouth Every 6 (Six) Hours As Needed for Itching., Disp: , Rfl:     empagliflozin (Jardiance) 25 MG tablet tablet, Take 1 tablet by mouth Daily., Disp: 90 tablet, Rfl: 3    FLUoxetine (PROzac) 20 MG capsule, Take 1 capsule by mouth Daily., Disp: 90 capsule, Rfl: 3    hydroCHLOROthiazide 12.5 MG tablet, Take 1 tablet by mouth Daily., Disp: 90 tablet, Rfl: 3    levothyroxine (SYNTHROID, LEVOTHROID) 50 MCG tablet, Take 1 tablet by mouth Daily. (Patient taking differently: Take 1 tablet by mouth Every " Morning.), Disp: 90 tablet, Rfl: 3    lisinopril (PRINIVIL,ZESTRIL) 10 MG tablet, Take 1 tablet by mouth Daily., Disp: 90 tablet, Rfl: 3    rosuvastatin (CRESTOR) 10 MG tablet, Take 1 tablet by mouth Daily., Disp: 90 tablet, Rfl: 3    famciclovir (FAMVIR) 125 MG tablet, 1 tab twice a day for 5 days with flair ups (Patient not taking: Reported on 5/30/2025), Disp: 10 tablet, Rfl: 5    lidocaine (LIDODERM) 5 %, Place 1 patch on the skin as directed by provider Daily. Remove & Discard patch within 12 hours; use as needed for back pain (Patient not taking: Reported on 5/30/2025), Disp: 60 each, Rfl: 5    meloxicam (MOBIC) 15 MG tablet, Take 1 tablet by mouth Daily. (Patient not taking: Reported on 5/30/2025), Disp: , Rfl:     Tirzepatide 7.5 MG/0.5ML solution auto-injector, Inject 7.5 mg under the skin into the appropriate area as directed 1 (One) Time Per Week., Disp: 2 mL, Rfl: 3    Recent Results (from the past 4 weeks)   Comprehensive Metabolic Panel    Collection Time: 05/19/25  8:33 AM    Specimen: Blood   Result Value Ref Range    Glucose 160 (H) 65 - 99 mg/dL    BUN 19 8 - 23 mg/dL    Creatinine 0.82 0.57 - 1.00 mg/dL    EGFR Result 77.1 >60.0 mL/min/1.73    BUN/Creatinine Ratio 23.2 7.0 - 25.0    Sodium 141 136 - 145 mmol/L    Potassium 4.4 3.5 - 5.2 mmol/L    Chloride 104 98 - 107 mmol/L    Total CO2 23.9 22.0 - 29.0 mmol/L    Calcium 9.4 8.6 - 10.5 mg/dL    Total Protein 7.2 6.0 - 8.5 g/dL    Albumin 4.5 3.5 - 5.2 g/dL    Globulin 2.7 gm/dL    A/G Ratio 1.7 g/dL    Total Bilirubin 0.4 0.0 - 1.2 mg/dL    Alkaline Phosphatase 79 39 - 117 U/L    AST (SGOT) 22 1 - 32 U/L    ALT (SGPT) 18 1 - 33 U/L   Hemoglobin A1c    Collection Time: 05/19/25  8:33 AM    Specimen: Blood   Result Value Ref Range    Hemoglobin A1C 6.50 (H) 4.80 - 5.60 %   MicroAlbumin, Urine, Random - Urine, Clean Catch    Collection Time: 05/19/25  8:33 AM    Specimen: Urine, Clean Catch   Result Value Ref Range    Microalbumin, Urine 6.7 Not  Estab. ug/mL         Diagnoses and all orders for this visit:    1. Primary hypertension (Primary)    2. Controlled type 2 diabetes mellitus with hyperglycemia, without long-term current use of insulin  -     Tirzepatide 7.5 MG/0.5ML solution auto-injector; Inject 7.5 mg under the skin into the appropriate area as directed 1 (One) Time Per Week.  Dispense: 2 mL; Refill: 3  -     Comprehensive Metabolic Panel; Future  -     Hemoglobin A1c; Future  -     MicroAlbumin, Urine, Random - Urine, Clean Catch; Future  -     Lipase; Future    3. Anxiety and depression          Patient Instructions   Hypertension: stable, continue amlodipine 5 mg daily, HCTZ 12.5 mg  1 tab daily, and lisinopril 10 mg daily    Diabetes: HgA1c 6.5, continue to eat a heart healthy diabetic diet, continue to increase activity, continue Jardiance 25 mg 1 tab daily, increasing tirzepatide to 7.5 mg once a week. Will get lipase level at next labs.    Anxiety/depression: stable, continue fluoxetine 20 mg 1 tab daily    Return for fasting labs in 3 months, recheck in 3 months.

## 2025-06-27 ENCOUNTER — HOSPITAL ENCOUNTER (OUTPATIENT)
Dept: GENERAL RADIOLOGY | Facility: HOSPITAL | Age: 70
Discharge: HOME | End: 2025-06-27
Payer: MEDICARE

## 2025-06-27 ENCOUNTER — ANESTHESIA (OUTPATIENT)
Dept: PAIN MEDICINE | Facility: HOSPITAL | Age: 70
End: 2025-06-27
Payer: MEDICARE

## 2025-06-27 ENCOUNTER — ANESTHESIA EVENT (OUTPATIENT)
Dept: PAIN MEDICINE | Facility: HOSPITAL | Age: 70
End: 2025-06-27
Payer: MEDICARE

## 2025-06-27 ENCOUNTER — HOSPITAL ENCOUNTER (OUTPATIENT)
Dept: PAIN MEDICINE | Facility: HOSPITAL | Age: 70
Discharge: HOME | End: 2025-06-27
Payer: MEDICARE

## 2025-06-27 VITALS
RESPIRATION RATE: 16 BRPM | HEART RATE: 88 BPM | TEMPERATURE: 97.1 F | DIASTOLIC BLOOD PRESSURE: 75 MMHG | OXYGEN SATURATION: 97 % | SYSTOLIC BLOOD PRESSURE: 113 MMHG

## 2025-06-27 DIAGNOSIS — R52 PAIN: ICD-10-CM

## 2025-06-27 DIAGNOSIS — M46.1 SACROILIITIS: ICD-10-CM

## 2025-06-27 LAB — GLUCOSE BLDC GLUCOMTR-MCNC: 146 MG/DL (ref 70–130)

## 2025-06-27 PROCEDURE — 25010000002 METHYLPREDNISOLONE PER 80 MG: Performed by: ANESTHESIOLOGY

## 2025-06-27 PROCEDURE — 25010000002 BUPIVACAINE (PF) 0.25 % SOLUTION: Performed by: ANESTHESIOLOGY

## 2025-06-27 PROCEDURE — 82948 REAGENT STRIP/BLOOD GLUCOSE: CPT

## 2025-06-27 RX ORDER — MIDAZOLAM HYDROCHLORIDE 1 MG/ML
0.5 INJECTION, SOLUTION INTRAMUSCULAR; INTRAVENOUS AS NEEDED
Status: DISCONTINUED | OUTPATIENT
Start: 2025-06-27 | End: 2025-06-28 | Stop reason: HOSPADM

## 2025-06-27 RX ORDER — FENTANYL CITRATE 50 UG/ML
25 INJECTION, SOLUTION INTRAMUSCULAR; INTRAVENOUS AS NEEDED
Status: DISCONTINUED | OUTPATIENT
Start: 2025-06-27 | End: 2025-06-28 | Stop reason: HOSPADM

## 2025-06-27 RX ORDER — BUPIVACAINE HYDROCHLORIDE 2.5 MG/ML
10 INJECTION, SOLUTION EPIDURAL; INFILTRATION; INTRACAUDAL; PERINEURAL ONCE
Status: COMPLETED | OUTPATIENT
Start: 2025-06-27 | End: 2025-06-27

## 2025-06-27 RX ORDER — LIDOCAINE HYDROCHLORIDE 10 MG/ML
1 INJECTION, SOLUTION INFILTRATION; PERINEURAL ONCE
Status: DISCONTINUED | OUTPATIENT
Start: 2025-06-27 | End: 2025-06-28 | Stop reason: HOSPADM

## 2025-06-27 RX ORDER — METHYLPREDNISOLONE ACETATE 80 MG/ML
80 INJECTION, SUSPENSION INTRA-ARTICULAR; INTRALESIONAL; INTRAMUSCULAR; SOFT TISSUE ONCE
Status: COMPLETED | OUTPATIENT
Start: 2025-06-27 | End: 2025-06-27

## 2025-06-27 RX ADMIN — BUPIVACAINE HYDROCHLORIDE 10 ML: 2.5 INJECTION, SOLUTION EPIDURAL; INFILTRATION; INTRACAUDAL; PERINEURAL at 10:23

## 2025-06-27 RX ADMIN — METHYLPREDNISOLONE ACETATE 80 MG: 80 INJECTION, SUSPENSION INTRA-ARTICULAR; INTRALESIONAL; INTRAMUSCULAR; SOFT TISSUE at 10:22

## 2025-06-27 NOTE — H&P
HISTORY OF PRESENT ILLNESS:  Returns for another bilateral SI joint injection today.  She receives greater than 80% relief.  Conservative therapy includes rest Celebrex ice.    PAST MEDICAL HISTORY:  Current Outpatient Medications on File Prior to Encounter   Medication Sig Dispense Refill    amLODIPine (NORVASC) 5 MG tablet Take 1 tablet by mouth Daily. 90 tablet 3    celecoxib (CeleBREX) 200 MG capsule Take 1 capsule by mouth Daily. 90 capsule 3    empagliflozin (Jardiance) 25 MG tablet tablet Take 1 tablet by mouth Daily. 90 tablet 3    FLUoxetine (PROzac) 20 MG capsule Take 1 capsule by mouth Daily. 90 capsule 3    hydroCHLOROthiazide 12.5 MG tablet Take 1 tablet by mouth Daily. 90 tablet 3    levothyroxine (SYNTHROID, LEVOTHROID) 50 MCG tablet Take 1 tablet by mouth Daily. (Patient taking differently: Take 1 tablet by mouth Every Morning.) 90 tablet 3    lidocaine (LIDODERM) 5 % Place 1 patch on the skin as directed by provider Daily. Remove & Discard patch within 12 hours; use as needed for back pain 60 each 5    lisinopril (PRINIVIL,ZESTRIL) 10 MG tablet Take 1 tablet by mouth Daily. 90 tablet 3    rosuvastatin (CRESTOR) 10 MG tablet Take 1 tablet by mouth Daily. 90 tablet 3    Tirzepatide 7.5 MG/0.5ML solution auto-injector Inject 7.5 mg under the skin into the appropriate area as directed 1 (One) Time Per Week. 2 mL 3    diphenhydrAMINE (BENADRYL) 25 mg capsule Take 1 capsule by mouth Every 6 (Six) Hours As Needed for Itching.      famciclovir (FAMVIR) 125 MG tablet 1 tab twice a day for 5 days with flair ups (Patient not taking: Reported on 5/30/2025) 10 tablet 5    [DISCONTINUED] meloxicam (MOBIC) 15 MG tablet Take 1 tablet by mouth Daily. (Patient not taking: Reported on 5/30/2025)       No current facility-administered medications on file prior to encounter.       Past Medical History:   Diagnosis Date    Anxiety and depression 03/15/2022    Backache     Diabetes mellitus type 2, controlled     HSV  (herpes simplex virus) infection 03/15/2022    Hypercholesterolemia     Hypertension     Hypothyroidism     Osteoarthritis of multiple joints     Spinal stenosis     LUMBAR         SOCIAL HISTORY:  No tobacco    REVIEW OF SYSTEMS:  No hematologic infectious or constitutional symptoms  Other review of systems non-contributory    PHYSICAL EXAM:  /76 (BP Location: Left arm, Patient Position: Sitting)   Pulse 89   Temp 36.2 °C (97.1 °F) (Oral)   Resp 18   SpO2 96%   Mallampati class 2 airway      DIAGNOSIS:  Post-Op Diagnosis Codes:     * Sacroiliitis [M46.1]    PLAN:  Bilateral sacroiliac joint injection with steroid and local anesthetic.  Discussed the risks with the patient including failure of relief and infection.

## 2025-06-27 NOTE — ANESTHESIA PROCEDURE NOTES
PAIN SI joint injection      Patient reassessed immediately prior to procedure    Patient location during procedure: Pain Clinic    Reason for block: procedure for pain  Performed by  Anesthesiologist: Drake Ospina MD  Preanesthetic Checklist  Completed: patient identified and surgical consent  Prep:  Patient position: prone  Sterile barriers:cap, gloves, mask and sterile barrier  Prep: ChloraPrep  Patient monitoring: blood pressure monitoring, continuous pulse oximetry and EKG  Procedure:  Sedation:no  Guidance:fluoroscopy  Contrast Medium:no  Location:Bilateral  Needle Type:Quincke  Needle Gauge:22 G  Medications:  Depomedrol:80 mg  Comment:Bupivacaine 0.25% 2 cc per side    Post Assessment  Patient Tolerance:comfortable throughout block  Complications:no  Additional Notes  Diagnosis: Sacroiliitis    Sedation:  none    Sedation time:    Under fluoroscopic guidance and after ChloraPrep, the SI joint was accessed and injected with the above medications.

## 2025-07-02 ENCOUNTER — OFFICE VISIT (OUTPATIENT)
Dept: ORTHOPEDIC SURGERY | Facility: CLINIC | Age: 70
End: 2025-07-02
Payer: MEDICARE

## 2025-07-02 VITALS — WEIGHT: 187.2 LBS | TEMPERATURE: 97.7 F | BODY MASS INDEX: 31.96 KG/M2 | HEIGHT: 64 IN

## 2025-07-02 DIAGNOSIS — M19.071 ARTHRITIS OF FIRST METATARSOPHALANGEAL (MTP) JOINT OF RIGHT FOOT: ICD-10-CM

## 2025-07-02 DIAGNOSIS — M19.071 ARTHRITIS OF RIGHT FOOT: ICD-10-CM

## 2025-07-02 DIAGNOSIS — S92.354K CLOSED NONDISPLACED FRACTURE OF FIFTH METATARSAL BONE OF RIGHT FOOT WITH NONUNION, SUBSEQUENT ENCOUNTER: Primary | ICD-10-CM

## 2025-07-02 NOTE — PROGRESS NOTES
"Foot Follow Up      Patient: Summer Jefferson    YOB: 1955 69 y.o. female    Chief Complaints: Foot aches    History of Present Illness:Patient was seen on 11/11/2024 reporting that she injured her right foot approximately 1 week prior when she stepped on a stone in a parking lot.  She had had pain swelling and bruising at the lateral midfoot but really none to the ankle.  She had been seen in urgent care on and fitted with a short boot.     When seen on 11/11/2024 she reported mild pain in the proximal lateral aspect of the right fifth metatarsal.     At that time she was found to have fifth metatarsal base fracture without clear evidence of ankle injury.  Did not recommend any surgical treatment at that time.  She was instructed on continue send boot and sleeping in a postoperative shoe and offloading with a cane.  She was also counseled avoid anti-inflammatories.     Patient was seen on 12/5/2024 reporting that her foot felt fine.  She had been limiting activity on it and reported that she does not have any \"real pain\".  She had been using her boot for weightbearing and offloading with a cane and sleeping in a postoperative shoe.  She had minimal if any pain at the base of the right fifth metatarsal and no pain about the ankle.     We discussed treatment at that time and reviewed x-rays and did have a bit more displacement but joint did not show incongruity.  Symptoms are improving and I did not recommend any further workup or surgical treatment nor does she desire it.  She was instructed on use of boot or postoperative shoe for weightbearing activities and to offload with a cane.  She also reported that she was taking \"1-1/2 times\" the vitamins that she was supposed to take including vitamin D and her level was decent in January 2024 so we held off on rechecking at that time unless she had further healing issues.  Reviewed she could walk is much as needed for daily activity with protection as " previously outlined.     Patient was seen on 1/3/2025 reporting that her foot felt had great.  She had been using her boot for weightbearing activities and sleeping in a postoperative shoe with no pain in the right foot.  Pain was rated 0 out of 10.     At that time she seemed to be doing well with fifth metatarsal base fracture without clear sign of ankle injury.  Reviewed x-ray findings with her and although there was still some gapping she was clinically asymptomatic and did not recommend further workup.  She was anxious to get out of her boot.  She was allowed start weaning out of her boot and into  postoperative shoe initially around the house for a week or so and gradually out of the house and tolerating then start weaning into a sturdy athletic shoe initially around the house and then gradually out of the house.  She was leaving in 2 weeks to go on a cruise and recommended she take her boot with her.       Patient was seen on 2/5/2025 reporting that her foot felt okay.  Since previous appointment she had used her boot for about another week and then subsequently started using tennis shoes or stiff shoes.  She did take her boot with her on her cruise and used it about half the time.  She reported that overall her foot felt okay but did get an occasional slight discomfort at the base of the fifth metatarsal.  Symptoms were no worse out of her boot than they were in her boot.     We discussed treatment and reviewed x-rays and there appeared to be some areas of nonunion she did not wish to pursue surgical treatment at that time without exhausting further conservative measures.  She was sent for vitamin D evaluation and bone stimulator was ordered.     On 2/6/2025 her vitamin D was found to be adequate at 41.2 and no further augmentation was recommended.     I spoke with her again on 2/24/2025 and she been using the bone stimulator and went ahead and ordered a CT scan.     Patient had her CT scan and I reviewed  "results with her via telephone on 3/10/2025 and we discussed proceeding with operative treatment and would review that with further detail with her at follow-up appointment on 3/19/2025.     Patient was seen on 3/19/2025 reporting that her foot was doing okay but still had pain at the base of the right fifth metatarsal.  Pain was rated 2 out of 10.     We discussed continued nonoperative versus operative treatment options with mutual decision made proceed with operative treatment with open reduction internal fixation and bone grafting of the proximal fifth metatarsal.     This was scheduled for 4/8/2025 however patient had dental issues that precluded surgery and it was subsequently rescheduled to 5/27/2025.  She was brought in on 5/12/2025 for further evaluation to check x-rays etc.  She reported that she was not having any pain with walking or activities of daily living and was not having appreciable pain in the right foot.  Pain was rated 0 out of 10.    We discussed treatment at that time and reviewed with her that given her paucity of symptoms I did not know that we necessarily had to do surgery and she did not wish to do any unless it was absolutely necessary which I did not see that it was at that point.  She understood she could still require surgery down the road.  We canceled her upcoming surgery.  She was advised to gradually start increasing activities and see how she did with that as well as continue with bone stimulator and accommodative shoes.    Patient is seen back today reporting that since her previous visit she has had onset of relative constant aching in the lateral aspect of her right foot but last time.  Not having any pain.  She has been compliant with using her bone stimulator and intermittently uses over-the-counter Voltaren gel on her foot which helps.  She intermittently uses Lidoderm patches as well.  She reports the symptoms are more of a \"ache\" than pain.  HPI    ROS: Foot pain  Past " "Medical History:   Diagnosis Date    Anxiety and depression 03/15/2022    Backache     Diabetes mellitus type 2, controlled     HSV (herpes simplex virus) infection 03/15/2022    Hypercholesterolemia     Hypertension     Hypothyroidism     Osteoarthritis of multiple joints     Spinal stenosis     LUMBAR     Physical Exam:   Vitals:    07/02/25 0947   Temp: 97.7 °F (36.5 °C)   Weight: 84.9 kg (187 lb 3.2 oz)   Height: 162.6 cm (64\")   PainSc: 0-No pain     Well developed with normal mood.  On exam she has mild tenderness to palpation over the proximal aspect of the fifth metatarsal of the right foot.      Radiology: 3 views right foot ordered evaluate fracture alignment reviewed with her and she took a picture and compared to previous x-rays these show no change in alignment of the proximal fifth metatarsal fracture that appears to be at least partially healed there is still some gapping laterally and plantarly but overall looks good.      CT scan films and report of the right foot dated 3/6/2025 reviewed which show a nondisplaced intra-articular fracture of the proximal fifth metatarsal extending to the TMT joint with an estimated 10% bony bridging across the dorsal aspect of the fracture.     Small well-corticated ossifications distal to the medial lateral malleolus and likely from remote trauma     Mild thickening of the proximal plantar fascia with small plantar calcaneal spur.     Multifocal mild degenerative change of the midfoot with good alignment and moderate to severe degenerative change first MTP joint with possible osteochondral bodies in the plantar joint space.        Vitamin D 1/17/2024 42.7   Vitamin D 2/6/2025 41.2        Assessment/Plan:1. 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  Right first MTP arthritis  3.  Right midfoot arthritis    We discussed treatment going forward and has had some exacerbation of previous symptoms with some aching pain in her " foot when previously she was not having any pain.  We discussed getting a CT scan but she states she does not wish to pursue surgical treatment at this time so we will hold off on that as I do not feel it would change our treatment protocol.    She will continue with her bone stimulator as she has been using.  We discussed prescription compounding cream to apply several times daily and she wants to hold off on that but will let me know if she wishes to pursue that and in the interim we will use over-the-counter Voltaren topical.  She asked about using her recumbent bike which I felt she was okay to do and we will see how she does with that but if has worsening pain will back off    We had a very pleasant visit and we will see her back in 6 weeks x-rays of her right foot.

## 2025-08-14 ENCOUNTER — OFFICE VISIT (OUTPATIENT)
Dept: ORTHOPEDIC SURGERY | Facility: CLINIC | Age: 70
End: 2025-08-14
Payer: MEDICARE

## 2025-08-14 VITALS — HEIGHT: 64 IN | WEIGHT: 186 LBS | BODY MASS INDEX: 31.76 KG/M2 | TEMPERATURE: 98.4 F

## 2025-08-14 DIAGNOSIS — M19.071 ARTHRITIS OF RIGHT FOOT: ICD-10-CM

## 2025-08-14 DIAGNOSIS — M19.071 ARTHRITIS OF FIRST METATARSOPHALANGEAL (MTP) JOINT OF RIGHT FOOT: ICD-10-CM

## 2025-08-14 DIAGNOSIS — S92.354K CLOSED NONDISPLACED FRACTURE OF FIFTH METATARSAL BONE OF RIGHT FOOT WITH NONUNION, SUBSEQUENT ENCOUNTER: Primary | ICD-10-CM

## 2025-08-22 ENCOUNTER — HOSPITAL ENCOUNTER (OUTPATIENT)
Dept: CT IMAGING | Facility: HOSPITAL | Age: 70
Discharge: HOME | End: 2025-08-22
Payer: MEDICARE

## 2025-08-22 DIAGNOSIS — S92.354K CLOSED NONDISPLACED FRACTURE OF FIFTH METATARSAL BONE OF RIGHT FOOT WITH NONUNION, SUBSEQUENT ENCOUNTER: ICD-10-CM

## 2025-08-22 PROCEDURE — 73700 CT LOWER EXTREMITY W/O DYE: CPT

## 2025-08-22 PROCEDURE — 76377 3D RENDER W/INTRP POSTPROCES: CPT

## (undated) DEVICE — PAD,ABDOMINAL,8"X10",ST,LF: Brand: MEDLINE

## (undated) DEVICE — ANTIBACTERIAL UNDYED BRAIDED (POLYGLACTIN 910), SYNTHETIC ABSORBABLE SUTURE: Brand: COATED VICRYL

## (undated) DEVICE — ELECTRD BLD EZ CLN MOD XLNG 2.75IN

## (undated) DEVICE — APPL CHLORAPREP HI/LITE 26ML ORNG

## (undated) DEVICE — ELECTRD BLD EZ CLN MOD 6.5IN

## (undated) DEVICE — BONE SCREW 4675020 LS 5.0X20MM
Type: IMPLANTABLE DEVICE | Site: SPINE LUMBAR | Status: NON-FUNCTIONAL
Brand: ANTERALIGN SPINAL SYSTEM WITH TITAN NANOLOCK SURFACE TECHNOLOGY
Removed: 2023-12-11

## (undated) DEVICE — NANO SPACER 46261045 T 6DG 20W 10X7.9X45
Type: IMPLANTABLE DEVICE | Site: SPINE LUMBAR | Status: NON-FUNCTIONAL
Brand: ANTERALIGN SPINAL SYSTEM WITH TITAN NANOLOCK SURFACE TECHNOLOGY
Removed: 2023-12-11

## (undated) DEVICE — DRSNG WND BORDR/ADHS NONADHR/GZ LF 4X4IN STRL

## (undated) DEVICE — DRSNG WND GZ PAD BORDERED 4X8IN STRL

## (undated) DEVICE — PATIENT RETURN ELECTRODE, SINGLE-USE, CONTACT QUALITY MONITORING, ADULT, WITH 9FT CORD, FOR PATIENTS WEIGING OVER 33LBS. (15KG): Brand: MEGADYNE

## (undated) DEVICE — MARKR SKIN W/RULR 2TP

## (undated) DEVICE — PREP SOL POVIDONE/IODINE BT 4OZ

## (undated) DEVICE — SPONGE,NEURO,0.5"X3",XR,STRL,LF,10/PK: Brand: MEDLINE

## (undated) DEVICE — TOOL MR8-15MH22 MR8 15CM MATCH 2.2MM: Brand: MIDAS REX MR8

## (undated) DEVICE — DRP MICROSCP LEICA 137X381CM

## (undated) DEVICE — GLV SURG BIOGEL LTX PF 8

## (undated) DEVICE — DRP C/ARM 41X74IN

## (undated) DEVICE — DRP SLUSH WARMR MACH CIR 44X44IN

## (undated) DEVICE — SOL ISO/ALC 70PCT 4OZ

## (undated) DEVICE — TOTAL TRAY, 16FR 10ML SIL FOLEY, URN: Brand: MEDLINE

## (undated) DEVICE — ELECTRD BLD EZ CLN MOD 4IN

## (undated) DEVICE — STRIP,CLOSURE,WOUND,MEDI-STRIP,1/2X4: Brand: MEDLINE

## (undated) DEVICE — DISPOSABLE IRRIGATION BIPOLAR CORD, M1000 TYPE: Brand: KIRWAN

## (undated) DEVICE — INTENDED FOR TISSUE SEPARATION, AND OTHER PROCEDURES THAT REQUIRE A SHARP SURGICAL BLADE TO PUNCTURE OR CUT.: Brand: BARD-PARKER ® CARBON RIB-BACK BLADES

## (undated) DEVICE — ADHS SKIN SURG TISS VISC PREMIERPRO EXOFIN HI/VISC FAST/DRY

## (undated) DEVICE — CONN TBG Y 5 IN 1 LF STRL

## (undated) DEVICE — COVER,C-ARM,41X74: Brand: MEDLINE

## (undated) DEVICE — DRP STRL STERILEZ ZFLD

## (undated) DEVICE — BNDG,ELSTC,MATRIX,STRL,4"X5YD,LF,HOOK&LP: Brand: MEDLINE

## (undated) DEVICE — 3M™ IOBAN™ 2 ANTIMICROBIAL INCISE DRAPE 6650EZ: Brand: IOBAN™ 2

## (undated) DEVICE — GLV SURG SENSICARE W/ALOE PF LF 7 STRL

## (undated) DEVICE — SUT MNCRYL PLS ANTIB UD 4/0 PS2 18IN

## (undated) DEVICE — STPLR SKIN VISISTAT WD 35CT

## (undated) DEVICE — SPONGE,NEURO,.75"X.75",XR,STRL,LF,10/PK: Brand: MEDLINE

## (undated) DEVICE — NEEDLE, QUINCKE 22GX3.5": Brand: MEDLINE INDUSTRIES, INC.

## (undated) DEVICE — WEBRIL* CAST PADDING: Brand: DEROYAL

## (undated) DEVICE — SKIN PREP TRAY W/CHG: Brand: MEDLINE INDUSTRIES, INC.

## (undated) DEVICE — GOWN,SIRUS,NONRNF,SETINSLV,2XL,18/CS: Brand: MEDLINE

## (undated) DEVICE — GLOVE,SURG,SENSICARE,ALOE,LF,PF,7: Brand: MEDLINE

## (undated) DEVICE — LT SOURCE STR TP

## (undated) DEVICE — SCREW 2140125 LUMBAR BONE 5.5 X 25MM
Type: IMPLANTABLE DEVICE | Site: SPINE LUMBAR | Status: NON-FUNCTIONAL
Brand: PIVOX™ OBLIQUE LATERAL SPINAL SYSTEM
Removed: 2023-12-11

## (undated) DEVICE — INSTRUMENT 8670001 SXT GUIDEWIRE BLUNT

## (undated) DEVICE — TOOL MR8-14MH30 MR8 14CM MATCH 3MM: Brand: MIDAS REX MR8

## (undated) DEVICE — BLD KNIF METRIX SHEATHED 160MM

## (undated) DEVICE — DRSNG WND BORDR/ADHS NONADHR/GZ LF 2X2IN STRL

## (undated) DEVICE — DRAPE,U/ SHT,SPLIT,PLAS,STERIL: Brand: MEDLINE

## (undated) DEVICE — UNDERCAST PADDING: Brand: DEROYAL

## (undated) DEVICE — DRP C/ARMOR

## (undated) DEVICE — NDL HYPO PRECISIONGLIDE REG 25G 1 1/2

## (undated) DEVICE — BNDG ELAS CO-FLEX SLF ADHR 2IN 5YD LF STRL

## (undated) DEVICE — PK NEURO SPINE 40

## (undated) DEVICE — GLV SURG SIGNATURE ESSENTIAL PF LTX SZ8

## (undated) DEVICE — DRESSING,GAUZE,XEROFORM,CURAD,1"X8",ST: Brand: CURAD

## (undated) DEVICE — GLV SURG SENSICARE PI PF LF 7 GRN STRL

## (undated) DEVICE — PLATE 2140001 OLIF25 2-HOLE PLATE SMALL
Type: IMPLANTABLE DEVICE | Site: SPINE LUMBAR | Status: NON-FUNCTIONAL
Brand: PIVOX™ OBLIQUE LATERAL SPINAL SYSTEM
Removed: 2023-12-11

## (undated) DEVICE — SPNG GZ WOVN 4X4IN 12PLY 10/BX STRL

## (undated) DEVICE — SPHR MARKR STEALTH STATION

## (undated) DEVICE — LABEL SHEET CUSTOM 2X2 YELLOW: Brand: MEDLINE INDUSTRIES, INC.

## (undated) DEVICE — DRSNG SURESITE WNDW 4X4.5

## (undated) DEVICE — TOOL MR8-14BA50C MR8 14CM BALL CARB 5MM: Brand: MIDAS REX MR8

## (undated) DEVICE — ELECTRD NDL EZ CLN MOD 2.75IN

## (undated) DEVICE — TOWEL,OR,DSP,ST,BLUE,STD,4/PK,20PK/CS: Brand: MEDLINE

## (undated) DEVICE — QUINCKE POINT SPINAL NEEDLE, BLACK: Brand: RELI

## (undated) DEVICE — DISPOSABLE BIPOLAR FORCEPS 7 3/4" (19.7CM) SCOVILLE BAYONET, 1.5MM TIP AND 12 FT. (3.6M) CABLE: Brand: KIRWAN

## (undated) DEVICE — DILATOR SET 945NSD2750 NIM STIMULATED

## (undated) DEVICE — GAUZE,SPONGE,FLUFF,6"X6.75",STRL,10/TRAY: Brand: MEDLINE

## (undated) DEVICE — SPNG LAP 18X18IN LF STRL PK/5

## (undated) DEVICE — PENCL SMOKE/EVAC MEGADYNE TELESCP 10FT

## (undated) DEVICE — PIN, 9733236, 150MM, STERILE, PERC REF

## (undated) DEVICE — BLANKT WARM UPPR/BDY ARM/OUT 57X196CM

## (undated) DEVICE — LAPAROSCOPIC DISSECTOR: Brand: DEROYAL

## (undated) DEVICE — PK ORTHO MINOR TOWER 40

## (undated) DEVICE — SUT VIC 0 UR5 27IN DYED J376H